# Patient Record
Sex: MALE | Race: BLACK OR AFRICAN AMERICAN
[De-identification: names, ages, dates, MRNs, and addresses within clinical notes are randomized per-mention and may not be internally consistent; named-entity substitution may affect disease eponyms.]

---

## 2017-03-30 ENCOUNTER — APPOINTMENT (OUTPATIENT)
Dept: PULMONOLOGY | Facility: CLINIC | Age: 57
End: 2017-03-30

## 2017-03-30 VITALS
DIASTOLIC BLOOD PRESSURE: 90 MMHG | WEIGHT: 315 LBS | RESPIRATION RATE: 14 BRPM | BODY MASS INDEX: 41.75 KG/M2 | OXYGEN SATURATION: 97 % | HEIGHT: 73 IN | SYSTOLIC BLOOD PRESSURE: 132 MMHG | TEMPERATURE: 98.2 F | HEART RATE: 105 BPM

## 2017-04-28 ENCOUNTER — APPOINTMENT (OUTPATIENT)
Dept: SLEEP CENTER | Facility: CLINIC | Age: 57
End: 2017-04-28

## 2017-04-28 ENCOUNTER — OUTPATIENT (OUTPATIENT)
Dept: OUTPATIENT SERVICES | Facility: HOSPITAL | Age: 57
LOS: 1 days | End: 2017-04-28
Payer: COMMERCIAL

## 2017-04-28 PROCEDURE — G0399: CPT

## 2017-05-02 DIAGNOSIS — G47.33 OBSTRUCTIVE SLEEP APNEA (ADULT) (PEDIATRIC): ICD-10-CM

## 2019-01-30 ENCOUNTER — OFFICE VISIT (OUTPATIENT)
Dept: FAMILY MEDICINE CLINIC | Facility: CLINIC | Age: 59
End: 2019-01-30
Payer: COMMERCIAL

## 2019-01-30 ENCOUNTER — TELEPHONE (OUTPATIENT)
Dept: FAMILY MEDICINE CLINIC | Facility: CLINIC | Age: 59
End: 2019-01-30

## 2019-01-30 VITALS
RESPIRATION RATE: 16 BRPM | DIASTOLIC BLOOD PRESSURE: 78 MMHG | SYSTOLIC BLOOD PRESSURE: 134 MMHG | BODY MASS INDEX: 40.02 KG/M2 | WEIGHT: 302 LBS | TEMPERATURE: 98.9 F | OXYGEN SATURATION: 99 % | HEIGHT: 73 IN | HEART RATE: 104 BPM

## 2019-01-30 DIAGNOSIS — E08.43 DIABETIC AUTONOMIC NEUROPATHY ASSOCIATED WITH DIABETES MELLITUS DUE TO UNDERLYING CONDITION (HCC): ICD-10-CM

## 2019-01-30 DIAGNOSIS — E11.42 TYPE 2 DIABETES MELLITUS WITH DIABETIC POLYNEUROPATHY, WITH LONG-TERM CURRENT USE OF INSULIN (HCC): Primary | ICD-10-CM

## 2019-01-30 DIAGNOSIS — G47.30 SLEEP APNEA, UNSPECIFIED TYPE: ICD-10-CM

## 2019-01-30 DIAGNOSIS — E11.9 TYPE 2 DIABETES MELLITUS TREATED WITH INSULIN (HCC): Primary | ICD-10-CM

## 2019-01-30 DIAGNOSIS — G62.9 POLYNEUROPATHY: ICD-10-CM

## 2019-01-30 DIAGNOSIS — Z12.5 PROSTATE CANCER SCREENING: ICD-10-CM

## 2019-01-30 DIAGNOSIS — Z13.220 NEED FOR LIPID SCREENING: ICD-10-CM

## 2019-01-30 DIAGNOSIS — Z79.4 TYPE 2 DIABETES MELLITUS WITH DIABETIC POLYNEUROPATHY, WITH LONG-TERM CURRENT USE OF INSULIN (HCC): Primary | ICD-10-CM

## 2019-01-30 DIAGNOSIS — I50.22 CHRONIC SYSTOLIC CONGESTIVE HEART FAILURE (HCC): ICD-10-CM

## 2019-01-30 DIAGNOSIS — Z79.4 TYPE 2 DIABETES MELLITUS TREATED WITH INSULIN (HCC): Primary | ICD-10-CM

## 2019-01-30 DIAGNOSIS — E11.8 TYPE 2 DIABETES MELLITUS WITH COMPLICATION, WITHOUT LONG-TERM CURRENT USE OF INSULIN (HCC): Primary | ICD-10-CM

## 2019-01-30 DIAGNOSIS — Z11.59 NEED FOR HEPATITIS C SCREENING TEST: ICD-10-CM

## 2019-01-30 PROCEDURE — 36416 COLLJ CAPILLARY BLOOD SPEC: CPT | Performed by: FAMILY MEDICINE

## 2019-01-30 PROCEDURE — 83036 HEMOGLOBIN GLYCOSYLATED A1C: CPT | Performed by: FAMILY MEDICINE

## 2019-01-30 PROCEDURE — 99204 OFFICE O/P NEW MOD 45 MIN: CPT | Performed by: FAMILY MEDICINE

## 2019-01-30 RX ORDER — LANCETS 33 GAUGE
EACH MISCELLANEOUS 3 TIMES DAILY
Qty: 100 EACH | Refills: 1 | Status: SHIPPED | OUTPATIENT
Start: 2019-01-30 | End: 2019-08-04 | Stop reason: SDUPTHER

## 2019-01-30 RX ORDER — FUROSEMIDE 40 MG/1
40 TABLET ORAL 3 TIMES DAILY
Refills: 0 | COMMUNITY
Start: 2019-01-25 | End: 2019-03-05 | Stop reason: SDUPTHER

## 2019-01-30 RX ORDER — POTASSIUM CHLORIDE 750 MG/1
10 TABLET, EXTENDED RELEASE ORAL 2 TIMES DAILY WITH MEALS
Refills: 0 | COMMUNITY
Start: 2019-01-25 | End: 2019-03-05 | Stop reason: SDUPTHER

## 2019-01-30 RX ORDER — NAPROXEN SODIUM 220 MG
220 TABLET ORAL 2 TIMES DAILY WITH MEALS
COMMUNITY

## 2019-01-30 NOTE — PROGRESS NOTES
Assessment/Plan:    No problem-specific Assessment & Plan notes found for this encounter  Diagnoses and all orders for this visit:    Type 2 diabetes mellitus with diabetic polyneuropathy, with long-term current use of insulin ([place] [address] )  I had a long discussion with patient in regards to diabetes pathophysiology, medications and treatment  We also had a long discussion in regards to lifestyle modifications including diet  After discussing risks and benefits with patient and given his very elevated blood glucose values will start insulin at this time Lantus 10 units at bedtime  He was advised to measure his blood glucose fasting in the morning daily as well as before meals  He was counseled in regards to medication adherence and compliance  -     POCT hemoglobin A1c, 14  -     Comprehensive metabolic panel; Future  -     Microalbumin / creatinine urine ratio  -     insulin detemir (LEVEMIR) 100 units/mL subcutaneous injection; Inject 10 Units under the skin daily at bedtime for 30 days  -     Insulin Pen Needle 32G X 4 MM MISC; by Does not apply route daily for 30 days  -     Glucometer  -     Glucometer test strips  -     LANCETS MICRO THIN 33G MISC; by Does not apply route 3 (three) times a day for 30 days    Chronic systolic congestive heart failure ([place] [address] )  Referred to cardiology  -     Ambulatory referral to Cardiology; Future    Need for lipid screening  -     Lipid panel; Future    Prostate cancer screening  -     PSA, Total Screen; Future    Need for hepatitis C screening test  -     Hepatitis C antibody; Future    Diabetic autonomic neuropathy associated with diabetes mellitus due to underlying condition ([place] [address] )  -     EMG 2 limb lower extremity; Future  -     VAS chapis single level; Future    Sleep apnea, unspecified type  Continue C-pap machine  Other orders  -     furosemide (LASIX) 40 mg tablet; Take 40 mg by mouth 3 (three) times a day  -     potassium chloride (K-DUR,KLOR-CON) 10 mEq tablet;  Take 10 mEq by mouth 2 (two) times a day with meals  -     naproxen sodium (ALEVE) 220 MG tablet; Take 220 mg by mouth 2 (two) times a day with meals      Follow up in 1-2 weeks    Subjective:      Patient ID: Ana Mueller is a 60 y o  male  Patient is here to establish care  He has a history of congestive heart failure per chart review of his old records  He recently moved from [facility] and used to get medical care at [facility] ([place]  Of he had an echo done in 2017, May which showed ejection fraction of 16%  He does have shortness of breath on exertion more so when he goes up a flight of stairs  He denies any shortness of breath at rest  He denies any chronic cough or any leg swelling at this time  He would like to be referred to a cardiologist at this time  He denies any chest pains at this time  He also has a history of type 2 diabetes mellitus  He has last hemoglobin A1c noted per chart review done in 2017 was 9 5  He is not currently taking any medications at this time  He is not following a low carbohydrate diet and likes his sweets with every meal per patient  He has a history of smoking for 20 years 1 pack per day  He has quit smoking 20 years ago  He has a history of numbness and tingling of both feet and lower extremities bilaterally  He also has sleep apnea and uses CPAP machine daily  The following portions of the patient's history were reviewed and updated as appropriate:   He  has a past medical history of Diverticulitis of colon and Heart disease    He   Patient Active Problem List    Diagnosis Date Noted    Type 2 diabetes mellitus with diabetic polyneuropathy, with long-term current use of insulin ([facility] ) 01/30/2019    Chronic systolic congestive heart failure ([facility] ) 01/30/2019    Need for lipid screening 01/30/2019    Prostate cancer screening 01/30/2019    Need for hepatitis C screening test 01/30/2019    Diabetic autonomic neuropathy associated with diabetes mellitus due to underlying condition ([facility] ) 01/30/2019    Sleep apnea 01/30/2019     He  has a past surgical history that includes Hernia repair  His family history includes Cancer in his maternal grandmother and sister; Leukemia in his mother; No Known Problems in his father  He  reports that he has quit smoking  He has quit using smokeless tobacco  He reports that he does not drink alcohol or use drugs  Current Outpatient Prescriptions   Medication Sig Dispense Refill    furosemide (LASIX) 40 mg tablet Take 40 mg by mouth 3 (three) times a day  0    naproxen sodium (ALEVE) 220 MG tablet Take 220 mg by mouth 2 (two) times a day with meals      potassium chloride (K-DUR,KLOR-CON) 10 mEq tablet Take 10 mEq by mouth 2 (two) times a day with meals  0    insulin detemir (LEVEMIR) 100 units/mL subcutaneous injection Inject 10 Units under the skin daily at bedtime for 30 days 300 Units 1    Insulin Pen Needle 32G X 4 MM MISC by Does not apply route daily for 30 days 30 each 1    LANCETS MICRO THIN 33G MISC by Does not apply route 3 (three) times a day for 30 days 100 each 1     No current facility-administered medications for this visit  No current outpatient prescriptions on file prior to visit  No current facility-administered medications on file prior to visit  He has No Known Allergies       Review of Systems   Constitutional: Negative for activity change, appetite change, fatigue and fever  HENT: Negative for congestion and ear discharge  Respiratory: Positive for apnea and shortness of breath  Negative for cough  Cardiovascular: Negative for chest pain and palpitations  Gastrointestinal: Negative for diarrhea and nausea  Musculoskeletal: Negative for arthralgias and back pain  Skin: Negative for color change and rash  Neurological: Positive for numbness  Negative for dizziness and headaches  Psychiatric/Behavioral: Negative for agitation and behavioral problems  Objective:      /78 (BP Location: Right arm, Patient Position: Sitting, Cuff Size: Adult)   Pulse 104   Temp 98 9 °F (37 2 °C) (Tympanic)   Resp 16   Ht 6' 1" (1 854 m)   Wt (!) 137 kg (302 lb)   SpO2 99%   BMI 39 84 kg/m²          Physical Exam   Constitutional: He is oriented to person, place, and time  He appears well-developed and well-nourished  No distress  Eyes: Pupils are equal, round, and reactive to light  No scleral icterus  Cardiovascular: Normal rate, regular rhythm and normal heart sounds  No murmur heard  Pulmonary/Chest: Effort normal and breath sounds normal  No respiratory distress  He has no wheezes  Abdominal: Soft  Bowel sounds are normal  He exhibits no distension  There is no tenderness  Neurological: He is alert and oriented to person, place, and time  Skin: Skin is warm and dry  No rash noted  He is not diaphoretic  Psychiatric: He has a normal mood and affect

## 2019-01-30 NOTE — TELEPHONE ENCOUNTER
Pharmacy called and accu check monitor is only one that is covered, placed the orders for you  Also levemir is not covered but [employer] is  Send to [facility]

## 2019-02-04 ENCOUNTER — APPOINTMENT (OUTPATIENT)
Dept: LAB | Facility: CLINIC | Age: 59
End: 2019-02-04
Payer: COMMERCIAL

## 2019-02-04 DIAGNOSIS — Z13.220 NEED FOR LIPID SCREENING: ICD-10-CM

## 2019-02-04 DIAGNOSIS — Z79.4 TYPE 2 DIABETES MELLITUS WITH DIABETIC POLYNEUROPATHY, WITH LONG-TERM CURRENT USE OF INSULIN (HCC): ICD-10-CM

## 2019-02-04 DIAGNOSIS — E11.42 TYPE 2 DIABETES MELLITUS WITH DIABETIC POLYNEUROPATHY, WITH LONG-TERM CURRENT USE OF INSULIN (HCC): ICD-10-CM

## 2019-02-04 DIAGNOSIS — Z11.59 NEED FOR HEPATITIS C SCREENING TEST: ICD-10-CM

## 2019-02-04 DIAGNOSIS — Z12.5 PROSTATE CANCER SCREENING: ICD-10-CM

## 2019-02-04 LAB
ALBUMIN SERPL BCP-MCNC: 3.8 G/DL (ref 3.5–5)
ALP SERPL-CCNC: 69 U/L (ref 46–116)
ALT SERPL W P-5'-P-CCNC: 22 U/L (ref 12–78)
ANION GAP SERPL CALCULATED.3IONS-SCNC: 7 MMOL/L (ref 4–13)
AST SERPL W P-5'-P-CCNC: 13 U/L (ref 5–45)
BILIRUB SERPL-MCNC: 0.56 MG/DL (ref 0.2–1)
BUN SERPL-MCNC: 13 MG/DL (ref 5–25)
CALCIUM SERPL-MCNC: 8.9 MG/DL (ref 8.3–10.1)
CHLORIDE SERPL-SCNC: 99 MMOL/L (ref 100–108)
CHOLEST SERPL-MCNC: 226 MG/DL (ref 50–200)
CO2 SERPL-SCNC: 29 MMOL/L (ref 21–32)
CREAT SERPL-MCNC: 0.9 MG/DL (ref 0.6–1.3)
CREAT UR-MCNC: 169 MG/DL
GFR SERPL CREATININE-BSD FRML MDRD: 93 ML/MIN/1.73SQ M
GLUCOSE SERPL-MCNC: 297 MG/DL (ref 65–140)
HDLC SERPL-MCNC: 44 MG/DL (ref 40–60)
LDLC SERPL CALC-MCNC: 164 MG/DL (ref 0–100)
MICROALBUMIN UR-MCNC: 13.3 MG/L (ref 0–20)
MICROALBUMIN/CREAT 24H UR: 8 MG/G CREATININE (ref 0–30)
NONHDLC SERPL-MCNC: 182 MG/DL
POTASSIUM SERPL-SCNC: 3.8 MMOL/L (ref 3.5–5.3)
PROT SERPL-MCNC: 7.8 G/DL (ref 6.4–8.2)
PSA SERPL-MCNC: 0.6 NG/ML (ref 0–4)
SODIUM SERPL-SCNC: 135 MMOL/L (ref 136–145)
TRIGL SERPL-MCNC: 92 MG/DL

## 2019-02-04 PROCEDURE — 86803 HEPATITIS C AB TEST: CPT

## 2019-02-04 PROCEDURE — 80061 LIPID PANEL: CPT

## 2019-02-04 PROCEDURE — 80053 COMPREHEN METABOLIC PANEL: CPT

## 2019-02-04 PROCEDURE — 82570 ASSAY OF URINE CREATININE: CPT | Performed by: FAMILY MEDICINE

## 2019-02-04 PROCEDURE — 3061F NEG MICROALBUMINURIA REV: CPT | Performed by: FAMILY MEDICINE

## 2019-02-04 PROCEDURE — 36415 COLL VENOUS BLD VENIPUNCTURE: CPT

## 2019-02-04 PROCEDURE — 82043 UR ALBUMIN QUANTITATIVE: CPT | Performed by: FAMILY MEDICINE

## 2019-02-04 PROCEDURE — G0103 PSA SCREENING: HCPCS

## 2019-02-05 LAB — HCV AB SER QL: NORMAL

## 2019-02-06 ENCOUNTER — CONSULT (OUTPATIENT)
Dept: CARDIOLOGY CLINIC | Facility: CLINIC | Age: 59
End: 2019-02-06
Payer: COMMERCIAL

## 2019-02-06 VITALS
HEART RATE: 103 BPM | RESPIRATION RATE: 18 BRPM | HEIGHT: 73 IN | BODY MASS INDEX: 41.08 KG/M2 | SYSTOLIC BLOOD PRESSURE: 134 MMHG | DIASTOLIC BLOOD PRESSURE: 76 MMHG | OXYGEN SATURATION: 94 % | WEIGHT: 310 LBS

## 2019-02-06 DIAGNOSIS — I50.22 CHRONIC SYSTOLIC CONGESTIVE HEART FAILURE (HCC): ICD-10-CM

## 2019-02-06 DIAGNOSIS — Z76.89 ENCOUNTER TO ESTABLISH CARE: Primary | ICD-10-CM

## 2019-02-06 DIAGNOSIS — G47.30 SLEEP APNEA: ICD-10-CM

## 2019-02-06 PROCEDURE — 99245 OFF/OP CONSLTJ NEW/EST HI 55: CPT | Performed by: INTERNAL MEDICINE

## 2019-02-06 PROCEDURE — 4010F ACE/ARB THERAPY RXD/TAKEN: CPT | Performed by: FAMILY MEDICINE

## 2019-02-06 PROCEDURE — 93000 ELECTROCARDIOGRAM COMPLETE: CPT | Performed by: INTERNAL MEDICINE

## 2019-02-06 RX ORDER — METOPROLOL SUCCINATE 25 MG/1
25 TABLET, EXTENDED RELEASE ORAL DAILY
Qty: 60 TABLET | Refills: 3 | Status: SHIPPED | OUTPATIENT
Start: 2019-02-06

## 2019-02-06 RX ORDER — LOSARTAN POTASSIUM 25 MG/1
25 TABLET ORAL DAILY
Qty: 60 TABLET | Refills: 3 | Status: SHIPPED | OUTPATIENT
Start: 2019-02-06

## 2019-02-06 NOTE — PROGRESS NOTES
Cardiology Outpatient Follow up Note    Ramiro Anderson 60 y o  male MRN: [id number]    02/06/19          Assessment/Plan:  1  Chest pain- catheterization at [facility] reportedly negative for obstructive CAD in 2017  Given significant risk factors will workup with a pharmacologic nuclear stress test      2  Chronic systolic heart failure-   · He is currently euvolemic  Cont lasix 40mg TID  · Na and fluid restriction was advised  · Current weight is 310lbs    3  NICM with EF: 20-25%  · Was previously non-complaint with lisinopril as he felt it gave him a headache  · Start losartan and toprol-xl   · Check echocardiogram  Previous ischemic workup was negative at [facility] in [place]  · Cardiomyopathy was likely related to significant prior alcohol use vs  Uncontrolled hypertension    4  Hypertension  · Start losartan and toprol  Cont lasix     5  CHELSIE on CPAP    6  DM2    7  HLD- refuses to take statins; states he previously experienced vision troubles  Follow up: 2 months    1  Encounter to establish care  POCT ECG   2  Chronic systolic congestive heart failure [facility])  Ambulatory referral to Cardiology       HPI: Ramiro Anderson is a 60y o  year old male with history of hypertension, morbid obesity, diabetes, sleep apnea, and nonischemic cardiomyopathy who presents to establish care  He recently moved to this area from [place]  He has a history of nonischemic cardiomyopathy with EF: 20-25% and was previously worked up with a cardiac catheterization that was reportedly negative for obstructive epicardial CAD  He was started on lisinopril by his prior cardiologist however was noncompliant due to reported headaches  Additionally he was supposed to started carvedilol however it was never initiated  He is skeptical about taking medications and prefers natural remedies  He has been on Lasix 40 mg p o  t i d  for volume management and denies any current dyspnea on exertion or lower extremity edema  He does however complain of substernal central chest discomfort that has been intermittent over the past few weeks  He states that initially felt like gas however its resolution has inconsistent  He has obstructive sleep apnea and has been compliant with CPAP  Additionally he has hyperlipidemia however refuses to take statin therapy  He states that he tried lipid lowering therapy in the past however it caused vision disturbances and discontinued it  He denies any other concerns at this time  Echocardiogram:(05/18/2017) Severely abnormal left ventricular ejection fraction estimated at 20-25%  The left ventricle is moderately dilated in size  The left ventricular wall thickness is mildly increased  There is mild mitral regurgitation  Aortic valve sclerosis without significant aortic stenosis  There is mild pulmonary hypertension  Normal pericardium without effusion  Pharmacologic Nuclear Stress Test: (05/18/2017-[facility]) Left ventricular cavity is severely enlarged at rest and is unchanged with stress  Reduced left ventricular systolic function  Post Stress LV EF: 27 %  Normal myocardial perfusion sestamibi SPECT imaging after pharmacologic vasodilatation  Overall left ventricular function was abnormal with global hypokinesis  Family History: denies family history of heart disease    Social history: prior alcohol use- drank 3 shots of whiskey daily; quit in 2017; quit smoking 20 years ago         Patient Active Problem List   Diagnosis    Type 2 diabetes mellitus with diabetic polyneuropathy, with long-term current use of insulin (HCC)    Chronic systolic congestive heart failure ([facility] [address] )    Need for lipid screening    Prostate cancer screening    Need for hepatitis C screening test    Diabetic autonomic neuropathy associated with diabetes mellitus due to underlying condition ([facility] )    Sleep apnea       No Known Allergies      Current Outpatient Prescriptions:     Blood Glucose Monitoring Suppl (ACCU-CHEK MELISA) device, TEST TWICE A DAY, Disp: 1 each, Rfl: 0    furosemide (LASIX) 40 mg tablet, Take 40 mg by mouth 3 (three) times a day, Disp: , Rfl: 0    glucose blood (ACCU-CHEK MELISA PLUS) test strip, TEST TWICE A DAY, ALSO DISPENSE LANCETS #100 TWICE A DAY WITH 3 REFILLS, Disp: 100 each, Rfl: 3    insulin detemir (LEVEMIR) 100 units/mL subcutaneous injection, Inject 10 Units under the skin daily at bedtime for 30 days, Disp: 300 Units, Rfl: 1    insulin glargine (BASAGLAR KWIKPEN) 100 units/mL injection pen, Inject 10 Units under the skin daily at bedtime, Disp: 5 pen, Rfl: 2    Insulin Pen Needle 32G X 4 MM MISC, by Does not apply route daily for 30 days, Disp: 30 each, Rfl: 1    LANCETS MICRO THIN 33G MISC, by Does not apply route 3 (three) times a day for 30 days, Disp: 100 each, Rfl: 1    naproxen sodium (ALEVE) 220 MG tablet, Take 220 mg by mouth 2 (two) times a day with meals, Disp: , Rfl:     potassium chloride (K-DUR,KLOR-CON) 10 mEq tablet, Take 10 mEq by mouth 2 (two) times a day with meals, Disp: , Rfl: 0    Past Medical History:   Diagnosis Date    Diverticulitis of colon     Heart disease        Family History   Problem Relation Age of Onset    Leukemia Mother     No Known Problems Father     Cancer Sister     Cancer Maternal Grandmother        Past Surgical History:   Procedure Laterality Date    HERNIA REPAIR         Social History     Social History    Marital status: Single     Spouse name: N/A    Number of children: N/A    Years of education: N/A     Occupational History    Not on file  Social History Main Topics    Smoking status: Former Smoker    Smokeless tobacco: Former User    Alcohol use No    Drug use: No    Sexual activity: Not on file     Other Topics Concern    Not on file     Social History Narrative    No narrative on file       Review of Systems   Constitution: Negative for diaphoresis, weight gain and weight loss     HENT: Negative for congestion  Cardiovascular: Positive for chest pain  Negative for dyspnea on exertion, irregular heartbeat, leg swelling, near-syncope, orthopnea, palpitations, paroxysmal nocturnal dyspnea and syncope  Respiratory: Negative for shortness of breath, sleep disturbances due to breathing and snoring  Hematologic/Lymphatic: Does not bruise/bleed easily  Skin: Negative for rash  Musculoskeletal: Negative for myalgias  Gastrointestinal: Negative for nausea and vomiting  Neurological: Negative for excessive daytime sleepiness and light-headedness  Psychiatric/Behavioral: The patient is not nervous/anxious  Vitals: /76   Pulse 103   Resp 18   Ht 6' 1" (1 854 m)   Wt (!) 141 kg (310 lb)   SpO2 94%   BMI 40 90 kg/m²       Physical Exam:     GEN: Alert and oriented x 3, in no acute distress  Well appearing and well nourished  HEENT: Sclera anicteric, conjunctivae pink, mucous membranes moist  Oropharynx clear  NECK: Supple, no carotid bruits, no significant JVD  Trachea midline, no thyromegaly  HEART: Regular rhythm, normal S1 and S2, 2/6 MARIA R right upper sternal border, no clicks, gallops or rubs  PMI nondisplaced, no thrills  LUNGS: Clear to auscultation bilaterally; no wheezes, rales, or rhonchi  No increased work of breathing or signs of respiratory distress  ABDOMEN: Soft, nontender, nondistended, normoactive bowel sounds  EXTREMITIES: Skin warm and well perfused, no clubbing, cyanosis, or edema  NEURO: No focal findings  Normal speech  Mood and affect normal    SKIN: Normal without suspicious lesions on exposed skin        Lab Results:       Lab Results   Component Value Date    HGBA1C  01/30/2019      Comment:      14 2     No results found for: CHOL  Lab Results   Component Value Date    HDL 44 02/04/2019     Lab Results   Component Value Date    LDLCALC 164 (H) 02/04/2019     Lab Results   Component Value Date    TRIG 92 02/04/2019     No results found for: CHOLHDL

## 2019-02-08 ENCOUNTER — TELEPHONE (OUTPATIENT)
Dept: CARDIOLOGY CLINIC | Facility: CLINIC | Age: 59
End: 2019-02-08

## 2019-02-08 DIAGNOSIS — E11.9 TYPE 2 DIABETES MELLITUS WITHOUT COMPLICATION, WITH LONG-TERM CURRENT USE OF INSULIN (HCC): Primary | ICD-10-CM

## 2019-02-08 DIAGNOSIS — Z79.4 TYPE 2 DIABETES MELLITUS WITHOUT COMPLICATION, WITH LONG-TERM CURRENT USE OF INSULIN (HCC): Primary | ICD-10-CM

## 2019-02-08 RX ORDER — BLOOD GLUCOSE CONTROL HIGH,LOW
EACH MISCELLANEOUS ONCE
Qty: 2 EACH | Refills: 0 | Status: SHIPPED | OUTPATIENT
Start: 2019-02-08 | End: 2019-07-23

## 2019-02-08 NOTE — TELEPHONE ENCOUNTER
Patient called and stated that he needs the solution for the glucose meter - never got it with the device

## 2019-02-08 NOTE — TELEPHONE ENCOUNTER
S/w Barbara from [facility], she stated there needs to be a peer to peer for pt's nuc stress @ [phone] opt 3   Pt's cath report has not been obtained from [facility]     Tracking# [id number]

## 2019-02-19 ENCOUNTER — OFFICE VISIT (OUTPATIENT)
Dept: FAMILY MEDICINE CLINIC | Facility: CLINIC | Age: 59
End: 2019-02-19
Payer: COMMERCIAL

## 2019-02-19 ENCOUNTER — TELEPHONE (OUTPATIENT)
Dept: FAMILY MEDICINE CLINIC | Facility: CLINIC | Age: 59
End: 2019-02-19

## 2019-02-19 VITALS
HEIGHT: 73 IN | OXYGEN SATURATION: 97 % | BODY MASS INDEX: 41.11 KG/M2 | DIASTOLIC BLOOD PRESSURE: 80 MMHG | WEIGHT: 310.2 LBS | TEMPERATURE: 99.4 F | HEART RATE: 96 BPM | SYSTOLIC BLOOD PRESSURE: 138 MMHG

## 2019-02-19 DIAGNOSIS — E11.9 TYPE 2 DIABETES MELLITUS TREATED WITH INSULIN (HCC): ICD-10-CM

## 2019-02-19 DIAGNOSIS — Z79.4 TYPE 2 DIABETES MELLITUS TREATED WITH INSULIN (HCC): ICD-10-CM

## 2019-02-19 DIAGNOSIS — Z79.4 TYPE 2 DIABETES MELLITUS WITH DIABETIC POLYNEUROPATHY, WITH LONG-TERM CURRENT USE OF INSULIN (HCC): Primary | ICD-10-CM

## 2019-02-19 DIAGNOSIS — E11.42 TYPE 2 DIABETES MELLITUS WITH DIABETIC POLYNEUROPATHY, WITH LONG-TERM CURRENT USE OF INSULIN (HCC): Primary | ICD-10-CM

## 2019-02-19 PROCEDURE — 99214 OFFICE O/P EST MOD 30 MIN: CPT | Performed by: FAMILY MEDICINE

## 2019-02-19 PROCEDURE — 3008F BODY MASS INDEX DOCD: CPT | Performed by: FAMILY MEDICINE

## 2019-02-19 NOTE — PROGRESS NOTES
Assessment/Plan:    No problem-specific Assessment & Plan notes found for this encounter  Diagnoses and all orders for this visit:    Type 2 diabetes mellitus with diabetic polyneuropathy, with long-term current use of insulin ([address] )  Type 2 diabetes mellitus treated with insulin ([address] )  I had a long discussion with patient in regards to lifestyle modification including diet and decreasing carbohydrates such as potatoes, rice and pasta and other sources of carbohydrates  After discussing risks and benefits of medication effects will increase basaglar to 20 units at bedtime  Advised to continue measuring blood sugars daily  Follow up in 1 month        Subjective:      Patient ID: Selina Burgess is a 60 y o  male  Diabetes Mellitus  Patient presents for follow up of diabetes  Current symptoms include: hyperglycemia  Symptoms have been well-controlled  Patient denies increased appetite, nausea, paresthesia of the feet, polydipsia, polyuria and visual disturbances  Evaluation to date has included: fasting blood sugar and hemoglobin A1C  Home sugars: BGs have been labile ranging between 200 and 300 mg/dl  Current treatment: Continued insulin which has not been very effective  The following portions of the patient's history were reviewed and updated as appropriate:   He  has a past medical history of Diverticulitis of colon and Heart disease    He   Patient Active Problem List    Diagnosis Date Noted    Type 2 diabetes mellitus with diabetic polyneuropathy, with long-term current use of insulin ([facility] [address] ) 01/30/2019    Chronic systolic congestive heart failure ([address] ) 01/30/2019    Need for lipid screening 01/30/2019    Prostate cancer screening 01/30/2019    Need for hepatitis C screening test 01/30/2019    Diabetic autonomic neuropathy associated with diabetes mellitus due to underlying condition ([address] ) 01/30/2019    Sleep apnea 01/30/2019     He  has a past surgical history that includes Hernia repair  His family history includes Cancer in his maternal grandmother and sister; Leukemia in his mother; No Known Problems in his father  He  reports that he has quit smoking  He has quit using smokeless tobacco  He reports that he does not drink alcohol or use drugs  Current Outpatient Medications   Medication Sig Dispense Refill    Blood Glucose Monitoring Suppl (ACCU-CHEK MELISA) device TEST TWICE A DAY 1 each 0    furosemide (LASIX) 40 mg tablet Take 40 mg by mouth 3 (three) times a day  0    glucose blood (ACCU-CHEK MELISA PLUS) test strip TEST TWICE A DAY, ALSO DISPENSE LANCETS #100 TWICE A DAY WITH 3 REFILLS 100 each 3    insulin glargine (BASAGLAR KWIKPEN) 100 units/mL injection pen Inject 10 Units under the skin daily at bedtime 5 pen 2    Insulin Pen Needle 32G X 4 MM MISC by Does not apply route daily for 30 days 30 each 1    LANCETS MICRO THIN 33G MISC by Does not apply route 3 (three) times a day for 30 days 100 each 1    naproxen sodium (ALEVE) 220 MG tablet Take 220 mg by mouth 2 (two) times a day with meals      potassium chloride (K-DUR,KLOR-CON) 10 mEq tablet Take 10 mEq by mouth 2 (two) times a day with meals  0    Blood Glucose Calibration (ACCU-CHEK MELISA) SOLN by In Vitro route once for 1 dose 2 each 0    insulin detemir (LEVEMIR) 100 units/mL subcutaneous injection Inject 10 Units under the skin daily at bedtime for 30 days (Patient not taking: Reported on 2/19/2019) 300 Units 1    losartan (COZAAR) 25 mg tablet Take 1 tablet (25 mg total) by mouth daily (Patient not taking: Reported on 2/19/2019) 60 tablet 3    metoprolol succinate (TOPROL-XL) 25 mg 24 hr tablet Take 1 tablet (25 mg total) by mouth daily (Patient not taking: Reported on 2/19/2019) 60 tablet 3     No current facility-administered medications for this visit        Current Outpatient Medications on File Prior to Visit   Medication Sig    Blood Glucose Monitoring Suppl (ACCU-CHEK MELISA) device TEST TWICE A DAY    furosemide (LASIX) 40 mg tablet Take 40 mg by mouth 3 (three) times a day    glucose blood (ACCU-CHEK MELISA PLUS) test strip TEST TWICE A DAY, ALSO DISPENSE LANCETS #100 TWICE A DAY WITH 3 REFILLS    insulin glargine (BASAGLAR KWIKPEN) 100 units/mL injection pen Inject 10 Units under the skin daily at bedtime    Insulin Pen Needle 32G X 4 MM MISC by Does not apply route daily for 30 days    LANCETS MICRO THIN 33G MISC by Does not apply route 3 (three) times a day for 30 days    naproxen sodium (ALEVE) 220 MG tablet Take 220 mg by mouth 2 (two) times a day with meals    potassium chloride (K-DUR,KLOR-CON) 10 mEq tablet Take 10 mEq by mouth 2 (two) times a day with meals    Blood Glucose Calibration (ACCU-CHEK MELISA) SOLN by In Vitro route once for 1 dose    insulin detemir (LEVEMIR) 100 units/mL subcutaneous injection Inject 10 Units under the skin daily at bedtime for 30 days (Patient not taking: Reported on 2/19/2019)    losartan (COZAAR) 25 mg tablet Take 1 tablet (25 mg total) by mouth daily (Patient not taking: Reported on 2/19/2019)    metoprolol succinate (TOPROL-XL) 25 mg 24 hr tablet Take 1 tablet (25 mg total) by mouth daily (Patient not taking: Reported on 2/19/2019)     No current facility-administered medications on file prior to visit  He has No Known Allergies       Review of Systems   Constitutional: Negative for activity change, appetite change, fatigue and fever  HENT: Negative for congestion and ear discharge  Respiratory: Negative for cough and shortness of breath  Cardiovascular: Negative for chest pain and palpitations  Gastrointestinal: Negative for diarrhea and nausea  Musculoskeletal: Negative for arthralgias and back pain  Skin: Negative for color change and rash  Neurological: Negative for dizziness and headaches  Psychiatric/Behavioral: Negative for agitation and behavioral problems           Objective:      /80   Pulse 96   Temp 99 4 °F (37 4 °C) (Tympanic)   Ht 6' 1" (1 854 m)   Wt (!) 141 kg (310 lb 3 2 oz)   SpO2 97%   BMI 40 93 kg/m²          Physical Exam   Constitutional: He is oriented to person, place, and time  He appears well-developed and well-nourished  No distress  Eyes: Pupils are equal, round, and reactive to light  No scleral icterus  Cardiovascular: Normal rate, regular rhythm and normal heart sounds  No murmur heard  Pulmonary/Chest: Effort normal and breath sounds normal  No respiratory distress  He has no wheezes  Abdominal: Soft  Bowel sounds are normal  He exhibits no distension  There is no tenderness  Neurological: He is alert and oriented to person, place, and time  Skin: Skin is warm and dry  No rash noted  He is not diaphoretic  Psychiatric: He has a normal mood and affect

## 2019-02-26 ENCOUNTER — HOSPITAL ENCOUNTER (OUTPATIENT)
Dept: NEUROLOGY | Facility: CLINIC | Age: 59
Discharge: HOME | End: 2019-02-26
Payer: COMMERCIAL

## 2019-02-26 DIAGNOSIS — E08.43 DIABETIC AUTONOMIC NEUROPATHY ASSOCIATED WITH DIABETES MELLITUS DUE TO UNDERLYING CONDITION (HCC): ICD-10-CM

## 2019-02-26 PROCEDURE — 95910 NRV CNDJ TEST 7-8 STUDIES: CPT | Performed by: PHYSICAL MEDICINE & REHABILITATION

## 2019-02-26 PROCEDURE — 95886 MUSC TEST DONE W/N TEST COMP: CPT | Performed by: PHYSICAL MEDICINE & REHABILITATION

## 2019-02-26 NOTE — PROCEDURES
[facility] , [address], [address]  [phone]        Name: Cesar Quinonez  Patient ID: [id number]   Age: 60 Years 3 Months  YOB: 1960   Account Number:    Gender: Male   Technologist:    Date of Exam: 2/26/2019 11:09   Referring Physician: Nico Nazario MD  Temperature: 29   Examining Physician: Janki Bautista MD  Height:                 Patient History:   60 y o male with history of DM, with numbness in bilateral feet, for approximately 2 years  Referred for peripheral neuropathy evaluation  [address]      Nerve / Sites Muscle Latency Ref  Amplitude Ref  Duration Rel Amp Distance Lat Diff Velocity Ref  ms ms mV mV ms % mm ms m/s m/s   R Peroneal - EDB      Ankle EDB 5 47 ?5 50 5 5 ?2 0 7 08 100 80         Fib head EDB 15 00  3 8  8 70 69 9 350 9 53 37 ?40      Pop fossa EDB 17 45  3 4  8 80 88 4 100 2 45 41 ?40            11 98     L Peroneal - EDB      Ankle EDB 5 68 ?5 50 7 2 ?2 0 6 56 100 80         Fib head EDB 15 63  4 4  8 44 61 8 350 9 95 35 ?40      Pop fossa EDB 17 81  4 3  8 02 97 4 100 2 19 46 ?40            12 14     R Tibial - AH      Ankle AH 9 38 ?6 00 4 1 ?4 0 5 78 100 100         Pop fossa AH 21 67  2 6  9 27 62 4 350 12 29 28 ?40   L Tibial - AH      Ankle AH 10 26 ?6 00 2 0 ?4 0 7 86 100 100         Pop fossa AH 22 08  3 3  8 44 162 350 11 82 30 ?40       SNC      Nerve / Sites Rec  Site Onset Lat Peak Lat Ref  Amp Ref  Distance Velocity Ref  ms ms ms µV µV mm m/s m/s   R Sural - Ankle (Calf)      Calf Ankle NR NR ?4 00 NR ?6 0 140 NR ?40   L Sural - Ankle (Calf)      Calf Ankle NR NR ?4 00 NR ?6 0 140 NR ?40   R Superficial peroneal - Ankle      Lat leg Ankle NR NR ?3 50 NR ?6 0 100 NR ?40   L Superficial peroneal - Ankle      Lat leg Ankle NR NR ?3 50 NR ?6 0 100 NR ?40       EMG         Needle EMG Examination     Insertional Spontaneous MUAP   Muscle Nerve Roots Activity Fib PSW Fasc Dur   Amp Poly Config Recruitment   R  Vastus medialis Femoral L2-L4 Normal None None None Normal Normal None Normal Normal   R  Tibialis anterior Peroneal L4-L5 Normal None None None Normal Normal None Normal Normal   R  Gastrocnemius (Medial head) Tibial S1-S2 Normal None None None Normal Normal None Normal Normal   R  Peroneus longus Perineal L5-S1 Normal None None None Normal Normal None Normal Normal   R  Gluteus medius Superior gluteal L4-S1 Normal None None None Normal Normal None Normal Normal   R  Biceps femoris (short head) Sciatic (peroneal division) L5-S2 Normal None None None Normal Normal None Normal Normal   R  Abductor hallucis Tibial S1-S2 Normal None None None Normal Normal None Normal Normal   R  dorsal interosseous pedis Tibial S1-S2 Normal None None None Normal Normal None Normal Normal   L  Vastus medialis Femoral L2-L4 Normal None None None Normal Normal None Normal Normal   L  Tibialis anterior Peroneal L4-L5 Normal None None None Normal Normal None Normal Normal   L  Gastrocnemius (Medial head) Tibial S1-S2 Normal None None None Normal Normal None Normal Normal   L  Peroneus longus Perineal L5-S1 Normal None None None Normal Normal None Normal Normal   L  Gluteus medius Superior gluteal L4-S1 Normal None None None Normal Normal None Normal Normal   L  Biceps femoris (short head) Sciatic (peroneal division) L5-S2 Normal None None None Normal Normal None Normal Normal   L  Abductor hallucis Tibial S1-S2 Normal None None None Normal Normal None Normal Normal   L  dorsal interosseous pedis Tibial S1-S2 Normal None None None Normal Normal None Normal Normal         Findings:   Motor:  Left peroneal compound motor action potential (CMAP) to the extensor digitorum brevis demonstrated prolonged distal latency, normal amplitude, and normal conduction velocity across the fibular head  There was decreased conduction velocity across the lower leg       Left tibial compound motor action potential (CMAP) to the abductor hallucis demonstrated prolonged distal latency, decreased amplitude, and decreased conduction velocity across the lower leg  Right peroneal compound motor action potential (CMAP) to the extensor digitorum brevis demonstrated normal distal latency, normal amplitude, and normal conduction velocity across the fibular head  There was decreased conduction velocity across the lower leg  Right tibial compound motor action potential (CMAP) to the abductor hallucis demonstrated prolonged distal latency, normal amplitude, and decreased conduction velocity across the lower leg  Sensory:  Left sural sensory nerve action potential (SNAP) unobtainable  Left superficial peroneal sensory nerve action potential (SNAP) unobtainable  Right sural sensory nerve action potential (SNAP) unobtainable  Right superficial peroneal sensory nerve action potential (SNAP) unobtainable  EMG:  A disposable monopolar needle was used to study selected muscles in the left and right lower extremity including the tibialis anterior, medial gastrocnemius, peroneus longus, vastus medialis, biceps femoris, abductor hallucis, first dorsal interosseous pedis, and gluteus medius  All muscles tested demonstrated normal insertional activity, no abnormal spontaneous activity, and normal volitional motor unit action potentials  Patient declined needle examination of the lumbar paraspinals  Impression: Abnormal study  1  There is electrodiagnostic evidence of a large fiber peripheral sensory polyneuropathy  This may be consistent with clinical history of longstanding diabetes  2  Unable to rule out left and right tibial mononeuropathy across the ankle, in setting of significantly prolonged tibial motor latencies  This may be consistent with clinical diagnosis of tarsal tunnel syndrome; recommend clinical correlation  Unable to obtain medial and lateral plantar studies in setting of peripheral polyneuropathy    3  There is no electrodiagnostic evidence of a left or right peroneal entrapment mononeuropathy across the fibular head  4  There is no electrodiagnostic evidence of a left or right lumbosacral plexopathy or lumbar radiculopathy               ___________________________  Gabrielle Mckinnon MD

## 2019-03-05 DIAGNOSIS — I50.22 CHRONIC SYSTOLIC CONGESTIVE HEART FAILURE (HCC): Primary | ICD-10-CM

## 2019-03-05 RX ORDER — FUROSEMIDE 40 MG/1
40 TABLET ORAL 3 TIMES DAILY
Qty: 270 TABLET | Refills: 1 | Status: SHIPPED | OUTPATIENT
Start: 2019-03-05 | End: 2019-05-07 | Stop reason: SDUPTHER

## 2019-03-05 RX ORDER — POTASSIUM CHLORIDE 750 MG/1
10 TABLET, EXTENDED RELEASE ORAL 2 TIMES DAILY
Qty: 180 TABLET | Refills: 1 | Status: SHIPPED | OUTPATIENT
Start: 2019-03-05 | End: 2019-08-14 | Stop reason: SDUPTHER

## 2019-03-06 ENCOUNTER — HOSPITAL ENCOUNTER (OUTPATIENT)
Dept: NON INVASIVE DIAGNOSTICS | Facility: CLINIC | Age: 59
Discharge: HOME | End: 2019-03-06
Payer: COMMERCIAL

## 2019-03-06 DIAGNOSIS — I50.22 CHRONIC SYSTOLIC CONGESTIVE HEART FAILURE (HCC): ICD-10-CM

## 2019-03-06 PROCEDURE — 93306 TTE W/DOPPLER COMPLETE: CPT | Performed by: INTERNAL MEDICINE

## 2019-03-06 PROCEDURE — 93306 TTE W/DOPPLER COMPLETE: CPT

## 2019-03-07 ENCOUNTER — HOSPITAL ENCOUNTER (OUTPATIENT)
Dept: NON INVASIVE DIAGNOSTICS | Facility: CLINIC | Age: 59
Discharge: HOME | End: 2019-03-07
Payer: COMMERCIAL

## 2019-03-07 DIAGNOSIS — I50.22 CHRONIC SYSTOLIC CONGESTIVE HEART FAILURE (HCC): ICD-10-CM

## 2019-03-07 PROCEDURE — A9502 TC99M TETROFOSMIN: HCPCS

## 2019-03-07 PROCEDURE — 78452 HT MUSCLE IMAGE SPECT MULT: CPT

## 2019-03-07 PROCEDURE — 93018 CV STRESS TEST I&R ONLY: CPT | Performed by: INTERNAL MEDICINE

## 2019-03-07 PROCEDURE — 93017 CV STRESS TEST TRACING ONLY: CPT

## 2019-03-07 PROCEDURE — 78452 HT MUSCLE IMAGE SPECT MULT: CPT | Performed by: INTERNAL MEDICINE

## 2019-03-07 PROCEDURE — 93016 CV STRESS TEST SUPVJ ONLY: CPT | Performed by: INTERNAL MEDICINE

## 2019-03-07 RX ADMIN — REGADENOSON 0.4 MG: 0.08 INJECTION, SOLUTION INTRAVENOUS at 13:48

## 2019-03-08 LAB
ARRHY DURING EX: NORMAL
CHEST PAIN STATEMENT: NORMAL
MAX DIASTOLIC BP: 72 MMHG
MAX HEART RATE: 103 BPM
MAX PREDICTED HEART RATE: 161 BPM
MAX. SYSTOLIC BP: 126 MMHG
PROTOCOL NAME: NORMAL
REASON FOR TERMINATION: NORMAL
TARGET HR FORMULA: NORMAL
TEST INDICATION: NORMAL
TIME IN EXERCISE PHASE: NORMAL

## 2019-03-13 ENCOUNTER — CONSULT (OUTPATIENT)
Dept: PULMONOLOGY | Facility: CLINIC | Age: 59
End: 2019-03-13
Payer: COMMERCIAL

## 2019-03-13 VITALS
HEART RATE: 78 BPM | HEIGHT: 73 IN | DIASTOLIC BLOOD PRESSURE: 80 MMHG | SYSTOLIC BLOOD PRESSURE: 140 MMHG | BODY MASS INDEX: 41.35 KG/M2 | WEIGHT: 312 LBS | OXYGEN SATURATION: 96 %

## 2019-03-13 DIAGNOSIS — G47.33 OBSTRUCTIVE SLEEP APNEA SYNDROME: Primary | ICD-10-CM

## 2019-03-13 DIAGNOSIS — I50.22 CHRONIC SYSTOLIC CONGESTIVE HEART FAILURE (HCC): ICD-10-CM

## 2019-03-13 PROCEDURE — 99244 OFF/OP CNSLTJ NEW/EST MOD 40: CPT | Performed by: PHYSICIAN ASSISTANT

## 2019-03-13 NOTE — PROGRESS NOTES
Assessment/Plan     1  Obstructive sleep apnea syndrome  PAP DME Resupply/Reorder   2  Chronic systolic congestive heart failure ([address] )       Patient is here today to establish for his sleep apnea, has underlying CHF as well as cardiomyopathy  He was diagnosed about 15 years ago, has been on CPAP since that time, received a new CPAP machine about 2 years ago from his physician in [place]  Now living in [place] so needs to establish here with us and with a new medical supply  He has not received a new mask, tubing or filter in about a year for his CPAP  Will try to obtain his old sleep study as well as a compliance, will send all the information to [facility] for him to receive new supplies  He will follow up with us in 3 months or sooner if necessary  Janna December is a 60 y o  male former smoker who quit over 20 years ago with past medical history of CHELSIE on CPAP, cardiomyopathy, CHF, hypertension, diabetes  He was referred to us to establish for his underlying sleep apnea  He was diagnosed about 15 years ago with sleep apnea, received a new machine about 2 years ago from a pulmonologist in [place] but is now living in [place]  He has not received any new supplies in about a year  Has been stable on the CPAP for several years, overall sleeps well at night, does have some daytime sleepiness  He complains of awakening in the middle of the night because of urination, he takes Lasix 3 times per day  He goes to sleep at 8-10 PM, awakens between 4-6 AM       He had been on inhalers in the past when he began having breathing trouble, it was initially thought to be pulmonary in origin but was found to have significant cardiac disease which was causing his respiratory symptoms  Not currently on any inhalers  Previous Report(s) Reviewed:      The following portions of the patient's history were reviewed in this encounter and updated as appropriate:     Review of Systems  Constitutional: negative  Eyes: negative  Ears, nose, mouth, throat, and face: negative  Respiratory: negative  Cardiovascular: negative  Gastrointestinal: negative  Genitourinary:negative  Integument/breast: negative  Hematologic/lymphatic: negative  Musculoskeletal:negative  Neurological: negative  Behavioral/Psych: negative  Endocrine: negative  Allergic/Immunologic: negative    Objective     /80   Pulse 78   Ht 6' 1" (1 854 m)   Wt (!) 142 kg (312 lb)   SpO2 96%   BMI 41 16 kg/m²   General appearance: alert and oriented, in no acute distress  Eyes: PERRL  Throat: Crowded oropharyngeal airway  Neck: supple, symmetrical, trachea midline and short and wide neck  Lungs: clear to auscultation bilaterally  Heart: regular rate and rhythm and S1, S2 normal  Abdomen: normal findings: soft, non-tender  Extremities: extremities normal, warm and well-perfused; no cyanosis, clubbing, or edema  Skin: Warm and dry  Neurologic: Mental status: Alert, oriented, thought content appropriate

## 2019-03-18 ENCOUNTER — TELEPHONE (OUTPATIENT)
Dept: PULMONOLOGY | Facility: CLINIC | Age: 59
End: 2019-03-18

## 2019-03-18 NOTE — TELEPHONE ENCOUNTER
----- Message from Georgie Rebolledo PA-C sent at 3/15/2019 10:38 AM EDT -----  We received the note from the physician that ordered the sleep study but not the actual study  Can you call their office to obtain the actual sleep study?  I figured we would need that to establish him with [facility]    ----- Message -----  From: Interface, Transcription Incoming  Sent: 3/14/2019   5:44 PM  To: Georgie Rebolledo PA-C

## 2019-03-18 NOTE — TELEPHONE ENCOUNTER
I called [facility] , [phone] opt 1 , spoke to [facility] , she is favidyag sleep study -- dc

## 2019-03-19 ENCOUNTER — OFFICE VISIT (OUTPATIENT)
Dept: FAMILY MEDICINE CLINIC | Facility: CLINIC | Age: 59
End: 2019-03-19
Payer: COMMERCIAL

## 2019-03-19 VITALS
OXYGEN SATURATION: 96 % | HEIGHT: 73 IN | SYSTOLIC BLOOD PRESSURE: 132 MMHG | TEMPERATURE: 99.6 F | DIASTOLIC BLOOD PRESSURE: 88 MMHG | WEIGHT: 313 LBS | BODY MASS INDEX: 41.48 KG/M2 | HEART RATE: 88 BPM

## 2019-03-19 DIAGNOSIS — G56.02 LEFT CARPAL TUNNEL SYNDROME: ICD-10-CM

## 2019-03-19 DIAGNOSIS — Z79.4 TYPE 2 DIABETES MELLITUS WITH DIABETIC POLYNEUROPATHY, WITH LONG-TERM CURRENT USE OF INSULIN (HCC): Primary | ICD-10-CM

## 2019-03-19 DIAGNOSIS — E11.42 TYPE 2 DIABETES MELLITUS WITH DIABETIC POLYNEUROPATHY, WITH LONG-TERM CURRENT USE OF INSULIN (HCC): Primary | ICD-10-CM

## 2019-03-19 DIAGNOSIS — G62.9 NEUROPATHY: ICD-10-CM

## 2019-03-19 PROCEDURE — 99214 OFFICE O/P EST MOD 30 MIN: CPT | Performed by: FAMILY MEDICINE

## 2019-03-19 RX ORDER — GABAPENTIN 100 MG/1
100 CAPSULE ORAL
Qty: 30 CAPSULE | Refills: 1 | Status: SHIPPED | OUTPATIENT
Start: 2019-03-19 | End: 2019-04-30 | Stop reason: SDUPTHER

## 2019-03-19 RX ORDER — PHENYLEPHRINE HYDROCHLORIDE 10 MG/1
TABLET, COATED ORAL DAILY
Qty: 1 EACH | Refills: 1 | Status: SHIPPED | OUTPATIENT
Start: 2019-03-19

## 2019-03-19 NOTE — PROGRESS NOTES
Assessment/Plan:    No problem-specific Assessment & Plan notes found for this encounter  Diagnoses and all orders for this visit:    Type 2 diabetes mellitus with diabetic polyneuropathy, with long-term current use of insulin ([address] )  After discussing risks and benefits of medication along with side effects he was advised to increase Levemir to 20 units at bedtime  He was advised to measure his fasting blood glucose daily  Neuropathy  After discussing risks and benefits of medication along with side effects will start gabapentin 100 mg at bedtime  Also referred to podiatrist   -     gabapentin (NEURONTIN) 100 mg capsule; Take 1 capsule (100 mg total) by mouth daily at bedtime for 30 days    Left carpal tunnel syndrome  -     Elastic Bandages & Supports (CARPAL TUNNEL WRIST STABILIZER) MISC; by Does not apply route daily      Follow up in 1 month    Subjective:      Patient ID: Traci Arango is a 60 y o  male  Patient is here to follow-up for type 2 diabetes mellitus at this time  He denies any symptoms at this time such as polyuria polyphagia or polydipsia  He said that he was taking Levemir 20 units at bedtime daily  However he developed some abdominal discomfort and he thought it was related to the Levemir and he decrease the dose to 15 units at this time  He is still taking this dose he said that his symptoms are control at this time no side effects from it  He said that his blood sugar has been running anywhere from 200-300 mg/dl in the morning fasting  He is also here to follow-up for an EMG study of lower extremities which showed polyneuropathy likely secondary from diabetes neuropathy  He does have nighttime symptoms of numbness and tingling sensation as well  Also he has been having left hand numbness and tingling sensation  more so on the left thumb and index finger         The following portions of the patient's history were reviewed and updated as appropriate:   He  has a past medical history of Diverticulitis of colon, Heart disease, and CHELSIE (obstructive sleep apnea)  He   Patient Active Problem List    Diagnosis Date Noted    Neuropathy 03/19/2019    Left carpal tunnel syndrome 03/19/2019    Type 2 diabetes mellitus with diabetic polyneuropathy, with long-term current use of insulin ([facility] ) 01/30/2019    Chronic systolic congestive heart failure ([facility] ) 01/30/2019    Need for lipid screening 01/30/2019    Prostate cancer screening 01/30/2019    Need for hepatitis C screening test 01/30/2019    Diabetic autonomic neuropathy associated with diabetes mellitus due to underlying condition ([facility] ) 01/30/2019    Sleep apnea 01/30/2019     He  has a past surgical history that includes Hernia repair  His family history includes Cancer in his maternal grandmother and sister; Leukemia in his mother; No Known Problems in his father  He  reports that he has quit smoking  He has never used smokeless tobacco  He reports that he drinks alcohol  He reports that he does not use drugs    Current Outpatient Medications   Medication Sig Dispense Refill    Blood Glucose Calibration (ACCU-CHEK MELSIA) SOLN by In Vitro route once for 1 dose 2 each 0    Blood Glucose Monitoring Suppl (ACCU-CHEK MELISA) device TEST TWICE A DAY 1 each 0    furosemide (LASIX) 40 mg tablet Take 1 tablet (40 mg total) by mouth 3 (three) times a day 270 tablet 1    glucose blood (ACCU-CHEK MELISA PLUS) test strip TEST TWICE A DAY, ALSO DISPENSE LANCETS #100 TWICE A DAY WITH 3 REFILLS 100 each 3    insulin glargine (BASAGLAR KWIKPEN) 100 units/mL injection pen Inject 20 Units under the skin daily at bedtime 5 pen 2    Insulin Pen Needle 32G X 4 MM MISC by Does not apply route daily for 30 days 30 each 1    LANCETS MICRO THIN 33G MISC by Does not apply route 3 (three) times a day for 30 days 100 each 1    losartan (COZAAR) 25 mg tablet Take 1 tablet (25 mg total) by mouth daily 60 tablet 3    metoprolol succinate (TOPROL-XL) 25 mg 24 hr tablet Take 1 tablet (25 mg total) by mouth daily 60 tablet 3    naproxen sodium (ALEVE) 220 MG tablet Take 220 mg by mouth 2 (two) times a day with meals      potassium chloride (K-DUR,KLOR-CON) 10 mEq tablet Take 1 tablet (10 mEq total) by mouth 2 (two) times a day 180 tablet 1    Elastic Bandages & Supports (CARPAL TUNNEL WRIST STABILIZER) MISC by Does not apply route daily 1 each 1    gabapentin (NEURONTIN) 100 mg capsule Take 1 capsule (100 mg total) by mouth daily at bedtime for 30 days 30 capsule 1     No current facility-administered medications for this visit  Current Outpatient Medications on File Prior to Visit   Medication Sig    Blood Glucose Calibration (ACCU-CHEK MELISA) SOLN by In Vitro route once for 1 dose    Blood Glucose Monitoring Suppl (ACCU-CHEK MELISA) device TEST TWICE A DAY    furosemide (LASIX) 40 mg tablet Take 1 tablet (40 mg total) by mouth 3 (three) times a day    glucose blood (ACCU-CHEK MELISA PLUS) test strip TEST TWICE A DAY, ALSO DISPENSE LANCETS #100 TWICE A DAY WITH 3 REFILLS    insulin glargine (BASAGLAR KWIKPEN) 100 units/mL injection pen Inject 20 Units under the skin daily at bedtime    Insulin Pen Needle 32G X 4 MM MISC by Does not apply route daily for 30 days    LANCETS MICRO THIN 33G MISC by Does not apply route 3 (three) times a day for 30 days    losartan (COZAAR) 25 mg tablet Take 1 tablet (25 mg total) by mouth daily    metoprolol succinate (TOPROL-XL) 25 mg 24 hr tablet Take 1 tablet (25 mg total) by mouth daily    naproxen sodium (ALEVE) 220 MG tablet Take 220 mg by mouth 2 (two) times a day with meals    potassium chloride (K-DUR,KLOR-CON) 10 mEq tablet Take 1 tablet (10 mEq total) by mouth 2 (two) times a day     No current facility-administered medications on file prior to visit  He has No Known Allergies       Review of Systems   Constitutional: Negative for activity change, appetite change, fatigue and fever     HENT: Negative for congestion and ear discharge  Respiratory: Negative for cough and shortness of breath  Cardiovascular: Negative for chest pain and palpitations  Gastrointestinal: Negative for diarrhea and nausea  Musculoskeletal: Positive for arthralgias  Negative for back pain  Skin: Negative for color change and rash  Neurological: Positive for numbness  Negative for dizziness and headaches  Psychiatric/Behavioral: Negative for agitation and behavioral problems  Objective:      /88   Pulse 88   Temp 99 6 °F (37 6 °C) (Tympanic)   Ht 6' 1" (1 854 m)   Wt (!) 142 kg (313 lb)   SpO2 96%   BMI 41 30 kg/m²          Physical Exam   Constitutional: He is oriented to person, place, and time  He appears well-developed and well-nourished  No distress  Eyes: Pupils are equal, round, and reactive to light  No scleral icterus  Cardiovascular: Normal rate, regular rhythm and normal heart sounds  Pulses are no weak pulses  No murmur heard  Pulses:       Dorsalis pedis pulses are 2+ on the right side, and 2+ on the left side  Posterior tibial pulses are 2+ on the right side, and 2+ on the left side  Pulmonary/Chest: Effort normal and breath sounds normal  No respiratory distress  He has no wheezes  Abdominal: Soft  Bowel sounds are normal  He exhibits no distension  There is no tenderness  Feet:   Right Foot:   Skin Integrity: Negative for ulcer, skin breakdown, erythema, warmth, callus or dry skin  Left Foot:   Skin Integrity: Negative for ulcer, skin breakdown, erythema, warmth, callus or dry skin  Neurological: He is alert and oriented to person, place, and time  Skin: Skin is warm and dry  No rash noted  He is not diaphoretic  Psychiatric: He has a normal mood and affect  Patient's shoes and socks removed  Right Foot/Ankle   Right Foot Inspection  Skin Exam: skin normal and skin intact no dry skin, no warmth, no callus, no erythema, no maceration, no abnormal color, no pre-ulcer, no ulcer and no callus                          Toe Exam: ROM and strength within normal limitsno swelling, no tenderness, erythema and  no right toe deformity  Sensory   Vibration: diminished  Proprioception: diminished   Monofilament testing: diminished  Vascular  Capillary refills: < 3 seconds  The right DP pulse is 2+  The right PT pulse is 2+  Left Foot/Ankle  Left Foot Inspection  Skin Exam: skin normal and skin intactno dry skin, no warmth, no erythema, no maceration, normal color, no pre-ulcer, no ulcer and no callus                         Toe Exam: ROM and strength within normal limitsno swelling, no tenderness, no erythema and no left toe deformity                   Sensory   Vibration: diminished  Proprioception: diminished  Monofilament: diminished  Vascular  Capillary refills: < 3 seconds  The left DP pulse is 2+  The left PT pulse is 2+  Assign Risk Category:  No deformity present; Loss of protective sensation;  No weak pulses       Risk: 1

## 2019-04-01 DIAGNOSIS — E11.9 TYPE 2 DIABETES MELLITUS TREATED WITH INSULIN (HCC): ICD-10-CM

## 2019-04-01 DIAGNOSIS — Z79.4 TYPE 2 DIABETES MELLITUS TREATED WITH INSULIN (HCC): ICD-10-CM

## 2019-04-11 ENCOUNTER — OFFICE VISIT (OUTPATIENT)
Dept: FAMILY MEDICINE CLINIC | Facility: CLINIC | Age: 59
End: 2019-04-11
Payer: COMMERCIAL

## 2019-04-11 ENCOUNTER — APPOINTMENT (OUTPATIENT)
Dept: RADIOLOGY | Facility: CLINIC | Age: 59
End: 2019-04-11
Payer: COMMERCIAL

## 2019-04-11 VITALS
WEIGHT: 312.6 LBS | HEART RATE: 84 BPM | HEIGHT: 73 IN | BODY MASS INDEX: 41.43 KG/M2 | SYSTOLIC BLOOD PRESSURE: 142 MMHG | TEMPERATURE: 99.5 F | OXYGEN SATURATION: 95 % | DIASTOLIC BLOOD PRESSURE: 84 MMHG

## 2019-04-11 DIAGNOSIS — M25.511 CHRONIC RIGHT SHOULDER PAIN: ICD-10-CM

## 2019-04-11 DIAGNOSIS — G89.29 CHRONIC RIGHT SHOULDER PAIN: ICD-10-CM

## 2019-04-11 DIAGNOSIS — M25.552 LEFT HIP PAIN: ICD-10-CM

## 2019-04-11 DIAGNOSIS — E78.2 MIXED HYPERLIPIDEMIA: ICD-10-CM

## 2019-04-11 DIAGNOSIS — M25.562 CHRONIC PAIN OF LEFT KNEE: ICD-10-CM

## 2019-04-11 DIAGNOSIS — E11.9 TYPE 2 DIABETES MELLITUS TREATED WITH INSULIN (HCC): ICD-10-CM

## 2019-04-11 DIAGNOSIS — G89.29 CHRONIC PAIN OF LEFT KNEE: ICD-10-CM

## 2019-04-11 DIAGNOSIS — Z12.11 COLON CANCER SCREENING: ICD-10-CM

## 2019-04-11 DIAGNOSIS — I10 ESSENTIAL HYPERTENSION: ICD-10-CM

## 2019-04-11 DIAGNOSIS — M25.552 LEFT HIP PAIN: Primary | ICD-10-CM

## 2019-04-11 DIAGNOSIS — Z79.4 TYPE 2 DIABETES MELLITUS TREATED WITH INSULIN (HCC): ICD-10-CM

## 2019-04-11 PROCEDURE — 73562 X-RAY EXAM OF KNEE 3: CPT

## 2019-04-11 PROCEDURE — 99214 OFFICE O/P EST MOD 30 MIN: CPT | Performed by: FAMILY MEDICINE

## 2019-04-11 PROCEDURE — 3008F BODY MASS INDEX DOCD: CPT | Performed by: FAMILY MEDICINE

## 2019-04-11 PROCEDURE — 73502 X-RAY EXAM HIP UNI 2-3 VIEWS: CPT

## 2019-04-11 PROCEDURE — 73030 X-RAY EXAM OF SHOULDER: CPT

## 2019-04-11 RX ORDER — MELOXICAM 15 MG/1
15 TABLET ORAL DAILY
Qty: 30 TABLET | Refills: 1 | Status: SHIPPED | OUTPATIENT
Start: 2019-04-11 | End: 2019-07-23

## 2019-04-15 ENCOUNTER — OFFICE VISIT (OUTPATIENT)
Dept: CARDIOLOGY CLINIC | Facility: CLINIC | Age: 59
End: 2019-04-15
Payer: COMMERCIAL

## 2019-04-15 VITALS
RESPIRATION RATE: 20 BRPM | OXYGEN SATURATION: 97 % | SYSTOLIC BLOOD PRESSURE: 136 MMHG | WEIGHT: 313 LBS | DIASTOLIC BLOOD PRESSURE: 82 MMHG | HEIGHT: 73 IN | BODY MASS INDEX: 41.48 KG/M2 | HEART RATE: 92 BPM

## 2019-04-15 DIAGNOSIS — R06.00 DYSPNEA: Primary | ICD-10-CM

## 2019-04-15 PROCEDURE — 99215 OFFICE O/P EST HI 40 MIN: CPT | Performed by: INTERNAL MEDICINE

## 2019-04-16 NOTE — ADDENDUM NOTE
[facility] on: 4/16/2019 02:07 PM     Modules accepted: Orders

## 2019-04-18 DIAGNOSIS — E11.42 TYPE 2 DIABETES MELLITUS WITH DIABETIC POLYNEUROPATHY, WITH LONG-TERM CURRENT USE OF INSULIN (HCC): ICD-10-CM

## 2019-04-18 DIAGNOSIS — Z79.4 TYPE 2 DIABETES MELLITUS WITH DIABETIC POLYNEUROPATHY, WITH LONG-TERM CURRENT USE OF INSULIN (HCC): ICD-10-CM

## 2019-04-18 RX ORDER — PEN NEEDLE, DIABETIC 32GX 5/32"
NEEDLE, DISPOSABLE MISCELLANEOUS
Qty: 30 EACH | Refills: 1 | Status: SHIPPED | OUTPATIENT
Start: 2019-04-18 | End: 2019-04-30 | Stop reason: SDUPTHER

## 2019-04-20 DIAGNOSIS — Z79.4 TYPE 2 DIABETES MELLITUS TREATED WITH INSULIN (HCC): ICD-10-CM

## 2019-04-20 DIAGNOSIS — E11.9 TYPE 2 DIABETES MELLITUS TREATED WITH INSULIN (HCC): ICD-10-CM

## 2019-04-30 ENCOUNTER — OFFICE VISIT (OUTPATIENT)
Dept: FAMILY MEDICINE CLINIC | Facility: CLINIC | Age: 59
End: 2019-04-30
Payer: COMMERCIAL

## 2019-04-30 VITALS
TEMPERATURE: 98.9 F | BODY MASS INDEX: 41.62 KG/M2 | WEIGHT: 314 LBS | SYSTOLIC BLOOD PRESSURE: 134 MMHG | DIASTOLIC BLOOD PRESSURE: 86 MMHG | HEIGHT: 73 IN | OXYGEN SATURATION: 98 % | HEART RATE: 96 BPM

## 2019-04-30 DIAGNOSIS — G62.9 NEUROPATHY: ICD-10-CM

## 2019-04-30 DIAGNOSIS — N52.9 ERECTILE DYSFUNCTION, UNSPECIFIED ERECTILE DYSFUNCTION TYPE: ICD-10-CM

## 2019-04-30 DIAGNOSIS — Z79.4 TYPE 2 DIABETES MELLITUS TREATED WITH INSULIN (HCC): Primary | ICD-10-CM

## 2019-04-30 DIAGNOSIS — E11.9 TYPE 2 DIABETES MELLITUS TREATED WITH INSULIN (HCC): Primary | ICD-10-CM

## 2019-04-30 DIAGNOSIS — Z79.4 TYPE 2 DIABETES MELLITUS WITH DIABETIC POLYNEUROPATHY, WITH LONG-TERM CURRENT USE OF INSULIN (HCC): ICD-10-CM

## 2019-04-30 DIAGNOSIS — E11.42 TYPE 2 DIABETES MELLITUS WITH DIABETIC POLYNEUROPATHY, WITH LONG-TERM CURRENT USE OF INSULIN (HCC): ICD-10-CM

## 2019-04-30 PROCEDURE — 99214 OFFICE O/P EST MOD 30 MIN: CPT | Performed by: FAMILY MEDICINE

## 2019-04-30 RX ORDER — GABAPENTIN 100 MG/1
100 CAPSULE ORAL 3 TIMES DAILY
Qty: 90 CAPSULE | Refills: 1 | Status: SHIPPED | OUTPATIENT
Start: 2019-04-30 | End: 2019-07-11 | Stop reason: SDUPTHER

## 2019-04-30 RX ORDER — TADALAFIL 10 MG/1
10 TABLET ORAL DAILY PRN
Qty: 5 TABLET | Refills: 1 | Status: SHIPPED | OUTPATIENT
Start: 2019-04-30

## 2019-05-01 ENCOUNTER — OFFICE VISIT (OUTPATIENT)
Dept: OBGYN CLINIC | Facility: CLINIC | Age: 59
End: 2019-05-01
Payer: COMMERCIAL

## 2019-05-01 VITALS
HEART RATE: 81 BPM | HEIGHT: 73 IN | WEIGHT: 310 LBS | SYSTOLIC BLOOD PRESSURE: 152 MMHG | DIASTOLIC BLOOD PRESSURE: 100 MMHG | BODY MASS INDEX: 41.08 KG/M2

## 2019-05-01 DIAGNOSIS — M19.011 PRIMARY OSTEOARTHRITIS OF RIGHT SHOULDER: Primary | ICD-10-CM

## 2019-05-01 DIAGNOSIS — M16.12 PRIMARY OSTEOARTHRITIS OF LEFT HIP: ICD-10-CM

## 2019-05-01 DIAGNOSIS — M17.12 PRIMARY LOCALIZED OSTEOARTHRITIS OF LEFT KNEE: ICD-10-CM

## 2019-05-01 PROCEDURE — 99244 OFF/OP CNSLTJ NEW/EST MOD 40: CPT | Performed by: ORTHOPAEDIC SURGERY

## 2019-05-01 PROCEDURE — 20610 DRAIN/INJ JOINT/BURSA W/O US: CPT | Performed by: ORTHOPAEDIC SURGERY

## 2019-05-01 RX ORDER — BUPIVACAINE HYDROCHLORIDE 2.5 MG/ML
4 INJECTION, SOLUTION INFILTRATION; PERINEURAL
Status: COMPLETED | OUTPATIENT
Start: 2019-05-01 | End: 2019-05-01

## 2019-05-01 RX ORDER — METHYLPREDNISOLONE ACETATE 40 MG/ML
1 INJECTION, SUSPENSION INTRA-ARTICULAR; INTRALESIONAL; INTRAMUSCULAR; SOFT TISSUE
Status: COMPLETED | OUTPATIENT
Start: 2019-05-01 | End: 2019-05-01

## 2019-05-01 RX ADMIN — BUPIVACAINE HYDROCHLORIDE 4 ML: 2.5 INJECTION, SOLUTION INFILTRATION; PERINEURAL at 10:33

## 2019-05-01 RX ADMIN — METHYLPREDNISOLONE ACETATE 1 ML: 40 INJECTION, SUSPENSION INTRA-ARTICULAR; INTRALESIONAL; INTRAMUSCULAR; SOFT TISSUE at 10:33

## 2019-05-07 DIAGNOSIS — I50.22 CHRONIC SYSTOLIC CONGESTIVE HEART FAILURE (HCC): ICD-10-CM

## 2019-05-07 RX ORDER — FUROSEMIDE 40 MG/1
40 TABLET ORAL 3 TIMES DAILY
Qty: 270 TABLET | Refills: 3 | Status: SHIPPED | OUTPATIENT
Start: 2019-05-07

## 2019-05-27 DIAGNOSIS — Z79.4 TYPE 2 DIABETES MELLITUS TREATED WITH INSULIN (HCC): ICD-10-CM

## 2019-05-27 DIAGNOSIS — E11.9 TYPE 2 DIABETES MELLITUS TREATED WITH INSULIN (HCC): ICD-10-CM

## 2019-05-28 ENCOUNTER — OFFICE VISIT (OUTPATIENT)
Dept: FAMILY MEDICINE CLINIC | Facility: CLINIC | Age: 59
End: 2019-05-28
Payer: COMMERCIAL

## 2019-05-28 VITALS
BODY MASS INDEX: 41.75 KG/M2 | OXYGEN SATURATION: 97 % | HEART RATE: 90 BPM | WEIGHT: 315 LBS | DIASTOLIC BLOOD PRESSURE: 98 MMHG | TEMPERATURE: 98.8 F | SYSTOLIC BLOOD PRESSURE: 132 MMHG | HEIGHT: 73 IN

## 2019-05-28 DIAGNOSIS — E11.9 TYPE 2 DIABETES MELLITUS TREATED WITH INSULIN (HCC): ICD-10-CM

## 2019-05-28 DIAGNOSIS — Z79.4 TYPE 2 DIABETES MELLITUS WITH DIABETIC POLYNEUROPATHY, WITH LONG-TERM CURRENT USE OF INSULIN (HCC): Primary | ICD-10-CM

## 2019-05-28 DIAGNOSIS — E78.2 MIXED HYPERLIPIDEMIA: ICD-10-CM

## 2019-05-28 DIAGNOSIS — E11.42 TYPE 2 DIABETES MELLITUS WITH DIABETIC POLYNEUROPATHY, WITH LONG-TERM CURRENT USE OF INSULIN (HCC): Primary | ICD-10-CM

## 2019-05-28 DIAGNOSIS — Z79.4 TYPE 2 DIABETES MELLITUS TREATED WITH INSULIN (HCC): ICD-10-CM

## 2019-05-28 LAB — SL AMB POCT HEMOGLOBIN AIC: 9.2 (ref ?–6.5)

## 2019-05-28 PROCEDURE — 83036 HEMOGLOBIN GLYCOSYLATED A1C: CPT | Performed by: FAMILY MEDICINE

## 2019-05-28 PROCEDURE — 3046F HEMOGLOBIN A1C LEVEL >9.0%: CPT | Performed by: FAMILY MEDICINE

## 2019-05-28 PROCEDURE — 99214 OFFICE O/P EST MOD 30 MIN: CPT | Performed by: FAMILY MEDICINE

## 2019-05-28 PROCEDURE — 3008F BODY MASS INDEX DOCD: CPT | Performed by: FAMILY MEDICINE

## 2019-05-31 DIAGNOSIS — E11.8 TYPE 2 DIABETES MELLITUS WITH COMPLICATION, WITHOUT LONG-TERM CURRENT USE OF INSULIN (HCC): ICD-10-CM

## 2019-06-06 ENCOUNTER — APPOINTMENT (OUTPATIENT)
Dept: LAB | Facility: CLINIC | Age: 59
End: 2019-06-06
Payer: COMMERCIAL

## 2019-06-06 DIAGNOSIS — E78.2 MIXED HYPERLIPIDEMIA: ICD-10-CM

## 2019-06-06 DIAGNOSIS — E11.42 TYPE 2 DIABETES MELLITUS WITH DIABETIC POLYNEUROPATHY, WITH LONG-TERM CURRENT USE OF INSULIN (HCC): ICD-10-CM

## 2019-06-06 DIAGNOSIS — Z79.4 TYPE 2 DIABETES MELLITUS WITH DIABETIC POLYNEUROPATHY, WITH LONG-TERM CURRENT USE OF INSULIN (HCC): ICD-10-CM

## 2019-06-06 LAB
ALBUMIN SERPL BCP-MCNC: 3.6 G/DL (ref 3.5–5)
ALP SERPL-CCNC: 61 U/L (ref 46–116)
ALT SERPL W P-5'-P-CCNC: 22 U/L (ref 12–78)
ANION GAP SERPL CALCULATED.3IONS-SCNC: 5 MMOL/L (ref 4–13)
AST SERPL W P-5'-P-CCNC: 12 U/L (ref 5–45)
BILIRUB SERPL-MCNC: 0.56 MG/DL (ref 0.2–1)
BUN SERPL-MCNC: 10 MG/DL (ref 5–25)
CALCIUM SERPL-MCNC: 8.8 MG/DL (ref 8.3–10.1)
CHLORIDE SERPL-SCNC: 102 MMOL/L (ref 100–108)
CHOLEST SERPL-MCNC: 229 MG/DL (ref 50–200)
CO2 SERPL-SCNC: 30 MMOL/L (ref 21–32)
CREAT SERPL-MCNC: 0.91 MG/DL (ref 0.6–1.3)
CREAT UR-MCNC: 64.9 MG/DL
GFR SERPL CREATININE-BSD FRML MDRD: 92 ML/MIN/1.73SQ M
GLUCOSE P FAST SERPL-MCNC: 102 MG/DL (ref 65–99)
HDLC SERPL-MCNC: 43 MG/DL (ref 40–60)
LDLC SERPL CALC-MCNC: 169 MG/DL (ref 0–100)
MICROALBUMIN UR-MCNC: <5 MG/L (ref 0–20)
MICROALBUMIN/CREAT 24H UR: <8 MG/G CREATININE (ref 0–30)
NONHDLC SERPL-MCNC: 186 MG/DL
POTASSIUM SERPL-SCNC: 3.9 MMOL/L (ref 3.5–5.3)
PROT SERPL-MCNC: 7.9 G/DL (ref 6.4–8.2)
SODIUM SERPL-SCNC: 137 MMOL/L (ref 136–145)
TRIGL SERPL-MCNC: 85 MG/DL

## 2019-06-06 PROCEDURE — 82570 ASSAY OF URINE CREATININE: CPT | Performed by: FAMILY MEDICINE

## 2019-06-06 PROCEDURE — 80061 LIPID PANEL: CPT

## 2019-06-06 PROCEDURE — 82043 UR ALBUMIN QUANTITATIVE: CPT | Performed by: FAMILY MEDICINE

## 2019-06-06 PROCEDURE — 36415 COLL VENOUS BLD VENIPUNCTURE: CPT

## 2019-06-06 PROCEDURE — 80053 COMPREHEN METABOLIC PANEL: CPT

## 2019-06-12 ENCOUNTER — OFFICE VISIT (OUTPATIENT)
Dept: PULMONOLOGY | Facility: CLINIC | Age: 59
End: 2019-06-12
Payer: COMMERCIAL

## 2019-06-12 VITALS
WEIGHT: 315 LBS | SYSTOLIC BLOOD PRESSURE: 120 MMHG | DIASTOLIC BLOOD PRESSURE: 70 MMHG | HEIGHT: 73 IN | OXYGEN SATURATION: 98 % | BODY MASS INDEX: 41.75 KG/M2 | HEART RATE: 78 BPM

## 2019-06-12 DIAGNOSIS — G89.29 CHRONIC PAIN OF LEFT KNEE: ICD-10-CM

## 2019-06-12 DIAGNOSIS — Z87.891 HISTORY OF TOBACCO ABUSE: ICD-10-CM

## 2019-06-12 DIAGNOSIS — I50.22 CHRONIC SYSTOLIC CONGESTIVE HEART FAILURE (HCC): ICD-10-CM

## 2019-06-12 DIAGNOSIS — E66.01 CLASS 3 SEVERE OBESITY WITH BODY MASS INDEX (BMI) OF 40.0 TO 44.9 IN ADULT, UNSPECIFIED OBESITY TYPE, UNSPECIFIED WHETHER SERIOUS COMORBIDITY PRESENT (HCC): ICD-10-CM

## 2019-06-12 DIAGNOSIS — Z99.89 OSA ON CPAP: ICD-10-CM

## 2019-06-12 DIAGNOSIS — G47.33 OSA ON CPAP: ICD-10-CM

## 2019-06-12 DIAGNOSIS — R06.02 SHORTNESS OF BREATH: Primary | ICD-10-CM

## 2019-06-12 DIAGNOSIS — M25.562 CHRONIC PAIN OF LEFT KNEE: ICD-10-CM

## 2019-06-12 PROCEDURE — 99214 OFFICE O/P EST MOD 30 MIN: CPT | Performed by: PHYSICIAN ASSISTANT

## 2019-06-24 ENCOUNTER — TELEPHONE (OUTPATIENT)
Dept: FAMILY MEDICINE CLINIC | Facility: CLINIC | Age: 59
End: 2019-06-24

## 2019-07-02 ENCOUNTER — APPOINTMENT (EMERGENCY)
Dept: CT IMAGING | Facility: HOSPITAL | Age: 59
End: 2019-07-02
Payer: COMMERCIAL

## 2019-07-02 ENCOUNTER — HOSPITAL ENCOUNTER (EMERGENCY)
Facility: HOSPITAL | Age: 59
Discharge: HOME | End: 2019-07-02
Attending: EMERGENCY MEDICINE | Admitting: EMERGENCY MEDICINE
Payer: COMMERCIAL

## 2019-07-02 VITALS
HEIGHT: 73 IN | OXYGEN SATURATION: 93 % | TEMPERATURE: 98.5 F | SYSTOLIC BLOOD PRESSURE: 129 MMHG | RESPIRATION RATE: 18 BRPM | HEART RATE: 84 BPM | DIASTOLIC BLOOD PRESSURE: 86 MMHG | BODY MASS INDEX: 41.75 KG/M2 | WEIGHT: 315 LBS

## 2019-07-02 DIAGNOSIS — K57.92 DIVERTICULITIS: Primary | ICD-10-CM

## 2019-07-02 LAB
ALBUMIN SERPL BCP-MCNC: 3.5 G/DL (ref 3.5–5)
ALP SERPL-CCNC: 62 U/L (ref 46–116)
ALT SERPL W P-5'-P-CCNC: 19 U/L (ref 12–78)
ANION GAP SERPL CALCULATED.3IONS-SCNC: 11 MMOL/L (ref 4–13)
AST SERPL W P-5'-P-CCNC: 15 U/L (ref 5–45)
BASOPHILS # BLD AUTO: 0.04 THOUSANDS/ΜL (ref 0–0.1)
BASOPHILS NFR BLD AUTO: 1 % (ref 0–1)
BILIRUB DIRECT SERPL-MCNC: 0.07 MG/DL (ref 0–0.2)
BILIRUB SERPL-MCNC: 0.3 MG/DL (ref 0.2–1)
BUN SERPL-MCNC: 19 MG/DL (ref 5–25)
CALCIUM SERPL-MCNC: 8.6 MG/DL (ref 8.3–10.1)
CHLORIDE SERPL-SCNC: 104 MMOL/L (ref 100–108)
CO2 SERPL-SCNC: 27 MMOL/L (ref 21–32)
CREAT SERPL-MCNC: 1.32 MG/DL (ref 0.6–1.3)
EOSINOPHIL # BLD AUTO: 0.21 THOUSAND/ΜL (ref 0–0.61)
EOSINOPHIL NFR BLD AUTO: 3 % (ref 0–6)
ERYTHROCYTE [DISTWIDTH] IN BLOOD BY AUTOMATED COUNT: 13.2 % (ref 11.6–15.1)
GFR SERPL CREATININE-BSD FRML MDRD: 59 ML/MIN/1.73SQ M
GLUCOSE SERPL-MCNC: 154 MG/DL (ref 65–140)
HCT VFR BLD AUTO: 40 % (ref 36.5–49.3)
HGB BLD-MCNC: 13.4 G/DL (ref 12–17)
IMM GRANULOCYTES # BLD AUTO: 0.03 THOUSAND/UL (ref 0–0.2)
IMM GRANULOCYTES NFR BLD AUTO: 0 % (ref 0–2)
LIPASE SERPL-CCNC: 95 U/L (ref 73–393)
LYMPHOCYTES # BLD AUTO: 2.52 THOUSANDS/ΜL (ref 0.6–4.47)
LYMPHOCYTES NFR BLD AUTO: 31 % (ref 14–44)
MCH RBC QN AUTO: 30.1 PG (ref 26.8–34.3)
MCHC RBC AUTO-ENTMCNC: 33.5 G/DL (ref 31.4–37.4)
MCV RBC AUTO: 90 FL (ref 82–98)
MONOCYTES # BLD AUTO: 0.77 THOUSAND/ΜL (ref 0.17–1.22)
MONOCYTES NFR BLD AUTO: 9 % (ref 4–12)
NEUTROPHILS # BLD AUTO: 4.63 THOUSANDS/ΜL (ref 1.85–7.62)
NEUTS SEG NFR BLD AUTO: 56 % (ref 43–75)
NRBC BLD AUTO-RTO: 0 /100 WBCS
PLATELET # BLD AUTO: 231 THOUSANDS/UL (ref 149–390)
PMV BLD AUTO: 9.9 FL (ref 8.9–12.7)
POTASSIUM SERPL-SCNC: 3.7 MMOL/L (ref 3.5–5.3)
PROT SERPL-MCNC: 7.5 G/DL (ref 6.4–8.2)
RBC # BLD AUTO: 4.45 MILLION/UL (ref 3.88–5.62)
SODIUM SERPL-SCNC: 142 MMOL/L (ref 136–145)
WBC # BLD AUTO: 8.2 THOUSAND/UL (ref 4.31–10.16)

## 2019-07-02 PROCEDURE — 80048 BASIC METABOLIC PNL TOTAL CA: CPT | Performed by: EMERGENCY MEDICINE

## 2019-07-02 PROCEDURE — 80076 HEPATIC FUNCTION PANEL: CPT | Performed by: EMERGENCY MEDICINE

## 2019-07-02 PROCEDURE — 83690 ASSAY OF LIPASE: CPT | Performed by: EMERGENCY MEDICINE

## 2019-07-02 PROCEDURE — 85025 COMPLETE CBC W/AUTO DIFF WBC: CPT | Performed by: EMERGENCY MEDICINE

## 2019-07-02 PROCEDURE — 99284 EMERGENCY DEPT VISIT MOD MDM: CPT | Performed by: EMERGENCY MEDICINE

## 2019-07-02 PROCEDURE — 74177 CT ABD & PELVIS W/CONTRAST: CPT

## 2019-07-02 PROCEDURE — 36415 COLL VENOUS BLD VENIPUNCTURE: CPT | Performed by: EMERGENCY MEDICINE

## 2019-07-02 PROCEDURE — 99284 EMERGENCY DEPT VISIT MOD MDM: CPT

## 2019-07-02 RX ORDER — CIPROFLOXACIN 500 MG/1
500 TABLET, FILM COATED ORAL ONCE
Status: COMPLETED | OUTPATIENT
Start: 2019-07-02 | End: 2019-07-02

## 2019-07-02 RX ORDER — METRONIDAZOLE 500 MG/1
500 TABLET ORAL ONCE
Status: COMPLETED | OUTPATIENT
Start: 2019-07-02 | End: 2019-07-02

## 2019-07-02 RX ORDER — CIPROFLOXACIN 500 MG/1
500 TABLET, FILM COATED ORAL 2 TIMES DAILY
Qty: 20 TABLET | Refills: 0 | Status: SHIPPED | OUTPATIENT
Start: 2019-07-02 | End: 2019-07-12

## 2019-07-02 RX ORDER — METRONIDAZOLE 500 MG/1
500 TABLET ORAL 3 TIMES DAILY
Qty: 30 TABLET | Refills: 0 | Status: SHIPPED | OUTPATIENT
Start: 2019-07-02 | End: 2019-07-12

## 2019-07-02 RX ADMIN — METRONIDAZOLE 500 MG: 500 TABLET, FILM COATED ORAL at 23:35

## 2019-07-02 RX ADMIN — IOHEXOL 100 ML: 350 INJECTION, SOLUTION INTRAVENOUS at 22:10

## 2019-07-02 RX ADMIN — CIPROFLOXACIN HYDROCHLORIDE 500 MG: 500 TABLET, FILM COATED ORAL at 23:35

## 2019-07-03 NOTE — DISCHARGE INSTRUCTIONS
Cipro twice daily to fight infection  Flagyl 3 times daily to fight infection  Return increasing pain fever or worsening symptoms  Follow up with  your doctor

## 2019-07-03 NOTE — ED PROVIDER NOTES
History  Chief Complaint   Patient presents with    Abdominal Pain     Patient reports abdominal pain for the last three days  Patient reports history of diverticulitis, patient feels that its the same  HPI patient is a 58-year-old male, reports over the last 3-4 days he has had left lower quadrant abdominal pain patient describes the uncomfortable soreness in his left lower abdomen  He reports some point tenderness there  He denies any vomiting or diarrhea  Denies any fever or chills  Patient reports he has had diverticulitis before was advised surgery at 1 point but refused  Patient reports usually gets antibiotics and improved  Denies any trauma to the area  Denies any worsening symptoms other than this constant pain over the last 3 days  Past medical history diabetes, diverticulitis  Family history noncontributory  Social history, nonsmoker no history of drug abuse    Prior to Admission Medications   Prescriptions Last Dose Informant Patient Reported? Taking?    Blood Glucose Calibration (ACCU-CHEK MELISA) SOLN  Self No No   Sig: by In Vitro route once for 1 dose   Blood Glucose Monitoring Suppl (ACCU-CHEK MELISA) device  Self No No   Sig: TEST TWICE A DAY   Elastic Bandages & Supports (CARPAL TUNNEL WRIST STABILIZER) MISC  Self No No   Sig: by Does not apply route daily   Insulin Pen Needle (BD PEN NEEDLE CARLOS U/F) 32G X 4 MM MISC   No No   Sig: Inject under the skin daily   LANCETS MICRO THIN 33G MISC  Self No No   Sig: by Does not apply route 3 (three) times a day for 30 days   furosemide (LASIX) 40 mg tablet   No No   Sig: Take 1 tablet (40 mg total) by mouth 3 (three) times a day   gabapentin (NEURONTIN) 100 mg capsule   No No   Sig: Take 1 capsule (100 mg total) by mouth 3 (three) times a day for 30 days   glucose blood (ACCU-CHEK MELISA PLUS) test strip   No No   Sig: TEST twice a day   insulin glargine (BASAGLAR KWIKPEN) 100 units/mL injection pen   No No   Sig: Inject 55 Units under the skin daily at bedtime for 90 days   losartan (COZAAR) 25 mg tablet  Self No No   Sig: Take 1 tablet (25 mg total) by mouth daily   meloxicam (MOBIC) 15 mg tablet  Self No No   Sig: Take 1 tablet (15 mg total) by mouth daily for 30 days   metoprolol succinate (TOPROL-XL) 25 mg 24 hr tablet  Self No No   Sig: Take 1 tablet (25 mg total) by mouth daily   naproxen sodium (ALEVE) 220 MG tablet  Self Yes No   Sig: Take 220 mg by mouth 2 (two) times a day with meals   potassium chloride (K-DUR,KLOR-CON) 10 mEq tablet  Self No No   Sig: Take 1 tablet (10 mEq total) by mouth 2 (two) times a day   tadalafil (CIALIS) 10 MG tablet   No No   Sig: Take 1 tablet (10 mg total) by mouth daily as needed for erectile dysfunction      Facility-Administered Medications: None       Past Medical History:   Diagnosis Date    Arthritis     Cardiac disease     CPAP (continuous positive airway pressure) dependence     Diabetes mellitus (HCC)     Diverticulitis of colon     Heart disease     Neuropathy     CHELSIE (obstructive sleep apnea)        Past Surgical History:   Procedure Laterality Date    HERNIA REPAIR         Family History   Problem Relation Age of Onset    Leukemia Mother     No Known Problems Father     Cancer Sister     Cancer Maternal Grandmother      I have reviewed and agree with the history as documented  Social History     Tobacco Use    Smoking status: Former Smoker    Smokeless tobacco: Never Used   Substance Use Topics    Alcohol use: Yes     Comment: social use    Drug use: No        Review of Systems   Constitutional: Negative for diaphoresis, fatigue and fever  HENT: Negative for congestion, ear pain, nosebleeds and sore throat  Eyes: Negative for photophobia, pain, discharge and visual disturbance  Respiratory: Negative for cough, choking, chest tightness, shortness of breath and wheezing  Cardiovascular: Negative for chest pain and palpitations  Gastrointestinal: Positive for abdominal pain  Negative for abdominal distention, diarrhea and vomiting  Genitourinary: Negative for dysuria, flank pain and frequency  Musculoskeletal: Negative for back pain, gait problem and joint swelling  Skin: Negative for color change and rash  Neurological: Negative for dizziness, syncope and headaches  Psychiatric/Behavioral: Negative for behavioral problems and confusion  The patient is not nervous/anxious  All other systems reviewed and are negative  Physical Exam  Physical Exam   Constitutional: He is oriented to person, place, and time  He appears well-developed and well-nourished  HENT:   Head: Normocephalic  Right Ear: External ear normal    Left Ear: External ear normal    Nose: Nose normal    Mouth/Throat: Oropharynx is clear and moist    Eyes: Pupils are equal, round, and reactive to light  EOM and lids are normal    Neck: Normal range of motion  Neck supple  Cardiovascular: Normal rate, regular rhythm and normal heart sounds  Pulmonary/Chest: Effort normal and breath sounds normal  No respiratory distress  Abdominal: Soft  Bowel sounds are normal  There is tenderness  There is left lower quadrant tenderness without rebound or guarding   Musculoskeletal: Normal range of motion  He exhibits no deformity  Neurological: He is alert and oriented to person, place, and time  Skin: Skin is warm and dry  Psychiatric: He has a normal mood and affect  Nursing note and vitals reviewed     Pulse oximetry 95% on room air adequate oxygenation, there is no hypoxia  Vital Signs  ED Triage Vitals [07/02/19 2016]   Temperature Pulse Respirations Blood Pressure SpO2   98 5 °F (36 9 °C) 91 20 156/85 95 %      Temp Source Heart Rate Source Patient Position - Orthostatic VS BP Location FiO2 (%)   Oral Monitor Sitting Left arm --      Pain Score       5           Vitals:    07/02/19 2016 07/02/19 2115 07/02/19 2200   BP: 156/85 132/83 129/86   Pulse: 91  84   Patient Position - Orthostatic VS: Sitting           Visual Acuity      ED Medications  Medications   iohexol (OMNIPAQUE) 350 MG/ML injection (MULTI-DOSE) 100 mL (100 mL Intravenous Given 7/2/19 2210)   ciprofloxacin (CIPRO) tablet 500 mg (500 mg Oral Given 7/2/19 2335)   metroNIDAZOLE (FLAGYL) tablet 500 mg (500 mg Oral Given 7/2/19 2335)       Diagnostic Studies  Results Reviewed     Procedure Component Value Units Date/Time    Basic metabolic panel [[id number]]  (Abnormal) Collected:  07/02/19 2113    Lab Status:  Final result Specimen:  Blood from Arm, Right Updated:  07/02/19 2137     Sodium 142 mmol/L      Potassium 3 7 mmol/L      Chloride 104 mmol/L      CO2 27 mmol/L      ANION GAP 11 mmol/L      BUN 19 mg/dL      Creatinine 1 32 mg/dL      Glucose 154 mg/dL      Calcium 8 6 mg/dL      eGFR 59 ml/min/1 73sq m     Narrative:       [place] guidelines for Chronic Kidney Disease (CKD):     Stage 1 with normal or high GFR (GFR > 90 mL/min/1 73 square meters)    Stage 2 Mild CKD (GFR = 60-89 mL/min/1 73 square meters)    Stage 3A Moderate CKD (GFR = 45-59 mL/min/1 73 square meters)    Stage 3B Moderate CKD (GFR = 30-44 mL/min/1 73 square meters)    Stage 4 Severe CKD (GFR = 15-29 mL/min/1 73 square meters)    Stage 5 End Stage CKD (GFR <15 mL/min/1 73 square meters)  Note: GFR calculation is accurate only with a steady state creatinine    Hepatic function panel [[id number]]  (Normal) Collected:  07/02/19 2113    Lab Status:  Final result Specimen:  Blood from Arm, Right Updated:  07/02/19 2137     Total Bilirubin 0 30 mg/dL      Bilirubin, Direct 0 07 mg/dL      Alkaline Phosphatase 62 U/L      AST 15 U/L      ALT 19 U/L      Total Protein 7 5 g/dL      Albumin 3 5 g/dL     Lipase [[id number]]  (Normal) Collected:  07/02/19 2113    Lab Status:  Final result Specimen:  Blood from Arm, Right Updated:  07/02/19 2137     Lipase 95 u/L     CBC and differential [[id number]] Collected:  07/02/19 2113    Lab Status:  Final result Specimen:  Blood from Arm, Right Updated:  07/02/19 2121     WBC 8 20 Thousand/uL      RBC 4 45 Million/uL      Hemoglobin 13 4 g/dL      Hematocrit 40 0 %      MCV 90 fL      MCH 30 1 pg      MCHC 33 5 g/dL      RDW 13 2 %      MPV 9 9 fL      Platelets 999 Thousands/uL      nRBC 0 /100 WBCs      Neutrophils Relative 56 %      Immat GRANS % 0 %      Lymphocytes Relative 31 %      Monocytes Relative 9 %      Eosinophils Relative 3 %      Basophils Relative 1 %      Neutrophils Absolute 4 63 Thousands/µL      Immature Grans Absolute 0 03 Thousand/uL      Lymphocytes Absolute 2 52 Thousands/µL      Monocytes Absolute 0 77 Thousand/µL      Eosinophils Absolute 0 21 Thousand/µL      Basophils Absolute 0 04 Thousands/µL                  CT abdomen pelvis with contrast   Final Result by Rita Bethea DO (07/02 2332)      Colonic diverticulosis with findings suspicious for acute diverticulitis in the region of the distal descending and proximal sigmoid colon; no pericolonic abscess  Bladder is partially distended  Mild circumferential bladder wall thickening noted, probably exaggerated by underdistention  Superimposed cystitis could be considered in the appropriate clinical setting  Correlation with the patient's symptoms,    laboratory values, and urinalysis recommended  Mild left renal cortical scarring, and other findings as above  Workstation performed: [id number]                    Procedures  Procedures       ED Course      diagnostic testing showed normal sodium potassium, creatinine was 1 32 consistent with renal insufficiency, patient's blood sugar was 154 consistent with his diabetes  Liver functions were normal no sign of hepatitis  Lipase was normal no sign of pancreatitis  patient's white count was normal at 8 2 no sign of inflammation, hemoglobin was normal at 13 no sign of anemia    CT scan abdomen pelvis showed findings suspicious   for diverticulitis  [facility]  Medical decision making 58-year-old male presents with left lower quadrant abdominal pain similar to past episodes of diverticulitis, no white count, diabetes is under control, normal electrolytes  CT consistent with possible acute diverticulitis, no abscess  Discussed with patient offered admission wants to be treated as an outpatient  Patient prefers out patient treatment with oral antibiotics  Discussed indications to return and follow-up  Disposition  Final diagnoses:   Diverticulitis     Time reflects when diagnosis was documented in both MDM as applicable and the Disposition within this note     Time User Action Codes Description Comment    7/2/2019 11:11 PM Hiram Castano Add [K57 [id number]] Diverticulitis       ED Disposition     ED Disposition Condition Date/Time Comment    Discharge Stable Tue Jul 2, 2019 11:12 PM Christopher Colon discharge to home/self care              Follow-up Information    None         Discharge Medication List as of 7/2/2019 11:13 PM      START taking these medications    Details   ciprofloxacin (CIPRO) 500 mg tablet Take 1 tablet (500 mg total) by mouth 2 (two) times a day for 10 days, Starting Tue 7/2/2019, Until Fri 7/12/2019, Print      metroNIDAZOLE (FLAGYL) 500 mg tablet Take 1 tablet (500 mg total) by mouth 3 (three) times a day for 10 days, Starting Tue 7/2/2019, Until Fri 7/12/2019, Print         CONTINUE these medications which have NOT CHANGED    Details   Blood Glucose Calibration (ACCU-CHEK MELISA) SOLN by In Vitro route once for 1 dose, Starting Fri 2/8/2019, Normal      Blood Glucose Monitoring Suppl (ACCU-CHEK MELISA) device TEST TWICE A DAY, Normal      Elastic Bandages & Supports (CARPAL TUNNEL WRIST STABILIZER) MISC by Does not apply route daily, Starting Tue 3/19/2019, Print      furosemide (LASIX) 40 mg tablet Take 1 tablet (40 mg total) by mouth 3 (three) times a day, Starting Tue 5/7/2019, Normal      gabapentin (NEURONTIN) 100 mg capsule Take 1 capsule (100 mg total) by mouth 3 (three) times a day for 30 days, Starting Tue 4/30/2019, Until Wed 6/12/2019, Normal      glucose blood (ACCU-CHEK MELISA PLUS) test strip TEST twice a day, Normal      insulin glargine (BASAGLAR KWIKPEN) 100 units/mL injection pen Inject 55 Units under the skin daily at bedtime for 90 days, Starting Tue 5/28/2019, Until Mon 8/26/2019, Normal      Insulin Pen Needle (BD PEN NEEDLE CARLOS U/F) 32G X 4 MM MISC Inject under the skin daily, Starting Tue 5/28/2019, Normal      LANCETS MICRO THIN 33G MISC by Does not apply route 3 (three) times a day for 30 days, Starting Wed 1/30/2019, Until Wed 6/12/2019, Normal      losartan (COZAAR) 25 mg tablet Take 1 tablet (25 mg total) by mouth daily, Starting Wed 2/6/2019, Normal      meloxicam (MOBIC) 15 mg tablet Take 1 tablet (15 mg total) by mouth daily for 30 days, Starting Thu 4/11/2019, Until Wed 6/12/2019, Normal      metoprolol succinate (TOPROL-XL) 25 mg 24 hr tablet Take 1 tablet (25 mg total) by mouth daily, Starting Wed 2/6/2019, Normal      naproxen sodium (ALEVE) 220 MG tablet Take 220 mg by mouth 2 (two) times a day with meals, Historical Med      potassium chloride (K-DUR,KLOR-CON) 10 mEq tablet Take 1 tablet (10 mEq total) by mouth 2 (two) times a day, Starting Tue 3/5/2019, Normal      tadalafil (CIALIS) 10 MG tablet Take 1 tablet (10 mg total) by mouth daily as needed for erectile dysfunction, Starting Tue 4/30/2019, Print           No discharge procedures on file      ED Provider  Electronically Signed by           Leonor Ramsey MD  07/03/19 3954

## 2019-07-09 ENCOUNTER — OFFICE VISIT (OUTPATIENT)
Dept: FAMILY MEDICINE CLINIC | Facility: CLINIC | Age: 59
End: 2019-07-09
Payer: COMMERCIAL

## 2019-07-09 VITALS
TEMPERATURE: 100 F | HEART RATE: 80 BPM | HEIGHT: 73 IN | OXYGEN SATURATION: 97 % | DIASTOLIC BLOOD PRESSURE: 86 MMHG | BODY MASS INDEX: 41.75 KG/M2 | WEIGHT: 315 LBS | SYSTOLIC BLOOD PRESSURE: 142 MMHG

## 2019-07-09 DIAGNOSIS — Z79.4 TYPE 2 DIABETES MELLITUS TREATED WITH INSULIN (HCC): ICD-10-CM

## 2019-07-09 DIAGNOSIS — M17.12 PRIMARY OSTEOARTHRITIS OF LEFT KNEE: ICD-10-CM

## 2019-07-09 DIAGNOSIS — R07.89 CHEST DISCOMFORT: ICD-10-CM

## 2019-07-09 DIAGNOSIS — M19.011 PRIMARY OSTEOARTHRITIS OF RIGHT SHOULDER: ICD-10-CM

## 2019-07-09 DIAGNOSIS — K57.92 ACUTE DIVERTICULITIS: Primary | ICD-10-CM

## 2019-07-09 DIAGNOSIS — E11.9 TYPE 2 DIABETES MELLITUS TREATED WITH INSULIN (HCC): ICD-10-CM

## 2019-07-09 PROCEDURE — 99214 OFFICE O/P EST MOD 30 MIN: CPT | Performed by: FAMILY MEDICINE

## 2019-07-09 NOTE — PROGRESS NOTES
Assessment/Plan:    No problem-specific Assessment & Plan notes found for this encounter  Diagnoses and all orders for this visit:    Acute diverticulitis    He was advised to finish antibiotic course  If symptoms worsen advised to go to the emergency room  Advised to take daily probiotics  Chest discomfort    Advised to follow up with Dr Armani Brown  Cardiologist   If his symptoms worsen advised to go to the emergency room immediately  Primary osteoarthritis of right shoulder  Primary osteoarthritis of left knee    Continue meloxicam advised to follow up with orthopedic surgeon as well  Follow-up in 1 month        Subjective:      Patient ID: Marylen Clare is a 60 y o  male  Patient is here to discuss several medical complaints  He went to [facility] [place] on July 2nd due to left lower quadrant pain tenderness workup ended up finding out diverticulitis per CT scan done of the abdomen without any evidence of perforation or abscess  He was discharged on ciprofloxacin and metronidazole Flagyl however he stopped taking the medications after a few days because he was getting some fatigue and tiredness symptoms which he thought it was related to the medications  He still having some left lower quadrant tenderness and discomfort at this time  He denies any diarrhea or blood in the stool however  He is also here for left upper chest tenderness discomfort that radiates to his back and left shoulder  He does follow up with Dr Armani Brown  Cardiologist he had a stress echo done on March 7th which showed an abnormal study with reduced ejection fraction systolic function abnormal 28% showing chronic systolic dysfunction as well as an inferior wall MI  He denies any chest pains at this time during this appointment right now  He also has a history of left knee pain as well as right shoulder pain he had x-rays done of both which showed moderate to severe arthritis of both    He is currently seen orthopedic surgeon and had injections for both  He said that his symptoms have somewhat improved although he still have some  He has tried to apply to disability given his multiple medical problems including his arthritis symptoms which limits him from doing things  The following portions of the patient's history were reviewed and updated as appropriate:   He  has a past medical history of Arthritis, Cardiac disease, CPAP (continuous positive airway pressure) dependence, Diabetes mellitus ([facility] [address] ), Diverticulitis of colon, Heart disease, Neuropathy, and CHELSIE (obstructive sleep apnea)  He   Patient Active Problem List    Diagnosis Date Noted    Acute diverticulitis 07/09/2019    Chest discomfort 07/09/2019    History of tobacco abuse 06/12/2019    Erectile dysfunction 04/30/2019    Left hip pain 04/11/2019    Osteoarthritis of left knee 04/11/2019    Osteoarthritis of right shoulder 04/11/2019    Type 2 diabetes mellitus treated with insulin ([facility] [address] ) 04/11/2019    Essential hypertension 04/11/2019    Mixed hyperlipidemia 04/11/2019    Neuropathy 03/19/2019    Left carpal tunnel syndrome 03/19/2019    Type 2 diabetes mellitus with diabetic polyneuropathy, with long-term current use of insulin ([facility] [address] ) 01/30/2019    Chronic systolic congestive heart failure ([facility] [address] ) 01/30/2019    Need for lipid screening 01/30/2019    Prostate cancer screening 01/30/2019    Need for hepatitis C screening test 01/30/2019    Diabetic autonomic neuropathy associated with diabetes mellitus due to underlying condition ([facility] [address] ) 01/30/2019    Sleep apnea 01/30/2019     He  has a past surgical history that includes Hernia repair  His family history includes Cancer in his maternal grandmother and sister; Leukemia in his mother; No Known Problems in his father  He  reports that he has quit smoking  He has never used smokeless tobacco  He reports that he drinks alcohol  He reports that he does not use drugs    Current Outpatient Medications   Medication Sig Dispense Refill    Blood Glucose Calibration (ACCU-CHEK MELIAS) SOLN by In Vitro route once for 1 dose 2 each 0    Blood Glucose Monitoring Suppl (ACCU-CHEK MELISA) device TEST TWICE A DAY 1 each 0    ciprofloxacin (CIPRO) 500 mg tablet Take 1 tablet (500 mg total) by mouth 2 (two) times a day for 10 days 20 tablet 0    Elastic Bandages & Supports (CARPAL TUNNEL WRIST STABILIZER) MISC by Does not apply route daily 1 each 1    furosemide (LASIX) 40 mg tablet Take 1 tablet (40 mg total) by mouth 3 (three) times a day 270 tablet 3    gabapentin (NEURONTIN) 100 mg capsule Take 1 capsule (100 mg total) by mouth 3 (three) times a day for 30 days 90 capsule 1    glucose blood (ACCU-CHEK MELISA PLUS) test strip TEST twice a day 100 each 3    insulin glargine (BASAGLAR KWIKPEN) 100 units/mL injection pen Inject 55 Units under the skin daily at bedtime for 90 days 17 pen 1    Insulin Pen Needle (BD PEN NEEDLE CARLOS U/F) 32G X 4 MM MISC Inject under the skin daily 100 each 1    LANCETS MICRO THIN 33G MISC by Does not apply route 3 (three) times a day for 30 days 100 each 1    meloxicam (MOBIC) 15 mg tablet Take 1 tablet (15 mg total) by mouth daily for 30 days 30 tablet 1    naproxen sodium (ALEVE) 220 MG tablet Take 220 mg by mouth 2 (two) times a day with meals      potassium chloride (K-DUR,KLOR-CON) 10 mEq tablet Take 1 tablet (10 mEq total) by mouth 2 (two) times a day 180 tablet 1    tadalafil (CIALIS) 10 MG tablet Take 1 tablet (10 mg total) by mouth daily as needed for erectile dysfunction 5 tablet 1    losartan (COZAAR) 25 mg tablet Take 1 tablet (25 mg total) by mouth daily (Patient not taking: Reported on 7/9/2019) 60 tablet 3    metoprolol succinate (TOPROL-XL) 25 mg 24 hr tablet Take 1 tablet (25 mg total) by mouth daily (Patient not taking: Reported on 7/9/2019) 60 tablet 3    metroNIDAZOLE (FLAGYL) 500 mg tablet Take 1 tablet (500 mg total) by mouth 3 (three) times a day for 10 days (Patient not taking: Reported on 7/9/2019) 30 tablet 0     No current facility-administered medications for this visit        Current Outpatient Medications on File Prior to Visit   Medication Sig    Blood Glucose Calibration (ACCU-CHEK MEILSA) SOLN by In Vitro route once for 1 dose    Blood Glucose Monitoring Suppl (ACCU-CHEK MELISA) device TEST TWICE A DAY    ciprofloxacin (CIPRO) 500 mg tablet Take 1 tablet (500 mg total) by mouth 2 (two) times a day for 10 days    Elastic Bandages & Supports (CARPAL TUNNEL WRIST STABILIZER) MISC by Does not apply route daily    furosemide (LASIX) 40 mg tablet Take 1 tablet (40 mg total) by mouth 3 (three) times a day    gabapentin (NEURONTIN) 100 mg capsule Take 1 capsule (100 mg total) by mouth 3 (three) times a day for 30 days    glucose blood (ACCU-CHEK MELISA PLUS) test strip TEST twice a day    insulin glargine (BASAGLAR KWIKPEN) 100 units/mL injection pen Inject 55 Units under the skin daily at bedtime for 90 days    Insulin Pen Needle (BD PEN NEEDLE CARLOS U/F) 32G X 4 MM MISC Inject under the skin daily    LANCETS MICRO THIN 33G MISC by Does not apply route 3 (three) times a day for 30 days    meloxicam (MOBIC) 15 mg tablet Take 1 tablet (15 mg total) by mouth daily for 30 days    naproxen sodium (ALEVE) 220 MG tablet Take 220 mg by mouth 2 (two) times a day with meals    potassium chloride (K-DUR,KLOR-CON) 10 mEq tablet Take 1 tablet (10 mEq total) by mouth 2 (two) times a day    tadalafil (CIALIS) 10 MG tablet Take 1 tablet (10 mg total) by mouth daily as needed for erectile dysfunction    losartan (COZAAR) 25 mg tablet Take 1 tablet (25 mg total) by mouth daily (Patient not taking: Reported on 7/9/2019)    metoprolol succinate (TOPROL-XL) 25 mg 24 hr tablet Take 1 tablet (25 mg total) by mouth daily (Patient not taking: Reported on 7/9/2019)    metroNIDAZOLE (FLAGYL) 500 mg tablet Take 1 tablet (500 mg total) by mouth 3 (three) times a day for 10 days (Patient not taking: Reported on 7/9/2019)     No current facility-administered medications on file prior to visit  He has No Known Allergies       Review of Systems   Constitutional: Negative for activity change, appetite change, fatigue and fever  HENT: Negative for congestion and ear discharge  Respiratory: Positive for chest tightness  Negative for apnea, cough, choking, shortness of breath, wheezing and stridor  Cardiovascular: Negative for chest pain and palpitations  Gastrointestinal: Positive for abdominal pain  Negative for abdominal distention, anal bleeding, blood in stool, constipation, diarrhea, nausea, rectal pain and vomiting  Musculoskeletal: Positive for arthralgias and myalgias  Negative for back pain  Skin: Negative for color change and rash  Neurological: Negative for dizziness and headaches  Psychiatric/Behavioral: Negative for agitation and behavioral problems  Objective:      /86   Pulse 80   Temp 100 °F (37 8 °C) (Tympanic)   Ht 6' 1" (1 854 m)   Wt (!) 146 kg (322 lb 12 8 oz)   SpO2 97%   BMI 42 59 kg/m²          Physical Exam   Constitutional: He is oriented to person, place, and time  He appears well-developed and well-nourished  No distress  Eyes: Pupils are equal, round, and reactive to light  No scleral icterus  Cardiovascular: Normal rate, regular rhythm and normal heart sounds  No murmur heard  Pulmonary/Chest: Effort normal and breath sounds normal  No respiratory distress  He has no wheezes  Abdominal: Soft  Bowel sounds are normal  He exhibits no distension  There is no tenderness  Neurological: He is alert and oriented to person, place, and time  Skin: Skin is warm and dry  No rash noted  He is not diaphoretic  Psychiatric: He has a normal mood and affect

## 2019-07-09 NOTE — LETTER
To whom it may concern:      Mr Rhonda Silva has multiple medical problems including severe left knee and right shoulder osteoarthritis as well as uncontrolled diabetes and chronic systolic heart failure  His multiple medical problems affect his every day life and also affects his ability to work  He is currently trying to apply for disability  If you have any questions feel free to contact our office        Sincerely,        Wilian Gordon MD

## 2019-07-11 DIAGNOSIS — E11.8 TYPE 2 DIABETES MELLITUS WITH COMPLICATION, WITHOUT LONG-TERM CURRENT USE OF INSULIN (HCC): ICD-10-CM

## 2019-07-11 DIAGNOSIS — G62.9 NEUROPATHY: ICD-10-CM

## 2019-07-11 RX ORDER — GABAPENTIN 100 MG/1
CAPSULE ORAL
Qty: 90 CAPSULE | Refills: 1 | Status: SHIPPED | OUTPATIENT
Start: 2019-07-11 | End: 2019-09-04 | Stop reason: SDUPTHER

## 2019-07-19 ENCOUNTER — TELEPHONE (OUTPATIENT)
Dept: FAMILY MEDICINE CLINIC | Facility: CLINIC | Age: 59
End: 2019-07-19

## 2019-07-19 ENCOUNTER — APPOINTMENT (OUTPATIENT)
Dept: RADIOLOGY | Facility: CLINIC | Age: 59
End: 2019-07-19
Payer: COMMERCIAL

## 2019-07-19 DIAGNOSIS — M25.561 RIGHT KNEE PAIN, UNSPECIFIED CHRONICITY: ICD-10-CM

## 2019-07-19 DIAGNOSIS — M25.561 RIGHT KNEE PAIN, UNSPECIFIED CHRONICITY: Primary | ICD-10-CM

## 2019-07-19 PROCEDURE — 73562 X-RAY EXAM OF KNEE 3: CPT

## 2019-07-19 NOTE — TELEPHONE ENCOUNTER
Patient called and would like an X-ray ordered for his right knee  He said it is difficult to bend and when he does, he feel slight pain behind the joint  Maris Patricia

## 2019-07-23 ENCOUNTER — APPOINTMENT (OUTPATIENT)
Dept: LAB | Facility: CLINIC | Age: 59
End: 2019-07-23
Payer: COMMERCIAL

## 2019-07-23 ENCOUNTER — OFFICE VISIT (OUTPATIENT)
Dept: CARDIOLOGY CLINIC | Facility: CLINIC | Age: 59
End: 2019-07-23
Payer: COMMERCIAL

## 2019-07-23 VITALS
HEIGHT: 73 IN | RESPIRATION RATE: 18 BRPM | SYSTOLIC BLOOD PRESSURE: 134 MMHG | DIASTOLIC BLOOD PRESSURE: 80 MMHG | OXYGEN SATURATION: 98 % | BODY MASS INDEX: 41.75 KG/M2 | HEART RATE: 78 BPM | WEIGHT: 315 LBS

## 2019-07-23 DIAGNOSIS — G47.33 OBSTRUCTIVE SLEEP APNEA SYNDROME: ICD-10-CM

## 2019-07-23 DIAGNOSIS — R53.82 CHRONIC FATIGUE: ICD-10-CM

## 2019-07-23 DIAGNOSIS — I50.22 CHRONIC SYSTOLIC CONGESTIVE HEART FAILURE (HCC): ICD-10-CM

## 2019-07-23 DIAGNOSIS — R07.9 CHEST PAIN, UNSPECIFIED TYPE: Primary | ICD-10-CM

## 2019-07-23 DIAGNOSIS — I42.8 NICM (NONISCHEMIC CARDIOMYOPATHY) (HCC): ICD-10-CM

## 2019-07-23 LAB
ALBUMIN SERPL BCP-MCNC: 3.7 G/DL (ref 3.5–5)
ALP SERPL-CCNC: 50 U/L (ref 46–116)
ALT SERPL W P-5'-P-CCNC: 21 U/L (ref 12–78)
ANION GAP SERPL CALCULATED.3IONS-SCNC: 6 MMOL/L (ref 4–13)
AST SERPL W P-5'-P-CCNC: 15 U/L (ref 5–45)
BILIRUB SERPL-MCNC: 0.53 MG/DL (ref 0.2–1)
BUN SERPL-MCNC: 12 MG/DL (ref 5–25)
CALCIUM SERPL-MCNC: 8.9 MG/DL (ref 8.3–10.1)
CHLORIDE SERPL-SCNC: 107 MMOL/L (ref 100–108)
CO2 SERPL-SCNC: 28 MMOL/L (ref 21–32)
CREAT SERPL-MCNC: 0.81 MG/DL (ref 0.6–1.3)
ERYTHROCYTE [DISTWIDTH] IN BLOOD BY AUTOMATED COUNT: 13.5 % (ref 11.6–15.1)
GFR SERPL CREATININE-BSD FRML MDRD: 97 ML/MIN/1.73SQ M
GLUCOSE SERPL-MCNC: 108 MG/DL (ref 65–140)
HCT VFR BLD AUTO: 43.1 % (ref 36.5–49.3)
HGB BLD-MCNC: 14.2 G/DL (ref 12–17)
MCH RBC QN AUTO: 30 PG (ref 26.8–34.3)
MCHC RBC AUTO-ENTMCNC: 32.9 G/DL (ref 31.4–37.4)
MCV RBC AUTO: 91 FL (ref 82–98)
PLATELET # BLD AUTO: 237 THOUSANDS/UL (ref 149–390)
PMV BLD AUTO: 10 FL (ref 8.9–12.7)
POTASSIUM SERPL-SCNC: 3.8 MMOL/L (ref 3.5–5.3)
PROT SERPL-MCNC: 7.9 G/DL (ref 6.4–8.2)
RBC # BLD AUTO: 4.73 MILLION/UL (ref 3.88–5.62)
SODIUM SERPL-SCNC: 141 MMOL/L (ref 136–145)
TSH SERPL DL<=0.05 MIU/L-ACNC: 0.83 UIU/ML (ref 0.36–3.74)
WBC # BLD AUTO: 7.11 THOUSAND/UL (ref 4.31–10.16)

## 2019-07-23 PROCEDURE — 84443 ASSAY THYROID STIM HORMONE: CPT

## 2019-07-23 PROCEDURE — 36415 COLL VENOUS BLD VENIPUNCTURE: CPT

## 2019-07-23 PROCEDURE — 80053 COMPREHEN METABOLIC PANEL: CPT

## 2019-07-23 PROCEDURE — 99215 OFFICE O/P EST HI 40 MIN: CPT | Performed by: INTERNAL MEDICINE

## 2019-07-23 PROCEDURE — 93000 ELECTROCARDIOGRAM COMPLETE: CPT | Performed by: INTERNAL MEDICINE

## 2019-07-23 PROCEDURE — 85027 COMPLETE CBC AUTOMATED: CPT

## 2019-07-23 NOTE — Clinical Note
Gil Camacho not sure what is causing his acute on chronic fatigue  I am not convinced that it is cardiac  He is not complaining of chest pain today  I told him to go to the ED, but he was not agreeable at this time  I ordered some basic blood work

## 2019-07-23 NOTE — PROGRESS NOTES
Cardiology Outpatient Follow up Note    Lily Jones 60 y o  male MRN: [id number]    07/23/19          Assessment/Plan:  1  Acute on chronic fatigue/ Dyspnea on exertion-   · Cardiac workup in March was unrevealing as noted below  · Check CBC, CMP, TSH, and TTE  · He was advised to go to the ED to be fully evaluated  He was agreeable if he had symptom progression  2  Chronic systolic heart failure-   · He is euvolemic  Cont lasix 40mg TID  He has taken an extra dose PRN for weight gain and edema  Na and fluid restriction was advised    3  NICM with EF: 20-25% with recovery to 45-50%  · Echo 3/6/19- EF: 45-50%; hypokinesis of the basal-mid inferior wall, mild MR  · Cardiomyopathy was likely related to significant prior alcohol use vs  hypertension  · Catheterization at [facility] reportedly negative for obstructive CAD in 2017  · [place] 3/7/19- inferior infarct with no evidence of ischemia  · Cont losartan and toprol-xl      4  Hypertension- cont lasix; He reports non-compliance with losartan and toprol  Compliance was strongly advised  5  CHELSIE on CPAP    6  IDDM- A1c: 9 2- he reports compliance with insulin    7  HLD- refuses to take statins; states he previously experienced vision troubles  1  Chest pain, unspecified type  POCT ECG   2  Chronic fatigue  CBC    TSH, 3rd generation with Free T4 reflex    Echo complete with contrast if indicated    Comprehensive metabolic panel    CANCELED: Basic metabolic panel   3  Chronic systolic congestive heart failure ([address] )     4  Obstructive sleep apnea syndrome     5  NICM (nonischemic cardiomyopathy) (HCC)         HPI: Lily Jones is a 60y o  year old male with history of hypertension, morbid obesity, diabetes, sleep apnea, and nonischemic cardiomyopathy who presents to for a routine follow up       Past medical history:   · He has a history of NICM with EF: 20-25% that was diagnosed at [facility] in [place]    · He was worked up with a cardiac catheterization that was reportedly negative for obstructive epicardial CAD  · [place] 3/2019 revealed inferior infarct with no evidence of ischemia  · TTE 3/2019: EF recovery to 45-50%; hypokinesis of the basal-mid inferior wall, mild LVH, g1DD, mild MR, trace TR  On presentation today he complains of acute on chronic fatigue and dyspnea on exertion  He states that he has no energy to perform his daily activities  He is unsure if he is depressed however has significant financial and life stressors  He will be moving out of his home soon  He also reports intermittent lower extremity edema for which he takes an extra dose of Lasix with resolution  He denies any active chest discomfort or any other concerns at this time  Family History: denies family history of cardiac disease    Social history: prior alcohol use- drank 3 shots of whiskey daily; quit in 2017; quit smoking 20 years ago         Patient Active Problem List   Diagnosis    Type 2 diabetes mellitus with diabetic polyneuropathy, with long-term current use of insulin ([address] )    Chronic systolic congestive heart failure ([address] )    Need for lipid screening    Prostate cancer screening    Need for hepatitis C screening test    Diabetic autonomic neuropathy associated with diabetes mellitus due to underlying condition ([address] )    Sleep apnea    Neuropathy    Left carpal tunnel syndrome    Left hip pain    Osteoarthritis of left knee    Osteoarthritis of right shoulder    Type 2 diabetes mellitus treated with insulin ([address] )    Essential hypertension    Mixed hyperlipidemia    Erectile dysfunction    History of tobacco abuse    Acute diverticulitis    Chest discomfort       No Known Allergies      Current Outpatient Medications:     Blood Glucose Calibration (ACCU-CHEK MELISA) SOLN, by In Vitro route once for 1 dose, Disp: 2 each, Rfl: 0    Blood Glucose Monitoring Suppl (ACCU-CHEK MELISA) device, TEST TWICE A DAY, Disp: 1 each, Rfl: 0    Elastic Bandages & Supports (CARPAL TUNNEL WRIST STABILIZER) MISC, by Does not apply route daily, Disp: 1 each, Rfl: 1    furosemide (LASIX) 40 mg tablet, Take 1 tablet (40 mg total) by mouth 3 (three) times a day, Disp: 270 tablet, Rfl: 3    gabapentin (NEURONTIN) 100 mg capsule, take 1 capsule by mouth three times a day, Disp: 90 capsule, Rfl: 1    glucose blood (ACCU-CHEK MELISA PLUS) test strip, TEST twice a day, Disp: 100 each, Rfl: 0    insulin glargine (BASAGLAR KWIKPEN) 100 units/mL injection pen, Inject 85 Units under the skin daily at bedtime for 90 days, Disp: 17 pen, Rfl: 1    Insulin Pen Needle (BD PEN NEEDLE CARLOS U/F) 32G X 4 MM MISC, Inject under the skin daily, Disp: 100 each, Rfl: 1    LANCETS MICRO THIN 33G MISC, by Does not apply route 3 (three) times a day for 30 days, Disp: 100 each, Rfl: 1    meloxicam (MOBIC) 15 mg tablet, Take 1 tablet (15 mg total) by mouth daily for 30 days, Disp: 30 tablet, Rfl: 1    naproxen sodium (ALEVE) 220 MG tablet, Take 220 mg by mouth 2 (two) times a day with meals , Disp: , Rfl:     potassium chloride (K-DUR,KLOR-CON) 10 mEq tablet, Take 1 tablet (10 mEq total) by mouth 2 (two) times a day, Disp: 180 tablet, Rfl: 1    tadalafil (CIALIS) 10 MG tablet, Take 1 tablet (10 mg total) by mouth daily as needed for erectile dysfunction, Disp: 5 tablet, Rfl: 1    losartan (COZAAR) 25 mg tablet, Take 1 tablet (25 mg total) by mouth daily (Patient not taking: Reported on 7/9/2019), Disp: 60 tablet, Rfl: 3    metoprolol succinate (TOPROL-XL) 25 mg 24 hr tablet, Take 1 tablet (25 mg total) by mouth daily (Patient not taking: Reported on 7/9/2019), Disp: 60 tablet, Rfl: 3    Past Medical History:   Diagnosis Date    Arthritis     Cardiac disease     CPAP (continuous positive airway pressure) dependence     Diabetes mellitus (HCC)     Diverticulitis of colon     Heart disease     Neuropathy     CHELSIE (obstructive sleep apnea)        Family History Problem Relation Age of Onset    Leukemia Mother     No Known Problems Father     Cancer Sister     Cancer Maternal Grandmother        Past Surgical History:   Procedure Laterality Date    HERNIA REPAIR         Social History     Socioeconomic History    Marital status: Single     Spouse name: Not on file    Number of children: Not on file    Years of education: Not on file    Highest education level: Not on file   Occupational History    Not on file   Social Needs    Financial resource strain: Not on file    Food insecurity:     Worry: Not on file     Inability: Not on file    Transportation needs:     Medical: Not on file     Non-medical: Not on file   Tobacco Use    Smoking status: Former Smoker    Smokeless tobacco: Never Used   Substance and Sexual Activity    Alcohol use: Yes     Comment: social use    Drug use: No    Sexual activity: Not on file   Lifestyle    Physical activity:     Days per week: Not on file     Minutes per session: Not on file    Stress: Not on file   Relationships    Social connections:     Talks on phone: Not on file     Gets together: Not on file     Attends [employer] service: Not on file     Active member of club or organization: Not on file     Attends meetings of clubs or organizations: Not on file     Relationship status: Not on file    Intimate partner violence:     Fear of current or ex partner: Not on file     Emotionally abused: Not on file     Physically abused: Not on file     Forced sexual activity: Not on file   Other Topics Concern    Not on file   Social History Narrative    Not on file       Review of Systems   Constitution: Positive for malaise/fatigue and weight gain  Negative for diaphoresis and weight loss  HENT: Negative for congestion  Cardiovascular: Positive for dyspnea on exertion and leg swelling  Negative for chest pain, irregular heartbeat, near-syncope, orthopnea, palpitations, paroxysmal nocturnal dyspnea and syncope  Respiratory: Negative for shortness of breath, sleep disturbances due to breathing and snoring  Hematologic/Lymphatic: Does not bruise/bleed easily  Skin: Negative for rash  Musculoskeletal: Negative for myalgias  Gastrointestinal: Negative for nausea and vomiting  Neurological: Positive for excessive daytime sleepiness  Negative for light-headedness  Psychiatric/Behavioral: The patient has insomnia  The patient is not nervous/anxious  Vitals: /80   Pulse 78   Resp 18   Ht 6' 1" (1 854 m)   Wt (!) 145 kg (319 lb)   SpO2 98%   BMI 42 09 kg/m²       Physical Exam:     GEN: Alert and oriented x 3, in no acute distress  Well appearing and well nourished  HEENT: Sclera anicteric, conjunctivae pink, mucous membranes moist  Oropharynx clear  NECK: Supple, no carotid bruits, no significant JVD  Trachea midline, no thyromegaly  HEART: Regular rhythm, normal S1 and S2, no murmurs, clicks, gallops or rubs  PMI nondisplaced, no thrills  LUNGS: decreased bilateral breat sounds   ABDOMEN: Soft, nontender, nondistended, normoactive bowel sounds  EXTREMITIES: Skin warm and well perfused, no clubbing, cyanosis, or edema  NEURO: No focal findings  Normal speech  Mood and affect normal    SKIN: Normal without suspicious lesions on exposed skin          Lab Results:       Lab Results   Component Value Date    HGBA1C 9 2 (A) 05/28/2019    HGBA1C  01/30/2019      Comment:      14 2     No results found for: CHOL  Lab Results   Component Value Date    HDL 43 06/06/2019    HDL 44 02/04/2019     Lab Results   Component Value Date    LDLCALC 169 (H) 06/06/2019    LDLCALC 164 (H) 02/04/2019     Lab Results   Component Value Date    TRIG 85 06/06/2019    TRIG 92 02/04/2019     No results found for: CHOLHDL

## 2019-07-24 ENCOUNTER — TELEPHONE (OUTPATIENT)
Dept: CARDIOLOGY CLINIC | Facility: CLINIC | Age: 59
End: 2019-07-24

## 2019-07-24 NOTE — TELEPHONE ENCOUNTER
----- Message from Jong Medina MD sent at 7/24/2019 10:49 AM EDT -----  please let him know that his blood work is normal

## 2019-07-25 ENCOUNTER — HOSPITAL ENCOUNTER (OUTPATIENT)
Dept: PULMONOLOGY | Facility: HOSPITAL | Age: 59
Discharge: HOME | End: 2019-07-25
Payer: COMMERCIAL

## 2019-07-25 DIAGNOSIS — R06.02 SHORTNESS OF BREATH: ICD-10-CM

## 2019-07-25 PROCEDURE — 94618 PULMONARY STRESS TESTING: CPT | Performed by: INTERNAL MEDICINE

## 2019-07-25 PROCEDURE — 94727 GAS DIL/WSHOT DETER LNG VOL: CPT | Performed by: INTERNAL MEDICINE

## 2019-07-25 PROCEDURE — 94761 N-INVAS EAR/PLS OXIMETRY MLT: CPT

## 2019-07-25 PROCEDURE — 94060 EVALUATION OF WHEEZING: CPT

## 2019-07-25 PROCEDURE — 94729 DIFFUSING CAPACITY: CPT | Performed by: INTERNAL MEDICINE

## 2019-07-25 PROCEDURE — 94727 GAS DIL/WSHOT DETER LNG VOL: CPT

## 2019-07-25 PROCEDURE — 94060 EVALUATION OF WHEEZING: CPT | Performed by: INTERNAL MEDICINE

## 2019-07-25 PROCEDURE — 94729 DIFFUSING CAPACITY: CPT

## 2019-07-25 RX ORDER — ALBUTEROL SULFATE 2.5 MG/3ML
2.5 SOLUTION RESPIRATORY (INHALATION) ONCE
Status: COMPLETED | OUTPATIENT
Start: 2019-07-25 | End: 2019-07-25

## 2019-07-25 RX ADMIN — ALBUTEROL SULFATE 2.5 MG: 2.5 SOLUTION RESPIRATORY (INHALATION) at 10:03

## 2019-08-02 ENCOUNTER — OFFICE VISIT (OUTPATIENT)
Dept: OBGYN CLINIC | Facility: CLINIC | Age: 59
End: 2019-08-02
Payer: COMMERCIAL

## 2019-08-02 VITALS
HEIGHT: 73 IN | BODY MASS INDEX: 41.75 KG/M2 | DIASTOLIC BLOOD PRESSURE: 84 MMHG | WEIGHT: 315 LBS | SYSTOLIC BLOOD PRESSURE: 128 MMHG | HEART RATE: 87 BPM

## 2019-08-02 DIAGNOSIS — M25.512 LEFT SHOULDER PAIN, UNSPECIFIED CHRONICITY: ICD-10-CM

## 2019-08-02 DIAGNOSIS — M17.11 PRIMARY OSTEOARTHRITIS OF RIGHT KNEE: Primary | ICD-10-CM

## 2019-08-02 PROCEDURE — 20610 DRAIN/INJ JOINT/BURSA W/O US: CPT | Performed by: ORTHOPAEDIC SURGERY

## 2019-08-02 PROCEDURE — 99213 OFFICE O/P EST LOW 20 MIN: CPT | Performed by: ORTHOPAEDIC SURGERY

## 2019-08-02 RX ORDER — LIDOCAINE HYDROCHLORIDE 10 MG/ML
4 INJECTION, SOLUTION INFILTRATION; PERINEURAL
Status: COMPLETED | OUTPATIENT
Start: 2019-08-02 | End: 2019-08-02

## 2019-08-02 RX ORDER — METHYLPREDNISOLONE ACETATE 40 MG/ML
1 INJECTION, SUSPENSION INTRA-ARTICULAR; INTRALESIONAL; INTRAMUSCULAR; SOFT TISSUE
Status: COMPLETED | OUTPATIENT
Start: 2019-08-02 | End: 2019-08-02

## 2019-08-02 RX ADMIN — METHYLPREDNISOLONE ACETATE 1 ML: 40 INJECTION, SUSPENSION INTRA-ARTICULAR; INTRALESIONAL; INTRAMUSCULAR; SOFT TISSUE at 11:44

## 2019-08-02 RX ADMIN — LIDOCAINE HYDROCHLORIDE 4 ML: 10 INJECTION, SOLUTION INFILTRATION; PERINEURAL at 11:45

## 2019-08-02 RX ADMIN — METHYLPREDNISOLONE ACETATE 1 ML: 40 INJECTION, SUSPENSION INTRA-ARTICULAR; INTRALESIONAL; INTRAMUSCULAR; SOFT TISSUE at 11:45

## 2019-08-02 RX ADMIN — LIDOCAINE HYDROCHLORIDE 4 ML: 10 INJECTION, SOLUTION INFILTRATION; PERINEURAL at 11:44

## 2019-08-02 NOTE — PROGRESS NOTES
Patient Name:  Juani Tenorio  MRN:  [id number]    Assessment & Plan     62-year-old male with multiple joints with osteoarthritis, now with right knee pain secondary to right knee OA and left shoulder pain with suspicion of underlying diagnosis of osteoarthritis  1  Weightbearing as tolerated in all extremities  2  Provided with right knee intra-articular corticosteroid injection and left glenohumeral joint corticosteroid injection for symptomatic pain relief  3  Continue with previously prescribed Mobic  4  Instructed patient to follow up in office on as-needed basis and call with any further issues      Subjective     62-year-old male presents today for evaluation of right knee pain and left shoulder pain  Patient was previously seen evaluated on 05/01/2019 complaining of right shoulder pain left knee pain and left hip pain  Patient received right glenohumeral corticosteroid injection as well as a left knee corticosteroid injection  States that this provided him with significant pain relief  He was set up to undergo fluoroscopically guided left hip corticosteroid injection but patient declined as he felt uncomfortable undergoing the procedure  Patient has been taking Mobic with moderate relief of symptoms  Patient is currently complaining of right knee pain that is made worse with increased activity improved somewhat with rest   Patient denies any episodes of alfonso instability but does describe intermittent popping of his right knee  Patient has peripheral neuropathy at baseline and altered sensation in bilateral lower extremities  He is also complaining of anterior left shoulder pain that he describes as deep and throbbing  Pain is intermittent but made worse with sudden shoulder movements  He states that he was lifting heavy objects at home several weeks ago this exacerbated his shoulder pain  Patient presents today interested in corticosteroid injections for right knee and left shoulder pain  Patient states that he is moving to [place] in the near future  General ROS:  Negative for fever or chills  Neurological ROS:  Negative for numbness or tingling  Objective     /84   Pulse 87   Ht 6' 1" (1 854 m)   Wt (!) 145 kg (319 lb)   BMI 42 09 kg/m²     Musculoskeletal right lower extremity  Skin intact, no erythema, no ecchymosis, no significant palpable knee effusion  No tenderness to palpation over medial or lateral joint line  Lateral knee pain with flexion extension associated with crepitus  Knee stable to varus valgus stress 30° of flexion and full extension, negative Suzie's  Altered sensation distally at baseline    Musculoskeletal left upper extremity  No significant tenderness palpation over anterior posterior lateral shoulder  Range of motion equal to contralateral extremity Passive ROM: approximately 120° of forward flexion, 45° external rotation, internal rotation to thoracolumbar junction  ROM passive at 90 degrees of abduction: 70 degrees ER, 30 degrees IR   5/5 strength with resisted external rotation and abduction in scapular plane  Negative bear hug test  Negative Celis and negative Neers      Data Review     I have personally reviewed pertinent films in PACS, and my interpretation follows  X-rays of right knee reviewed at today's visit    This reveals tricompartmental degenerative changes including subchondral sclerosis and joint space narrowing      Social History     Tobacco Use    Smoking status: Former Smoker    Smokeless tobacco: Never Used   Substance Use Topics    Alcohol use: Yes     Comment: social use    Drug use: No    [facility]      Large joint arthrocentesis: R knee  Date/Time: 8/2/2019 11:44 AM  Consent given by: patient  Timeout: Immediately prior to procedure a time out was called to verify the correct patient, procedure, equipment, support staff and site/side marked as required   Supporting Documentation  Indications: pain   Procedure Details  Location: knee - R knee  Preparation: Patient was prepped and draped in the usual sterile fashion  Needle size: 22 G  Ultrasound guidance: no  Medications administered: 4 mL lidocaine 1 %; 1 mL methylPREDNISolone acetate 40 mg/mL    Patient tolerance: patient tolerated the procedure well with no immediate complications  Dressing:  Sterile dressing applied    Large joint arthrocentesis: L glenohumeral  Date/Time: 8/2/2019 11:45 AM  Consent given by: patient  Timeout: Immediately prior to procedure a time out was called to verify the correct patient, procedure, equipment, support staff and site/side marked as required   Supporting Documentation  Indications: pain   Procedure Details  Location: shoulder - L glenohumeral  Needle size: 22 G  Ultrasound guidance: no  Approach: posterior  Medications administered: 4 mL lidocaine 1 %; 1 mL methylPREDNISolone acetate 40 mg/mL    Patient tolerance: patient tolerated the procedure well with no immediate complications  Dressing:  Sterile dressing applied

## 2019-08-04 DIAGNOSIS — Z79.4 TYPE 2 DIABETES MELLITUS WITH DIABETIC POLYNEUROPATHY, WITH LONG-TERM CURRENT USE OF INSULIN (HCC): ICD-10-CM

## 2019-08-04 DIAGNOSIS — E11.42 TYPE 2 DIABETES MELLITUS WITH DIABETIC POLYNEUROPATHY, WITH LONG-TERM CURRENT USE OF INSULIN (HCC): ICD-10-CM

## 2019-08-05 RX ORDER — LANCETS
EACH MISCELLANEOUS
Qty: 100 EACH | Refills: 3 | Status: SHIPPED | OUTPATIENT
Start: 2019-08-05

## 2019-08-09 ENCOUNTER — HOSPITAL ENCOUNTER (OUTPATIENT)
Dept: NON INVASIVE DIAGNOSTICS | Facility: CLINIC | Age: 59
Discharge: HOME | End: 2019-08-09
Payer: COMMERCIAL

## 2019-08-09 DIAGNOSIS — R53.82 CHRONIC FATIGUE: ICD-10-CM

## 2019-08-09 PROCEDURE — 93306 TTE W/DOPPLER COMPLETE: CPT

## 2019-08-09 RX ADMIN — PERFLUTREN 0.8 ML/MIN: 6.52 INJECTION, SUSPENSION INTRAVENOUS at 11:15

## 2019-08-11 PROCEDURE — 93306 TTE W/DOPPLER COMPLETE: CPT | Performed by: INTERNAL MEDICINE

## 2019-08-14 DIAGNOSIS — I50.22 CHRONIC SYSTOLIC CONGESTIVE HEART FAILURE (HCC): ICD-10-CM

## 2019-08-14 RX ORDER — POTASSIUM CHLORIDE 750 MG/1
TABLET, EXTENDED RELEASE ORAL
Qty: 180 TABLET | Refills: 1 | Status: SHIPPED | OUTPATIENT
Start: 2019-08-14

## 2019-08-15 ENCOUNTER — TELEPHONE (OUTPATIENT)
Dept: CARDIOLOGY CLINIC | Facility: CLINIC | Age: 59
End: 2019-08-15

## 2019-08-15 NOTE — TELEPHONE ENCOUNTER
Appointment canceled for Yovani Lin ([id number])   Visit Type: OFFICE VISIT SHORT [facility]   Date        Time      Length    Provider                  Department   8/22/2019    9:40 AM  20 mins  Mery Livingston MD      [facility]      Reason for Cancellation: Canceled via MyChart     SPOKE TO PT TO SEE IF HE WANTED  [address] THE APPT & HE SAID NO B/C HE IS MOVING

## 2019-09-04 DIAGNOSIS — G62.9 NEUROPATHY: ICD-10-CM

## 2019-09-04 RX ORDER — GABAPENTIN 100 MG/1
100 CAPSULE ORAL 3 TIMES DAILY
Qty: 90 CAPSULE | Refills: 1 | Status: SHIPPED | OUTPATIENT
Start: 2019-09-04

## 2020-03-06 ENCOUNTER — OFFICE VISIT (OUTPATIENT)
Dept: FAMILY MEDICINE CLINIC | Facility: CLINIC | Age: 60
End: 2020-03-06

## 2020-03-06 ENCOUNTER — TELEPHONE (OUTPATIENT)
Dept: FAMILY MEDICINE CLINIC | Facility: CLINIC | Age: 60
End: 2020-03-06

## 2020-03-06 VITALS
WEIGHT: 315 LBS | HEART RATE: 86 BPM | HEIGHT: 73 IN | DIASTOLIC BLOOD PRESSURE: 87 MMHG | BODY MASS INDEX: 41.75 KG/M2 | OXYGEN SATURATION: 94 % | SYSTOLIC BLOOD PRESSURE: 130 MMHG | TEMPERATURE: 98.7 F

## 2020-03-06 DIAGNOSIS — I50.22 CHRONIC SYSTOLIC HEART FAILURE (HCC): ICD-10-CM

## 2020-03-06 DIAGNOSIS — Z91.14 NONCOMPLIANCE WITH DIET AND MEDICATION REGIMEN: ICD-10-CM

## 2020-03-06 DIAGNOSIS — Z79.4 TYPE 2 DIABETES MELLITUS WITH DIABETIC POLYNEUROPATHY, WITH LONG-TERM CURRENT USE OF INSULIN (HCC): Primary | ICD-10-CM

## 2020-03-06 DIAGNOSIS — E11.42 TYPE 2 DIABETES MELLITUS WITH DIABETIC POLYNEUROPATHY, WITH LONG-TERM CURRENT USE OF INSULIN (HCC): Primary | ICD-10-CM

## 2020-03-06 DIAGNOSIS — Z91.119 NONCOMPLIANCE WITH DIET AND MEDICATION REGIMEN: ICD-10-CM

## 2020-03-06 DIAGNOSIS — E66.01 MORBIDLY OBESE (HCC): ICD-10-CM

## 2020-03-06 DIAGNOSIS — M15.9 PRIMARY OSTEOARTHRITIS INVOLVING MULTIPLE JOINTS: ICD-10-CM

## 2020-03-06 DIAGNOSIS — I10 ESSENTIAL HYPERTENSION: ICD-10-CM

## 2020-03-06 PROBLEM — M15.0 PRIMARY OSTEOARTHRITIS INVOLVING MULTIPLE JOINTS: Status: ACTIVE | Noted: 2020-03-06

## 2020-03-06 LAB
ALBUMIN SERPL-MCNC: 4.3 G/DL (ref 3.5–5.2)
ALBUMIN UR-MCNC: 1.3 MG/DL
ALBUMIN/GLOB SERPL: 1.1 G/DL
ALP SERPL-CCNC: 83 U/L (ref 39–117)
ALT SERPL W P-5'-P-CCNC: 11 U/L (ref 1–41)
ANION GAP SERPL CALCULATED.3IONS-SCNC: 17.2 MMOL/L (ref 5–15)
AST SERPL-CCNC: 13 U/L (ref 1–40)
BILIRUB SERPL-MCNC: 0.5 MG/DL (ref 0.2–1.2)
BUN BLD-MCNC: 14 MG/DL (ref 8–23)
BUN/CREAT SERPL: 14.9 (ref 7–25)
CALCIUM SPEC-SCNC: 9.6 MG/DL (ref 8.6–10.5)
CHLORIDE SERPL-SCNC: 94 MMOL/L (ref 98–107)
CHOLEST SERPL-MCNC: 225 MG/DL (ref 0–200)
CO2 SERPL-SCNC: 24.8 MMOL/L (ref 22–29)
CREAT BLD-MCNC: 0.94 MG/DL (ref 0.76–1.27)
CREAT UR-MCNC: 74.3 MG/DL
GFR SERPL CREATININE-BSD FRML MDRD: 99 ML/MIN/1.73
GLOBULIN UR ELPH-MCNC: 3.9 GM/DL
GLUCOSE BLD-MCNC: 282 MG/DL (ref 65–99)
HBA1C MFR BLD: 10.4 % (ref 3.5–5.6)
HDLC SERPL-MCNC: 38 MG/DL (ref 40–60)
LDLC SERPL CALC-MCNC: 162 MG/DL (ref 0–100)
LDLC/HDLC SERPL: 4.26 {RATIO}
MICROALBUMIN/CREAT UR: 17.5 MG/G
POTASSIUM BLD-SCNC: 4.1 MMOL/L (ref 3.5–5.2)
PROT SERPL-MCNC: 8.2 G/DL (ref 6–8.5)
SODIUM BLD-SCNC: 136 MMOL/L (ref 136–145)
TRIGL SERPL-MCNC: 125 MG/DL (ref 0–150)
VLDLC SERPL-MCNC: 25 MG/DL (ref 5–40)

## 2020-03-06 PROCEDURE — 82043 UR ALBUMIN QUANTITATIVE: CPT | Performed by: FAMILY MEDICINE

## 2020-03-06 PROCEDURE — 80053 COMPREHEN METABOLIC PANEL: CPT | Performed by: FAMILY MEDICINE

## 2020-03-06 PROCEDURE — 36415 COLL VENOUS BLD VENIPUNCTURE: CPT | Performed by: FAMILY MEDICINE

## 2020-03-06 PROCEDURE — 83036 HEMOGLOBIN GLYCOSYLATED A1C: CPT | Performed by: FAMILY MEDICINE

## 2020-03-06 PROCEDURE — 80061 LIPID PANEL: CPT | Performed by: FAMILY MEDICINE

## 2020-03-06 PROCEDURE — 99204 OFFICE O/P NEW MOD 45 MIN: CPT | Performed by: FAMILY MEDICINE

## 2020-03-06 PROCEDURE — 82570 ASSAY OF URINE CREATININE: CPT | Performed by: FAMILY MEDICINE

## 2020-03-06 RX ORDER — INSULIN GLARGINE 100 [IU]/ML
85 INJECTION, SOLUTION SUBCUTANEOUS NIGHTLY
COMMUNITY
End: 2020-03-06 | Stop reason: SDUPTHER

## 2020-03-06 RX ORDER — GABAPENTIN 100 MG/1
100 CAPSULE ORAL 3 TIMES DAILY
COMMUNITY
Start: 2020-02-12 | End: 2020-03-17 | Stop reason: SDUPTHER

## 2020-03-06 RX ORDER — BLOOD-GLUCOSE METER
EACH MISCELLANEOUS
COMMUNITY

## 2020-03-06 RX ORDER — POTASSIUM CHLORIDE 750 MG/1
10 TABLET, FILM COATED, EXTENDED RELEASE ORAL 2 TIMES DAILY
COMMUNITY
Start: 2020-02-08 | End: 2020-03-06 | Stop reason: SDUPTHER

## 2020-03-06 RX ORDER — FUROSEMIDE 40 MG/1
40 TABLET ORAL 3 TIMES DAILY
Qty: 90 TABLET | Refills: 5 | Status: SHIPPED | OUTPATIENT
Start: 2020-03-06 | End: 2020-10-28

## 2020-03-06 RX ORDER — LANCETS
EACH MISCELLANEOUS
COMMUNITY

## 2020-03-06 RX ORDER — METOPROLOL SUCCINATE 25 MG/1
25 TABLET, EXTENDED RELEASE ORAL DAILY
Qty: 30 TABLET | Refills: 5 | Status: SHIPPED | OUTPATIENT
Start: 2020-03-06 | End: 2020-03-23 | Stop reason: ALTCHOICE

## 2020-03-06 RX ORDER — FUROSEMIDE 40 MG/1
40 TABLET ORAL 3 TIMES DAILY
COMMUNITY
Start: 2020-02-08 | End: 2020-03-06 | Stop reason: SDUPTHER

## 2020-03-06 RX ORDER — MELOXICAM 15 MG/1
15 TABLET ORAL DAILY
COMMUNITY
End: 2020-03-06 | Stop reason: SDUPTHER

## 2020-03-06 RX ORDER — MELOXICAM 15 MG/1
15 TABLET ORAL DAILY
Qty: 30 TABLET | Refills: 5 | Status: SHIPPED | OUTPATIENT
Start: 2020-03-06 | End: 2020-08-24

## 2020-03-06 RX ORDER — NAPROXEN SODIUM 220 MG
220 TABLET ORAL 2 TIMES DAILY WITH MEALS
COMMUNITY
End: 2020-03-06

## 2020-03-06 RX ORDER — LOSARTAN POTASSIUM 25 MG/1
25 TABLET ORAL DAILY
COMMUNITY
End: 2020-03-06 | Stop reason: SDUPTHER

## 2020-03-06 RX ORDER — LOSARTAN POTASSIUM 25 MG/1
25 TABLET ORAL DAILY
Qty: 30 TABLET | Refills: 5 | Status: SHIPPED | OUTPATIENT
Start: 2020-03-06 | End: 2020-03-23 | Stop reason: ALTCHOICE

## 2020-03-06 RX ORDER — TADALAFIL 10 MG/1
10 TABLET ORAL DAILY PRN
COMMUNITY
End: 2020-03-23 | Stop reason: ALTCHOICE

## 2020-03-06 RX ORDER — ACETAMINOPHEN 500 MG
1000 TABLET ORAL EVERY 8 HOURS PRN
Qty: 180 TABLET | Refills: 5 | Status: SHIPPED | OUTPATIENT
Start: 2020-03-06

## 2020-03-06 RX ORDER — POTASSIUM CHLORIDE 1500 MG/1
20 TABLET, FILM COATED, EXTENDED RELEASE ORAL DAILY
Qty: 30 TABLET | Refills: 5 | Status: SHIPPED | OUTPATIENT
Start: 2020-03-06 | End: 2020-04-15 | Stop reason: SDUPTHER

## 2020-03-06 RX ORDER — INSULIN GLARGINE 100 [IU]/ML
100 INJECTION, SOLUTION SUBCUTANEOUS NIGHTLY
Qty: 10 PEN | Refills: 5 | Status: SHIPPED | OUTPATIENT
Start: 2020-03-06 | End: 2020-04-05

## 2020-03-06 RX ORDER — METOPROLOL SUCCINATE 25 MG/1
25 TABLET, EXTENDED RELEASE ORAL DAILY
COMMUNITY
End: 2020-03-06 | Stop reason: SDUPTHER

## 2020-03-06 NOTE — PATIENT INSTRUCTIONS
Preventive Care 40-64 Years, Male  Preventive care refers to lifestyle choices and visits with your health care provider that can promote health and wellness.  What does preventive care include?    · A yearly physical exam. This is also called an annual well check.  · Dental exams once or twice a year.  · Routine eye exams. Ask your health care provider how often you should have your eyes checked.  · Personal lifestyle choices, including:  ? Daily care of your teeth and gums.  ? Regular physical activity.  ? Eating a healthy diet.  ? Avoiding tobacco and drug use.  ? Limiting alcohol use.  ? Practicing safe sex.  ? Taking low-dose aspirin every day starting at age 50.  What happens during an annual well check?  The services and screenings done by your health care provider during your annual well check will depend on your age, overall health, lifestyle risk factors, and family history of disease.  Counseling  Your health care provider may ask you questions about your:  · Alcohol use.  · Tobacco use.  · Drug use.  · Emotional well-being.  · Home and relationship well-being.  · Sexual activity.  · Eating habits.  · Work and work environment.  Screening  You may have the following tests or measurements:  · Height, weight, and BMI.  · Blood pressure.  · Lipid and cholesterol levels. These may be checked every 5 years, or more frequently if you are over 50 years old.  · Skin check.  · Lung cancer screening. You may have this screening every year starting at age 55 if you have a 30-pack-year history of smoking and currently smoke or have quit within the past 15 years.  · Colorectal cancer screening. All adults should have this screening starting at age 50 and continuing until age 75. Your health care provider may recommend screening at age 45. You will have tests every 1-10 years, depending on your results and the type of screening test. People at increased risk should start screening at an earlier age. Screening tests may  include:  ? Guaiac-based fecal occult blood testing.  ? Fecal immunochemical test (FIT).  ? Stool DNA test.  ? Virtual colonoscopy.  ? Sigmoidoscopy. During this test, a flexible tube with a tiny camera (sigmoidoscope) is used to examine your rectum and lower colon. The sigmoidoscope is inserted through your anus into your rectum and lower colon.  ? Colonoscopy. During this test, a long, thin, flexible tube with a tiny camera (colonoscope) is used to examine your entire colon and rectum.  · Prostate cancer screening. Recommendations will vary depending on your family history and other risks.  · Hepatitis C blood test.  · Hepatitis B blood test.  · Sexually transmitted disease (STD) testing.  · Diabetes screening. This is done by checking your blood sugar (glucose) after you have not eaten for a while (fasting). You may have this done every 1-3 years.  Discuss your test results, treatment options, and if necessary, the need for more tests with your health care provider.  Vaccines  Your health care provider may recommend certain vaccines, such as:  · Influenza vaccine. This is recommended every year.  · Tetanus, diphtheria, and acellular pertussis (Tdap, Td) vaccine. You may need a Td booster every 10 years.  · Varicella vaccine. You may need this if you have not been vaccinated.  · Zoster vaccine. You may need this after age 60.  · Measles, mumps, and rubella (MMR) vaccine. You may need at least one dose of MMR if you were born in 1957 or later. You may also need a second dose.  · Pneumococcal 13-valent conjugate (PCV13) vaccine. You may need this if you have certain conditions and have not been vaccinated.  · Pneumococcal polysaccharide (PPSV23) vaccine. You may need one or two doses if you smoke cigarettes or if you have certain conditions.  · Meningococcal vaccine. You may need this if you have certain conditions.  · Hepatitis A vaccine. You may need this if you have certain conditions or if you travel or work in  "places where you may be exposed to hepatitis A.  · Hepatitis B vaccine. You may need this if you have certain conditions or if you travel or work in places where you may be exposed to hepatitis B.  · Haemophilus influenzae type b (Hib) vaccine. You may need this if you have certain risk factors.  Talk to your health care provider about which screenings and vaccines you need and how often you need them.  This information is not intended to replace advice given to you by your health care provider. Make sure you discuss any questions you have with your health care provider.  Document Released: 01/13/2017 Document Revised: 02/07/2019 Document Reviewed: 10/18/2016  [employer] Interactive Patient Education © 2019 [employer]      DASH Eating Plan  DASH stands for \"Dietary Approaches to Stop Hypertension.\" The DASH eating plan is a healthy eating plan that has been shown to reduce high blood pressure (hypertension). It may also reduce your risk for type 2 diabetes, heart disease, and stroke. The DASH eating plan may also help with weight loss.  What are tips for following this plan?    General guidelines  · Avoid eating more than 2,300 mg (milligrams) of salt (sodium) a day. If you have hypertension, you may need to reduce your sodium intake to 1,500 mg a day.  · Limit alcohol intake to no more than 1 drink a day for nonpregnant women and 2 drinks a day for men. One drink equals 12 oz of beer, 5 oz of wine, or 1½ oz of hard liquor.  · Work with your health care provider to maintain a healthy body weight or to lose weight. Ask what an ideal weight is for you.  · Get at least 30 minutes of exercise that causes your heart to beat faster (aerobic exercise) most days of the week. Activities may include walking, swimming, or biking.  · Work with your health care provider or diet and nutrition specialist (dietitian) to adjust your eating plan to your individual calorie needs.  Reading food labels    · Check food labels for the " "amount of sodium per serving. Choose foods with less than 5 percent of the Daily Value of sodium. Generally, foods with less than 300 mg of sodium per serving fit into this eating plan.  · To find whole grains, look for the word \"whole\" as the first word in the ingredient list.  Shopping  · Buy products labeled as \"low-sodium\" or \"no salt added.\"  · Buy fresh foods. Avoid canned foods and premade or frozen meals.  Cooking  · Avoid adding salt when cooking. Use salt-free seasonings or herbs instead of table salt or sea salt. Check with your health care provider or pharmacist before using salt substitutes.  · Do not preciado foods. Cook foods using healthy methods such as baking, boiling, grilling, and broiling instead.  · Cook with heart-healthy oils, such as olive, canola, soybean, or sunflower oil.  Meal planning  · Eat a balanced diet that includes:  ? 5 or more servings of fruits and vegetables each day. At each meal, try to fill half of your plate with fruits and vegetables.  ? Up to 6-8 servings of whole grains each day.  ? Less than 6 oz of lean meat, poultry, or fish each day. A 3-oz serving of meat is about the same size as a deck of cards. One egg equals 1 oz.  ? 2 servings of low-fat dairy each day.  ? A serving of nuts, seeds, or beans 5 times each week.  ? Heart-healthy fats. Healthy fats called Omega-3 fatty acids are found in foods such as flaxseeds and coldwater fish, like sardines, salmon, and mackerel.  · Limit how much you eat of the following:  ? Canned or prepackaged foods.  ? Food that is high in trans fat, such as fried foods.  ? Food that is high in saturated fat, such as fatty meat.  ? Sweets, desserts, sugary drinks, and other foods with added sugar.  ? Full-fat dairy products.  · Do not salt foods before eating.  · Try to eat at least 2 vegetarian meals each week.  · Eat more home-cooked food and less restaurant, buffet, and fast food.  · When eating at a restaurant, ask that your food be " prepared with less salt or no salt, if possible.  What foods are recommended?  The items listed may not be a complete list. Talk with your dietitian about what dietary choices are best for you.  Grains  Whole-grain or whole-wheat bread. Whole-grain or whole-wheat pasta. Brown rice. Oatmeal. Quinoa. Bulgur. Whole-grain and low-sodium cereals. Amy bread. Low-fat, low-sodium crackers. Whole-wheat flour tortillas.  Vegetables  Fresh or frozen vegetables (raw, steamed, roasted, or grilled). Low-sodium or reduced-sodium tomato and vegetable juice. Low-sodium or reduced-sodium tomato sauce and tomato paste. Low-sodium or reduced-sodium canned vegetables.  Fruits  All fresh, dried, or frozen fruit. Canned fruit in natural juice (without added sugar).  Meat and other protein foods  Skinless chicken or turkey. Ground chicken or turkey. Pork with fat trimmed off. Fish and seafood. Egg whites. Dried beans, peas, or lentils. Unsalted nuts, nut butters, and seeds. Unsalted canned beans. Lean cuts of beef with fat trimmed off. Low-sodium, lean deli meat.  Dairy  Low-fat (1%) or fat-free (skim) milk. Fat-free, low-fat, or reduced-fat cheeses. Nonfat, low-sodium ricotta or cottage cheese. Low-fat or nonfat yogurt. Low-fat, low-sodium cheese.  Fats and oils  Soft margarine without trans fats. Vegetable oil. Low-fat, reduced-fat, or light mayonnaise and salad dressings (reduced-sodium). Canola, safflower, olive, soybean, and sunflower oils. Avocado.  Seasoning and other foods  Herbs. Spices. Seasoning mixes without salt. Unsalted popcorn and pretzels. Fat-free sweets.  What foods are not recommended?  The items listed may not be a complete list. Talk with your dietitian about what dietary choices are best for you.  Grains  Baked goods made with fat, such as croissants, muffins, or some breads. Dry pasta or rice meal packs.  Vegetables  Creamed or fried vegetables. Vegetables in a cheese sauce. Regular canned vegetables (not  low-sodium or reduced-sodium). Regular canned tomato sauce and paste (not low-sodium or reduced-sodium). Regular tomato and vegetable juice (not low-sodium or reduced-sodium). Pickles. Olives.  Fruits  Canned fruit in a light or heavy syrup. Fried fruit. Fruit in cream or butter sauce.  Meat and other protein foods  Fatty cuts of meat. Ribs. Fried meat. Jernigan. Sausage. Bologna and other processed lunch meats. Salami. Fatback. Hotdogs. Bratwurst. Salted nuts and seeds. Canned beans with added salt. Canned or smoked fish. Whole eggs or egg yolks. Chicken or turkey with skin.  Dairy  Whole or 2% milk, cream, and half-and-half. Whole or full-fat cream cheese. Whole-fat or sweetened yogurt. Full-fat cheese. Nondairy creamers. Whipped toppings. Processed cheese and cheese spreads.  Fats and oils  Butter. Stick margarine. Lard. Shortening. Ghee. Jernigan fat. Tropical oils, such as coconut, palm kernel, or palm oil.  Seasoning and other foods  Salted popcorn and pretzels. Onion salt, garlic salt, seasoned salt, table salt, and sea salt. Worcestershire sauce. Tartar sauce. Barbecue sauce. Teriyaki sauce. Soy sauce, including reduced-sodium. Steak sauce. Canned and packaged gravies. Fish sauce. Oyster sauce. Cocktail sauce. Horseradish that you find on the shelf. Ketchup. Mustard. Meat flavorings and tenderizers. Bouillon cubes. Hot sauce and Tabasco sauce. Premade or packaged marinades. Premade or packaged taco seasonings. Relishes. Regular salad dressings.  Where to find more information:  · National [facility] [place] [facility]: www.nhlbi.nih.gov  · American Heart Association: www.heart.org  Summary  · The DASH eating plan is a healthy eating plan that has been shown to reduce high blood pressure (hypertension). It may also reduce your risk for type 2 diabetes, heart disease, and stroke.  · With the DASH eating plan, you should limit salt (sodium) intake to 2,300 mg a day. If you have hypertension, you may need to reduce  your sodium intake to 1,500 mg a day.  · When on the DASH eating plan, aim to eat more fresh fruits and vegetables, whole grains, lean proteins, low-fat dairy, and heart-healthy fats.  · Work with your health care provider or diet and nutrition specialist (dietitian) to adjust your eating plan to your individual calorie needs.  This information is not intended to replace advice given to you by your health care provider. Make sure you discuss any questions you have with your health care provider.  Document Released: 12/06/2012 Document Revised: 12/11/2017 Document Reviewed: 12/11/2017  [employer] Interactive Patient Education © 2020 [employer]      Exercising to Stay Healthy  To become healthy and stay healthy, it is recommended that you do moderate-intensity and vigorous-intensity exercise. You can tell that you are exercising at a moderate intensity if your heart starts beating faster and you start breathing faster but can still hold a conversation. You can tell that you are exercising at a vigorous intensity if you are breathing much harder and faster and cannot hold a conversation while exercising.  Exercising regularly is important. It has many health benefits, such as:  · Improving overall fitness, flexibility, and endurance.  · Increasing bone density.  · Helping with weight control.  · Decreasing body fat.  · Increasing muscle strength.  · Reducing stress and tension.  · Improving overall health.  How often should I exercise?  Choose an activity that you enjoy, and set realistic goals. Your health care provider can help you make an activity plan that works for you.  Exercise regularly as told by your health care provider. This may include:  · Doing strength training two times a week, such as:  ? Lifting weights.  ? Using resistance bands.  ? Push-ups.  ? Sit-ups.  ? Yoga.  · Doing a certain intensity of exercise for a given amount of time. Choose from these options:  ? A total of 150 minutes of  moderate-intensity exercise every week.  ? A total of 75 minutes of vigorous-intensity exercise every week.  ? A mix of moderate-intensity and vigorous-intensity exercise every week.  Children, pregnant women, people who have not exercised regularly, people who are overweight, and older adults may need to talk with a health care provider about what activities are safe to do. If you have a medical condition, be sure to talk with your health care provider before you start a new exercise program.  What are some exercise ideas?  Moderate-intensity exercise ideas include:  · Walking 1 mile (1.6 km) in about 15 minutes.  · Biking.  · Hiking.  · Golfing.  · Dancing.  · Water aerobics.  Vigorous-intensity exercise ideas include:  · Walking 4.5 miles (7.2 km) or more in about 1 hour.  · Jogging or running 5 miles (8 km) in about 1 hour.  · Biking 10 miles (16.1 km) or more in about 1 hour.  · Lap swimming.  · Roller-skating or in-line skating.  · Cross-country skiing.  · Vigorous competitive sports, such as football, basketball, and soccer.  · Jumping rope.  · Aerobic dancing.  What are some everyday activities that can help me to get exercise?  · Yard work, such as:  ? Pushing a .  ? Raking and bagging leaves.  · Washing your car.  · Pushing a stroller.  · Shoveling snow.  · Gardening.  · Washing windows or floors.  How can I be more active in my day-to-day activities?  · Use stairs instead of an elevator.  · Take a walk during your lunch break.  · If you drive, park your car farther away from your work or school.  · If you take public transportation, get off one stop early and walk the rest of the way.  · Stand up or walk around during all of your indoor phone calls.  · Get up, stretch, and walk around every 30 minutes throughout the day.  · Enjoy exercise with a friend. Support to continue exercising will help you keep a regular routine of activity.  What guidelines can I follow while exercising?  · Before you  start a new exercise program, talk with your health care provider.  · Do not exercise so much that you hurt yourself, feel dizzy, or get very short of breath.  · Wear comfortable clothes and wear shoes with good support.  · Drink plenty of water while you exercise to prevent dehydration or heat stroke.  · Work out until your breathing and your heartbeat get faster.  Where to find more information  · [place] Department of Health and Human Services: www.hhs.gov  · Centers for Disease Control and Prevention (CDC): www.cdc.gov  Summary  · Exercising regularly is important. It will improve your overall fitness, flexibility, and endurance.  · Regular exercise also will improve your overall health. It can help you control your weight, reduce stress, and improve your bone density.  · Do not exercise so much that you hurt yourself, feel dizzy, or get very short of breath.  · Before you start a new exercise program, talk with your health care provider.  This information is not intended to replace advice given to you by your health care provider. Make sure you discuss any questions you have with your health care provider.  Document Released: 01/20/2012 Document Revised: 11/08/2018 Document Reviewed: 11/08/2018  [employer] Interactive Patient Education © 2020 [employer]

## 2020-03-06 NOTE — PROGRESS NOTES
Subjective:     Sorin Allen is a 59 y.o. male who presents for  Chief Complaint   Patient presents with   • Shortness of Breath     New pt to the office. Complains of shortness of breath    • Establish Care       This is a new patient to me. Presents to establish care. Moved from [place].     HPI  Patient has a concurrent medical history of type 2 diabetes mellitus that is moderately controlled with basal insulin 100 units nightly.  Diabetes is exacerbated by morbid obesity, BMI 42.  Patient does not follow a low carbohydrate/diabetic diet.  Does not participate in regular exercise due to complaints of diffuse arthritic pain.  Diabetic eye exam and foot exam are due.    Associated cardiac comorbidity includes a concurrent medical history of HFrEF.  No echocardiogram is available at this time.  Records have been requested.  Patient is normotensive in office.  Cardiac disease is poorly controlled.  Patient refuses to take beta-blocker or an ACE inhibitor.  Patient is only willing to take Lasix 40 mg 3 times daily.  Complains of dyspnea without diuretic.  Complains of occasional headache and pulse in years.  Ambulatory BP is not monitored.  Discussed pathophysiology of congestive heart failure with patient and encouraged beta-blocker and ACE inhibitor use in an effort to reduce worsening heart disease.    Preventative:  Health Maintenance   Topic Date Due   • ANNUAL PHYSICAL  11/11/1962   • TDAP/TD VACCINES (1 - Tdap) 11/11/1970   • ZOSTER VACCINE (1 of 2) 11/11/2009   • INFLUENZA VACCINE  08/01/2019   • HEPATITIS C SCREENING  02/07/2020   • COLONOSCOPY  02/07/2020   • LIPID PANEL  03/06/2020       Past Medical Hx:  Past Medical History:   Diagnosis Date   • Arthritis    • Cardiac disease    • Carpal tunnel syndrome    • CPAP (continuous positive airway pressure) dependence    • Diabetes mellitus (CMS/HCC)    • Diverticulitis of colon    • Erectile dysfunction    • Heart disease    • Hyperlipidemia    •  Hypertension    • Neuromuscular disorder (CMS/HCC)    • ESAU (obstructive sleep apnea)    • Osteoarthritis        Past Surgical Hx:  Past Surgical History:   Procedure Laterality Date   • HERNIA REPAIR         Family Hx:  Family History   Problem Relation Age of Onset   • Leukemia Mother    • Cancer Sister    • Cancer Maternal Grandmother         Social History:  Social History     Socioeconomic History   • Marital status:      Spouse name: Not on file   • Number of children: Not on file   • Years of education: Not on file   • Highest education level: Not on file   Tobacco Use   • Smoking status: Former Smoker   • Smokeless tobacco: Never Used   Substance and Sexual Activity   • Alcohol use: Yes   • Drug use: Never       Allergies:  Patient has no known allergies.    Current Meds:    Current Outpatient Medications:   •  furosemide (LASIX) 40 MG tablet, Take 40 mg by mouth 3 (Three) Times a Day., Disp: , Rfl:   •  gabapentin (NEURONTIN) 100 MG capsule, Take 100 mg by mouth 3 (Three) Times a Day., Disp: , Rfl:   •  potassium chloride (K-DUR) 10 MEQ CR tablet, Take 10 mEq by mouth 2 (Two) Times a Day., Disp: , Rfl:     The following portions of the patient's history were reviewed and updated as appropriate: allergies, current medications, past family history, past medical history, past social history, past surgical history and problem list.    Review of Systems  Review of Systems   Constitutional: Negative for chills, diaphoresis, fatigue and fever.   HENT: Negative for congestion, rhinorrhea, sinus pressure, sneezing and sore throat.    Eyes: Negative for blurred vision, double vision and redness.   Respiratory: Positive for shortness of breath. Negative for cough and wheezing.    Cardiovascular: Negative for chest pain and leg swelling.   Gastrointestinal: Negative for abdominal pain, constipation, diarrhea, nausea and vomiting.   Endocrine: Negative for polydipsia, polyphagia and polyuria.   Genitourinary:  "Negative for difficulty urinating, dysuria and hematuria.   Musculoskeletal: Positive for arthralgias and gait problem. Negative for myalgias.   Skin: Negative for rash and skin lesions.   Neurological: Positive for headache. Negative for tremors, seizures and syncope.   Psychiatric/Behavioral: Negative for sleep disturbance and depressed mood. The patient is not nervous/anxious.        Objective:     /87 (BP Location: Right arm, Patient Position: Sitting, Cuff Size: Large Adult)   Pulse 86   Temp 98.7 °F (37.1 °C) (Oral)   Ht 185.4 cm (73\")   Wt (!) 145 kg (320 lb 3.2 oz)   SpO2 94%   BMI 42.25 kg/m²     Physical Exam   Constitutional: He is oriented to person, place, and time. He appears well-developed and well-nourished. No distress. He is morbidly obese.  HENT:   Head: Normocephalic and atraumatic.   Right Ear: Tympanic membrane and ear canal normal.   Left Ear: Tympanic membrane and ear canal normal.   Nose: Nose normal.   Mouth/Throat: Oropharynx is clear and moist and mucous membranes are normal.   Eyes: Pupils are equal, round, and reactive to light. Conjunctivae and EOM are normal. No scleral icterus.   Neck: Neck supple. No tracheal deviation present. No thyromegaly present.   Cardiovascular: Normal rate, regular rhythm, normal heart sounds and intact distal pulses.   Pulmonary/Chest: Effort normal and breath sounds normal. He has no wheezes.   Abdominal: Soft. Bowel sounds are normal. There is no tenderness.   Lymphadenopathy:     He has no cervical adenopathy.   Neurological: He is alert and oriented to person, place, and time.   Skin: Skin is warm and dry. Capillary refill takes less than 2 seconds. No rash noted. He is not diaphoretic.   Psychiatric: His affect is angry. He is agitated and aggressive.   Vitals reviewed.    No results found for: WBC, HGB, HCT, MCV, PLT  No results found for: GLUCOSE, BUN, CREATININE, EGFRIFNONA, EGFRIFAFRI, BCR, K, CO2, CALCIUM, PROTENTOTREF, ALBUMIN, " LABIL2, BILIRUBIN, AST, ALT     Assessment/Plan:     Sorin was seen today for shortness of breath and establish care.    Diagnoses and all orders for this visit:    Type 2 diabetes mellitus with diabetic polyneuropathy, with long-term current use of insulin (CMS/[facility])  Comments:  HbA1c goal <7%. Plan to recheck A1c today.  Continue insulin regimen.  May benefit from SGLT2.  Diabetic eye exam and foot exam are due.  Orders:  -     Comprehensive Metabolic Panel  -     Microalbumin / Creatinine Urine Ratio - Urine, Clean Catch  -     Hemoglobin A1c  -     Insulin Glargine (BASAGLAR KWIKPEN) 100 UNIT/ML injection pen; Inject 100 Units under the skin into the appropriate area as directed Every Night for 30 days.  -     losartan (COZAAR) 25 MG tablet; Take 1 tablet by mouth Daily.    Chronic systolic heart failure (CMS/[facility])  Comments:  Records requested.  Declined beta-blocker and ACE inhibitor.  Encouraged low-salt diet.  Only willing to take Lasix.  Orders:  -     Comprehensive Metabolic Panel  -     Lipid Panel  -     losartan (COZAAR) 25 MG tablet; Take 1 tablet by mouth Daily.  -     metoprolol succinate XL (TOPROL-XL) 25 MG 24 hr tablet; Take 1 tablet by mouth Daily.  -     furosemide (LASIX) 40 MG tablet; Take 1 tablet by mouth 3 (Three) Times a Day.  -     potassium chloride (K-TAB) 20 MEQ tablet controlled-release ER tablet; Take 1 tablet by mouth Daily.    Essential hypertension  Comments:  BP goal <140/90.  Encouraged low-Na+ diet & 150 min exercise weekly.   Noncompliant with antihypertensive medication.  Orders:  -     Comprehensive Metabolic Panel  -     Lipid Panel  -     losartan (COZAAR) 25 MG tablet; Take 1 tablet by mouth Daily.  -     metoprolol succinate XL (TOPROL-XL) 25 MG 24 hr tablet; Take 1 tablet by mouth Daily.    Morbidly obese (CMS/[facility])  Comments:  Discussed health risks associated with obesity.  Encouraged 150 minutes of exercise weekly.  Orders:  -     Comprehensive Metabolic Panel  -      Hemoglobin A1c  -     Lipid Panel    Primary osteoarthritis involving multiple joints  Comments:  Exacerbated by morbid obesity, BMI 42.  Advised no more than 3 g in medicine in 24 hours.  Requesting referral to orthopedics for intraarticular injections.  Orders:  -     Comprehensive Metabolic Panel  -     meloxicam (MOBIC) 15 MG tablet; Take 1 tablet by mouth Daily.  -     acetaminophen (TYLENOL) 500 MG tablet; Take 2 tablets by mouth Every 8 (Eight) Hours As Needed for Moderate Pain  (arthritic pain).  -     Ambulatory Referral to Orthopedic Surgery    Noncompliance with diet and medication regimen  Comments:  Discussed risks and benefits of antihypertensive therapy and oral hypoglycemics.  Discussed health risks associated with uncontrolled diabetes and hypertension.      Follow-up:     Return in about 3 months (around 6/6/2020) for Recheck BP & HTN.      Signature    Natalie Steward MD  Family Medicine  [facility]        This document has been electronically signed by Natalie Steward MD on March 6, 2020 9:11 AM

## 2020-03-10 ENCOUNTER — TELEPHONE (OUTPATIENT)
Dept: FAMILY MEDICINE CLINIC | Facility: CLINIC | Age: 60
End: 2020-03-10

## 2020-03-10 DIAGNOSIS — E11.42 TYPE 2 DIABETES MELLITUS WITH DIABETIC POLYNEUROPATHY, WITH LONG-TERM CURRENT USE OF INSULIN (HCC): Primary | ICD-10-CM

## 2020-03-10 DIAGNOSIS — Z79.4 TYPE 2 DIABETES MELLITUS WITH DIABETIC POLYNEUROPATHY, WITH LONG-TERM CURRENT USE OF INSULIN (HCC): Primary | ICD-10-CM

## 2020-03-13 ENCOUNTER — TELEPHONE (OUTPATIENT)
Dept: FAMILY MEDICINE CLINIC | Facility: CLINIC | Age: 60
End: 2020-03-13

## 2020-03-13 NOTE — TELEPHONE ENCOUNTER
Pt was given lab results. Does not want any cholesterol medication. No additional diabetes medication. Would like you to recheck his labs. He was off insulin for 2 weeks prior to labs and thinks that is why levels was high. He is not on Losartan. Has tried it before but gave him headaches and has not been on it.

## 2020-03-16 NOTE — TELEPHONE ENCOUNTER
Please call patient and let him know that his prescription were sent to his pharmacy on March 6 following his appointment.    I will send referral to orthopedics for patient's arthritic knees.

## 2020-03-16 NOTE — TELEPHONE ENCOUNTER
Recommended increasing insulin to 110 units. Blood sugar was 135 this morning. Needs Furosemide refilled but says you was waiting on his lab results first and asking if you are going to put in referral for his knee problem?

## 2020-03-16 NOTE — TELEPHONE ENCOUNTER
Patient is unwilling to take antihypertensive medication despite DM II & HFrEF. Discussed health risks associated with uncontrolled BP and uncontrolled heart disease. Risks and benefits of antihypertensive therapy discussed with patient at his last office visit, March 6, 2020. Patient expressed understanding and will only take Lasix.

## 2020-03-16 NOTE — TELEPHONE ENCOUNTER
Please call patient and let him know that I will recheck his hemoglobin A1c in 3 months.  Hemoglobin A1c is a measure of average blood sugar over the course of 3 months.  Not having his insulin may have caused drastic changes in his sugar levels but he has been averaging sugar levels greater than 240 mg/dL.  His goal blood sugar level should be less than 180.  He is unwilling to add additional diabetic medication to his regimen I would recommend increasing his insulin from 100 units nightly to 110 units nightly.

## 2020-03-18 RX ORDER — GABAPENTIN 100 MG/1
100 CAPSULE ORAL 3 TIMES DAILY
Qty: 90 CAPSULE | Refills: 2 | Status: SHIPPED | OUTPATIENT
Start: 2020-03-18 | End: 2020-06-09

## 2020-03-18 NOTE — TELEPHONE ENCOUNTER
Patient with concurrent medical history of uncontrolled type 2 diabetes mellitus with associated peripheral neuropathy requesting refill of gabapentin 100 mg 3 times daily for neuropathic pain.  Prescription last filled on February 12, 2020 per inspect report.

## 2020-03-23 ENCOUNTER — OFFICE VISIT (OUTPATIENT)
Dept: ORTHOPEDIC SURGERY | Facility: CLINIC | Age: 60
End: 2020-03-23

## 2020-03-23 VITALS
HEART RATE: 83 BPM | SYSTOLIC BLOOD PRESSURE: 150 MMHG | TEMPERATURE: 97 F | DIASTOLIC BLOOD PRESSURE: 95 MMHG | WEIGHT: 315 LBS | BODY MASS INDEX: 41.75 KG/M2 | HEIGHT: 73 IN

## 2020-03-23 DIAGNOSIS — M17.0 PRIMARY OSTEOARTHRITIS OF BOTH KNEES: Primary | ICD-10-CM

## 2020-03-23 DIAGNOSIS — E66.01 MORBID OBESITY (HCC): ICD-10-CM

## 2020-03-23 PROBLEM — M17.12 PRIMARY OSTEOARTHRITIS OF LEFT KNEE: Status: ACTIVE | Noted: 2020-03-23

## 2020-03-23 PROCEDURE — 99203 OFFICE O/P NEW LOW 30 MIN: CPT | Performed by: PHYSICIAN ASSISTANT

## 2020-03-23 PROCEDURE — 20610 DRAIN/INJ JOINT/BURSA W/O US: CPT | Performed by: PHYSICIAN ASSISTANT

## 2020-03-23 RX ORDER — LIDOCAINE HYDROCHLORIDE 10 MG/ML
4 INJECTION, SOLUTION EPIDURAL; INFILTRATION; INTRACAUDAL; PERINEURAL
Status: COMPLETED | OUTPATIENT
Start: 2020-03-23 | End: 2020-03-23

## 2020-03-23 RX ORDER — TRIAMCINOLONE ACETONIDE 40 MG/ML
40 INJECTION, SUSPENSION INTRA-ARTICULAR; INTRAMUSCULAR
Status: COMPLETED | OUTPATIENT
Start: 2020-03-23 | End: 2020-03-23

## 2020-03-23 RX ADMIN — TRIAMCINOLONE ACETONIDE 40 MG: 40 INJECTION, SUSPENSION INTRA-ARTICULAR; INTRAMUSCULAR at 09:52

## 2020-03-23 RX ADMIN — LIDOCAINE HYDROCHLORIDE 4 ML: 10 INJECTION, SOLUTION EPIDURAL; INFILTRATION; INTRACAUDAL; PERINEURAL at 09:52

## 2020-03-23 NOTE — PATIENT INSTRUCTIONS
"Osteoarthritis    Osteoarthritis is a type of arthritis that affects tissue that covers the ends of bones in joints (cartilage). Cartilage acts as a cushion between the bones and helps them move smoothly. Osteoarthritis results when cartilage in the joints gets worn down. Osteoarthritis is sometimes called \"wear and tear\" arthritis.  Osteoarthritis is the most common form of arthritis. It often occurs in older people. It is a condition that gets worse over time (a progressive condition). Joints that are most often affected by this condition are in:  · Fingers.  · Toes.  · Hips.  · Knees.  · Spine, including neck and lower back.  What are the causes?  This condition is caused by age-related wearing down of cartilage that covers the ends of bones.  What increases the risk?  The following factors may make you more likely to develop this condition:  · Older age.  · Being overweight or obese.  · Overuse of joints, such as in athletes.  · Past injury of a joint.  · Past surgery on a joint.  · Family history of osteoarthritis.  What are the signs or symptoms?  The main symptoms of this condition are pain, swelling, and stiffness in the joint. The joint may lose its shape over time. Small pieces of bone or cartilage may break off and float inside of the joint, which may cause more pain and damage to the joint. Small deposits of bone (osteophytes) may grow on the edges of the joint. Other symptoms may include:  · A grating or scraping feeling inside the joint when you move it.  · Popping or creaking sounds when you move.  Symptoms may affect one or more joints. Osteoarthritis in a major joint, such as your knee or hip, can make it painful to walk or exercise. If you have osteoarthritis in your hands, you might not be able to  items, twist your hand, or control small movements of your hands and fingers (fine motor skills).  How is this diagnosed?  This condition may be diagnosed based on:  · Your medical history.  · A " physical exam.  · Your symptoms.  · X-rays of the affected joint(s).  · Blood tests to rule out other types of arthritis.  How is this treated?  There is no cure for this condition, but treatment can help to control pain and improve joint function. Treatment plans may include:  · A prescribed exercise program that allows for rest and joint relief. You may work with a physical therapist.  · A weight control plan.  · Pain relief techniques, such as:  ? Applying heat and cold to the joint.  ? Electric pulses delivered to nerve endings under the skin (transcutaneous electrical nerve stimulation, or TENS).  ? Massage.  ? Certain nutritional supplements.  · NSAIDs or prescription medicines to help relieve pain.  · Medicine to help relieve pain and inflammation (corticosteroids). This can be given by mouth (orally) or as an injection.  · Assistive devices, such as a brace, wrap, splint, specialized glove, or cane.  · Surgery, such as:  ? An osteotomy. This is done to reposition the bones and relieve pain or to remove loose pieces of bone and cartilage.  ? Joint replacement surgery. You may need this surgery if you have very bad (advanced) osteoarthritis.  Follow these instructions at home:  Activity  · Rest your affected joints as directed by your health care provider.  · Do not drive or use heavy machinery while taking prescription pain medicine.  · Exercise as directed. Your health care provider or physical therapist may recommend specific types of exercise, such as:  ? Strengthening exercises. These are done to strengthen the muscles that support joints that are affected by arthritis. They can be performed with weights or with exercise bands to add resistance.  ? Aerobic activities. These are exercises, such as brisk walking or water aerobics, that get your heart pumping.  ? Range-of-motion activities. These keep your joints easy to move.  ? Balance and agility exercises.  Managing pain, stiffness, and swelling              · If directed, apply heat to the affected area as often as told by your health care provider. Use the heat source that your health care provider recommends, such as a moist heat pack or a heating pad.  ? If you have a removable assistive device, remove it as told by your health care provider.  ? Place a towel between your skin and the heat source. If your health care provider tells you to keep the assistive device on while you apply heat, place a towel between the assistive device and the heat source.  ? Leave the heat on for 20-30 minutes.  ? Remove the heat if your skin turns bright red. This is especially important if you are unable to feel pain, heat, or cold. You may have a greater risk of getting burned.  · If directed, put ice on the affected joint:  ? If you have a removable assistive device, remove it as told by your health care provider.  ? Put ice in a plastic bag.  ? Place a towel between your skin and the bag. If your health care provider tells you to keep the assistive device on during icing, place a towel between the assistive device and the bag.  ? Leave the ice on for 20 minutes, 2-3 times a day.  General instructions  · Take over-the-counter and prescription medicines only as told by your health care provider.  · Maintain a healthy weight. Follow instructions from your health care provider for weight control. These may include dietary restrictions.  · Do not use any products that contain nicotine or tobacco, such as cigarettes and e-cigarettes. These can delay bone healing. If you need help quitting, ask your health care provider.  · Use assistive devices as directed by your health care provider.  · Keep all follow-up visits as told by your health care provider. This is important.  Where to find more information  · National [place] of Arthritis and Musculoskeletal and Skin Diseases: www.niams.nih.gov  · National [place] on Aging: www.melodie.nih.gov  · American College of Rheumatology:  www.rheumatology.org  Contact a health care provider if:  · Your skin turns red.  · You develop a rash.  · You have pain that gets worse.  · You have a fever along with joint or muscle aches.  Get help right away if:  · You lose a lot of weight.  · You suddenly lose your appetite.  · You have night sweats.  Summary  · Osteoarthritis is a type of arthritis that affects tissue covering the ends of bones in joints (cartilage).  · This condition is caused by age-related wearing down of cartilage that covers the ends of bones.  · The main symptom of this condition is pain, swelling, and stiffness in the joint.  · There is no cure for this condition, but treatment can help to control pain and improve joint function.  This information is not intended to replace advice given to you by your health care provider. Make sure you discuss any questions you have with your health care provider.  Document Released: 12/18/2006 Document Revised: 09/24/2018 Document Reviewed: 08/21/2017  [employer] Interactive Patient Education © 2020 [employer]      Knee Injection  A knee injection is a procedure to get medicine into your knee joint to relieve the pain, swelling, and stiffness of arthritis. Your health care provider uses a needle to inject medicine, which may also help to lubricate and cushion your knee joint. You may need more than one injection.  Tell a health care provider about:  · Any allergies you have.  · All medicines you are taking, including vitamins, herbs, eye drops, creams, and over-the-counter medicines.  · Any problems you or family members have had with anesthetic medicines.  · Any blood disorders you have.  · Any surgeries you have had.  · Any medical conditions you have.  · Whether you are pregnant or may be pregnant.  What are the risks?  Generally, this is a safe procedure. However, problems may occur, including:  · Infection.  · Bleeding.  · Symptoms that get worse.  · Damage to the area around your  knee.  · Allergic reaction to any of the medicines.  · Skin reactions from repeated injections.  What happens before the procedure?  · Ask your health care provider about changing or stopping your regular medicines. This is especially important if you are taking diabetes medicines or blood thinners.  · Plan to have someone take you home from the hospital or clinic.  What happens during the procedure?    · You will sit or lie down in a position for your knee to be treated.  · The skin over your kneecap will be cleaned with a germ-killing soap.  · You will be given a medicine that numbs the area (local anesthetic). You may feel some stinging.  · The medicine will be injected into your knee. The needle is carefully placed between your kneecap and your knee. The medicine is injected into the joint space.  · The needle will be removed at the end of the procedure.  · A bandage (dressing) may be placed over the injection site.  The procedure may vary among health care providers and hospitals.  What can I expect after the procedure?  · Your blood pressure, heart rate, breathing rate, and blood oxygen level will be monitored until you leave the hospital or clinic.  · You may have to move your knee through its full range of motion. This helps to get all the medicine into your joint space.  · You will be watched to make sure that you do not have a reaction to the injected medicine.  · You may feel more pain, swelling, and warmth than you did before the injection. This reaction may last about 1-2 days.  Follow these instructions at home:  Medicines  · Take over-the-counter and prescription medicines only as told by your doctor.  · Do not drive or use heavy machinery while taking prescription pain medicine.  · Do not take medicines such as aspirin and ibuprofen unless your health care provider tells you to take them.  Injection site care  · Follow instructions from your health care provider about:  ? How to take care of your  puncture site.  ? When and how you should change your dressing.  ? When you should remove your dressing.  · Check your injection area every day for signs of infection. Check for:  ? More redness, swelling, or pain after 2 days.  ? Fluid or blood.  ? Pus or a bad smell.  ? Warmth.  Managing pain, stiffness, and swelling    · If directed, put ice on the injection area:  ? Put ice in a plastic bag.  ? Place a towel between your skin and the bag.  ? Leave the ice on for 20 minutes, 2-3 times per day.  · Do not apply heat to your knee.  · Raise (elevate) the injection area above the level of your heart while you are sitting or lying down.  General instructions  · If you were given a dressing, keep it dry until your health care provider says it can be removed. Ask your health care provider when you can start showering or taking a bath.  · Avoid strenuous activities for as long as directed by your health care provider. Ask your health care provider when you can return to your normal activities.  · Keep all follow-up visits as told by your health care provider. This is important. You may need more injections.  Contact a health care provider if you have:  · A fever.  · Warmth in your injection area.  · Fluid, blood, or pus coming from your injection site.  · Symptoms at your injection site that last longer than 2 days after your procedure.  Get help right away if:  · Your knee:  ? Turns very red.  ? Becomes very swollen.  ? Is in severe pain.  Summary  · A knee injection is a procedure to get medicine into your knee joint to relieve the pain, swelling, and stiffness of arthritis.  · A needle is carefully placed between your kneecap and your knee to inject medicine into the joint space.  · Before the procedure, ask your health care provider about changing or stopping your regular medicines, especially if you are taking diabetes medicines or blood thinners.  · Contact your health care provider if you have any problems or  questions after your procedure.  This information is not intended to replace advice given to you by your health care provider. Make sure you discuss any questions you have with your health care provider.  Document Released: 03/10/2008 Document Revised: 01/07/2019 Document Reviewed: 01/07/2019  [employer] Interactive Patient Education © 2020 [employer]      Preventing Health Risks of Being Overweight  Maintaining a healthy body weight is an important part of your overall health. Your healthy body weight depends on your age, gender, and height. Being overweight puts you at risk for many health problems, including:  · Heart disease.  · Diabetes.  · Problems sleeping.  · Joint problems.  You can make changes to your diet and lifestyle to prevent these risks. Consider working with a health care provider or a dietitian to make these changes.  What nutrition changes can be made?    · Eat only as much as your body needs. In most cases, this is about 2,000 calories a day, but the amount varies depending on your height, gender, and activity level. Ask your health care provider how many calories you should have each day. Eating more than your body needs on a regular basis can cause you to become overweight or obese.  · Eat slowly, and stop eating when you feel full.  · Choose healthy foods, including:  ? Fruits and vegetables.  ? Lean meats.  ? Low-fat dairy products.  ? High-fiber foods, such as whole grains and beans.  ? Healthy snacks like vegetable sticks, a piece of fruit, or a small amount of yogurt or cheese.  · Avoid foods and drinks that are high in sugar, salt (sodium), saturated fat, or trans fat. This includes:  ? Many desserts such as candy, cookies, and ice cream.  ? Soda.  ? Fried foods.  ? Processed meats such as hot dogs or lunch meats.  ? Prepackaged snack foods.  What lifestyle changes can be made?    · Exercise for at least 150 minutes a week to prevent weight gain, or as often as recommended by your  health care provider. Do moderate-intensity exercise, such as brisk walking.  ? Spread it out by exercising for 30 minutes 5 days a week, or in short 10-minute bursts several times a day.  · Find other ways to stay active and burn calories, such as yard work or a hobby that involves physical activity.  · Get at least 8 hours of sleep each night. When you are well-rested, you are more likely to be active and make healthy choices during the day. To sleep better:  ? Try to go to bed and wake up at about the same time every day.  ? Keep your bedroom dark, quiet, and cool.  ? Make sure that your bed is comfortable.  ? Avoid stimulating activities, such as watching television or exercising, for at least one hour before bedtime.  Why are these changes important?  Eating healthy and being active helps you lose weight and prevent health problems caused by being overweight. Making these changes can also help you manage stress, feel better mentally, and connect with friends and family.  What can happen if changes are not made?  Being overweight can affect you for your entire life. You may develop joint or bone problems that make it painful or difficult for you to play sports or do activities you enjoy. Being overweight puts stress on your heart and lungs and can lead to medical problems like diabetes, heart disease, and sleeping problems.  Where to find support  You can get support for preventing health risks of being overweight from:  · Your health care provider or a dietitian. They can provide guidance about healthy eating and healthy lifestyle choices.  · Weight loss support groups, online or in-person.  Where to find more information  · MyPlate: www.choosemyplate.gov  ? This an online tool that provides personalized recommendations about foods to eat each day.  · The Centers for Disease Control and Prevention: www.cdc.gov/healthyweight  ? This resource gives tips for managing weight and having an active  lifestyle.  Summary  · To prevent unhealthy weight gain, it is important to maintain a healthy diet high in vegetables and whole grains, exercise regularly, and get at least 8 hours of sleep each night.  · Making these changes helps prevent many long-term (chronic) health conditions that can shorten your life, such as diabetes, heart disease, and stroke.  This information is not intended to replace advice given to you by your health care provider. Make sure you discuss any questions you have with your health care provider.  Document Released: 11/14/2018 Document Revised: 11/14/2018 Document Reviewed: 11/14/2018  [employer] Interactive Patient Education © 2020 [employer]      Knee Injection  A knee injection is a procedure to get medicine into your knee joint to relieve the pain, swelling, and stiffness of arthritis. Your health care provider uses a needle to inject medicine, which may also help to lubricate and cushion your knee joint. You may need more than one injection.  Tell a health care provider about:  · Any allergies you have.  · All medicines you are taking, including vitamins, herbs, eye drops, creams, and over-the-counter medicines.  · Any problems you or family members have had with anesthetic medicines.  · Any blood disorders you have.  · Any surgeries you have had.  · Any medical conditions you have.  · Whether you are pregnant or may be pregnant.  What are the risks?  Generally, this is a safe procedure. However, problems may occur, including:  · Infection.  · Bleeding.  · Symptoms that get worse.  · Damage to the area around your knee.  · Allergic reaction to any of the medicines.  · Skin reactions from repeated injections.  What happens before the procedure?  · Ask your health care provider about changing or stopping your regular medicines. This is especially important if you are taking diabetes medicines or blood thinners.  · Plan to have someone take you home from the hospital or clinic.  What  happens during the procedure?    · You will sit or lie down in a position for your knee to be treated.  · The skin over your kneecap will be cleaned with a germ-killing soap.  · You will be given a medicine that numbs the area (local anesthetic). You may feel some stinging.  · The medicine will be injected into your knee. The needle is carefully placed between your kneecap and your knee. The medicine is injected into the joint space.  · The needle will be removed at the end of the procedure.  · A bandage (dressing) may be placed over the injection site.  The procedure may vary among health care providers and hospitals.  What can I expect after the procedure?  · Your blood pressure, heart rate, breathing rate, and blood oxygen level will be monitored until you leave the hospital or clinic.  · You may have to move your knee through its full range of motion. This helps to get all the medicine into your joint space.  · You will be watched to make sure that you do not have a reaction to the injected medicine.  · You may feel more pain, swelling, and warmth than you did before the injection. This reaction may last about 1-2 days.  Follow these instructions at home:  Medicines  · Take over-the-counter and prescription medicines only as told by your doctor.  · Do not drive or use heavy machinery while taking prescription pain medicine.  · Do not take medicines such as aspirin and ibuprofen unless your health care provider tells you to take them.  Injection site care  · Follow instructions from your health care provider about:  ? How to take care of your puncture site.  ? When and how you should change your dressing.  ? When you should remove your dressing.  · Check your injection area every day for signs of infection. Check for:  ? More redness, swelling, or pain after 2 days.  ? Fluid or blood.  ? Pus or a bad smell.  ? Warmth.  Managing pain, stiffness, and swelling    · If directed, put ice on the injection area:  ? Put  ice in a plastic bag.  ? Place a towel between your skin and the bag.  ? Leave the ice on for 20 minutes, 2-3 times per day.  · Do not apply heat to your knee.  · Raise (elevate) the injection area above the level of your heart while you are sitting or lying down.  General instructions  · If you were given a dressing, keep it dry until your health care provider says it can be removed. Ask your health care provider when you can start showering or taking a bath.  · Avoid strenuous activities for as long as directed by your health care provider. Ask your health care provider when you can return to your normal activities.  · Keep all follow-up visits as told by your health care provider. This is important. You may need more injections.  Contact a health care provider if you have:  · A fever.  · Warmth in your injection area.  · Fluid, blood, or pus coming from your injection site.  · Symptoms at your injection site that last longer than 2 days after your procedure.  Get help right away if:  · Your knee:  ? Turns very red.  ? Becomes very swollen.  ? Is in severe pain.  Summary  · A knee injection is a procedure to get medicine into your knee joint to relieve the pain, swelling, and stiffness of arthritis.  · A needle is carefully placed between your kneecap and your knee to inject medicine into the joint space.  · Before the procedure, ask your health care provider about changing or stopping your regular medicines, especially if you are taking diabetes medicines or blood thinners.  · Contact your health care provider if you have any problems or questions after your procedure.  This information is not intended to replace advice given to you by your health care provider. Make sure you discuss any questions you have with your health care provider.  Document Released: 03/10/2008 Document Revised: 01/07/2019 Document Reviewed: 01/07/2019  [employer] Interactive Patient Education © 2020 [employer]

## 2020-03-23 NOTE — PROGRESS NOTES
ORTHOPEDIC VISIT    Referring Provider: Nichelle  Primary Care Provider: Natalie Steward MD         Subjective:  Chief Complaint:  Chief Complaint   Patient presents with   • Left Knee - Initial Evaluation, Pain   • Right Knee - Initial Evaluation, Pain       HPI:  Sorin Allen is a 59 y.o. male who presents for his initial visit for bilateral knee pain ongoing for many years.  He denies any specific injury, but reports that he used to be a  and had to kneel a lot.  He reports both dull, achy pain, as well as intermittent sharp, shooting pain, mainly in the anterior general knee area.  He denies any radiation, numbness, or tingling.  He does report mechanical symptoms such as popping, that is associated with the pain.  He also reports some instability in the left knee.  He reports the pain in his left knee is greater than the right.  He is currently taking meloxicam, which does help some with the discomfort.  He denies any history of previous surgery.  He has had previous steroid injections, which helped significantly with his discomfort.  His last injections were over a year ago.  Referred for consultation by Dr. Steward.    Past Medical History:   Diagnosis Date   • Arthritis    • Cardiac disease    • Carpal tunnel syndrome    • CPAP (continuous positive airway pressure) dependence    • Diabetes mellitus (CMS/HCC)    • Diverticulitis of colon    • Erectile dysfunction    • Heart disease    • Hyperlipidemia    • Hypertension    • Neuromuscular disorder (CMS/HCC)    • ESAU (obstructive sleep apnea)    • Osteoarthritis        Past Surgical History:   Procedure Laterality Date   • HERNIA REPAIR         Family History   Problem Relation Age of Onset   • Leukemia Mother    • Cancer Sister    • Cancer Maternal Grandmother        Social History     Occupational History   • Not on file   Tobacco Use   • Smoking status: Former Smoker     Packs/day: 2.00     Years: 20.00     Pack years: 40.00     Last attempt  to quit: 3/6/2000     Years since quittin.0   • Smokeless tobacco: Never Used   Substance and Sexual Activity   • Alcohol use: Yes   • Drug use: Never   • Sexual activity: Defer        Medications:    Current Outpatient Medications:   •  ACCU-CHEK SOFTCLIX LANCETS lancets, by Other route. Use as instructed, Disp: , Rfl:   •  acetaminophen (TYLENOL) 500 MG tablet, Take 2 tablets by mouth Every 8 (Eight) Hours As Needed for Moderate Pain  (arthritic pain)., Disp: 180 tablet, Rfl: 5  •  Blood Glucose Calibration liquid, by In Vitro route., Disp: , Rfl:   •  Blood Glucose Monitoring Suppl (ACCU-CHEK ALEKS PLUS) w/Device kit, Use to test blood sugar twice a day, Disp: , Rfl:   •  furosemide (LASIX) 40 MG tablet, Take 1 tablet by mouth 3 (Three) Times a Day., Disp: 90 tablet, Rfl: 5  •  gabapentin (NEURONTIN) 100 MG capsule, Take 1 capsule by mouth 3 (Three) Times a Day., Disp: 90 capsule, Rfl: 2  •  glucose blood (ACCU-CHEK ALEKS PLUS) test strip, 1 each by Other route 4 (Four) Times a Day Before Meals & at Bedtime., Disp: 120 each, Rfl: 5  •  Insulin Glargine (BASAGLAR KWIKPEN) 100 UNIT/ML injection pen, Inject 100 Units under the skin into the appropriate area as directed Every Night for 30 days., Disp: 10 pen, Rfl: 5  •  Insulin Pen Needle 32G X 4 MM misc, 1 Units Daily., Disp: 30 each, Rfl: 5  •  meloxicam (MOBIC) 15 MG tablet, Take 1 tablet by mouth Daily., Disp: 30 tablet, Rfl: 5  •  potassium chloride (K-TAB) 20 MEQ tablet controlled-release ER tablet, Take 1 tablet by mouth Daily., Disp: 30 tablet, Rfl: 5    Allergies:  No Known Allergies      Review of Systems:  Gen -no fever, chills , sweats, headache   Eyes - no irritation or discharge   ENT -  no ear pain , runny nose , sore throat , difficulty swallowing   Resp - no cough , congestion , excessive expectoration   CVS - no chest pain , palpitations.   Abd - no pain , nausea , vomiting , diarrhea   Skin - no rash , lesions.   Neuro - no dizziness    Please  "see HPI for any other pertinent positives.  All other systems were reviewed and are negative.       Objective   Objective:    /95 (BP Location: Right arm, Patient Position: Sitting, Cuff Size: Large Adult)   Pulse 83   Temp 97 °F (36.1 °C) (Oral)   Ht 185.4 cm (73\")   Wt (!) 148 kg (325 lb 6.4 oz)   BMI 42.93 kg/m²     Physical Examination:  Alert, oriented, morbidly obese individual in no acute distress, ambulating unassisted  Right lower extremity shows no erythema, rashes, or open skin lesions. There is a minimal amount of swelling. It is grossly well aligned, and the patient is neurovascularly intact distally. The knee is stable to varus and valgus stress, there is no patellar maltracking or crepitus noted, and plantar and dorsiflexion is 5/5. There is no tenderness to palpation or with range of motion, which is about 0-100.  Left lower extremity shows no erythema, rashes, or open skin lesions. There is a minimal amount of swelling. It is grossly well aligned, and the patient is neurovascularly intact distally. The knee is stable to varus and valgus stress, there is no patellar maltracking or crepitus noted, and plantar and dorsiflexion is 5/5. There is no tenderness to palpation or with range of motion, which is about 0-100.           Imaging:  xrays obtained today   left Knee X-Ray  Indication: Left knee pain  AP, Lateral, [place] views  Findings:Shows moderate to severe tricompartmental DJD, worse in the patellofemoral compartment, There is subchondral sclerosis, subchondral cysts, and osteophytosis present., No fractures or dislocations are appreciated and AP view of the right knee shows similar findings.  decreased joint spaces  Hardware appropriately positioned not applicable    no prior studies available for comparison.    This patient's x-ray report was graded according to the Kellgren and Jaspreet classification.  This took into account the joint space narrowing, osteophyte formation, " sclerosis of the distal femur/proximal tibia along with deformity of those bones.  The findings were indicative of K L grade 4.    X-RAY was ordered and reviewed by NICK Carranza            Assessment:  1. Primary osteoarthritis of both knees    2. Morbid obesity (CMS/HCC)                 Plan:  Both surgical and nonsurgical options were discussed with the patient today.  He would like to continue conservative treatment options at this time.  Weight loss is highly recommended, and he voiced understanding.  He reports that he was just started on a new insulin that has caused him to gain some weight.  I have encouraged him to discuss with his prescriber if any of the new medications would be an option for him that also help with weight loss.  We will try an intra-articular bilateral steroid injections today.  Risks and benefits were discussed and postinjection instructions were given.  He will also be fitted for a knee brace for his left knee.  He has deferred a brace for the right knee.  We have also discussed future use with Visco supplementation.  He may follow-up as needed.  Natural history and expected course discussed. Questions answered.  Educational materials distributed.  Rest, ice, compression, and elevation (RICE) therapy.  Quad strengthening exercises.  NSAIDs per medication orders.  OTC analgesics as needed.  Arthrocentesis. See procedure note.  cortisone injections  viscosupplementation  bracing  weight loss  activtiy modification  assistive devices  Large Joint Arthrocentesis  Date/Time: 3/23/2020 9:52 AM  Consent given by: patient  Timeout: Immediately prior to procedure a time out was called to verify the correct patient, procedure, equipment, support staff and site/side marked as required   Supporting Documentation  Indications: pain   Procedure Details  Location: knee - Knee joint: Bilateral.  Preparation: Patient was prepped and draped in the usual sterile fashion  Needle size: 25  G  Approach: anterolateral  Medications administered: 4 mL lidocaine PF 1% 1 %; 40 mg triamcinolone acetonide 40 MG/ML  Patient tolerance: patient tolerated the procedure well with no immediate complications      After consent was obtained and a time out was properly performed, the right knee was prepped with alcohol and chlorhexidine. Sterile technique was used, along with a 25 gauge needle, to inject 4 cc of 1% lidocaine and 1 cc of 40 mg/mL kenalog into the knee. The patient tolerated the procedure well.  After consent was obtained and a time out was properly performed, the left knee was prepped with alcohol and chlorhexidine. Sterile technique was used, along with a 25 gauge needle, to inject 4 cc of 1% lidocaine and 1 cc of 40 mg/mL kenalog into the knee. The patient tolerated the procedure well.               NICK Carranza  03/23/20  09:44

## 2020-03-24 NOTE — TELEPHONE ENCOUNTER
[facility] requests pen needles for insulin and test strips for meter.    No

## 2020-03-30 ENCOUNTER — PATIENT MESSAGE (OUTPATIENT)
Dept: FAMILY MEDICINE CLINIC | Facility: CLINIC | Age: 60
End: 2020-03-30

## 2020-03-31 NOTE — TELEPHONE ENCOUNTER
From: Sorin Allen  To: Natalie Steward MD  Sent: 3/30/2020 9:27 PM EDT  Subject: Referral Request    Hi  My CPAP machine needs a new Mass how do I go about getting it

## 2020-04-01 ENCOUNTER — TELEMEDICINE (OUTPATIENT)
Dept: FAMILY MEDICINE CLINIC | Facility: CLINIC | Age: 60
End: 2020-04-01

## 2020-04-01 VITALS — HEART RATE: 84 BPM | DIASTOLIC BLOOD PRESSURE: 98 MMHG | SYSTOLIC BLOOD PRESSURE: 154 MMHG

## 2020-04-01 DIAGNOSIS — E11.42 TYPE 2 DIABETES MELLITUS WITH DIABETIC POLYNEUROPATHY, WITH LONG-TERM CURRENT USE OF INSULIN (HCC): Chronic | ICD-10-CM

## 2020-04-01 DIAGNOSIS — G47.33 OSA ON CPAP: Primary | ICD-10-CM

## 2020-04-01 DIAGNOSIS — Z79.4 TYPE 2 DIABETES MELLITUS WITH DIABETIC POLYNEUROPATHY, WITH LONG-TERM CURRENT USE OF INSULIN (HCC): Chronic | ICD-10-CM

## 2020-04-01 DIAGNOSIS — I11.0 HYPERTENSIVE HEART DISEASE WITH CHRONIC SYSTOLIC CONGESTIVE HEART FAILURE (HCC): ICD-10-CM

## 2020-04-01 DIAGNOSIS — Z99.89 OSA ON CPAP: Primary | ICD-10-CM

## 2020-04-01 DIAGNOSIS — E66.01 MORBID (SEVERE) OBESITY DUE TO EXCESS CALORIES (HCC): ICD-10-CM

## 2020-04-01 DIAGNOSIS — I50.22 CHRONIC SYSTOLIC HEART FAILURE (HCC): Chronic | ICD-10-CM

## 2020-04-01 DIAGNOSIS — I50.22 HYPERTENSIVE HEART DISEASE WITH CHRONIC SYSTOLIC CONGESTIVE HEART FAILURE (HCC): ICD-10-CM

## 2020-04-01 PROCEDURE — 99215 OFFICE O/P EST HI 40 MIN: CPT | Performed by: FAMILY MEDICINE

## 2020-04-01 NOTE — PROGRESS NOTES
Subjective:     Sorin Allen is a 59 y.o. morbidly obese male who presents for  Chief Complaint   Patient presents with   • Sleep Apnea     CPAP       This is a known patient to me.  Visit completed via [employer] video visit. One hour of the visit was spent on the phone troubleshooting patient's [employer] video access.     Patient has a concurrent medical history of obstructive sleep apnea managed with CPAP. Patient was tested greater than 15 years ago after reports from friends of witnessed apneic episodes. Reports a history of falling asleep at traffic lights.  Currently using a CPAP with nasal mask and humidifier with a pressure setting of 9 mmHg.  Denies any problems falling asleep. Sleeping 6 hours of sleep at night that is generally restorative. Reports night time awakenings to urinate due to diuretic therapy. Patient requesting replacement mask as his it defective.    Sleep Apnea   This is a chronic problem. The current episode started more than 1 year ago (>15 years ago). The problem occurs constantly. The problem has been gradually improving. Associated symptoms include arthralgias (hands, feet, and knees) and urinary symptoms (nocturia x2). Pertinent negatives include no abdominal pain, chest pain, chills, congestion, coughing, diaphoresis, fatigue, fever, myalgias, nausea, rash, sore throat or vomiting. Exacerbated by: weight. Treatments tried: CPAP. The treatment provided significant relief.     Patient has a concurrent medical history of type 2 diabetes mellitus with peripheral neuropathy of bilateral lower extremities that is moderately controlled with basal insulin 95 units nightly.  Last HbA1c was 10.4% in March 2020 that he attributes to lack of insulin for approximately 2 months.  Glucose was checked during this visit and measured at 147 mg/dL.  Glucose has been as high as 277 mg/dL when he forgot to take insulin.  Patient does not follow a low carbohydrate/diabetic diet; meals  typically consist of rice, beans, and a meat product with candy daily. Reports symptomatic episodes of hypoglycemia presenting with tremors and a glucose of 83-85 mg/dL.  Complains of bilateral foot pain that is worse at night and managed with gabapentin.  Patient requesting referral to podiatry.    Patient also has concurrent medical history of essential hypertension and congestive heart failure with ejection fractions as low as 16% in 2017 and 25% in 2019 per review of medical records from former PCP.  Patient reports that he was able to improve his ejection fraction from 16% to 25% by drinking coffee.  Patient is unwilling to take a beta-blocker or ACE inhibitor, reporting that he would prefer a homeopathic approach.  Discussed mortality benefit of starting patient on spironolactone given his ejection fraction.  Heart failure is currently treated with diuretic therapy 3 times a day and potassium supplementation.  Ambulatory BP fluctuates from systolic BP of 129 to 154 mmHg and diastolic pressure of 89 to 98 mmHg.  Associated symptoms include bilateral temporal headaches that typically start in the morning and last the entire day.  Patient believes that headaches may be related to a side effect of insulin or hypoglycemia.  Reports some improvement of headache following meals.    Preventative:  Health Maintenance   Topic Date Due   • ANNUAL PHYSICAL  11/11/1962   • TDAP/TD VACCINES (1 - Tdap) 11/11/1970   • PNEUMOCOCCAL VACCINE (19-64 MEDIUM RISK) (1 of 1 - PPSV23) 11/11/1978   • ZOSTER VACCINE (1 of 2) 11/11/2009   • HEPATITIS C SCREENING  02/07/2020   • DIABETIC FOOT EXAM  02/07/2020   • DIABETIC EYE EXAM  02/07/2020   • COLONOSCOPY  02/07/2020   • INFLUENZA VACCINE  08/01/2020   • HEMOGLOBIN A1C  09/06/2020   • LIPID PANEL  03/06/2021   • URINE MICROALBUMIN  03/06/2021       Past Medical Hx:  Past Medical History:   Diagnosis Date   • Arthritis    • Cardiac disease    • Carpal tunnel syndrome    • CPAP  (continuous positive airway pressure) dependence    • Diabetes mellitus (CMS/HCC)    • Diverticulitis of colon    • Erectile dysfunction    • Heart disease    • Hyperlipidemia    • Hypertension    • Neuromuscular disorder (CMS/HCC)    • ESAU (obstructive sleep apnea)    • Osteoarthritis        Past Surgical Hx:  Past Surgical History:   Procedure Laterality Date   • HERNIA REPAIR         Family Hx:  Family History   Problem Relation Age of Onset   • Leukemia Mother    • Cancer Sister    • Cancer Maternal Grandmother         Social History:  Social History     Socioeconomic History   • Marital status:      Spouse name: Not on file   • Number of children: Not on file   • Years of education: Not on file   • Highest education level: Not on file   Tobacco Use   • Smoking status: Former Smoker     Packs/day: 2.00     Years: 20.00     Pack years: 40.00     Last attempt to quit: 3/6/2000     Years since quittin.0   • Smokeless tobacco: Never Used   Substance and Sexual Activity   • Alcohol use: Yes   • Drug use: Never   • Sexual activity: Defer       Allergies:  Patient has no known allergies.    Current Meds:    Current Outpatient Medications:   •  ACCU-CHEK SOFTCLIX LANCETS lancets, by Other route. Use as instructed, Disp: , Rfl:   •  acetaminophen (TYLENOL) 500 MG tablet, Take 2 tablets by mouth Every 8 (Eight) Hours As Needed for Moderate Pain  (arthritic pain)., Disp: 180 tablet, Rfl: 5  •  Blood Glucose Calibration liquid, by In Vitro route., Disp: , Rfl:   •  Blood Glucose Monitoring Suppl (ACCU-CHEK ALEKS PLUS) w/Device kit, Use to test blood sugar twice a day, Disp: , Rfl:   •  furosemide (LASIX) 40 MG tablet, Take 1 tablet by mouth 3 (Three) Times a Day., Disp: 90 tablet, Rfl: 5  •  gabapentin (NEURONTIN) 100 MG capsule, Take 1 capsule by mouth 3 (Three) Times a Day., Disp: 90 capsule, Rfl: 2  •  glucose blood (ACCU-CHEK ALEKS PLUS) test strip, 1 each by Other route 4 (Four) Times a Day Before Meals &  at Bedtime., Disp: 120 each, Rfl: 5  •  Insulin Glargine (BASAGLAR KWIKPEN) 100 UNIT/ML injection pen, Inject 100 Units under the skin into the appropriate area as directed Every Night for 30 days., Disp: 10 pen, Rfl: 5  •  Insulin Pen Needle 32G X 4 MM misc, 1 Units Daily., Disp: 30 each, Rfl: 5  •  meloxicam (MOBIC) 15 MG tablet, Take 1 tablet by mouth Daily., Disp: 30 tablet, Rfl: 5  •  potassium chloride (K-TAB) 20 MEQ tablet controlled-release ER tablet, Take 1 tablet by mouth Daily., Disp: 30 tablet, Rfl: 5    The following portions of the patient's history were reviewed and updated as appropriate: allergies, current medications, past family history, past medical history, past social history, past surgical history and problem list.    Review of Systems  Review of Systems   Constitutional: Negative for chills, diaphoresis, fatigue and fever.   HENT: Negative for congestion, rhinorrhea, sinus pressure, sneezing and sore throat.    Eyes: Positive for visual disturbance. Negative for blurred vision, double vision and redness.   Respiratory: Positive for shortness of breath. Negative for cough and wheezing.    Cardiovascular: Negative for chest pain and leg swelling.   Gastrointestinal: Negative for abdominal pain, constipation, diarrhea, nausea and vomiting.   Endocrine: Negative for polydipsia, polyphagia and polyuria.   Genitourinary: Positive for nocturia. Negative for difficulty urinating, dysuria and hematuria.   Musculoskeletal: Positive for arthralgias (hands, feet, and knees) and gait problem (due to bilateral knee pain). Negative for myalgias.   Skin: Negative for rash and skin lesions.   Neurological: Positive for headache. Negative for tremors, seizures and syncope.   Psychiatric/Behavioral: Negative for sleep disturbance and depressed mood. The patient is not nervous/anxious.        Objective:     Patient measured blood pressure on automated cuff during visit.  /98 (BP Location: Left arm, Patient  Position: Sitting) Comment (BP Location): wrist    Physical Exam     No results found for: WBC, HGB, HCT, MCV, PLT     Lab Results   Component Value Date    GLUCOSE 282 (H) 03/06/2020    BUN 14 03/06/2020    CREATININE 0.94 03/06/2020    EGFRIFAFRI 99 03/06/2020    BCR 14.9 03/06/2020    K 4.1 03/06/2020    CO2 24.8 03/06/2020    CALCIUM 9.6 03/06/2020    ALBUMIN 4.30 03/06/2020    AST 13 03/06/2020    ALT 11 03/06/2020      Lab Results   Component Value Date    HGBA1C 10.4 (H) 03/06/2020     Lab Results   Component Value Date    CHOL 225 (H) 03/06/2020    TRIG 125 03/06/2020    HDL 38 (L) 03/06/2020     (H) 03/06/2020     The 10-year ASCVD risk score (Ford OBWEN Jr., et al., 2013) is: 35.6%    Values used to calculate the score:      Age: 59 years      Sex: Male      Is Non- : Yes      Diabetic: Yes      Tobacco smoker: No      Systolic Blood Pressure: 154 mmHg      Is BP treated: Yes      HDL Cholesterol: 38 mg/dL      Total Cholesterol: 225 mg/dL    Assessment/Plan:     Sorin was seen today for sleep apnea.    Diagnoses and all orders for this visit:    ESAU on CPAP  Comments:  Reports nightly compliance with CPAP & benefit from use.  Exacerbated by BMI.  Contributing to BP control as pt is unwilling to take additional antiHTN meds.  Orders:  -     Ambulatory Referral to Sleep Medicine  -     CPAP Therapy    Type 2 diabetes mellitus with diabetic polyneuropathy, with long-term current use of insulin (CMS/[facility])  Comments:  HbA1c goal <7%.  Noncompliant with diet.  Continue basal insulin.  Patient to reduce to 90 units nightly.  Diabetic eye exam & foot exam are due.  Orders:  -     Ambulatory Referral to Podiatry    Chronic systolic heart failure (CMS/[facility])  Comments:  BP goal < 140/90.  Continue Lasix.  Patient unwilling to take BB, ACE-I, or statin.  Discussed mortality benefit of spironolactone.  Patient to consider.  Orders:  -     CPAP Therapy  -     Adult Transthoracic Echo  Complete W/ Cont if Necessary Per Protocol; Future    Hypertensive heart disease with chronic systolic congestive heart failure (CMS/HCC)  Comments:  BP goal <140/90.  Encouraged low-Na+ diet. Cannot exercise.   Continue Lasix 40 mg TID.  Pt to consider Aldactone.  Orders:  -     CPAP Therapy  -     Adult Transthoracic Echo Complete W/ Cont if Necessary Per Protocol; Future    Morbid (severe) obesity due to excess calories (CMS/HCC)  Comments:  Cannot exercise due to debilitating OA and dyspnea with exertion likely associated with HFrEF.  Inform patient that some movement is better than no movement.  Orders:  -     CPAP Therapy    Greater than 50% of visit, 40 minutes, was spent counseling patient on his multiple comorbidities, recommended therapies for each condition, and health risks associated with uncontrolled diabetes and heart disease.  This time was also spent coordinating care.  Discussed a diabetes appropriate diet; encouraged limiting salt from diet.  Patient is agreeable to monitoring ambulatory BP.  Educational material and ambulatory BP monitoring instructions provided in AVS.      Follow-up:     Return for Keep appointment in June 2020..      Signature    Natalie Steward MD  Family Medicine  [facility]        This document has been electronically signed by Natalie Steward MD on April 1, 2020 13:42

## 2020-04-01 NOTE — PATIENT INSTRUCTIONS
Diabetes Mellitus and Nutrition, Adult  When you have diabetes (diabetes mellitus), it is very important to have healthy eating habits because your blood sugar (glucose) levels are greatly affected by what you eat and drink. Eating healthy foods in the appropriate amounts, at about the same times every day, can help you:  · Control your blood glucose.  · Lower your risk of heart disease.  · Improve your blood pressure.  · Reach or maintain a healthy weight.  Every person with diabetes is different, and each person has different needs for a meal plan. Your health care provider may recommend that you work with a diet and nutrition specialist (dietitian) to make a meal plan that is best for you. Your meal plan may vary depending on factors such as:  · The calories you need.  · The medicines you take.  · Your weight.  · Your blood glucose, blood pressure, and cholesterol levels.  · Your activity level.  · Other health conditions you have, such as heart or kidney disease.  How do carbohydrates affect me?  Carbohydrates, also called carbs, affect your blood glucose level more than any other type of food. Eating carbs naturally raises the amount of glucose in your blood. Carb counting is a method for keeping track of how many carbs you eat. Counting carbs is important to keep your blood glucose at a healthy level, especially if you use insulin or take certain oral diabetes medicines.  It is important to know how many carbs you can safely have in each meal. This is different for every person. Your dietitian can help you calculate how many carbs you should have at each meal and for each snack.  Foods that contain carbs include:  · Bread, cereal, rice, pasta, and crackers.  · Potatoes and corn.  · Peas, beans, and lentils.  · Milk and yogurt.  · Fruit and juice.  · Desserts, such as cakes, cookies, ice cream, and candy.  How does alcohol affect me?  Alcohol can cause a sudden decrease in blood glucose (hypoglycemia),  "especially if you use insulin or take certain oral diabetes medicines. Hypoglycemia can be a life-threatening condition. Symptoms of hypoglycemia (sleepiness, dizziness, and confusion) are similar to symptoms of having too much alcohol.  If your health care provider says that alcohol is safe for you, follow these guidelines:  · Limit alcohol intake to no more than 1 drink per day for nonpregnant women and 2 drinks per day for men. One drink equals 12 oz of beer, 5 oz of wine, or 1½ oz of hard liquor.  · Do not drink on an empty stomach.  · Keep yourself hydrated with water, diet soda, or unsweetened iced tea.  · Keep in mind that regular soda, juice, and other mixers may contain a lot of sugar and must be counted as carbs.  What are tips for following this plan?    Reading food labels  · Start by checking the serving size on the \"Nutrition Facts\" label of packaged foods and drinks. The amount of calories, carbs, fats, and other nutrients listed on the label is based on one serving of the item. Many items contain more than one serving per package.  · Check the total grams (g) of carbs in one serving. You can calculate the number of servings of carbs in one serving by dividing the total carbs by 15. For example, if a food has 30 g of total carbs, it would be equal to 2 servings of carbs.  · Check the number of grams (g) of saturated and trans fats in one serving. Choose foods that have low or no amount of these fats.  · Check the number of milligrams (mg) of salt (sodium) in one serving. Most people should limit total sodium intake to less than 2,300 mg per day.  · Always check the nutrition information of foods labeled as \"low-fat\" or \"nonfat\". These foods may be higher in added sugar or refined carbs and should be avoided.  · Talk to your dietitian to identify your daily goals for nutrients listed on the label.  Shopping  · Avoid buying canned, premade, or processed foods. These foods tend to be high in fat, sodium, " and added sugar.  · Shop around the outside edge of the grocery store. This includes fresh fruits and vegetables, bulk grains, fresh meats, and fresh dairy.  Cooking  · Use low-heat cooking methods, such as baking, instead of high-heat cooking methods like deep frying.  · Cook using healthy oils, such as olive, canola, or sunflower oil.  · Avoid cooking with butter, cream, or high-fat meats.  Meal planning  · Eat meals and snacks regularly, preferably at the same times every day. Avoid going long periods of time without eating.  · Eat foods high in fiber, such as fresh fruits, vegetables, beans, and whole grains. Talk to your dietitian about how many servings of carbs you can eat at each meal.  · Eat 4-6 ounces (oz) of lean protein each day, such as lean meat, chicken, fish, eggs, or tofu. One oz of lean protein is equal to:  ? 1 oz of meat, chicken, or fish.  ? 1 egg.  ? ¼ cup of tofu.  · Eat some foods each day that contain healthy fats, such as avocado, nuts, seeds, and fish.  Lifestyle  · Check your blood glucose regularly.  · Exercise regularly as told by your health care provider. This may include:  ? 150 minutes of moderate-intensity or vigorous-intensity exercise each week. This could be brisk walking, biking, or water aerobics.  ? Stretching and doing strength exercises, such as yoga or weightlifting, at least 2 times a week.  · Take medicines as told by your health care provider.  · Do not use any products that contain nicotine or tobacco, such as cigarettes and e-cigarettes. If you need help quitting, ask your health care provider.  · Work with a counselor or diabetes educator to identify strategies to manage stress and any emotional and social challenges.  Questions to ask a health care provider  · Do I need to meet with a diabetes educator?  · Do I need to meet with a dietitian?  · What number can I call if I have questions?  · When are the best times to check my blood glucose?  Where to find more  information:  · American Diabetes Association: diabetes.org  · Academy of Nutrition and Dietetics: www.eatright.org  · National Independence of Diabetes and Digestive and Kidney Diseases (NIH): www.niddk.nih.gov  Summary  · A healthy meal plan will help you control your blood glucose and maintain a healthy lifestyle.  · Working with a diet and nutrition specialist (dietitian) can help you make a meal plan that is best for you.  · Keep in mind that carbohydrates (carbs) and alcohol have immediate effects on your blood glucose levels. It is important to count carbs and to use alcohol carefully.  This information is not intended to replace advice given to you by your health care provider. Make sure you discuss any questions you have with your health care provider.  Document Released: 09/14/2006 Document Revised: 01/14/2020 Document Reviewed: 01/22/2018  [employer] Interactive Patient Education © 2020 [employer]      Diabetes Mellitus and Exercise  Exercising regularly is important for your overall health, especially when you have diabetes (diabetes mellitus). Exercising is not only about losing weight. It has many other health benefits, such as increasing muscle strength and bone density and reducing body fat and stress. This leads to improved fitness, flexibility, and endurance, all of which result in better overall health.  Exercise has additional benefits for people with diabetes, including:  · Reducing appetite.  · Helping to lower and control blood glucose.  · Lowering blood pressure.  · Helping to control amounts of fatty substances (lipids) in the blood, such as cholesterol and triglycerides.  · Helping the body to respond better to insulin (improving insulin sensitivity).  · Reducing how much insulin the body needs.  · Decreasing the risk for heart disease by:  ? Lowering cholesterol and triglyceride levels.  ? Increasing the levels of good cholesterol.  ? Lowering blood glucose levels.  What is my activity  plan?  Your health care provider or certified diabetes educator can help you make a plan for the type and frequency of exercise (activity plan) that works for you. Make sure that you:  · Do at least 150 minutes of moderate-intensity or vigorous-intensity exercise each week. This could be brisk walking, biking, or water aerobics.  ? Do stretching and strength exercises, such as yoga or weightlifting, at least 2 times a week.  ? Spread out your activity over at least 3 days of the week.  · Get some form of physical activity every day.  ? Do not go more than 2 days in a row without some kind of physical activity.  ? Avoid being inactive for more than 30 minutes at a time. Take frequent breaks to walk or stretch.  · Choose a type of exercise or activity that you enjoy, and set realistic goals.  · Start slowly, and gradually increase the intensity of your exercise over time.  What do I need to know about managing my diabetes?    · Check your blood glucose before and after exercising.  ? If your blood glucose is 240 mg/dL (13.3 mmol/L) or higher before you exercise, check your urine for ketones. If you have ketones in your urine, do not exercise until your blood glucose returns to normal.  ? If your blood glucose is 100 mg/dL (5.6 mmol/L) or lower, eat a snack containing 15-20 grams of carbohydrate. Check your blood glucose 15 minutes after the snack to make sure that your level is above 100 mg/dL (5.6 mmol/L) before you start your exercise.  · Know the symptoms of low blood glucose (hypoglycemia) and how to treat it. Your risk for hypoglycemia increases during and after exercise. Common symptoms of hypoglycemia can include:  ? Hunger.  ? Anxiety.  ? Sweating and feeling clammy.  ? Confusion.  ? Dizziness or feeling light-headed.  ? Increased heart rate or palpitations.  ? Blurry vision.  ? Tingling or numbness around the mouth, lips, or tongue.  ? Tremors or shakes.  ? Irritability.  · Keep a rapid-acting carbohydrate  snack available before, during, and after exercise to help prevent or treat hypoglycemia.  · Avoid injecting insulin into areas of the body that are going to be exercised. For example, avoid injecting insulin into:  ? The arms, when playing tennis.  ? The legs, when jogging.  · Keep records of your exercise habits. Doing this can help you and your health care provider adjust your diabetes management plan as needed. Write down:  ? Food that you eat before and after you exercise.  ? Blood glucose levels before and after you exercise.  ? The type and amount of exercise you have done.  ? When your insulin is expected to peak, if you use insulin. Avoid exercising at times when your insulin is peaking.  · When you start a new exercise or activity, work with your health care provider to make sure the activity is safe for you, and to adjust your insulin, medicines, or food intake as needed.  · Drink plenty of water while you exercise to prevent dehydration or heat stroke. Drink enough fluid to keep your urine clear or pale yellow.  Summary  · Exercising regularly is important for your overall health, especially when you have diabetes (diabetes mellitus).  · Exercising has many health benefits, such as increasing muscle strength and bone density and reducing body fat and stress.  · Your health care provider or certified diabetes educator can help you make a plan for the type and frequency of exercise (activity plan) that works for you.  · When you start a new exercise or activity, work with your health care provider to make sure the activity is safe for you, and to adjust your insulin, medicines, or food intake as needed.  This information is not intended to replace advice given to you by your health care provider. Make sure you discuss any questions you have with your health care provider.  Document Released: 03/09/2005 Document Revised: 01/13/2020 Document Reviewed: 05/29/2017  [employer] Interactive Patient Education © 2020  [employer]      How to Take Your Blood Pressure  You can take your blood pressure at home with a machine. You may need to check your blood pressure at home:  · To check if you have high blood pressure (hypertension).  · To check your blood pressure over time.  · To make sure your blood pressure medicine is working.  Supplies needed:  You will need a blood pressure machine, or monitor. You can buy one at a [employer] or online. When choosing one:  · Choose one with an arm cuff.  · Choose one that wraps around your upper arm. Only one finger should fit between your arm and the cuff.  · Do not choose one that measures your blood pressure from your wrist or finger.  Your doctor can suggest a monitor.  How to prepare  Avoid these things for 30 minutes before checking your blood pressure:  · Drinking caffeine.  · Drinking alcohol.  · Eating.  · Smoking.  · Exercising.  Five minutes before checking your blood pressure:  · Pee.  · Sit in a dining chair. Avoid sitting in a soft couch or armchair.  · Be quiet. Do not talk.  How to take your blood pressure  Follow the instructions that came with your machine. If you have a digital blood pressure monitor, these may be the instructions:  1. Sit up straight.  2. Place your feet on the floor. Do not cross your ankles or legs.  3. Rest your left arm at the level of your heart. You may rest it on a table, desk, or chair.  4. Pull up your shirt sleeve.  5. Wrap the blood pressure cuff around the upper part of your left arm. The cuff should be 1 inch (2.5 cm) above your elbow. It is best to wrap the cuff around bare skin.  6. Fit the cuff snugly around your arm. You should be able to place only one finger between the cuff and your arm.  7. Put the cord inside the groove of your elbow.  8. Press the power button.  9. Sit quietly while the cuff fills with air and loses air.  10. Write down the numbers on the screen.  11. Wait 2-3 minutes and then repeat steps 1-10.  What do the  "numbers mean?  Two numbers make up your blood pressure. The first number is called systolic pressure. The second is called diastolic pressure. An example of a blood pressure reading is \"120 over 80\" (or 120/80).  If you are an adult and do not have a medical condition, use this guide to find out if your blood pressure is normal:  Normal  · First number: below 120.  · Second number: below 80.  Elevated  · First number: 120-129.  · Second number: below 80.  Hypertension stage 1  · First number: 130-139.  · Second number: 80-89.  Hypertension stage 2  · First number: 140 or above.  · Second number: 90 or above.  Your blood pressure is above normal even if only the top or bottom number is above normal.  Follow these instructions at home:  · Check your blood pressure as often as your doctor tells you to.  · Take your monitor to your next doctor's appointment. Your doctor will:  ? Make sure you are using it correctly.  ? Make sure it is working right.  · Make sure you understand what your blood pressure numbers should be.  · Tell your doctor if your medicines are causing side effects.  Contact a doctor if:  · Your blood pressure keeps being high.  Get help right away if:  · Your first blood pressure number is higher than 180.  · Your second blood pressure number is higher than 120.  This information is not intended to replace advice given to you by your health care provider. Make sure you discuss any questions you have with your health care provider.  Document Released: 11/30/2009 Document Revised: 11/15/2017 Document Reviewed: 05/26/2017  [employer] Interactive Patient Education © 2020 [employer]      "

## 2020-04-08 ENCOUNTER — TELEPHONE (OUTPATIENT)
Dept: FAMILY MEDICINE CLINIC | Facility: CLINIC | Age: 60
End: 2020-04-08

## 2020-04-08 RX ORDER — INSULIN GLARGINE 100 [IU]/ML
90 INJECTION, SOLUTION SUBCUTANEOUS NIGHTLY
COMMUNITY
End: 2020-04-13 | Stop reason: SDUPTHER

## 2020-04-08 NOTE — TELEPHONE ENCOUNTER
Received prior authorization request for Lantus Solostar 100unit/ml. I don't see this in his medication list. Is pt on this medication? I see Basaglar in chart.

## 2020-04-08 NOTE — TELEPHONE ENCOUNTER
Spoke with pharmacy. Informed them he is not to be on Lantus. He is taking Basaglar. Pharmacy said looks like insurance is covering Basaglar. I will disregard PA for Lantus. Thank you.

## 2020-04-08 NOTE — TELEPHONE ENCOUNTER
Patient is currently prescribed Basaglar.  He is currently using 90 units nightly.  I do not want him on the Lantus.

## 2020-04-09 ENCOUNTER — OFFICE VISIT (OUTPATIENT)
Dept: PODIATRY | Facility: CLINIC | Age: 60
End: 2020-04-09

## 2020-04-09 VITALS
BODY MASS INDEX: 41.75 KG/M2 | HEART RATE: 80 BPM | TEMPERATURE: 98.2 F | HEIGHT: 73 IN | SYSTOLIC BLOOD PRESSURE: 154 MMHG | WEIGHT: 315 LBS | DIASTOLIC BLOOD PRESSURE: 95 MMHG

## 2020-04-09 DIAGNOSIS — E11.42 DM TYPE 2 WITH DIABETIC PERIPHERAL NEUROPATHY (HCC): ICD-10-CM

## 2020-04-09 DIAGNOSIS — M77.42 METATARSALGIA OF BOTH FEET: Primary | ICD-10-CM

## 2020-04-09 DIAGNOSIS — L85.3 XEROSIS OF SKIN: ICD-10-CM

## 2020-04-09 DIAGNOSIS — M72.2 PLANTAR FASCIITIS, LEFT: ICD-10-CM

## 2020-04-09 DIAGNOSIS — M77.41 METATARSALGIA OF BOTH FEET: Primary | ICD-10-CM

## 2020-04-09 PROCEDURE — 99203 OFFICE O/P NEW LOW 30 MIN: CPT | Performed by: PODIATRIST

## 2020-04-09 NOTE — PROGRESS NOTES
04/09/2020  Foot and Ankle Surgery - New Patient   Provider: Dr. Justin Gaitan DPM  Location: [facility]    Subjective:  Sorin Allen is a 59 y.o. male.     No chief complaint on file.      HPI: Patient is a 59-year-old diabetic male that was referred to me for bilateral foot pain.  Patient states that he has had longstanding burning, numbness, and tingling sensations to the feet but has been recently dealing with left heel and arch discomfort.  He states that the symptoms have been present for the last few months.  Symptoms are worse with increased weightbearing activity and typically improved with rest.  He does notice fairly significant burning and tingling at the end of the day.  He rates his symptoms an 8 out of 10.  He is unaware of any recent injury.  Denies any previous open wounds or infections to his feet.  He states that his most recent A1c was greater than 10%.  He has not seen a podiatrist in the past.  He does have longstanding issues involving both knees and his low back.    No Known Allergies    Past Medical History:   Diagnosis Date   • Arthritis    • Cardiac disease    • Carpal tunnel syndrome    • CPAP (continuous positive airway pressure) dependence    • Diabetes mellitus (CMS/HCC)    • Diverticulitis of colon    • Erectile dysfunction    • Heart disease    • Hyperlipidemia    • Hypertension    • Neuromuscular disorder (CMS/HCC)    • ESAU (obstructive sleep apnea)    • Osteoarthritis        Past Surgical History:   Procedure Laterality Date   • HERNIA REPAIR         Family History   Problem Relation Age of Onset   • Leukemia Mother    • Cancer Sister    • Cancer Maternal Grandmother        Social History     Socioeconomic History   • Marital status:      Spouse name: Not on file   • Number of children: Not on file   • Years of education: Not on file   • Highest education level: Not on file   Tobacco Use   • Smoking status: Former Smoker     Packs/day: 2.00     Years: 20.00      Pack years: 40.00     Last attempt to quit: 3/6/2000     Years since quittin.1   • Smokeless tobacco: Never Used   Substance and Sexual Activity   • Alcohol use: Yes   • Drug use: Never   • Sexual activity: Defer        Current Outpatient Medications on File Prior to Visit   Medication Sig Dispense Refill   • ACCU-CHEK SOFTCLIX LANCETS lancets by Other route. Use as instructed     • acetaminophen (TYLENOL) 500 MG tablet Take 2 tablets by mouth Every 8 (Eight) Hours As Needed for Moderate Pain  (arthritic pain). 180 tablet 5   • Blood Glucose Calibration liquid by In Vitro route.     • Blood Glucose Monitoring Suppl (ACCU-CHEK ALEKS PLUS) w/Device kit Use to test blood sugar twice a day     • furosemide (LASIX) 40 MG tablet Take 1 tablet by mouth 3 (Three) Times a Day. 90 tablet 5   • gabapentin (NEURONTIN) 100 MG capsule Take 1 capsule by mouth 3 (Three) Times a Day. 90 capsule 2   • glucose blood (ACCU-CHEK ALEKS PLUS) test strip 1 each by Other route 4 (Four) Times a Day Before Meals & at Bedtime. 120 each 5   • Insulin Glargine (BASAGLAR KWIKPEN) 100 UNIT/ML injection pen Inject 90 Units under the skin into the appropriate area as directed Every Night.     • Insulin Pen Needle 32G X 4 MM misc 1 Units Daily. 30 each 5   • meloxicam (MOBIC) 15 MG tablet Take 1 tablet by mouth Daily. 30 tablet 5   • potassium chloride (K-TAB) 20 MEQ tablet controlled-release ER tablet Take 1 tablet by mouth Daily. 30 tablet 5     No current facility-administered medications on file prior to visit.        Review of Systems:  General: Denies fever, chills, fatigue, and weakness.  Eyes: Denies vision loss, blurry vision, and excessive redness.  ENT: Denies hearing issues and difficulty swallowing.  Cardiovascular: Denies palpitations, chest pain, or syncopal episodes.  Respiratory: Denies shortness of breath, wheezing, and coughing.  GI: Denies abdominal pain, nausea, and vomiting.   : Denies frequency, hematuria, and  "urgency.  Musculoskeletal: + Bilateral foot pain  Derm: Denies rash, open wounds, or suspicious lesions.  Neuro: Denies headaches, numbness, loss of coordination, and tremors.  Psych: Denies anxiety and depression.  Endocrine: Denies temperature intolerance and changes in appetite.  Heme: Denies bleeding disorders or abnormal bruising.     Objective   /95   Pulse 80   Temp 98.2 °F (36.8 °C) (Oral)   Ht 185.4 cm (73\")   Wt (!) 148 kg (326 lb)   BMI 43.01 kg/m²     Foot/Ankle Exam:       General:   Appearance: appears stated age and healthy and obesity    Orientation: AAOx3    Affect: appropriate    Gait: antalgic      VASCULAR      Right Foot Vascularity   Normal vascular exam    Dorsalis pedis:  2+  Posterior tibial:  2+  Skin Temperature: warm    Edema Grading:  None  CFT:  < 3 seconds  Pedal Hair Growth:  Present     Left Foot Vascularity   Normal vascular exam    Dorsalis pedis:  2+  Posterior tibial:  2+  Skin Temperature: warm    Edema Grading:  None  CFT:  < 3 seconds  Pedal Hair Growth:  Present      NEUROLOGIC     Right Foot Neurologic   Light touch sensation:  Diminished  Hot/Cold sensation: diminished    Protective Sensation using Dorset-Lanny Monofilament:  Diminished  Achilles reflex:  2+     Left Foot Neurologic   Light touch sensation:  Diminished  Hot/cold sensation: diminished    Protective Sensation using Dorset-Lanny Monofilament:  Diminished  Achilles reflex:  2+     MUSCULOSKELETAL      Right Foot Musculoskeletal   Ecchymosis:  None  Tenderness: none    Arch:  Normal  Hammertoe:  Second toe, third toe, fourth toe and fifth toe (Reducible)  Hallux valgus: Yes       Left Foot Musculoskeletal   Ecchymosis:  None  Tenderness: plantar heel and arch    Arch:  Normal  Hammertoe:  Second toe, third toe, fourth toe and fifth toe (Reducible)  Hallux valgus: Yes       MUSCLE STRENGTH     Right Foot Muscle Strength   Normal strength    Foot dorsiflexion:  5  Foot plantar flexion:  5  Foot " inversion:  5  Foot eversion:  5     Left Foot Muscle Strength   Normal strength    Foot dorsiflexion:  5  Foot plantar flexion:  5  Foot inversion:  5  Foot eversion:  5     DERMATOLOGIC     Right Foot Dermatologic   Skin: skin intact       Left Foot Dermatologic   Skin: skin intact       TESTS     Left Foot Tests   Calcaneal squeeze: positive        Right Foot Additional Comments Moderate xerosis to the plantar aspect both feet.  No open wounds or signs of infection.  Moderate soft tissue rigidity and equinus contracture, bilateral.  No gross deformity or instability      Assessment/Plan   Diagnoses and all orders for this visit:    Metatarsalgia of both feet    Plantar fasciitis, left    DM type 2 with diabetic peripheral neuropathy (CMS/[facility])    Xerosis of skin      Patient presents with longstanding issues involving both feet and recent onset of left heel and arch pain.  After evaluation, patient does have fairly significant soft tissue rigidity and equinus contracture to both lower extremities.  I explained his symptoms are most likely due to plantar fasciitis on his left foot.  His forefoot discomfort of numbness, tingling, and burning is most likely due to increased forefoot pressure, soft tissue rigidity, and diabetic neuropathy.  We did review the diagnoses and treatment options at length.  I have discussed proper supportive shoes and inserts.  We also reviewed stretching and manual therapy exercises to perform on a daily basis.  I discussed the importance of weight loss and low impact exercises.  I do feel that he is at moderate risk for pedal complications given his elevated A1c and neuropathy.Explained importance of diabetic foot care, daily foot checks, and glycemic control. Patient should check both feet on a daily basis, monitor and control blood sugars, make sure that both feet and in between toes are towel dried after baths or showers. Avoid barefoot walking at all times. Check shoes before putting  them on.  I would like him to apply a diabetic foot cream on a daily basis to treat the xerosis.  I would like to see him in 3 months for routine foot check.  If he continues to have issues at that time, may consider formal physical therapy. Patient was given information on proper foot care. Call the office at the first signs of a wound or with signs of infection.      No orders of the defined types were placed in this encounter.       Note is dictated utilizing voice recognition software. Unfortunately this leads to occasional typographical errors. I apologize in advance if the situation occurs. If questions occur please do not hesitate to call our office.

## 2020-04-09 NOTE — PATIENT INSTRUCTIONS
Diabetes Mellitus and Foot Care  Foot care is an important part of your health, especially when you have diabetes. Diabetes may cause you to have problems because of poor blood flow (circulation) to your feet and legs, which can cause your skin to:  · Become thinner and drier.  · Break more easily.  · Heal more slowly.  · Peel and crack.  You may also have nerve damage (neuropathy) in your legs and feet, causing decreased feeling in them. This means that you may not notice minor injuries to your feet that could lead to more serious problems. Noticing and addressing any potential problems early is the best way to prevent future foot problems.  How to care for your feet  Foot hygiene  · Wash your feet daily with warm water and mild soap. Do not use hot water. Then, pat your feet and the areas between your toes until they are completely dry. Do not soak your feet as this can dry your skin.  · Trim your toenails straight across. Do not dig under them or around the cuticle. File the edges of your nails with an emery board or nail file.  · Apply a moisturizing lotion or petroleum jelly to the skin on your feet and to dry, brittle toenails. Use lotion that does not contain alcohol and is unscented. Do not apply lotion between your toes.  Shoes and socks  · Wear clean socks or stockings every day. Make sure they are not too tight. Do not wear knee-high stockings since they may decrease blood flow to your legs.  · Wear shoes that fit properly and have enough cushioning. Always look in your shoes before you put them on to be sure there are no objects inside.  · To break in new shoes, wear them for just a few hours a day. This prevents injuries on your feet.  Wounds, scrapes, corns, and calluses  · Check your feet daily for blisters, cuts, bruises, sores, and redness. If you cannot see the bottom of your feet, use a mirror or ask someone for help.  · Do not cut corns or calluses or try to remove them with medicine.  · If you  find a minor scrape, cut, or break in the skin on your feet, keep it and the skin around it clean and dry. You may clean these areas with mild soap and water. Do not clean the area with peroxide, alcohol, or iodine.  · If you have a wound, scrape, corn, or callus on your foot, look at it several times a day to make sure it is healing and not infected. Check for:  ? Redness, swelling, or pain.  ? Fluid or blood.  ? Warmth.  ? Pus or a bad smell.  General instructions  · Do not cross your legs. This may decrease blood flow to your feet.  · Do not use heating pads or hot water bottles on your feet. They may burn your skin. If you have lost feeling in your feet or legs, you may not know this is happening until it is too late.  · Protect your feet from hot and cold by wearing shoes, such as at the beach or on hot pavement.  · Schedule a complete foot exam at least once a year (annually) or more often if you have foot problems. If you have foot problems, report any cuts, sores, or bruises to your health care provider immediately.  Contact a health care provider if:  · You have a medical condition that increases your risk of infection and you have any cuts, sores, or bruises on your feet.  · You have an injury that is not healing.  · You have redness on your legs or feet.  · You feel burning or tingling in your legs or feet.  · You have pain or cramps in your legs and feet.  · Your legs or feet are numb.  · Your feet always feel cold.  · You have pain around a toenail.  Get help right away if:  · You have a wound, scrape, corn, or callus on your foot and:  ? You have pain, swelling, or redness that gets worse.  ? You have fluid or blood coming from the wound, scrape, corn, or callus.  ? Your wound, scrape, corn, or callus feels warm to the touch.  ? You have pus or a bad smell coming from the wound, scrape, corn, or callus.  ? You have a fever.  ? You have a red line going up your leg.  Summary  · Check your feet every day  for cuts, sores, red spots, swelling, and blisters.  · Moisturize feet and legs daily.  · Wear shoes that fit properly and have enough cushioning.  · If you have foot problems, report any cuts, sores, or bruises to your health care provider immediately.  · Schedule a complete foot exam at least once a year (annually) or more often if you have foot problems.  This information is not intended to replace advice given to you by your health care provider. Make sure you discuss any questions you have with your health care provider.  Document Released: 12/15/2001 Document Revised: 01/30/2019 Document Reviewed: 01/19/2018  [employer] Interactive Patient Education © 2020 [employer]

## 2020-04-12 ENCOUNTER — PATIENT MESSAGE (OUTPATIENT)
Dept: FAMILY MEDICINE CLINIC | Facility: CLINIC | Age: 60
End: 2020-04-12

## 2020-04-13 DIAGNOSIS — Z79.4 TYPE 2 DIABETES MELLITUS WITH DIABETIC POLYNEUROPATHY, WITH LONG-TERM CURRENT USE OF INSULIN (HCC): Primary | ICD-10-CM

## 2020-04-13 DIAGNOSIS — E11.42 TYPE 2 DIABETES MELLITUS WITH DIABETIC POLYNEUROPATHY, WITH LONG-TERM CURRENT USE OF INSULIN (HCC): Primary | ICD-10-CM

## 2020-04-13 RX ORDER — INSULIN GLARGINE 100 [IU]/ML
90 INJECTION, SOLUTION SUBCUTANEOUS NIGHTLY
Qty: 9 PEN | Refills: 5 | Status: SHIPPED | OUTPATIENT
Start: 2020-04-13 | End: 2020-05-11

## 2020-04-14 NOTE — TELEPHONE ENCOUNTER
From: Sorin Allen  To: Natalie Steward MD  Sent: 4/12/2020 10:58 AM EDT  Subject: Referral Request    This is my blood pressure from 3:31    5:20 pm 4/1 138/84 -88  7:01 am 4/2 126/79 -78  8:51 PM 4/2 139/93 -97  6:43 am 4/3 119/73 -74  9:36 PM 4/3 129/85 -89  5:50 am 4/4 127/86 -81  7:21 PM 4/4 114/83 -103  7:08 am 4/5 127/83 -80  7:09 am 4/6 128/66 _75  7:13 PM 4/6 141/87 -89  7:40 am 4/7 126/78 77  8:29 PM 4/7 135/87 87  8:17 am 4/8 127/86 -81  7:37 am 4/9 127/77 -78  10:53 am 4/9 142/91 -84  2:24 PM 4/9 120/79 -86  7:17 am 4/10 126/85 -74  9:06 PM 4/10 131/82 -87  7:32 am 4.11. 119/84 -83

## 2020-04-14 NOTE — PATIENT INSTRUCTIONS
"DASH Eating Plan  DASH stands for \"Dietary Approaches to Stop Hypertension.\" The DASH eating plan is a healthy eating plan that has been shown to reduce high blood pressure (hypertension). It may also reduce your risk for type 2 diabetes, heart disease, and stroke. The DASH eating plan may also help with weight loss.  What are tips for following this plan?    General guidelines  · Avoid eating more than 2,300 mg (milligrams) of salt (sodium) a day. If you have hypertension, you may need to reduce your sodium intake to 1,500 mg a day.  · Limit alcohol intake to no more than 1 drink a day for nonpregnant women and 2 drinks a day for men. One drink equals 12 oz of beer, 5 oz of wine, or 1½ oz of hard liquor.  · Work with your health care provider to maintain a healthy body weight or to lose weight. Ask what an ideal weight is for you.  · Get at least 30 minutes of exercise that causes your heart to beat faster (aerobic exercise) most days of the week. Activities may include walking, swimming, or biking.  · Work with your health care provider or diet and nutrition specialist (dietitian) to adjust your eating plan to your individual calorie needs.  Reading food labels    · Check food labels for the amount of sodium per serving. Choose foods with less than 5 percent of the Daily Value of sodium. Generally, foods with less than 300 mg of sodium per serving fit into this eating plan.  · To find whole grains, look for the word \"whole\" as the first word in the ingredient list.  Shopping  · Buy products labeled as \"low-sodium\" or \"no salt added.\"  · Buy fresh foods. Avoid canned foods and premade or frozen meals.  Cooking  · Avoid adding salt when cooking. Use salt-free seasonings or herbs instead of table salt or sea salt. Check with your health care provider or pharmacist before using salt substitutes.  · Do not preciado foods. Cook foods using healthy methods such as baking, boiling, grilling, and broiling instead.  · Cook with " heart-healthy oils, such as olive, canola, soybean, or sunflower oil.  Meal planning  · Eat a balanced diet that includes:  ? 5 or more servings of fruits and vegetables each day. At each meal, try to fill half of your plate with fruits and vegetables.  ? Up to 6-8 servings of whole grains each day.  ? Less than 6 oz of lean meat, poultry, or fish each day. A 3-oz serving of meat is about the same size as a deck of cards. One egg equals 1 oz.  ? 2 servings of low-fat dairy each day.  ? A serving of nuts, seeds, or beans 5 times each week.  ? Heart-healthy fats. Healthy fats called Omega-3 fatty acids are found in foods such as flaxseeds and coldwater fish, like sardines, salmon, and mackerel.  · Limit how much you eat of the following:  ? Canned or prepackaged foods.  ? Food that is high in trans fat, such as fried foods.  ? Food that is high in saturated fat, such as fatty meat.  ? Sweets, desserts, sugary drinks, and other foods with added sugar.  ? Full-fat dairy products.  · Do not salt foods before eating.  · Try to eat at least 2 vegetarian meals each week.  · Eat more home-cooked food and less restaurant, buffet, and fast food.  · When eating at a restaurant, ask that your food be prepared with less salt or no salt, if possible.  What foods are recommended?  The items listed may not be a complete list. Talk with your dietitian about what dietary choices are best for you.  Grains  Whole-grain or whole-wheat bread. Whole-grain or whole-wheat pasta. Brown rice. Oatmeal. Quinoa. Bulgur. Whole-grain and low-sodium cereals. Amy bread. Low-fat, low-sodium crackers. Whole-wheat flour tortillas.  Vegetables  Fresh or frozen vegetables (raw, steamed, roasted, or grilled). Low-sodium or reduced-sodium tomato and vegetable juice. Low-sodium or reduced-sodium tomato sauce and tomato paste. Low-sodium or reduced-sodium canned vegetables.  Fruits  All fresh, dried, or frozen fruit. Canned fruit in natural juice (without  added sugar).  Meat and other protein foods  Skinless chicken or turkey. Ground chicken or turkey. Pork with fat trimmed off. Fish and seafood. Egg whites. Dried beans, peas, or lentils. Unsalted nuts, nut butters, and seeds. Unsalted canned beans. Lean cuts of beef with fat trimmed off. Low-sodium, lean deli meat.  Dairy  Low-fat (1%) or fat-free (skim) milk. Fat-free, low-fat, or reduced-fat cheeses. Nonfat, low-sodium ricotta or cottage cheese. Low-fat or nonfat yogurt. Low-fat, low-sodium cheese.  Fats and oils  Soft margarine without trans fats. Vegetable oil. Low-fat, reduced-fat, or light mayonnaise and salad dressings (reduced-sodium). Canola, safflower, olive, soybean, and sunflower oils. Avocado.  Seasoning and other foods  Herbs. Spices. Seasoning mixes without salt. Unsalted popcorn and pretzels. Fat-free sweets.  What foods are not recommended?  The items listed may not be a complete list. Talk with your dietitian about what dietary choices are best for you.  Grains  Baked goods made with fat, such as croissants, muffins, or some breads. Dry pasta or rice meal packs.  Vegetables  Creamed or fried vegetables. Vegetables in a cheese sauce. Regular canned vegetables (not low-sodium or reduced-sodium). Regular canned tomato sauce and paste (not low-sodium or reduced-sodium). Regular tomato and vegetable juice (not low-sodium or reduced-sodium). Pickles. Olives.  Fruits  Canned fruit in a light or heavy syrup. Fried fruit. Fruit in cream or butter sauce.  Meat and other protein foods  Fatty cuts of meat. Ribs. Fried meat. Jernigan. Sausage. Bologna and other processed lunch meats. Salami. Fatback. Hotdogs. Bratwurst. Salted nuts and seeds. Canned beans with added salt. Canned or smoked fish. Whole eggs or egg yolks. Chicken or turkey with skin.  Dairy  Whole or 2% milk, cream, and half-and-half. Whole or full-fat cream cheese. Whole-fat or sweetened yogurt. Full-fat cheese. Nondairy creamers. Whipped toppings.  Processed cheese and cheese spreads.  Fats and oils  Butter. Stick margarine. Lard. Shortening. Ghee. Jernigan fat. Tropical oils, such as coconut, palm kernel, or palm oil.  Seasoning and other foods  Salted popcorn and pretzels. Onion salt, garlic salt, seasoned salt, table salt, and sea salt. Worcestershire sauce. Tartar sauce. Barbecue sauce. Teriyaki sauce. Soy sauce, including reduced-sodium. Steak sauce. Canned and packaged gravies. Fish sauce. Oyster sauce. Cocktail sauce. Horseradish that you find on the shelf. Ketchup. Mustard. Meat flavorings and tenderizers. Bouillon cubes. Hot sauce and Tabasco sauce. Premade or packaged marinades. Premade or packaged taco seasonings. Relishes. Regular salad dressings.  Where to find more information:  · National Heart, Lung, and Blood [place]: www.nhlbi.nih.gov  · American Heart Association: www.heart.org  Summary  · The DASH eating plan is a healthy eating plan that has been shown to reduce high blood pressure (hypertension). It may also reduce your risk for type 2 diabetes, heart disease, and stroke.  · With the DASH eating plan, you should limit salt (sodium) intake to 2,300 mg a day. If you have hypertension, you may need to reduce your sodium intake to 1,500 mg a day.  · When on the DASH eating plan, aim to eat more fresh fruits and vegetables, whole grains, lean proteins, low-fat dairy, and heart-healthy fats.  · Work with your health care provider or diet and nutrition specialist (dietitian) to adjust your eating plan to your individual calorie needs.  This information is not intended to replace advice given to you by your health care provider. Make sure you discuss any questions you have with your health care provider.  Document Released: 12/06/2012 Document Revised: 12/11/2017 Document Reviewed: 12/11/2017  [employer] Interactive Patient Education © 2020 [employer]

## 2020-04-15 DIAGNOSIS — I50.22 CHRONIC SYSTOLIC HEART FAILURE (HCC): ICD-10-CM

## 2020-04-16 RX ORDER — POTASSIUM CHLORIDE 1500 MG/1
20 TABLET, FILM COATED, EXTENDED RELEASE ORAL DAILY
Qty: 30 TABLET | Refills: 5 | Status: SHIPPED | OUTPATIENT
Start: 2020-04-16 | End: 2020-05-11 | Stop reason: SDUPTHER

## 2020-05-01 ENCOUNTER — HOSPITAL ENCOUNTER (OUTPATIENT)
Dept: CARDIOLOGY | Facility: HOSPITAL | Age: 60
Discharge: HOME | End: 2020-05-01
Admitting: FAMILY MEDICINE

## 2020-05-01 VITALS
HEIGHT: 71 IN | BODY MASS INDEX: 44.1 KG/M2 | SYSTOLIC BLOOD PRESSURE: 145 MMHG | DIASTOLIC BLOOD PRESSURE: 95 MMHG | WEIGHT: 315 LBS

## 2020-05-01 DIAGNOSIS — I11.0 HYPERTENSIVE HEART DISEASE WITH CHRONIC SYSTOLIC CONGESTIVE HEART FAILURE (HCC): ICD-10-CM

## 2020-05-01 DIAGNOSIS — I50.22 HYPERTENSIVE HEART DISEASE WITH CHRONIC SYSTOLIC CONGESTIVE HEART FAILURE (HCC): ICD-10-CM

## 2020-05-01 DIAGNOSIS — I50.22 CHRONIC SYSTOLIC HEART FAILURE (HCC): ICD-10-CM

## 2020-05-01 PROCEDURE — 93306 TTE W/DOPPLER COMPLETE: CPT

## 2020-05-02 LAB
BH CV ECHO MEAS - ACS: 1.6 CM
BH CV ECHO MEAS - AI DEC SLOPE: 57.2 CM/SEC^2
BH CV ECHO MEAS - AI DEC TIME: 5.4 SEC
BH CV ECHO MEAS - AI MAX PG: 38.1 MMHG
BH CV ECHO MEAS - AI MAX VEL: 308.5 CM/SEC
BH CV ECHO MEAS - AI P1/2T: 1578 MSEC
BH CV ECHO MEAS - AO MAX PG (FULL): 12.9 MMHG
BH CV ECHO MEAS - AO MAX PG: 14.4 MMHG
BH CV ECHO MEAS - AO MEAN PG (FULL): 9.2 MMHG
BH CV ECHO MEAS - AO MEAN PG: 10.1 MMHG
BH CV ECHO MEAS - AO ROOT AREA: 9.2 CM^2
BH CV ECHO MEAS - AO ROOT DIAM: 3.4 CM
BH CV ECHO MEAS - AO V2 MAX: 189.6 CM/SEC
BH CV ECHO MEAS - AO V2 MEAN: 152.7 CM/SEC
BH CV ECHO MEAS - AO V2 VTI: 37 CM
BH CV ECHO MEAS - ASC AORTA: 3.5 CM
BH CV ECHO MEAS - AVA(I,A): 1.2 CM^2
BH CV ECHO MEAS - AVA(I,D): 1.2 CM^2
BH CV ECHO MEAS - AVA(V,A): 1.2 CM^2
BH CV ECHO MEAS - AVA(V,D): 1.2 CM^2
BH CV ECHO MEAS - EDV(CUBED): 225.8 ML
BH CV ECHO MEAS - EDV(MOD-SP4): 136.7 ML
BH CV ECHO MEAS - EDV(TEICH): 186.2 ML
BH CV ECHO MEAS - EF(CUBED): 17.4 %
BH CV ECHO MEAS - EF(MOD-SP4): 37.6 %
BH CV ECHO MEAS - EF(TEICH): 13.5 %
BH CV ECHO MEAS - ESV(CUBED): 186.6 ML
BH CV ECHO MEAS - ESV(MOD-SP4): 85.3 ML
BH CV ECHO MEAS - ESV(TEICH): 161 ML
BH CV ECHO MEAS - FS: 6.2 %
BH CV ECHO MEAS - IVS/LVPW: 1.2
BH CV ECHO MEAS - IVSD: 1.4 CM
BH CV ECHO MEAS - LA DIMENSION: 5.5 CM
BH CV ECHO MEAS - LA/AO: 1.6
BH CV ECHO MEAS - LV MASS(C)D: 352.7 GRAMS
BH CV ECHO MEAS - LV MAX PG: 1.6 MMHG
BH CV ECHO MEAS - LV MEAN PG: 0.84 MMHG
BH CV ECHO MEAS - LV V1 MAX: 62.3 CM/SEC
BH CV ECHO MEAS - LV V1 MEAN: 43.8 CM/SEC
BH CV ECHO MEAS - LV V1 VTI: 12.7 CM
BH CV ECHO MEAS - LVIDD: 6.1 CM
BH CV ECHO MEAS - LVIDS: 5.7 CM
BH CV ECHO MEAS - LVOT AREA: 3.5 CM^2
BH CV ECHO MEAS - LVOT DIAM: 2.1 CM
BH CV ECHO MEAS - LVPWD: 1.2 CM
BH CV ECHO MEAS - MV A MAX VEL: 71.9 CM/SEC
BH CV ECHO MEAS - MV DEC SLOPE: 519.5 CM/SEC^2
BH CV ECHO MEAS - MV DEC TIME: 0.12 SEC
BH CV ECHO MEAS - MV E MAX VEL: 64.1 CM/SEC
BH CV ECHO MEAS - MV E/A: 0.89
BH CV ECHO MEAS - MV MAX PG: 3.1 MMHG
BH CV ECHO MEAS - MV MEAN PG: 1.8 MMHG
BH CV ECHO MEAS - MV V2 MAX: 87.6 CM/SEC
BH CV ECHO MEAS - MV V2 MEAN: 63.3 CM/SEC
BH CV ECHO MEAS - MV V2 VTI: 14.9 CM
BH CV ECHO MEAS - MVA(VTI): 3 CM^2
BH CV ECHO MEAS - PA ACC TIME: 0.12 SEC
BH CV ECHO MEAS - PA MAX PG (FULL): 0 MMHG
BH CV ECHO MEAS - PA MAX PG: 2 MMHG
BH CV ECHO MEAS - PA PR(ACCEL): 22.9 MMHG
BH CV ECHO MEAS - PA V2 MAX: 70.6 CM/SEC
BH CV ECHO MEAS - RV MAX PG: 2 MMHG
BH CV ECHO MEAS - RV MEAN PG: 0.93 MMHG
BH CV ECHO MEAS - RV V1 MAX: 70.5 CM/SEC
BH CV ECHO MEAS - RV V1 MEAN: 45.6 CM/SEC
BH CV ECHO MEAS - RV V1 VTI: 12 CM
BH CV ECHO MEAS - RVDD: 3.2 CM
BH CV ECHO MEAS - SV(AO): 338.7 ML
BH CV ECHO MEAS - SV(CUBED): 39.2 ML
BH CV ECHO MEAS - SV(LVOT): 44.6 ML
BH CV ECHO MEAS - SV(MOD-SP4): 51.4 ML
BH CV ECHO MEAS - SV(TEICH): 25.2 ML
LV EF 2D ECHO EST: 40 %
MAXIMAL PREDICTED HEART RATE: 160 BPM
STRESS TARGET HR: 136 BPM

## 2020-05-02 PROCEDURE — 93306 TTE W/DOPPLER COMPLETE: CPT | Performed by: INTERNAL MEDICINE

## 2020-05-03 PROBLEM — I11.0 HYPERTENSIVE HEART DISEASE WITH CHRONIC COMBINED SYSTOLIC AND DIASTOLIC CONGESTIVE HEART FAILURE (HCC): Status: ACTIVE | Noted: 2020-03-06

## 2020-05-03 PROBLEM — I42.0 DILATED CARDIOMYOPATHY: Status: ACTIVE | Noted: 2020-03-06

## 2020-05-03 PROBLEM — I50.42 HYPERTENSIVE HEART DISEASE WITH CHRONIC COMBINED SYSTOLIC AND DIASTOLIC CONGESTIVE HEART FAILURE: Status: ACTIVE | Noted: 2020-03-06

## 2020-05-11 DIAGNOSIS — Z79.4 TYPE 2 DIABETES MELLITUS WITH DIABETIC POLYNEUROPATHY, WITH LONG-TERM CURRENT USE OF INSULIN (HCC): ICD-10-CM

## 2020-05-11 DIAGNOSIS — I50.22 CHRONIC SYSTOLIC HEART FAILURE (HCC): ICD-10-CM

## 2020-05-11 DIAGNOSIS — E11.42 TYPE 2 DIABETES MELLITUS WITH DIABETIC POLYNEUROPATHY, WITH LONG-TERM CURRENT USE OF INSULIN (HCC): ICD-10-CM

## 2020-05-11 RX ORDER — POTASSIUM CHLORIDE 1500 MG/1
20 TABLET, FILM COATED, EXTENDED RELEASE ORAL DAILY
Qty: 30 TABLET | Refills: 5 | Status: SHIPPED | OUTPATIENT
Start: 2020-05-11 | End: 2020-07-16 | Stop reason: SDUPTHER

## 2020-05-11 RX ORDER — INSULIN GLARGINE 100 [IU]/ML
85 INJECTION, SOLUTION SUBCUTANEOUS DAILY
Qty: 9 PEN | Refills: 5 | Status: SHIPPED | OUTPATIENT
Start: 2020-05-11 | End: 2020-11-30 | Stop reason: SDUPTHER

## 2020-06-06 DIAGNOSIS — Z79.4 TYPE 2 DIABETES MELLITUS WITH DIABETIC POLYNEUROPATHY, WITH LONG-TERM CURRENT USE OF INSULIN (HCC): Primary | ICD-10-CM

## 2020-06-06 DIAGNOSIS — E11.42 TYPE 2 DIABETES MELLITUS WITH DIABETIC POLYNEUROPATHY, WITH LONG-TERM CURRENT USE OF INSULIN (HCC): Primary | ICD-10-CM

## 2020-06-09 RX ORDER — GABAPENTIN 100 MG/1
100 CAPSULE ORAL 3 TIMES DAILY
Qty: 90 CAPSULE | Refills: 2 | Status: SHIPPED | OUTPATIENT
Start: 2020-06-09 | End: 2020-09-20

## 2020-06-09 NOTE — TELEPHONE ENCOUNTER
Patient with concurrent medical history of type 2 diabetes mellitus with polyneuropathy requesting refill gabapentin 100 g p.o. 3 times daily.  Prescription was filled on May 11, 2020 per inspect report.

## 2020-06-15 ENCOUNTER — LAB (OUTPATIENT)
Dept: FAMILY MEDICINE CLINIC | Facility: CLINIC | Age: 60
End: 2020-06-15

## 2020-06-15 ENCOUNTER — OFFICE VISIT (OUTPATIENT)
Dept: FAMILY MEDICINE CLINIC | Facility: CLINIC | Age: 60
End: 2020-06-15

## 2020-06-15 VITALS
BODY MASS INDEX: 42.66 KG/M2 | HEART RATE: 78 BPM | DIASTOLIC BLOOD PRESSURE: 88 MMHG | WEIGHT: 315 LBS | HEIGHT: 72 IN | TEMPERATURE: 97.3 F | SYSTOLIC BLOOD PRESSURE: 133 MMHG | OXYGEN SATURATION: 99 %

## 2020-06-15 DIAGNOSIS — E66.01 MORBID (SEVERE) OBESITY DUE TO EXCESS CALORIES (HCC): ICD-10-CM

## 2020-06-15 DIAGNOSIS — E11.42 TYPE 2 DIABETES MELLITUS WITH DIABETIC POLYNEUROPATHY, WITH LONG-TERM CURRENT USE OF INSULIN (HCC): ICD-10-CM

## 2020-06-15 DIAGNOSIS — I50.42 HYPERTENSIVE HEART DISEASE WITH CHRONIC COMBINED SYSTOLIC AND DIASTOLIC CONGESTIVE HEART FAILURE (HCC): Primary | ICD-10-CM

## 2020-06-15 DIAGNOSIS — Z53.20 SCREENING FOR MALIGNANT NEOPLASM OF COLON DECLINED: ICD-10-CM

## 2020-06-15 DIAGNOSIS — Z79.4 TYPE 2 DIABETES MELLITUS WITH DIABETIC POLYNEUROPATHY, WITH LONG-TERM CURRENT USE OF INSULIN (HCC): ICD-10-CM

## 2020-06-15 DIAGNOSIS — Z00.00 ANNUAL PHYSICAL EXAM: ICD-10-CM

## 2020-06-15 DIAGNOSIS — I11.0 HYPERTENSIVE HEART DISEASE WITH CHRONIC COMBINED SYSTOLIC AND DIASTOLIC CONGESTIVE HEART FAILURE (HCC): Primary | ICD-10-CM

## 2020-06-15 LAB
ALBUMIN SERPL-MCNC: 4.5 G/DL (ref 3.5–5.2)
ALBUMIN/GLOB SERPL: 1.1 G/DL
ALP SERPL-CCNC: 66 U/L (ref 39–117)
ALT SERPL W P-5'-P-CCNC: 18 U/L (ref 1–41)
ANION GAP SERPL CALCULATED.3IONS-SCNC: 9.9 MMOL/L (ref 5–15)
AST SERPL-CCNC: 16 U/L (ref 1–40)
BASOPHILS # BLD AUTO: 0.05 10*3/MM3 (ref 0–0.2)
BASOPHILS NFR BLD AUTO: 0.7 % (ref 0–1.5)
BILIRUB SERPL-MCNC: 0.4 MG/DL (ref 0.2–1.2)
BUN BLD-MCNC: 11 MG/DL (ref 8–23)
BUN/CREAT SERPL: 12.4 (ref 7–25)
CALCIUM SPEC-SCNC: 9.7 MG/DL (ref 8.6–10.5)
CHLORIDE SERPL-SCNC: 98 MMOL/L (ref 98–107)
CO2 SERPL-SCNC: 32.1 MMOL/L (ref 22–29)
CREAT BLD-MCNC: 0.89 MG/DL (ref 0.76–1.27)
DEPRECATED RDW RBC AUTO: 43.7 FL (ref 37–54)
EOSINOPHIL # BLD AUTO: 0.11 10*3/MM3 (ref 0–0.4)
EOSINOPHIL NFR BLD AUTO: 1.5 % (ref 0.3–6.2)
ERYTHROCYTE [DISTWIDTH] IN BLOOD BY AUTOMATED COUNT: 13.6 % (ref 12.3–15.4)
GFR SERPL CREATININE-BSD FRML MDRD: 106 ML/MIN/1.73
GLOBULIN UR ELPH-MCNC: 4 GM/DL
GLUCOSE BLD-MCNC: 122 MG/DL (ref 65–99)
HBA1C MFR BLD: 6.9 % (ref 3.5–5.6)
HCT VFR BLD AUTO: 45 % (ref 37.5–51)
HGB BLD-MCNC: 15.1 G/DL (ref 13–17.7)
IMM GRANULOCYTES # BLD AUTO: 0.02 10*3/MM3 (ref 0–0.05)
IMM GRANULOCYTES NFR BLD AUTO: 0.3 % (ref 0–0.5)
LYMPHOCYTES # BLD AUTO: 2.01 10*3/MM3 (ref 0.7–3.1)
LYMPHOCYTES NFR BLD AUTO: 26.9 % (ref 19.6–45.3)
MCH RBC QN AUTO: 29.2 PG (ref 26.6–33)
MCHC RBC AUTO-ENTMCNC: 33.6 G/DL (ref 31.5–35.7)
MCV RBC AUTO: 86.9 FL (ref 79–97)
MONOCYTES # BLD AUTO: 0.54 10*3/MM3 (ref 0.1–0.9)
MONOCYTES NFR BLD AUTO: 7.2 % (ref 5–12)
NEUTROPHILS # BLD AUTO: 4.74 10*3/MM3 (ref 1.7–7)
NEUTROPHILS NFR BLD AUTO: 63.4 % (ref 42.7–76)
NRBC BLD AUTO-RTO: 0 /100 WBC (ref 0–0.2)
PLATELET # BLD AUTO: 239 10*3/MM3 (ref 140–450)
PMV BLD AUTO: 10.3 FL (ref 6–12)
POTASSIUM BLD-SCNC: 4.4 MMOL/L (ref 3.5–5.2)
PROT SERPL-MCNC: 8.5 G/DL (ref 6–8.5)
RBC # BLD AUTO: 5.18 10*6/MM3 (ref 4.14–5.8)
SODIUM BLD-SCNC: 140 MMOL/L (ref 136–145)
VIT B12 BLD-MCNC: 515 PG/ML (ref 211–946)
WBC NRBC COR # BLD: 7.47 10*3/MM3 (ref 3.4–10.8)

## 2020-06-15 PROCEDURE — 82607 VITAMIN B-12: CPT | Performed by: FAMILY MEDICINE

## 2020-06-15 PROCEDURE — 99213 OFFICE O/P EST LOW 20 MIN: CPT | Performed by: FAMILY MEDICINE

## 2020-06-15 PROCEDURE — 83036 HEMOGLOBIN GLYCOSYLATED A1C: CPT | Performed by: FAMILY MEDICINE

## 2020-06-15 PROCEDURE — 99396 PREV VISIT EST AGE 40-64: CPT | Performed by: FAMILY MEDICINE

## 2020-06-15 PROCEDURE — 85025 COMPLETE CBC W/AUTO DIFF WBC: CPT | Performed by: FAMILY MEDICINE

## 2020-06-15 PROCEDURE — 36415 COLL VENOUS BLD VENIPUNCTURE: CPT | Performed by: FAMILY MEDICINE

## 2020-06-15 PROCEDURE — 80053 COMPREHEN METABOLIC PANEL: CPT | Performed by: FAMILY MEDICINE

## 2020-06-15 NOTE — PROGRESS NOTES
Subjective:     Sorin Allen is a 59 y.o. male who presents for  Chief Complaint   Patient presents with   • Annual Exam     He is in for his annual physical exam        This is a known patient to me.     HPI   Patient presents for an annual physical exam.    Diet: generally unhealthy; enjoys donuts and sweet tea  Exercise: walking; 2K steps per day  Dentist: never    Seatbelt Use: regular    Colon Cancer Screening: Due. Patient requested postponing procedure due to concerns of out of pocket cost.       Patient has a concurrent medical history of type II diabetes mellitus that is moderately controlled with Basaglar 50 units nightly. Last hemoglobin A1c was not at therapeutic goal in March 2020. Reports fasting glucose ~120 mg/dL. Associated conditions include peripheral neuropathy and ischemic heart disease. Dilated cardiomyopathy is managed with Lasix 40 mg TID and potassium 20 mEq. Normotensive in office. Reports a generally unhealthy diet and does not participate in regular exercise. Diabetic eye exam is due. Diabetic foot exam is due.     Past Medical Hx:  Past Medical History:   Diagnosis Date   • Arthritis    • Cardiac disease    • Carpal tunnel syndrome    • CPAP (continuous positive airway pressure) dependence    • Diabetes mellitus (CMS/HCC)    • Diverticulitis of colon    • Erectile dysfunction    • Heart disease    • Hyperlipidemia    • Hypertension    • Neuromuscular disorder (CMS/HCC)    • ESAU (obstructive sleep apnea)    • Osteoarthritis        Past Surgical Hx:  Past Surgical History:   Procedure Laterality Date   • HERNIA REPAIR         Family Hx:  Family History   Problem Relation Age of Onset   • Leukemia Mother    • Cancer Sister    • Cancer Maternal Grandmother         Social History:  Social History     Socioeconomic History   • Marital status:      Spouse name: Not on file   • Number of children: Not on file   • Years of education: Not on file   • Highest education level: Not on  file   Tobacco Use   • Smoking status: Former Smoker     Packs/day: 2.00     Years: 20.00     Pack years: 40.00     Last attempt to quit: 3/6/2000     Years since quittin.2   • Smokeless tobacco: Never Used   Substance and Sexual Activity   • Alcohol use: Yes   • Drug use: Never   • Sexual activity: Defer       Allergies:  Patient has no known allergies.    Current Meds:    Current Outpatient Medications:   •  ACCU-CHEK SOFTCLIX LANCETS lancets, by Other route. Use as instructed, Disp: , Rfl:   •  acetaminophen (TYLENOL) 500 MG tablet, Take 2 tablets by mouth Every 8 (Eight) Hours As Needed for Moderate Pain  (arthritic pain)., Disp: 180 tablet, Rfl: 5  •  Blood Glucose Calibration liquid, by In Vitro route., Disp: , Rfl:   •  Blood Glucose Monitoring Suppl (ACCU-CHEK ALEKS PLUS) w/Device kit, Use to test blood sugar twice a day, Disp: , Rfl:   •  furosemide (LASIX) 40 MG tablet, Take 1 tablet by mouth 3 (Three) Times a Day., Disp: 90 tablet, Rfl: 5  •  gabapentin (NEURONTIN) 100 MG capsule, Take 1 capsule by mouth 3 (Three) Times a Day., Disp: 90 capsule, Rfl: 2  •  glucose blood (ACCU-CHEK ALEKS PLUS) test strip, 1 each by Other route 4 (Four) Times a Day Before Meals & at Bedtime., Disp: 120 each, Rfl: 5  •  Insulin Glargine (BASAGLAR KWIKPEN) 100 UNIT/ML injection pen, Inject 85 Units under the skin into the appropriate area as directed Daily., Disp: 9 pen, Rfl: 5  •  Insulin Pen Needle 32G X 4 MM misc, 1 Units Daily., Disp: 30 each, Rfl: 5  •  meloxicam (MOBIC) 15 MG tablet, Take 1 tablet by mouth Daily., Disp: 30 tablet, Rfl: 5  •  potassium chloride ER (K-TAB) 20 MEQ tablet controlled-release ER tablet, Take 1 tablet by mouth Daily., Disp: 30 tablet, Rfl: 5    The following portions of the patient's history were reviewed and updated as appropriate: allergies, current medications, past family history, past medical history, past social history, past surgical history and problem list.    Review of  "Systems  Review of Systems   Constitutional: Negative for chills, diaphoresis, fatigue and fever.   HENT: Negative for congestion, rhinorrhea, sinus pressure, sneezing and sore throat.    Eyes: Negative for blurred vision, double vision and redness.   Respiratory: Negative for cough, shortness of breath and wheezing.    Cardiovascular: Negative for chest pain and leg swelling.   Gastrointestinal: Negative for abdominal pain, constipation, diarrhea, nausea and vomiting.   Genitourinary: Positive for frequency. Negative for difficulty urinating, dysuria and hematuria.   Musculoskeletal: Positive for arthralgias (knee & ankles) and myalgias (muscle cramps in calves). Negative for gait problem.   Skin: Negative for rash and skin lesions.   Neurological: Negative for tremors, seizures, syncope and headache.   Psychiatric/Behavioral: Negative for sleep disturbance and depressed mood. The patient is not nervous/anxious.        Objective:     /88 (BP Location: Left arm, Patient Position: Sitting, Cuff Size: Large Adult)   Pulse 78   Temp 97.3 °F (36.3 °C) (Infrared)   Ht 182.9 cm (72\")   Wt (!) 150 kg (331 lb)   SpO2 99%   BMI 44.89 kg/m²     Physical Exam   Constitutional: He is oriented to person, place, and time. He appears well-developed and well-nourished. No distress. He is morbidly obese.  HENT:   Head: Normocephalic and atraumatic.   Right Ear: Tympanic membrane and ear canal normal.   Left Ear: Tympanic membrane and ear canal normal.   Nose: Nose normal.   Mouth/Throat: Oropharynx is clear and moist and mucous membranes are normal.   Eyes: Pupils are equal, round, and reactive to light. Conjunctivae and EOM are normal. No scleral icterus.   Neck: Neck supple. No tracheal deviation present. No thyromegaly present.   Cardiovascular: Normal rate, regular rhythm, normal heart sounds and intact distal pulses.   Pulmonary/Chest: Effort normal and breath sounds normal. He has no wheezes.   Abdominal: Soft. " Bowel sounds are normal.    Sorin had a diabetic foot exam performed today.   During the foot exam he had a monofilament test performed (diminished sensation).    Neurological Sensory Findings -  Right ankle/foot altered proprioception (no sensation in heel) and left ankle/foot altered proprioception (no sensation in heel).  Vascular Status -  His right foot exhibits no edema. His left foot exhibits no edema.  Skin Integrity  -  He has right heel is dry and cracked.He has left heel dry and cracked..  Lymphadenopathy:     He has no cervical adenopathy.   Neurological: He is alert and oriented to person, place, and time.   Skin: Skin is warm and dry. Capillary refill takes less than 2 seconds. No rash noted. He is not diaphoretic.   Psychiatric: He has a normal mood and affect. His behavior is normal.   Vitals reviewed.      No results found for: WBC, HGB, HCT, MCV, PLT  Lab Results   Component Value Date    GLUCOSE 282 (H) 03/06/2020    BUN 14 03/06/2020    CREATININE 0.94 03/06/2020    EGFRIFAFRI 99 03/06/2020    BCR 14.9 03/06/2020    K 4.1 03/06/2020    CO2 24.8 03/06/2020    CALCIUM 9.6 03/06/2020    ALBUMIN 4.30 03/06/2020    AST 13 03/06/2020    ALT 11 03/06/2020     Lab Results   Component Value Date    HGBA1C 10.4 (H) 03/06/2020     Lab Results   Component Value Date    MICROALBUR 1.3 03/06/2020      Lab Results   Component Value Date    CHOL 225 (H) 03/06/2020    TRIG 125 03/06/2020    HDL 38 (L) 03/06/2020     (H) 03/06/2020     The 10-year ASCVD risk score (Ford ANDRÉS Jr., et al., 2013) is: 28.3%    Values used to calculate the score:      Age: 59 years      Sex: Male      Is Non- : Yes      Diabetic: Yes      Tobacco smoker: No      Systolic Blood Pressure: 133 mmHg      Is BP treated: Yes      HDL Cholesterol: 38 mg/dL      Total Cholesterol: 225 mg/dL     Results for orders placed during the hospital encounter of 05/01/20   Adult Transthoracic Echo Complete W/ Cont if  Necessary Per Protocol    Narrative · The left ventricular cavity is mildly dilated.  · Estimated EF = 40%. Somewhat global LV hypokinesis noted  · Left ventricular diastolic dysfunction (grade I a) consistent with   impaired relaxation.  · Right ventricular cavity is mild-to-moderately dilated.  · Left atrial cavity size is moderately dilated.  · There is calcification of the aortic valve. Most probably fusion of the   raphe noted  · Mild aortic valve stenosis is present.  · Mild mitral valve regurgitation is present        Assessment/Plan:     Sorin was seen today for annual exam.    Diagnoses and all orders for this visit:    Hypertensive heart disease with chronic combined systolic and diastolic congestive heart failure (CMS/HCC)  Comments:  BP goal <140/90.  LDL goal < 100.  Discussed risks & benefits of BB, ACE-I/ARB, and statin. Declined therapy.  Continue Lasix for symptomatic management.   Orders:  -     CBC Auto Differential  -     Comprehensive Metabolic Panel    Type 2 diabetes mellitus with diabetic polyneuropathy, with long-term current use of insulin (CMS/[facility])  Comments:  HbA1c goal <7%.  Continue Basaglar 50 units nightly.  Continue gabapentin for neuropathy.  Diabetic eye exam due.  Orders:  -     CBC Auto Differential  -     Comprehensive Metabolic Panel  -     Hemoglobin A1c  -     Vitamin B12    Morbid (severe) obesity due to excess calories (CMS/[facility])  Comments:  Discussed health risks associated with obesity.  Encouraged 150 minutes of exercise weekly.  Orders:  -     CBC Auto Differential  -     Comprehensive Metabolic Panel    Screening for malignant neoplasm of colon declined  Comments:  Would like to hold on screening due to financial restrictions.    Annual physical exam  -     CBC Auto Differential  -     Comprehensive Metabolic Panel  -     Hemoglobin A1c  -     Vitamin B12    Encouraged 150 minutes of moderate intensity activity weekly.   Encouraged regular dental visits.   Encouraged  regular seat belt use.   Encouraged safe sex practices.   Patient provided with educational material regarding preventive health care in [facility].    Follow-up:     Return in about 6 months (around 12/15/2020) for Recheck BP & DM II.      Signature    Natalie Steward MD  Family Medicine  [facility]        This document has been electronically signed by Natalie Steward MD on Laura 15, 2020 09:32

## 2020-06-15 NOTE — PATIENT INSTRUCTIONS
Preventive Care 40-64 Years Old, Male  Preventive care refers to lifestyle choices and visits with your health care provider that can promote health and wellness. This includes:  · A yearly physical exam. This is also called an annual well check.  · Regular dental and eye exams.  · Immunizations.  · Screening for certain conditions.  · Healthy lifestyle choices, such as eating a healthy diet, getting regular exercise, not using drugs or products that contain nicotine and tobacco, and limiting alcohol use.  What can I expect for my preventive care visit?  Physical exam  Your health care provider will check:  · Height and weight. These may be used to calculate body mass index (BMI), which is a measurement that tells if you are at a healthy weight.  · Heart rate and blood pressure.  · Your skin for abnormal spots.  Counseling  Your health care provider may ask you questions about:  · Alcohol, tobacco, and drug use.  · Emotional well-being.  · Home and relationship well-being.  · Sexual activity.  · Eating habits.  · Work and work environment.  What immunizations do I need?    Influenza (flu) vaccine  · This is recommended every year.  Tetanus, diphtheria, and pertussis (Tdap) vaccine  · You may need a Td booster every 10 years.  Varicella (chickenpox) vaccine  · You may need this vaccine if you have not already been vaccinated.  Zoster (shingles) vaccine  · You may need this after age 60.  Measles, mumps, and rubella (MMR) vaccine  · You may need at least one dose of MMR if you were born in 1957 or later. You may also need a second dose.  Pneumococcal conjugate (PCV13) vaccine  · You may need this if you have certain conditions and were not previously vaccinated.  Pneumococcal polysaccharide (PPSV23) vaccine  · You may need one or two doses if you smoke cigarettes or if you have certain conditions.  Meningococcal conjugate (MenACWY) vaccine  · You may need this if you have certain conditions.  Hepatitis A  vaccine  · You may need this if you have certain conditions or if you travel or work in places where you may be exposed to hepatitis A.  Hepatitis B vaccine  · You may need this if you have certain conditions or if you travel or work in places where you may be exposed to hepatitis B.  Haemophilus influenzae type b (Hib) vaccine  · You may need this if you have certain risk factors.  Human papillomavirus (HPV) vaccine  · If recommended by your health care provider, you may need three doses over 6 months.  You may receive vaccines as individual doses or as more than one vaccine together in one shot (combination vaccines). Talk with your health care provider about the risks and benefits of combination vaccines.  What tests do I need?  Blood tests  · Lipid and cholesterol levels. These may be checked every 5 years, or more frequently if you are over 50 years old.  · Hepatitis C test.  · Hepatitis B test.  Screening  · Lung cancer screening. You may have this screening every year starting at age 55 if you have a 30-pack-year history of smoking and currently smoke or have quit within the past 15 years.  · Prostate cancer screening. Recommendations will vary depending on your family history and other risks.  · Colorectal cancer screening. All adults should have this screening starting at age 50 and continuing until age 75. Your health care provider may recommend screening at age 45 if you are at increased risk. You will have tests every 1-10 years, depending on your results and the type of screening test.  · Diabetes screening. This is done by checking your blood sugar (glucose) after you have not eaten for a while (fasting). You may have this done every 1-3 years.  · Sexually transmitted disease (STD) testing.  Follow these instructions at home:  Eating and drinking  · Eat a diet that includes fresh fruits and vegetables, whole grains, lean protein, and low-fat dairy products.  · Take vitamin and mineral supplements as  recommended by your health care provider.  · Do not drink alcohol if your health care provider tells you not to drink.  · If you drink alcohol:  ? Limit how much you have to 0-2 drinks a day.  ? Be aware of how much alcohol is in your drink. In the [place] one drink equals one 12 oz bottle of beer (355 mL), one 5 oz glass of wine (148 mL), or one 1½ oz glass of hard liquor (44 mL).  Lifestyle  · Take daily care of your teeth and gums.  · Stay active. Exercise for at least 30 minutes on 5 or more days each week.  · Do not use any products that contain nicotine or tobacco, such as cigarettes, e-cigarettes, and chewing tobacco. If you need help quitting, ask your health care provider.  · If you are sexually active, practice safe sex. Use a condom or other form of protection to prevent STIs (sexually transmitted infections).  · Talk with your health care provider about taking a low-dose aspirin every day starting at age 50.  What's next?  · Go to your health care provider once a year for a well check visit.  · Ask your health care provider how often you should have your eyes and teeth checked.  · Stay up to date on all vaccines.  This information is not intended to replace advice given to you by your health care provider. Make sure you discuss any questions you have with your health care provider.  Document Released: 01/13/2017 Document Revised: 12/12/2019 Document Reviewed: 12/12/2019  [employer] Patient Education © 2020 [employer]      Diabetes Mellitus and Nutrition, Adult  When you have diabetes (diabetes mellitus), it is very important to have healthy eating habits because your blood sugar (glucose) levels are greatly affected by what you eat and drink. Eating healthy foods in the appropriate amounts, at about the same times every day, can help you:  · Control your blood glucose.  · Lower your risk of heart disease.  · Improve your blood pressure.  · Reach or maintain a healthy weight.  Every person with diabetes is  different, and each person has different needs for a meal plan. Your health care provider may recommend that you work with a diet and nutrition specialist (dietitian) to make a meal plan that is best for you. Your meal plan may vary depending on factors such as:  · The calories you need.  · The medicines you take.  · Your weight.  · Your blood glucose, blood pressure, and cholesterol levels.  · Your activity level.  · Other health conditions you have, such as heart or kidney disease.  How do carbohydrates affect me?  Carbohydrates, also called carbs, affect your blood glucose level more than any other type of food. Eating carbs naturally raises the amount of glucose in your blood. Carb counting is a method for keeping track of how many carbs you eat. Counting carbs is important to keep your blood glucose at a healthy level, especially if you use insulin or take certain oral diabetes medicines.  It is important to know how many carbs you can safely have in each meal. This is different for every person. Your dietitian can help you calculate how many carbs you should have at each meal and for each snack.  Foods that contain carbs include:  · Bread, cereal, rice, pasta, and crackers.  · Potatoes and corn.  · Peas, beans, and lentils.  · Milk and yogurt.  · Fruit and juice.  · Desserts, such as cakes, cookies, ice cream, and candy.  How does alcohol affect me?  Alcohol can cause a sudden decrease in blood glucose (hypoglycemia), especially if you use insulin or take certain oral diabetes medicines. Hypoglycemia can be a life-threatening condition. Symptoms of hypoglycemia (sleepiness, dizziness, and confusion) are similar to symptoms of having too much alcohol.  If your health care provider says that alcohol is safe for you, follow these guidelines:  · Limit alcohol intake to no more than 1 drink per day for nonpregnant women and 2 drinks per day for men. One drink equals 12 oz of beer, 5 oz of wine, or 1½ oz of hard  "liquor.  · Do not drink on an empty stomach.  · Keep yourself hydrated with water, diet soda, or unsweetened iced tea.  · Keep in mind that regular soda, juice, and other mixers may contain a lot of sugar and must be counted as carbs.  What are tips for following this plan?    Reading food labels  · Start by checking the serving size on the \"Nutrition Facts\" label of packaged foods and drinks. The amount of calories, carbs, fats, and other nutrients listed on the label is based on one serving of the item. Many items contain more than one serving per package.  · Check the total grams (g) of carbs in one serving. You can calculate the number of servings of carbs in one serving by dividing the total carbs by 15. For example, if a food has 30 g of total carbs, it would be equal to 2 servings of carbs.  · Check the number of grams (g) of saturated and trans fats in one serving. Choose foods that have low or no amount of these fats.  · Check the number of milligrams (mg) of salt (sodium) in one serving. Most people should limit total sodium intake to less than 2,300 mg per day.  · Always check the nutrition information of foods labeled as \"low-fat\" or \"nonfat\". These foods may be higher in added sugar or refined carbs and should be avoided.  · Talk to your dietitian to identify your daily goals for nutrients listed on the label.  Shopping  · Avoid buying canned, premade, or processed foods. These foods tend to be high in fat, sodium, and added sugar.  · Shop around the outside edge of the grocery store. This includes fresh fruits and vegetables, bulk grains, fresh meats, and fresh dairy.  Cooking  · Use low-heat cooking methods, such as baking, instead of high-heat cooking methods like deep frying.  · Cook using healthy oils, such as olive, canola, or sunflower oil.  · Avoid cooking with butter, cream, or high-fat meats.  Meal planning  · Eat meals and snacks regularly, preferably at the same times every day. Avoid going " long periods of time without eating.  · Eat foods high in fiber, such as fresh fruits, vegetables, beans, and whole grains. Talk to your dietitian about how many servings of carbs you can eat at each meal.  · Eat 4-6 ounces (oz) of lean protein each day, such as lean meat, chicken, fish, eggs, or tofu. One oz of lean protein is equal to:  ? 1 oz of meat, chicken, or fish.  ? 1 egg.  ? ¼ cup of tofu.  · Eat some foods each day that contain healthy fats, such as avocado, nuts, seeds, and fish.  Lifestyle  · Check your blood glucose regularly.  · Exercise regularly as told by your health care provider. This may include:  ? 150 minutes of moderate-intensity or vigorous-intensity exercise each week. This could be brisk walking, biking, or water aerobics.  ? Stretching and doing strength exercises, such as yoga or weightlifting, at least 2 times a week.  · Take medicines as told by your health care provider.  · Do not use any products that contain nicotine or tobacco, such as cigarettes and e-cigarettes. If you need help quitting, ask your health care provider.  · Work with a counselor or diabetes educator to identify strategies to manage stress and any emotional and social challenges.  Questions to ask a health care provider  · Do I need to meet with a diabetes educator?  · Do I need to meet with a dietitian?  · What number can I call if I have questions?  · When are the best times to check my blood glucose?  Where to find more information:  · American Diabetes Association: diabetes.org  · Academy of Nutrition and Dietetics: www.eatright.org  · National [place] of Diabetes and Digestive and Kidney Diseases (NIH): www.niddk.nih.gov  Summary  · A healthy meal plan will help you control your blood glucose and maintain a healthy lifestyle.  · Working with a diet and nutrition specialist (dietitian) can help you make a meal plan that is best for you.  · Keep in mind that carbohydrates (carbs) and alcohol have immediate  effects on your blood glucose levels. It is important to count carbs and to use alcohol carefully.  This information is not intended to replace advice given to you by your health care provider. Make sure you discuss any questions you have with your health care provider.  Document Released: 09/14/2006 Document Revised: 11/30/2018 Document Reviewed: 01/22/2018  [employer] Patient Education © 2020 [employer]      Diabetes Mellitus and Exercise  Exercising regularly is important for your overall health, especially when you have diabetes (diabetes mellitus). Exercising is not only about losing weight. It has many other health benefits, such as increasing muscle strength and bone density and reducing body fat and stress. This leads to improved fitness, flexibility, and endurance, all of which result in better overall health.  Exercise has additional benefits for people with diabetes, including:  · Reducing appetite.  · Helping to lower and control blood glucose.  · Lowering blood pressure.  · Helping to control amounts of fatty substances (lipids) in the blood, such as cholesterol and triglycerides.  · Helping the body to respond better to insulin (improving insulin sensitivity).  · Reducing how much insulin the body needs.  · Decreasing the risk for heart disease by:  ? Lowering cholesterol and triglyceride levels.  ? Increasing the levels of good cholesterol.  ? Lowering blood glucose levels.  What is my activity plan?  Your health care provider or certified diabetes educator can help you make a plan for the type and frequency of exercise (activity plan) that works for you. Make sure that you:  · Do at least 150 minutes of moderate-intensity or vigorous-intensity exercise each week. This could be brisk walking, biking, or water aerobics.  ? Do stretching and strength exercises, such as yoga or weightlifting, at least 2 times a week.  ? Spread out your activity over at least 3 days of the week.  · Get some form of  physical activity every day.  ? Do not go more than 2 days in a row without some kind of physical activity.  ? Avoid being inactive for more than 30 minutes at a time. Take frequent breaks to walk or stretch.  · Choose a type of exercise or activity that you enjoy, and set realistic goals.  · Start slowly, and gradually increase the intensity of your exercise over time.  What do I need to know about managing my diabetes?    · Check your blood glucose before and after exercising.  ? If your blood glucose is 240 mg/dL (13.3 mmol/L) or higher before you exercise, check your urine for ketones. If you have ketones in your urine, do not exercise until your blood glucose returns to normal.  ? If your blood glucose is 100 mg/dL (5.6 mmol/L) or lower, eat a snack containing 15-20 grams of carbohydrate. Check your blood glucose 15 minutes after the snack to make sure that your level is above 100 mg/dL (5.6 mmol/L) before you start your exercise.  · Know the symptoms of low blood glucose (hypoglycemia) and how to treat it. Your risk for hypoglycemia increases during and after exercise. Common symptoms of hypoglycemia can include:  ? Hunger.  ? Anxiety.  ? Sweating and feeling clammy.  ? Confusion.  ? Dizziness or feeling light-headed.  ? Increased heart rate or palpitations.  ? Blurry vision.  ? Tingling or numbness around the mouth, lips, or tongue.  ? Tremors or shakes.  ? Irritability.  · Keep a rapid-acting carbohydrate snack available before, during, and after exercise to help prevent or treat hypoglycemia.  · Avoid injecting insulin into areas of the body that are going to be exercised. For example, avoid injecting insulin into:  ? The arms, when playing tennis.  ? The legs, when jogging.  · Keep records of your exercise habits. Doing this can help you and your health care provider adjust your diabetes management plan as needed. Write down:  ? Food that you eat before and after you exercise.  ? Blood glucose levels  before and after you exercise.  ? The type and amount of exercise you have done.  ? When your insulin is expected to peak, if you use insulin. Avoid exercising at times when your insulin is peaking.  · When you start a new exercise or activity, work with your health care provider to make sure the activity is safe for you, and to adjust your insulin, medicines, or food intake as needed.  · Drink plenty of water while you exercise to prevent dehydration or heat stroke. Drink enough fluid to keep your urine clear or pale yellow.  Summary  · Exercising regularly is important for your overall health, especially when you have diabetes (diabetes mellitus).  · Exercising has many health benefits, such as increasing muscle strength and bone density and reducing body fat and stress.  · Your health care provider or certified diabetes educator can help you make a plan for the type and frequency of exercise (activity plan) that works for you.  · When you start a new exercise or activity, work with your health care provider to make sure the activity is safe for you, and to adjust your insulin, medicines, or food intake as needed.  This information is not intended to replace advice given to you by your health care provider. Make sure you discuss any questions you have with your health care provider.  Document Released: 03/09/2005 Document Revised: 07/12/2018 Document Reviewed: 05/29/2017  [employer] Patient Education © 2020 [employer]

## 2020-07-16 DIAGNOSIS — I50.22 CHRONIC SYSTOLIC HEART FAILURE (HCC): ICD-10-CM

## 2020-07-19 RX ORDER — POTASSIUM CHLORIDE 1500 MG/1
20 TABLET, FILM COATED, EXTENDED RELEASE ORAL DAILY
Qty: 30 TABLET | Refills: 5 | Status: SHIPPED | OUTPATIENT
Start: 2020-07-19 | End: 2021-02-16 | Stop reason: SDUPTHER

## 2020-08-24 DIAGNOSIS — I10 ESSENTIAL HYPERTENSION: ICD-10-CM

## 2020-08-24 DIAGNOSIS — M15.9 PRIMARY OSTEOARTHRITIS INVOLVING MULTIPLE JOINTS: ICD-10-CM

## 2020-08-24 DIAGNOSIS — I50.22 CHRONIC SYSTOLIC HEART FAILURE (HCC): ICD-10-CM

## 2020-08-24 RX ORDER — MELOXICAM 15 MG/1
15 TABLET ORAL DAILY PRN
Qty: 30 TABLET | Refills: 5 | Status: SHIPPED | OUTPATIENT
Start: 2020-08-24 | End: 2021-02-07

## 2020-08-24 RX ORDER — METOPROLOL SUCCINATE 25 MG/1
TABLET, EXTENDED RELEASE ORAL
Qty: 30 TABLET | Refills: 5 | OUTPATIENT
Start: 2020-08-24

## 2020-08-24 NOTE — TELEPHONE ENCOUNTER
Refill requests for Toprol 25 mg and meloxicam 15 mg reviewed.  Toprol refill denied.  Patient unwilling to take any antihypertensive therapy aside from diuretics for HFrEF.  Refill of meloxicam sent to patient's pharmacy.

## 2020-08-31 ENCOUNTER — TELEPHONE (OUTPATIENT)
Dept: FAMILY MEDICINE CLINIC | Facility: CLINIC | Age: 60
End: 2020-08-31

## 2020-08-31 NOTE — TELEPHONE ENCOUNTER
LM for patient to return my call  Patient has not been seen in office since 7/9/2019  Needs appointment and verify we are still his PCP

## 2020-08-31 NOTE — TELEPHONE ENCOUNTER
Patient returned phone call and stated that he moved to [place] and will not longer be coming to our practice

## 2020-09-20 DIAGNOSIS — Z79.4 TYPE 2 DIABETES MELLITUS WITH DIABETIC POLYNEUROPATHY, WITH LONG-TERM CURRENT USE OF INSULIN (HCC): ICD-10-CM

## 2020-09-20 DIAGNOSIS — E11.42 TYPE 2 DIABETES MELLITUS WITH DIABETIC POLYNEUROPATHY, WITH LONG-TERM CURRENT USE OF INSULIN (HCC): ICD-10-CM

## 2020-09-20 RX ORDER — GABAPENTIN 100 MG/1
CAPSULE ORAL
Qty: 90 CAPSULE | Refills: 2 | Status: SHIPPED | OUTPATIENT
Start: 2020-09-20 | End: 2021-01-05

## 2020-10-27 DIAGNOSIS — I50.22 CHRONIC SYSTOLIC HEART FAILURE (HCC): ICD-10-CM

## 2020-10-28 RX ORDER — FUROSEMIDE 40 MG/1
40 TABLET ORAL 3 TIMES DAILY
Qty: 90 TABLET | Refills: 5 | Status: SHIPPED | OUTPATIENT
Start: 2020-10-28 | End: 2021-02-16 | Stop reason: SDUPTHER

## 2020-10-28 NOTE — TELEPHONE ENCOUNTER
Lab Results   Component Value Date    CREATININE 0.89 06/15/2020     Lab Results   Component Value Date    K 4.4 06/15/2020

## 2020-11-30 DIAGNOSIS — Z79.4 TYPE 2 DIABETES MELLITUS WITH DIABETIC POLYNEUROPATHY, WITH LONG-TERM CURRENT USE OF INSULIN (HCC): ICD-10-CM

## 2020-11-30 DIAGNOSIS — E11.42 TYPE 2 DIABETES MELLITUS WITH DIABETIC POLYNEUROPATHY, WITH LONG-TERM CURRENT USE OF INSULIN (HCC): ICD-10-CM

## 2020-12-01 RX ORDER — INSULIN GLARGINE 100 [IU]/ML
85 INJECTION, SOLUTION SUBCUTANEOUS DAILY
Qty: 10 PEN | Refills: 5 | Status: SHIPPED | OUTPATIENT
Start: 2020-12-01 | End: 2021-02-22 | Stop reason: SDUPTHER

## 2020-12-01 NOTE — TELEPHONE ENCOUNTER
Lab Results   Component Value Date    HGBA1C 6.9 (H) 06/15/2020     Continue Basaglar 85 units nightly.

## 2020-12-16 ENCOUNTER — HOSPITAL ENCOUNTER (OUTPATIENT)
Dept: GENERAL RADIOLOGY | Facility: HOSPITAL | Age: 60
Discharge: HOME | End: 2020-12-16
Admitting: FAMILY MEDICINE

## 2020-12-16 ENCOUNTER — LAB (OUTPATIENT)
Dept: FAMILY MEDICINE CLINIC | Facility: CLINIC | Age: 60
End: 2020-12-16

## 2020-12-16 ENCOUNTER — OFFICE VISIT (OUTPATIENT)
Dept: FAMILY MEDICINE CLINIC | Facility: CLINIC | Age: 60
End: 2020-12-16

## 2020-12-16 VITALS
BODY MASS INDEX: 42.66 KG/M2 | HEIGHT: 72 IN | DIASTOLIC BLOOD PRESSURE: 84 MMHG | OXYGEN SATURATION: 96 % | TEMPERATURE: 97.5 F | WEIGHT: 315 LBS | HEART RATE: 94 BPM | SYSTOLIC BLOOD PRESSURE: 133 MMHG

## 2020-12-16 DIAGNOSIS — J18.9 COMMUNITY ACQUIRED PNEUMONIA OF LEFT LOWER LOBE OF LUNG: Primary | ICD-10-CM

## 2020-12-16 DIAGNOSIS — Z11.59 NEED FOR HEPATITIS C SCREENING TEST: ICD-10-CM

## 2020-12-16 DIAGNOSIS — Z79.4 TYPE 2 DIABETES MELLITUS WITH DIABETIC POLYNEUROPATHY, WITH LONG-TERM CURRENT USE OF INSULIN (HCC): Primary | ICD-10-CM

## 2020-12-16 DIAGNOSIS — E66.01 MORBID (SEVERE) OBESITY DUE TO EXCESS CALORIES (HCC): ICD-10-CM

## 2020-12-16 DIAGNOSIS — E11.42 TYPE 2 DIABETES MELLITUS WITH DIABETIC POLYNEUROPATHY, WITH LONG-TERM CURRENT USE OF INSULIN (HCC): Primary | ICD-10-CM

## 2020-12-16 DIAGNOSIS — I50.42 CHRONIC COMBINED SYSTOLIC AND DIASTOLIC HEART FAILURE (HCC): ICD-10-CM

## 2020-12-16 DIAGNOSIS — R06.02 SHORTNESS OF BREATH: ICD-10-CM

## 2020-12-16 LAB
ANION GAP SERPL CALCULATED.3IONS-SCNC: 10.9 MMOL/L (ref 5–15)
BUN SERPL-MCNC: 12 MG/DL (ref 8–23)
BUN/CREAT SERPL: 13.8 (ref 7–25)
CALCIUM SPEC-SCNC: 9.3 MG/DL (ref 8.6–10.5)
CHLORIDE SERPL-SCNC: 98 MMOL/L (ref 98–107)
CO2 SERPL-SCNC: 30.1 MMOL/L (ref 22–29)
CREAT SERPL-MCNC: 0.87 MG/DL (ref 0.76–1.27)
GFR SERPL CREATININE-BSD FRML MDRD: 108 ML/MIN/1.73
GLUCOSE SERPL-MCNC: 176 MG/DL (ref 65–99)
HBA1C MFR BLD: 7 % (ref 3.5–5.6)
HCV AB SER DONR QL: NORMAL
NT-PROBNP SERPL-MCNC: 584.4 PG/ML (ref 0–900)
POTASSIUM SERPL-SCNC: 3.9 MMOL/L (ref 3.5–5.2)
SODIUM SERPL-SCNC: 139 MMOL/L (ref 136–145)

## 2020-12-16 PROCEDURE — 36415 COLL VENOUS BLD VENIPUNCTURE: CPT | Performed by: FAMILY MEDICINE

## 2020-12-16 PROCEDURE — 99214 OFFICE O/P EST MOD 30 MIN: CPT | Performed by: FAMILY MEDICINE

## 2020-12-16 PROCEDURE — 83036 HEMOGLOBIN GLYCOSYLATED A1C: CPT | Performed by: FAMILY MEDICINE

## 2020-12-16 PROCEDURE — 83880 ASSAY OF NATRIURETIC PEPTIDE: CPT | Performed by: FAMILY MEDICINE

## 2020-12-16 PROCEDURE — 71046 X-RAY EXAM CHEST 2 VIEWS: CPT

## 2020-12-16 PROCEDURE — 86803 HEPATITIS C AB TEST: CPT | Performed by: FAMILY MEDICINE

## 2020-12-16 PROCEDURE — 80048 BASIC METABOLIC PNL TOTAL CA: CPT | Performed by: FAMILY MEDICINE

## 2020-12-16 RX ORDER — CEFDINIR 300 MG/1
300 CAPSULE ORAL 2 TIMES DAILY
Qty: 14 CAPSULE | Refills: 0 | Status: SHIPPED | OUTPATIENT
Start: 2020-12-16 | End: 2020-12-23

## 2021-01-05 DIAGNOSIS — E11.42 TYPE 2 DIABETES MELLITUS WITH DIABETIC POLYNEUROPATHY, WITH LONG-TERM CURRENT USE OF INSULIN (HCC): ICD-10-CM

## 2021-01-05 DIAGNOSIS — Z79.4 TYPE 2 DIABETES MELLITUS WITH DIABETIC POLYNEUROPATHY, WITH LONG-TERM CURRENT USE OF INSULIN (HCC): ICD-10-CM

## 2021-01-05 RX ORDER — BLOOD SUGAR DIAGNOSTIC
STRIP MISCELLANEOUS
Qty: 400 EACH | Refills: 3 | Status: SHIPPED | OUTPATIENT
Start: 2021-01-05 | End: 2021-02-11 | Stop reason: SDUPTHER

## 2021-01-05 RX ORDER — GABAPENTIN 100 MG/1
CAPSULE ORAL
Qty: 90 CAPSULE | Refills: 2 | Status: SHIPPED | OUTPATIENT
Start: 2021-01-05 | End: 2021-06-27

## 2021-01-06 PROBLEM — Z53.20 MEDICATION THERAPY MANAGEMENT RECOMMENDATION REFUSED BY PATIENT: Status: ACTIVE | Noted: 2021-01-06

## 2021-01-06 PROBLEM — Z53.20 STATIN DECLINED: Status: ACTIVE | Noted: 2021-01-06

## 2021-02-06 DIAGNOSIS — M15.9 PRIMARY OSTEOARTHRITIS INVOLVING MULTIPLE JOINTS: ICD-10-CM

## 2021-02-07 RX ORDER — MELOXICAM 15 MG/1
15 TABLET ORAL DAILY PRN
Qty: 30 TABLET | Refills: 5 | Status: SHIPPED | OUTPATIENT
Start: 2021-02-07 | End: 2021-08-17

## 2021-02-08 ENCOUNTER — TELEPHONE (OUTPATIENT)
Dept: FAMILY MEDICINE CLINIC | Facility: CLINIC | Age: 61
End: 2021-02-08

## 2021-02-08 DIAGNOSIS — E11.42 TYPE 2 DIABETES MELLITUS WITH DIABETIC POLYNEUROPATHY, WITH LONG-TERM CURRENT USE OF INSULIN (HCC): ICD-10-CM

## 2021-02-08 DIAGNOSIS — Z79.4 TYPE 2 DIABETES MELLITUS WITH DIABETIC POLYNEUROPATHY, WITH LONG-TERM CURRENT USE OF INSULIN (HCC): ICD-10-CM

## 2021-02-08 NOTE — TELEPHONE ENCOUNTER
Fax from pharmacy requesting new prescription for test strips and bd uf wilton pen needle. I spoke with pharmacy. Pt has recently started on  medicare and they need new prescription for wilton pen needle and for accu-chek guide test strips. Must have diagnosis code and medicare will only allow him to test up to 3 times a day and prescription cannot say as needed.

## 2021-02-11 RX ORDER — BLOOD SUGAR DIAGNOSTIC
STRIP MISCELLANEOUS
Qty: 300 EACH | Refills: 3 | Status: SHIPPED | OUTPATIENT
Start: 2021-02-11 | End: 2021-02-22 | Stop reason: CLARIF

## 2021-02-12 ENCOUNTER — PATIENT MESSAGE (OUTPATIENT)
Dept: FAMILY MEDICINE CLINIC | Facility: CLINIC | Age: 61
End: 2021-02-12

## 2021-02-12 DIAGNOSIS — I50.22 CHRONIC SYSTOLIC HEART FAILURE (HCC): ICD-10-CM

## 2021-02-15 NOTE — TELEPHONE ENCOUNTER
From: Sorin Allen  To: Natalie Steward MD  Sent: 2/12/2021 4:56 PM EST  Subject: Prescription Question      I am taking twice the amount of water pills and the potassium could you increase on the prescription please I am having a problem at the pharmacy

## 2021-02-16 RX ORDER — POTASSIUM CHLORIDE 1500 MG/1
40 TABLET, FILM COATED, EXTENDED RELEASE ORAL DAILY
Qty: 60 TABLET | Refills: 5 | Status: SHIPPED | OUTPATIENT
Start: 2021-02-16 | End: 2021-06-04 | Stop reason: SDUPTHER

## 2021-02-16 RX ORDER — FUROSEMIDE 80 MG
80 TABLET ORAL 3 TIMES DAILY
Qty: 90 TABLET | Refills: 5 | Status: SHIPPED | OUTPATIENT
Start: 2021-02-16 | End: 2021-06-29

## 2021-02-22 DIAGNOSIS — E11.42 TYPE 2 DIABETES MELLITUS WITH DIABETIC POLYNEUROPATHY, WITH LONG-TERM CURRENT USE OF INSULIN (HCC): ICD-10-CM

## 2021-02-22 DIAGNOSIS — Z79.4 TYPE 2 DIABETES MELLITUS WITH DIABETIC POLYNEUROPATHY, WITH LONG-TERM CURRENT USE OF INSULIN (HCC): ICD-10-CM

## 2021-02-22 DIAGNOSIS — I50.22 CHRONIC SYSTOLIC HEART FAILURE (HCC): ICD-10-CM

## 2021-02-22 RX ORDER — BLOOD-GLUCOSE METER
EACH MISCELLANEOUS
COMMUNITY
End: 2021-02-22 | Stop reason: SDUPTHER

## 2021-02-22 NOTE — TELEPHONE ENCOUNTER
Pharmacy needs new prescription for accu chek guide test strips not naz plus per insurance and pharmacy states they are going to stop making naz monitor. Needs accu chek guide please.  Also, needs refill on Basaglar.

## 2021-02-24 RX ORDER — FUROSEMIDE 40 MG/1
TABLET ORAL
Qty: 90 TABLET | Refills: 5 | OUTPATIENT
Start: 2021-02-24

## 2021-02-24 NOTE — TELEPHONE ENCOUNTER
Please call patient let him know that he should not be alarmed by [facility]  This was a denial for his Lasix.  I sent a new prescription with updated doses on the 16th.  I would like him to come in and have blood work done to make sure that his kidney function and electrolytes are well controlled on his new dose of Lasix.

## 2021-02-25 ENCOUNTER — LAB (OUTPATIENT)
Dept: FAMILY MEDICINE CLINIC | Facility: CLINIC | Age: 61
End: 2021-02-25

## 2021-02-25 LAB
ANION GAP SERPL CALCULATED.3IONS-SCNC: 11 MMOL/L (ref 5–15)
BUN SERPL-MCNC: 17 MG/DL (ref 8–23)
BUN/CREAT SERPL: 18.7 (ref 7–25)
CALCIUM SPEC-SCNC: 9 MG/DL (ref 8.6–10.5)
CHLORIDE SERPL-SCNC: 100 MMOL/L (ref 98–107)
CO2 SERPL-SCNC: 31 MMOL/L (ref 22–29)
CREAT SERPL-MCNC: 0.91 MG/DL (ref 0.76–1.27)
GFR SERPL CREATININE-BSD FRML MDRD: 103 ML/MIN/1.73
GLUCOSE SERPL-MCNC: 115 MG/DL (ref 65–99)
POTASSIUM SERPL-SCNC: 4 MMOL/L (ref 3.5–5.2)
SODIUM SERPL-SCNC: 142 MMOL/L (ref 136–145)

## 2021-02-25 PROCEDURE — 80048 BASIC METABOLIC PNL TOTAL CA: CPT | Performed by: FAMILY MEDICINE

## 2021-02-25 PROCEDURE — 36415 COLL VENOUS BLD VENIPUNCTURE: CPT | Performed by: FAMILY MEDICINE

## 2021-02-26 RX ORDER — INSULIN GLARGINE 100 [IU]/ML
85 INJECTION, SOLUTION SUBCUTANEOUS DAILY
Qty: 10 PEN | Refills: 5 | Status: SHIPPED | OUTPATIENT
Start: 2021-02-26 | End: 2021-05-10

## 2021-02-26 RX ORDER — BLOOD-GLUCOSE METER
1 EACH MISCELLANEOUS 3 TIMES DAILY
Qty: 1 KIT | Refills: 0 | Status: SHIPPED | OUTPATIENT
Start: 2021-02-26

## 2021-05-08 DIAGNOSIS — E11.42 TYPE 2 DIABETES MELLITUS WITH DIABETIC POLYNEUROPATHY, WITH LONG-TERM CURRENT USE OF INSULIN (HCC): ICD-10-CM

## 2021-05-08 DIAGNOSIS — Z79.4 TYPE 2 DIABETES MELLITUS WITH DIABETIC POLYNEUROPATHY, WITH LONG-TERM CURRENT USE OF INSULIN (HCC): ICD-10-CM

## 2021-05-10 RX ORDER — INSULIN GLARGINE 100 [IU]/ML
85 INJECTION, SOLUTION SUBCUTANEOUS DAILY
Qty: 15 PEN | Refills: 5 | Status: SHIPPED | OUTPATIENT
Start: 2021-05-10 | End: 2021-07-13 | Stop reason: CLARIF

## 2021-06-04 DIAGNOSIS — I50.22 CHRONIC SYSTOLIC HEART FAILURE (HCC): ICD-10-CM

## 2021-06-04 RX ORDER — POTASSIUM CHLORIDE 1500 MG/1
40 TABLET, FILM COATED, EXTENDED RELEASE ORAL DAILY
Qty: 60 TABLET | Refills: 5 | Status: SHIPPED | OUTPATIENT
Start: 2021-06-04 | End: 2021-06-08 | Stop reason: SDUPTHER

## 2021-06-08 DIAGNOSIS — I50.22 CHRONIC SYSTOLIC HEART FAILURE (HCC): ICD-10-CM

## 2021-06-08 RX ORDER — POTASSIUM CHLORIDE 1500 MG/1
40 TABLET, FILM COATED, EXTENDED RELEASE ORAL DAILY
Qty: 180 TABLET | Refills: 1 | Status: SHIPPED | OUTPATIENT
Start: 2021-06-08 | End: 2021-12-03

## 2021-06-25 ENCOUNTER — HOSPITAL ENCOUNTER (OUTPATIENT)
Dept: GENERAL RADIOLOGY | Facility: HOSPITAL | Age: 61
Discharge: HOME | End: 2021-06-25
Admitting: FAMILY MEDICINE

## 2021-06-25 ENCOUNTER — LAB (OUTPATIENT)
Dept: FAMILY MEDICINE CLINIC | Facility: CLINIC | Age: 61
End: 2021-06-25

## 2021-06-25 ENCOUNTER — OFFICE VISIT (OUTPATIENT)
Dept: FAMILY MEDICINE CLINIC | Facility: CLINIC | Age: 61
End: 2021-06-25

## 2021-06-25 VITALS
WEIGHT: 315 LBS | SYSTOLIC BLOOD PRESSURE: 125 MMHG | TEMPERATURE: 97.3 F | OXYGEN SATURATION: 97 % | DIASTOLIC BLOOD PRESSURE: 84 MMHG | HEART RATE: 79 BPM | BODY MASS INDEX: 42.66 KG/M2 | HEIGHT: 72 IN

## 2021-06-25 DIAGNOSIS — I11.0 HYPERTENSIVE HEART DISEASE WITH CHRONIC COMBINED SYSTOLIC AND DIASTOLIC CONGESTIVE HEART FAILURE (HCC): Primary | ICD-10-CM

## 2021-06-25 DIAGNOSIS — I11.0 HYPERTENSIVE HEART DISEASE WITH CHRONIC COMBINED SYSTOLIC AND DIASTOLIC CONGESTIVE HEART FAILURE (HCC): ICD-10-CM

## 2021-06-25 DIAGNOSIS — E78.2 MIXED DIABETIC HYPERLIPIDEMIA ASSOCIATED WITH TYPE 2 DIABETES MELLITUS (HCC): ICD-10-CM

## 2021-06-25 DIAGNOSIS — I50.42 HYPERTENSIVE HEART DISEASE WITH CHRONIC COMBINED SYSTOLIC AND DIASTOLIC CONGESTIVE HEART FAILURE (HCC): ICD-10-CM

## 2021-06-25 DIAGNOSIS — E11.42 TYPE 2 DIABETES MELLITUS WITH DIABETIC POLYNEUROPATHY, WITH LONG-TERM CURRENT USE OF INSULIN (HCC): ICD-10-CM

## 2021-06-25 DIAGNOSIS — E11.69 MIXED DIABETIC HYPERLIPIDEMIA ASSOCIATED WITH TYPE 2 DIABETES MELLITUS (HCC): ICD-10-CM

## 2021-06-25 DIAGNOSIS — Z00.00 ENCOUNTER FOR MEDICARE ANNUAL WELLNESS EXAM: ICD-10-CM

## 2021-06-25 DIAGNOSIS — Z79.4 TYPE 2 DIABETES MELLITUS WITH DIABETIC POLYNEUROPATHY, WITH LONG-TERM CURRENT USE OF INSULIN (HCC): ICD-10-CM

## 2021-06-25 DIAGNOSIS — Z53.20 MEDICATION THERAPY MANAGEMENT RECOMMENDATION REFUSED BY PATIENT: ICD-10-CM

## 2021-06-25 DIAGNOSIS — I50.42 HYPERTENSIVE HEART DISEASE WITH CHRONIC COMBINED SYSTOLIC AND DIASTOLIC CONGESTIVE HEART FAILURE (HCC): Primary | ICD-10-CM

## 2021-06-25 DIAGNOSIS — Z12.5 ENCOUNTER FOR SCREENING FOR MALIGNANT NEOPLASM OF PROSTATE: ICD-10-CM

## 2021-06-25 DIAGNOSIS — E66.01 MORBID (SEVERE) OBESITY DUE TO EXCESS CALORIES (HCC): ICD-10-CM

## 2021-06-25 LAB
ALBUMIN SERPL-MCNC: 4.1 G/DL (ref 3.5–5.2)
ALBUMIN UR-MCNC: 2.3 MG/DL
ALBUMIN/GLOB SERPL: 1.2 G/DL
ALP SERPL-CCNC: 62 U/L (ref 39–117)
ALT SERPL W P-5'-P-CCNC: 11 U/L (ref 1–41)
ANION GAP SERPL CALCULATED.3IONS-SCNC: 10.7 MMOL/L (ref 5–15)
AST SERPL-CCNC: 14 U/L (ref 1–40)
BASOPHILS # BLD AUTO: 0.05 10*3/MM3 (ref 0–0.2)
BASOPHILS NFR BLD AUTO: 0.6 % (ref 0–1.5)
BILIRUB SERPL-MCNC: 0.4 MG/DL (ref 0–1.2)
BUN SERPL-MCNC: 13 MG/DL (ref 8–23)
BUN/CREAT SERPL: 14.4 (ref 7–25)
CALCIUM SPEC-SCNC: 9.3 MG/DL (ref 8.6–10.5)
CHLORIDE SERPL-SCNC: 98 MMOL/L (ref 98–107)
CHOLEST SERPL-MCNC: 204 MG/DL (ref 0–200)
CO2 SERPL-SCNC: 28.3 MMOL/L (ref 22–29)
CREAT SERPL-MCNC: 0.9 MG/DL (ref 0.76–1.27)
CREAT UR-MCNC: 90.6 MG/DL
DEPRECATED RDW RBC AUTO: 44.7 FL (ref 37–54)
EOSINOPHIL # BLD AUTO: 0.31 10*3/MM3 (ref 0–0.4)
EOSINOPHIL NFR BLD AUTO: 4 % (ref 0.3–6.2)
ERYTHROCYTE [DISTWIDTH] IN BLOOD BY AUTOMATED COUNT: 14 % (ref 12.3–15.4)
GFR SERPL CREATININE-BSD FRML MDRD: 104 ML/MIN/1.73
GLOBULIN UR ELPH-MCNC: 3.3 GM/DL
GLUCOSE SERPL-MCNC: 84 MG/DL (ref 65–99)
HBA1C MFR BLD: 6.4 % (ref 3.5–5.6)
HCT VFR BLD AUTO: 44.3 % (ref 37.5–51)
HDLC SERPL-MCNC: 38 MG/DL (ref 40–60)
HGB BLD-MCNC: 14.6 G/DL (ref 13–17.7)
IMM GRANULOCYTES # BLD AUTO: 0.01 10*3/MM3 (ref 0–0.05)
IMM GRANULOCYTES NFR BLD AUTO: 0.1 % (ref 0–0.5)
LDLC SERPL CALC-MCNC: 145 MG/DL (ref 0–100)
LDLC/HDLC SERPL: 3.77 {RATIO}
LYMPHOCYTES # BLD AUTO: 2.76 10*3/MM3 (ref 0.7–3.1)
LYMPHOCYTES NFR BLD AUTO: 35.6 % (ref 19.6–45.3)
MCH RBC QN AUTO: 28.9 PG (ref 26.6–33)
MCHC RBC AUTO-ENTMCNC: 33 G/DL (ref 31.5–35.7)
MCV RBC AUTO: 87.7 FL (ref 79–97)
MICROALBUMIN/CREAT UR: 25.4 MG/G
MONOCYTES # BLD AUTO: 0.68 10*3/MM3 (ref 0.1–0.9)
MONOCYTES NFR BLD AUTO: 8.8 % (ref 5–12)
NEUTROPHILS NFR BLD AUTO: 3.95 10*3/MM3 (ref 1.7–7)
NEUTROPHILS NFR BLD AUTO: 50.9 % (ref 42.7–76)
NRBC BLD AUTO-RTO: 0 /100 WBC (ref 0–0.2)
NT-PROBNP SERPL-MCNC: 850 PG/ML (ref 0–900)
PLATELET # BLD AUTO: 230 10*3/MM3 (ref 140–450)
PMV BLD AUTO: 10.8 FL (ref 6–12)
POTASSIUM SERPL-SCNC: 3.9 MMOL/L (ref 3.5–5.2)
PROT SERPL-MCNC: 7.4 G/DL (ref 6–8.5)
PSA SERPL-MCNC: 0.85 NG/ML (ref 0–4)
RBC # BLD AUTO: 5.05 10*6/MM3 (ref 4.14–5.8)
SODIUM SERPL-SCNC: 137 MMOL/L (ref 136–145)
TRIGL SERPL-MCNC: 113 MG/DL (ref 0–150)
VLDLC SERPL-MCNC: 21 MG/DL (ref 5–40)
WBC # BLD AUTO: 7.76 10*3/MM3 (ref 3.4–10.8)

## 2021-06-25 PROCEDURE — 83036 HEMOGLOBIN GLYCOSYLATED A1C: CPT | Performed by: FAMILY MEDICINE

## 2021-06-25 PROCEDURE — 82043 UR ALBUMIN QUANTITATIVE: CPT | Performed by: FAMILY MEDICINE

## 2021-06-25 PROCEDURE — 80053 COMPREHEN METABOLIC PANEL: CPT | Performed by: FAMILY MEDICINE

## 2021-06-25 PROCEDURE — 80061 LIPID PANEL: CPT | Performed by: FAMILY MEDICINE

## 2021-06-25 PROCEDURE — 99396 PREV VISIT EST AGE 40-64: CPT | Performed by: FAMILY MEDICINE

## 2021-06-25 PROCEDURE — 85025 COMPLETE CBC W/AUTO DIFF WBC: CPT | Performed by: FAMILY MEDICINE

## 2021-06-25 PROCEDURE — 93000 ELECTROCARDIOGRAM COMPLETE: CPT | Performed by: FAMILY MEDICINE

## 2021-06-25 PROCEDURE — 82570 ASSAY OF URINE CREATININE: CPT | Performed by: FAMILY MEDICINE

## 2021-06-25 PROCEDURE — 83880 ASSAY OF NATRIURETIC PEPTIDE: CPT | Performed by: FAMILY MEDICINE

## 2021-06-25 PROCEDURE — G0103 PSA SCREENING: HCPCS | Performed by: FAMILY MEDICINE

## 2021-06-25 PROCEDURE — 71046 X-RAY EXAM CHEST 2 VIEWS: CPT

## 2021-06-25 PROCEDURE — 36415 COLL VENOUS BLD VENIPUNCTURE: CPT | Performed by: FAMILY MEDICINE

## 2021-06-25 RX ORDER — BLOOD SUGAR DIAGNOSTIC
STRIP MISCELLANEOUS
COMMUNITY
Start: 2021-05-13 | End: 2021-11-18 | Stop reason: ALTCHOICE

## 2021-06-25 NOTE — PROGRESS NOTES
The ABCs of the Annual Wellness Visit  [place] to Medicare Visit    Chief Complaint   Patient presents with   • Welcome To Medicare       Subjective   History of Present Illness:  Sorin Allen is a 60 y.o. male who presents for a  Welcome to Medicare Visit.    HEALTH RISK ASSESSMENT    Recent Hospitalizations:  No hospitalization(s) within the last year.    Current Medical Providers:  Patient Care Team:  Natalie Steward MD as PCP - General (Family Medicine)    Smoking Status:  Social History     Tobacco Use   Smoking Status Former Smoker   • Packs/day: 2.00   • Years: 20.00   • Pack years: 40.00   • Quit date: 3/6/2000   • Years since quittin.3   Smokeless Tobacco Never Used       Alcohol Consumption:  Social History     Substance and Sexual Activity   Alcohol Use Yes    Comment: rare       Depression Screen:   PHQ-2/PHQ-9 Depression Screening 2021   Little interest or pleasure in doing things 0   Feeling down, depressed, or hopeless 1   Total Score 1       Fall Risk Screen:  NATALYA Fall Risk Assessment has not been completed.    Health Habits and Functional and Cognitive Screening:  Functional & Cognitive Status 2021   Do you have difficulty preparing food and eating? Yes   Do you have difficulty bathing yourself, getting dressed or grooming yourself? Yes   Do you have difficulty using the toilet? Yes   Do you have difficulty moving around from place to place? Yes   Do you have trouble with steps or getting out of a bed or a chair? Yes   Current Diet Unhealthy Diet   Dental Exam Not up to date   Eye Exam Up to date   Exercise (times per week) 0 times per week   Current Exercises Include No Regular Exercise   Do you need help using the phone?  No   Are you deaf or do you have serious difficulty hearing?  No   Do you need help with transportation? No   Do you need help shopping? Yes   Do you need help preparing meals?  Yes   Do you need help with housework?  Yes   Do you need help with  laundry? Yes   Do you need help taking your medications? No   Do you need help managing money? No   Do you ever drive or ride in a car without wearing a seat belt? No   Have you felt unusual stress, anger or loneliness in the last month? Yes   Who do you live with? Spouse   If you need help, do you have trouble finding someone available to you? Yes   Have you been bothered in the last four weeks by sexual problems? Yes   Do you have difficulty concentrating, remembering or making decisions? Yes         Does the patient have evidence of cognitive impairment? No    Asprin use counseling:Start ASA 81 mg daily     Visual Acuity:     Visual Acuity Screening    Right eye Left eye Both eyes   Without correction: 20/40 20/50 20/40   With correction:          Age-appropriate Screening Schedule:  Refer to the list below for future screening recommendations based on patient's age, sex and/or medical conditions. Orders for these recommended tests are listed in the plan section. The patient has been provided with a written plan.    Health Maintenance   Topic Date Due   • TDAP/TD VACCINES (1 - Tdap) Never done   • ZOSTER VACCINE (1 of 2) Never done   • DIABETIC EYE EXAM  Never done   • LIPID PANEL  03/06/2021   • URINE MICROALBUMIN  03/06/2021   • HEMOGLOBIN A1C  06/16/2021   • INFLUENZA VACCINE  08/01/2021   • DIABETIC FOOT EXAM  12/16/2021          The following portions of the patient's history were reviewed and updated as appropriate: allergies, current medications, past family history, past medical history, past social history, past surgical history and problem list.    Outpatient Medications Prior to Visit   Medication Sig Dispense Refill   • Accu-Chek Sigrid Plus test strip USE GLUCOSE TESTING STRIPS TO CHECK BLOOD SUGARS 3 TIMES A DAY. DX. E11.42     • ACCU-CHEK SOFTCLIX LANCETS lancets by Other route. Use as instructed     • acetaminophen (TYLENOL) 500 MG tablet Take 2 tablets by mouth Every 8 (Eight) Hours As Needed for  Moderate Pain  (arthritic pain). 180 tablet 5   • Blood Glucose Calibration liquid by In Vitro route.     • Blood Glucose Monitoring Suppl (ACCU-CHEK ALEKS PLUS) w/Device kit Use to test blood sugar twice a day     • Blood Glucose Monitoring Suppl (Accu-Chek Guide) w/Device kit 1 kit 3 (Three) Times a Day. Use glucometer to check blood sugars 3 times a day. Dx. E11.42 1 kit 0   • furosemide (LASIX) 80 MG tablet Take 1 tablet by mouth 3 (Three) Times a Day. 90 tablet 5   • gabapentin (NEURONTIN) 100 MG capsule TAKE 1 CAPSULE BY MOUTH THREE TIMES A DAY 90 capsule 2   • Insulin Glargine (BASAGLAR KWIKPEN) 100 UNIT/ML injection pen INJECT 85 UNITS UNDER THE SKIN INTO THE APPROPRIATE AREA AS DIRECTED DAILY. 15 pen 5   • Insulin Pen Needle 32G X 4 MM misc Use pen needle to deliver insulin subcutaneously daily.  Dx. E11.42 30 each 5   • meloxicam (MOBIC) 15 MG tablet Take 1 tablet by mouth Daily As Needed for Moderate Pain . Take with food! 30 tablet 5   • potassium chloride ER (K-TAB) 20 MEQ tablet controlled-release ER tablet Take 2 tablets by mouth Daily. 180 tablet 1     No facility-administered medications prior to visit.       Patient Active Problem List   Diagnosis   • Morbid (severe) obesity due to excess calories (CMS/HCC)   • Hypertensive heart disease with chronic combined systolic and diastolic congestive heart failure (CMS/HCC)   • Dilated cardiomyopathy (CMS/HCC)   • Type 2 diabetes mellitus with diabetic polyneuropathy, with long-term current use of insulin (CMS/HCC)   • Primary osteoarthritis involving multiple joints   • Primary osteoarthritis of left knee   • Statin declined   • Medication therapy management recommendation refused by patient       Advanced Care Planning:  ACP discussion was held with the patient during this visit. Patient does not have an advance directive, information provided.    Review of Systems   Cardiovascular: Positive for leg swelling.   Musculoskeletal: Positive for arthralgias  "(knees).   Neurological:        Paresthesias of feet.       Compared to one year ago, the patient feels his physical health is worse.  Compared to one year ago, the patient feels his mental health is the same.    Reviewed chart for potential of high risk medication in the elderly: yes  Reviewed chart for potential of harmful drug interactions in the elderly:yes    Objective         Vitals:    06/25/21 0859   BP: 125/84   BP Location: Left arm   Patient Position: Sitting   Cuff Size: Large Adult   Pulse: 79   Temp: 97.3 °F (36.3 °C)   TempSrc: Infrared   SpO2: 97%   Weight: (!) 151 kg (333 lb)   Height: 182.9 cm (72\")       Body mass index is 45.16 kg/m².  Discussed the patient's BMI with him. The BMI is above average; BMI management plan is completed.    Physical Exam  Vitals reviewed.   Constitutional:       General: He is not in acute distress.     Appearance: He is well-developed. He is morbidly obese. He is not diaphoretic.   HENT:      Head: Normocephalic and atraumatic.   Cardiovascular:      Rate and Rhythm: Normal rate and regular rhythm.      Heart sounds: Normal heart sounds.   Pulmonary:      Effort: Pulmonary effort is normal.      Breath sounds: Examination of the right-lower field reveals rales. Examination of the left-lower field reveals rales. Rales present. No wheezing.   Musculoskeletal:      Right lower leg: No edema.      Left lower leg: Edema (pedal) present.   Skin:     General: Skin is warm and dry.      Comments: Calloused feet   Neurological:      Mental Status: He is alert and oriented to person, place, and time.   Psychiatric:         Mood and Affect: Mood normal.         Behavior: Behavior normal.             Lab Results   Component Value Date    WBC 7.47 06/15/2020    HGB 15.1 06/15/2020    HCT 45.0 06/15/2020    MCV 86.9 06/15/2020     06/15/2020     Lab Results   Component Value Date    GLUCOSE 115 (H) 02/25/2021    BUN 17 02/25/2021    CREATININE 0.91 02/25/2021    EGFRIFAFRI " 103 02/25/2021    BCR 18.7 02/25/2021    K 4.0 02/25/2021    CO2 31.0 (H) 02/25/2021    CALCIUM 9.0 02/25/2021    ALBUMIN 4.50 06/15/2020    AST 16 06/15/2020    ALT 18 06/15/2020     Lab Results   Component Value Date    HGBA1C 7.0 (H) 12/16/2020     Lab Results   Component Value Date    MICROALBUR 1.3 03/06/2020     Lab Results   Component Value Date    CHOL 225 (H) 03/06/2020    TRIG 125 03/06/2020    HDL 38 (L) 03/06/2020     (H) 03/06/2020     The 10-year ASCVD risk score (Ford BOWEN Jr., et al., 2013) is: 26.5%    Values used to calculate the score:      Age: 60 years      Sex: Male      Is Non- : Yes      Diabetic: Yes      Tobacco smoker: No      Systolic Blood Pressure: 125 mmHg      Is BP treated: Yes      HDL Cholesterol: 38 mg/dL      Total Cholesterol: 225 mg/dL      ECG 12 Lead    Date/Time: 6/25/2021 9:28 AM  Performed by: Natalie Steward MD  Authorized by: Natalie Steward MD   Comparison: not compared with previous ECG   Previous ECG: no previous ECG available  Rhythm: sinus rhythm  Rate: normal  BPM: 77    Clinical impression: abnormal EKG            Assessment/Plan   Medicare Risks and Personalized Health Plan  CMS Preventative Services Quick Reference  Advance Directive Discussion  Cardiovascular risk  Colon Cancer Screening  Dementia/Memory   Depression/Dysphoria  Diabetic Lab Screening   Fall Risk  Immunizations Discussed/Encouraged (specific immunizations; Tdap, Pneumococcal 23 and Shingrix )  Inactivity/Sedentary  Obesity/Overweight   Prostate Cancer Screening     The above risks/problems have been discussed with the patient.  Pertinent information has been shared with the patient in the After Visit Summary.  Follow up plans and orders are seen below in the Assessment/Plan Section.    Problem List Items Addressed This Visit        Advance Directives and General Issues    Medication therapy management recommendation refused by patient    Overview      Declined statin, BB, & ACE-I/ARB.            Cardiac and Vasculature    Hypertensive heart disease with chronic combined systolic and diastolic congestive heart failure (CMS/HCC) - Primary    Overview     Echocardiogram in May 2020 revealed LVEF 40% & grade 1a diastolic dysfunction, dilated ventricles, and aortic & mitral valve calcifications.  BP at goal, <140/90.  Continue Lasix 80 mg 3 times daily & KCl 30 mEq.  Declined BB & ACE-I/ARB.  Reviewed LDL goal.  Declines statin.         Relevant Orders    CBC Auto Differential    Comprehensive Metabolic Panel    Microalbumin / Creatinine Urine Ratio - Urine, Clean Catch    proBNP    ECG 12 Lead    Mixed diabetic hyperlipidemia associated with type 2 diabetes mellitus (CMS/HCC)    Overview     Reviewed lipid panel, LDL goal, & 10-year ASCVD risk score.  Encouraged a diet rich in vegetables & 150 minutes of exercise weekly.  Declined statin.         Relevant Orders    CBC Auto Differential    Comprehensive Metabolic Panel    Lipid Panel    ECG 12 Lead       Endocrine and Metabolic    Morbid (severe) obesity due to excess calories (CMS/[facility])    Overview     Discussed health risks associated with obesity.  Encouraged 150 minutes of exercise weekly.         Relevant Orders    CBC Auto Differential    Comprehensive Metabolic Panel    ECG 12 Lead    Type 2 diabetes mellitus with diabetic polyneuropathy, with long-term current use of insulin (CMS/[facility])    Overview     HbA1c at goal, <7%.  Monitoring regularly.  Monitoring renal function.  Declined ACE inhibitor/ARB.  Continue Basaglar 60 units daily.  Continue gabapentin 300 mg nightly for neuropathy.  Eye exam due.         Relevant Orders    CBC Auto Differential    Comprehensive Metabolic Panel    Hemoglobin A1c    Lipid Panel    Microalbumin / Creatinine Urine Ratio - Urine, Clean Catch    ECG 12 Lead      Other Visit Diagnoses     Encounter for screening for malignant neoplasm of prostate        Relevant Orders    PSA  Screen    Encounter for Medicare annual wellness exam        Relevant Orders    CBC Auto Differential    Comprehensive Metabolic Panel    Hemoglobin A1c    Lipid Panel    Microalbumin / Creatinine Urine Ratio - Urine, Clean Catch    ECG 12 Lead          Follow Up:  Return in about 3 months (around 9/25/2021) for Recheck heart disease & DM II.     An After Visit Summary and PPPS were given to the patient.     Signature    Natalie Steward MD  Family Medicine  [facility]

## 2021-06-25 NOTE — PATIENT INSTRUCTIONS
Diabetes Mellitus and Nutrition, Adult  When you have diabetes, or diabetes mellitus, it is very important to have healthy eating habits because your blood sugar (glucose) levels are greatly affected by what you eat and drink. Eating healthy foods in the right amounts, at about the same times every day, can help you:  · Control your blood glucose.  · Lower your risk of heart disease.  · Improve your blood pressure.  · Reach or maintain a healthy weight.  What can affect my meal plan?  Every person with diabetes is different, and each person has different needs for a meal plan. Your health care provider may recommend that you work with a dietitian to make a meal plan that is best for you. Your meal plan may vary depending on factors such as:  · The calories you need.  · The medicines you take.  · Your weight.  · Your blood glucose, blood pressure, and cholesterol levels.  · Your activity level.  · Other health conditions you have, such as heart or kidney disease.  How do carbohydrates affect me?  Carbohydrates, also called carbs, affect your blood glucose level more than any other type of food. Eating carbs naturally raises the amount of glucose in your blood. Carb counting is a method for keeping track of how many carbs you eat. Counting carbs is important to keep your blood glucose at a healthy level, especially if you use insulin or take certain oral diabetes medicines.  It is important to know how many carbs you can safely have in each meal. This is different for every person. Your dietitian can help you calculate how many carbs you should have at each meal and for each snack.  How does alcohol affect me?  Alcohol can cause a sudden decrease in blood glucose (hypoglycemia), especially if you use insulin or take certain oral diabetes medicines. Hypoglycemia can be a life-threatening condition. Symptoms of hypoglycemia, such as sleepiness, dizziness, and confusion, are similar to symptoms of having too much  "alcohol.  · Do not drink alcohol if:  ? Your health care provider tells you not to drink.  ? You are pregnant, may be pregnant, or are planning to become pregnant.  · If you drink alcohol:  ? Do not drink on an empty stomach.  ? Limit how much you use to:  § 0-1 drink a day for women.  § 0-2 drinks a day for men.  ? Be aware of how much alcohol is in your drink. In the [place] one drink equals one 12 oz bottle of beer (355 mL), one 5 oz glass of wine (148 mL), or one 1½ oz glass of hard liquor (44 mL).  ? Keep yourself hydrated with water, diet soda, or unsweetened iced tea.  § Keep in mind that regular soda, juice, and other mixers may contain a lot of sugar and must be counted as carbs.  What are tips for following this plan?    Reading food labels  · Start by checking the serving size on the \"Nutrition Facts\" label of packaged foods and drinks. The amount of calories, carbs, fats, and other nutrients listed on the label is based on one serving of the item. Many items contain more than one serving per package.  · Check the total grams (g) of carbs in one serving. You can calculate the number of servings of carbs in one serving by dividing the total carbs by 15. For example, if a food has 30 g of total carbs per serving, it would be equal to 2 servings of carbs.  · Check the number of grams (g) of saturated fats and trans fats in one serving. Choose foods that have a low amount or none of these fats.  · Check the number of milligrams (mg) of salt (sodium) in one serving. Most people should limit total sodium intake to less than 2,300 mg per day.  · Always check the nutrition information of foods labeled as \"low-fat\" or \"nonfat.\" These foods may be higher in added sugar or refined carbs and should be avoided.  · Talk to your dietitian to identify your daily goals for nutrients listed on the label.  Shopping  · Avoid buying canned, pre-made, or processed foods. These foods tend to be high in fat, sodium, and added " sugar.  · Shop around the outside edge of the grocery store. This is where you will most often find fresh fruits and vegetables, bulk grains, fresh meats, and fresh dairy.  Cooking  · Use low-heat cooking methods, such as baking, instead of high-heat cooking methods like deep frying.  · Cook using healthy oils, such as olive, canola, or sunflower oil.  · Avoid cooking with butter, cream, or high-fat meats.  Meal planning  · Eat meals and snacks regularly, preferably at the same times every day. Avoid going long periods of time without eating.  · Eat foods that are high in fiber, such as fresh fruits, vegetables, beans, and whole grains. Talk with your dietitian about how many servings of carbs you can eat at each meal.  · Eat 4-6 oz (112-168 g) of lean protein each day, such as lean meat, chicken, fish, eggs, or tofu. One ounce (oz) of lean protein is equal to:  ? 1 oz (28 g) of meat, chicken, or fish.  ? 1 egg.  ? ¼ cup (62 g) of tofu.  · Eat some foods each day that contain healthy fats, such as avocado, nuts, seeds, and fish.  What foods should I eat?  Fruits  Berries. Apples. Oranges. Peaches. Apricots. Plums. Grapes. Clay. Papaya. Pomegranate. Kiwi. Cherries.  Vegetables  Lettuce. Spinach. Leafy greens, including kale, chard, boy greens, and mustard greens. Beets. Cauliflower. Cabbage. Broccoli. Carrots. Green beans. Tomatoes. Peppers. Onions. Cucumbers. Bunola sprouts.  Grains  Whole grains, such as whole-wheat or whole-grain bread, crackers, tortillas, cereal, and pasta. Unsweetened oatmeal. Quinoa. Brown or wild rice.  Meats and other proteins  Seafood. Poultry without skin. Lean cuts of poultry and beef. Tofu. Nuts. Seeds.  Dairy  Low-fat or fat-free dairy products such as milk, yogurt, and cheese.  The items listed above may not be a complete list of foods and beverages you can eat. Contact a dietitian for more information.  What foods should I avoid?  Fruits  Fruits canned with  syrup.  Vegetables  Canned vegetables. Frozen vegetables with butter or cream sauce.  Grains  Refined white flour and flour products such as bread, pasta, snack foods, and cereals. Avoid all processed foods.  Meats and other proteins  Fatty cuts of meat. Poultry with skin. Breaded or fried meats. Processed meat. Avoid saturated fats.  Dairy  Full-fat yogurt, cheese, or milk.  Beverages  Sweetened drinks, such as soda or iced tea.  The items listed above may not be a complete list of foods and beverages you should avoid. Contact a dietitian for more information.  Questions to ask a health care provider  · Do I need to meet with a diabetes educator?  · Do I need to meet with a dietitian?  · What number can I call if I have questions?  · When are the best times to check my blood glucose?  Where to find more information:  · American Diabetes Association: diabetes.org  · Academy of Nutrition and Dietetics: www.eatright.org  · National [place] of Diabetes and Digestive and Kidney Diseases: www.niddk.nih.gov  · Association of Diabetes Care and Education Specialists: www.diabeteseducator.org  Summary  · It is important to have healthy eating habits because your blood sugar (glucose) levels are greatly affected by what you eat and drink.  · A healthy meal plan will help you control your blood glucose and maintain a healthy lifestyle.  · Your health care provider may recommend that you work with a dietitian to make a meal plan that is best for you.  · Keep in mind that carbohydrates (carbs) and alcohol have immediate effects on your blood glucose levels. It is important to count carbs and to use alcohol carefully.  This information is not intended to replace advice given to you by your health care provider. Make sure you discuss any questions you have with your health care provider.  Document Revised: 11/24/2020 Document Reviewed: 11/24/2020  [employer] Patient Education © 2021 [employer]      Diabetes Mellitus and  "Exercise  Exercising regularly is important for overall health, especially for people who have diabetes mellitus. Exercising is not only about losing weight. It has many other health benefits, such as increasing muscle strength and bone density and reducing body fat and stress. This leads to improved fitness, flexibility, and endurance, all of which result in better overall health.  What are the benefits of exercise if I have diabetes?  Exercise has many benefits for people with diabetes. They include:  · Helping to lower and control blood sugar (glucose).  · Helping the body to respond better to the hormone insulin by improving insulin sensitivity.  · Reducing how much insulin the body needs.  · Lowering the risk for heart disease by:  ? Lowering \"bad\" cholesterol and triglyceride levels.  ? Increasing \"good\" cholesterol levels.  ? Lowering blood pressure.  ? Lowering blood glucose levels.  What is my activity plan?  Your health care provider or certified diabetes educator can help you make a plan for the type and frequency of exercise that works for you. This is called your activity plan. Be sure to:  · Get at least 150 minutes of medium-intensity or high-intensity exercise each week. Exercises may include brisk walking, biking, or water aerobics.  · Do stretching and strengthening exercises, such as yoga or weight lifting, at least 2 times a week.  · Spread out your activity over at least 3 days of the week.  · Get some form of physical activity each day.  ? Do not go more than 2 days in a row without some kind of physical activity.  ? Avoid being inactive for more than 90 minutes at a time. Take frequent breaks to walk or stretch.  · Choose exercises or activities that you enjoy. Set realistic goals.  · Start slowly and gradually increase your exercise intensity over time.  How do I manage my diabetes during exercise?    Monitor your blood glucose  · Check your blood glucose before and after exercising. If your " blood glucose is:  ? 240 mg/dL (13.3 mmol/L) or higher before you exercise, check your urine for ketones. These are chemicals created by the liver. If you have ketones in your urine, do not exercise until your blood glucose returns to normal.  ? 100 mg/dL (5.6 mmol/L) or lower, eat a snack containing 15-20 grams of carbohydrate. Check your blood glucose 15 minutes after the snack to make sure that your glucose level is above 100 mg/dL (5.6 mmol/L) before you start your exercise.  · Know the symptoms of low blood glucose (hypoglycemia) and how to treat it. Your risk for hypoglycemia increases during and after exercise.  Follow these tips and your health care provider's instructions  · Keep a carbohydrate snack that is fast-acting for use before, during, and after exercise to help prevent or treat hypoglycemia.  · Avoid injecting insulin into areas of the body that are going to be exercised. For example, avoid injecting insulin into:  ? Your arms, when you are about to play tennis.  ? Your legs, when you are about to go jogging.  · Keep records of your exercise habits. Doing this can help you and your health care provider adjust your diabetes management plan as needed. Write down:  ? Food that you eat before and after you exercise.  ? Blood glucose levels before and after you exercise.  ? The type and amount of exercise you have done.  · Work with your health care provider when you start a new exercise or activity. He or she may need to:  ? Make sure that the activity is safe for you.  ? Adjust your insulin, other medicines, and food that you eat.  · Drink plenty of water while you exercise. This prevents loss of water (dehydration) and problems caused by a lot of heat in the body (heat stroke).  Where to find more information  · American Diabetes Association: www.diabetes.org  Summary  · Exercising regularly is important for overall health, especially for people who have diabetes mellitus.  · Exercising has many  health benefits. It increases muscle strength and bone density and reduces body fat and stress. It also lowers and controls blood glucose.  · Your health care provider or certified diabetes educator can help you make an activity plan for the type and frequency of exercise that works for you.  · Work with your health care provider to make sure any new activity is safe for you. Also work with your health care provider to adjust your insulin, other medicines, and the food you eat.  This information is not intended to replace advice given to you by your health care provider. Make sure you discuss any questions you have with your health care provider.  Document Revised: 09/14/2020 Document Reviewed: 09/14/2020  Elsevier Patient Education © 2021 [employer]       ADVANCE CARE PLANNING  Conversations that Matter  PLANNING GUIDE    LOOKING BACK ...  Who we are, what we believe, and what we value are all shaped by experiences we have had. Mormonism, family traditions, jobs and friends affect us deeply.    Has anything happened in your past that shaped your feelings about medical treatment?    Think about an experience you may have had with a family member or friend who was faced with a decision about medical care near the end of life. What was positive about that experience? What do you wish would have been done differently?    HERE AND NOW ...  Do you have any significant health problems now? What kinds of things bring you such antione that, should a health problem prevent you from doing them any more, life would have little meaning? What short- or long-term goals do you have? How might medical treatment help you or hinder you in accomplishing those goals?    WHAT ABOUT TOMORROW?  What significant health problems do you fear may affect you in the future? How do you feel about the possibility of having to go to a nursing home? How would decisions be made if you could not make them?    WHO SHOULD MAKE DECISIONS?  An important  "part of planning is to consider whether or not you could appoint someone to make your healthcare decisions if you could not make them yourself. Many people select a close family member, but you are free to pick anyone you think could best represent you. Whoever you appoint should have all of the following qualifications:    • Can be trusted.  • Is willing to accept this responsibility.  • Is willing to follow the values and instructions you have discussed.  • Is able to make complex, difficult decisions.    It is helpful, but not required, to appoint one or more alternate persons in case your first choice becomes unable or unwilling to represent you. It is best if only one person has authority at a time, but you can instruct your representatives to discuss decisions together if time permits.    WHAT FUTURE DECISIONS NEED TO BE CONSIDERED?  Providing instructions for future healthcare decisions may seem like an impossible task. How can anyone plan for all the possibilities? You cannot … but you do not have to.    You need to plan for situations where you:  1. Become unexpectedly incapable of making your own decisions  2. It is clear you will have little or no recovery, and  3. The injury or loss of function is significant.    Such a situation might arise because of an injury to the brain from an accident, a stroke, or a slowly progressive disease such as Alzheimer's.    To plan for this type of situation, many people state, \"If I'm going to be a vegetable, let me go,\" or \"No heroics,\" or \"Don't keep me alive on machines.\" While these remarks are a beginning, they simply are too vague to guide decision-making.    You need to completely describe under what circumstances your goals for medical care should be changed from attempting to prolong life to being allowed to die. In some situations, certain treatments may not make sense because they will not help, but other treatments will be of important benefit.    Consider " these three questions:  1. When would it make sense to continue certain treatments in an effort to prolong life and seek recovery?  2. When would it make sense to stop or withhold certain treatments and accept death when it comes?  3. Under any circumstance, what kind of comfort care would you want, including medication, spiritual and environmental options?    Making these choices requires understanding the information, weighing the benefits and burdens from your perspective, and then discussing your choices with those closest to you.    WHAT'S NEXT?  How do you make sure that your choices are respected? First talk, about them with your family, friends, clergy and physician, then put your choices in writing. Information about putting your plans into writing -- in an advance directive -- is available from your healthcare organization or .    Do you have any significant health problems? What health problems do you fear in the future?     Consider what frightens you most about medical treatment. What role does Tenriism, christopher or spirituality play in how you live your life? How does cost influence your decisions about medical care?   In terms of future medical care, under what circumstances would you want the goals of medical treatment to switch from attempting to prolong life to focusing on comfort?     Describe these circumstances in as much detail as possible.   Ask yourself, what will most help me live well at this point in my life?     Share your views with the person or people who would make your medical decisions if you could not make them.     THERE'S NO EASY WAY TO PLAN FOR FUTURE HEALTHCARE CHOICES.  It's a process that involves thinking and talking about complex and sensitive issues.    The questions that follow will help in the advance care planning process. This is a guide for your own benefit; it's not a test, and there are no right or wrong answers. It does not need to be completed all at once.  You may use it to share your feelings with healthcare providers, your family and your friends. The answers to these questions will help those you love make choices for you when you cannot make them yourself.    These are things I need to tell my loved ones:  What is your idea of comfort care? Describe how you would want medications to be used to provide comfort. What type of spiritual care would you want?     I need to learn about: ____________________________  I need to ask my healthcare provider: ________________

## 2021-06-26 DIAGNOSIS — Z79.4 TYPE 2 DIABETES MELLITUS WITH DIABETIC POLYNEUROPATHY, WITH LONG-TERM CURRENT USE OF INSULIN (HCC): ICD-10-CM

## 2021-06-26 DIAGNOSIS — E11.42 TYPE 2 DIABETES MELLITUS WITH DIABETIC POLYNEUROPATHY, WITH LONG-TERM CURRENT USE OF INSULIN (HCC): ICD-10-CM

## 2021-06-27 RX ORDER — GABAPENTIN 100 MG/1
CAPSULE ORAL
Qty: 90 CAPSULE | Refills: 2 | Status: SHIPPED | OUTPATIENT
Start: 2021-06-27

## 2021-06-28 DIAGNOSIS — I50.22 CHRONIC SYSTOLIC HEART FAILURE (HCC): ICD-10-CM

## 2021-06-29 RX ORDER — FUROSEMIDE 80 MG
80 TABLET ORAL 3 TIMES DAILY
Qty: 270 TABLET | Refills: 1 | Status: SHIPPED | OUTPATIENT
Start: 2021-06-29 | End: 2021-12-27

## 2021-07-01 ENCOUNTER — TELEPHONE (OUTPATIENT)
Dept: FAMILY MEDICINE CLINIC | Facility: CLINIC | Age: 61
End: 2021-07-01

## 2021-07-01 DIAGNOSIS — E11.42 TYPE 2 DIABETES MELLITUS WITH DIABETIC POLYNEUROPATHY, WITH LONG-TERM CURRENT USE OF INSULIN (HCC): Primary | ICD-10-CM

## 2021-07-01 DIAGNOSIS — Z79.4 TYPE 2 DIABETES MELLITUS WITH DIABETIC POLYNEUROPATHY, WITH LONG-TERM CURRENT USE OF INSULIN (HCC): Primary | ICD-10-CM

## 2021-07-01 NOTE — TELEPHONE ENCOUNTER
PATIENT'S  INSURANCE WILL NOT COVER THE INSULIN HE IS PRESCRIBED. IS THERE ANOTHER ONE HE CAN TRY.    PLEASE ADVISE  [phone]       [facility]/pharmacy #[id number] - [place], [place] - [address] - [phone] [facility] [phone] FX

## 2021-07-12 NOTE — TELEPHONE ENCOUNTER
PATIENT CALLED IN ABOUT THIS AND IS ASKING THAT IT BE CHANGED TO LANTIS. PLEASE REACH OUT AND LET HIM KNOW IF THIS IS A CHANGE THAT CAN BE MADE.

## 2021-07-13 NOTE — TELEPHONE ENCOUNTER
Please call patient and let him know that I switched his Basaglar to Lantus and sent a new prescription to [facility] pharmacy.

## 2021-08-17 DIAGNOSIS — M15.9 PRIMARY OSTEOARTHRITIS INVOLVING MULTIPLE JOINTS: ICD-10-CM

## 2021-08-17 RX ORDER — MELOXICAM 15 MG/1
15 TABLET ORAL DAILY PRN
Qty: 90 TABLET | Refills: 1 | Status: SHIPPED | OUTPATIENT
Start: 2021-08-17 | End: 2022-02-10

## 2021-09-29 ENCOUNTER — OFFICE VISIT (OUTPATIENT)
Dept: FAMILY MEDICINE CLINIC | Facility: CLINIC | Age: 61
End: 2021-09-29

## 2021-09-29 VITALS
OXYGEN SATURATION: 93 % | BODY MASS INDEX: 42.66 KG/M2 | HEART RATE: 90 BPM | WEIGHT: 315 LBS | SYSTOLIC BLOOD PRESSURE: 117 MMHG | DIASTOLIC BLOOD PRESSURE: 80 MMHG | TEMPERATURE: 98 F | HEIGHT: 72 IN

## 2021-09-29 DIAGNOSIS — I11.0 HYPERTENSIVE HEART DISEASE WITH CHRONIC COMBINED SYSTOLIC AND DIASTOLIC CONGESTIVE HEART FAILURE (HCC): Primary | ICD-10-CM

## 2021-09-29 DIAGNOSIS — I50.42 HYPERTENSIVE HEART DISEASE WITH CHRONIC COMBINED SYSTOLIC AND DIASTOLIC CONGESTIVE HEART FAILURE (HCC): Primary | ICD-10-CM

## 2021-09-29 DIAGNOSIS — E11.69 MIXED DIABETIC HYPERLIPIDEMIA ASSOCIATED WITH TYPE 2 DIABETES MELLITUS (HCC): ICD-10-CM

## 2021-09-29 DIAGNOSIS — E66.01 MORBID (SEVERE) OBESITY DUE TO EXCESS CALORIES (HCC): ICD-10-CM

## 2021-09-29 DIAGNOSIS — Z53.20 STATIN DECLINED: ICD-10-CM

## 2021-09-29 DIAGNOSIS — E11.42 TYPE 2 DIABETES MELLITUS WITH DIABETIC POLYNEUROPATHY, WITH LONG-TERM CURRENT USE OF INSULIN (HCC): ICD-10-CM

## 2021-09-29 DIAGNOSIS — Z12.11 ENCOUNTER FOR SCREENING FOR MALIGNANT NEOPLASM OF COLON: ICD-10-CM

## 2021-09-29 DIAGNOSIS — E78.2 MIXED DIABETIC HYPERLIPIDEMIA ASSOCIATED WITH TYPE 2 DIABETES MELLITUS (HCC): ICD-10-CM

## 2021-09-29 DIAGNOSIS — Z79.4 TYPE 2 DIABETES MELLITUS WITH DIABETIC POLYNEUROPATHY, WITH LONG-TERM CURRENT USE OF INSULIN (HCC): ICD-10-CM

## 2021-09-29 PROCEDURE — 99213 OFFICE O/P EST LOW 20 MIN: CPT | Performed by: FAMILY MEDICINE

## 2021-09-29 RX ORDER — FLASH GLUCOSE SENSOR
1 KIT MISCELLANEOUS
Qty: 2 EACH | Refills: 5 | Status: SHIPPED | OUTPATIENT
Start: 2021-09-29 | End: 2022-02-05 | Stop reason: SDUPTHER

## 2021-09-29 RX ORDER — FLASH GLUCOSE SCANNING READER
1 EACH MISCELLANEOUS
Qty: 1 EACH | Refills: 0 | Status: SHIPPED | OUTPATIENT
Start: 2021-09-29

## 2021-09-29 NOTE — PROGRESS NOTES
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  BP at goal, <140/90.  Continue Lasix 80 mg 3 times daily & KCl 40 mEq.  Declined BB & ACE-I/ARB.  Reviewed LDL goal.  Declines statin.         Statin declined    Mixed diabetic hyperlipidemia associated with type 2 diabetes mellitus (CMS/[facility])    Overview     Reviewed lipid panel, LDL goal, & 10-year ASCVD risk score.  Encouraged a diet rich in vegetables & 150 minutes of exercise weekly.  Declined statin.            Endocrine and Metabolic    Morbid (severe) obesity due to excess calories (CMS/[facility])    Overview     Discussed health risks associated with obesity.  Encouraged 150 minutes of exercise weekly.         Type 2 diabetes mellitus with diabetic polyneuropathy, with long-term current use of insulin (CMS/[facility])    Overview     HbA1c at goal, <7%.  Monitoring regularly.  Monitoring renal function.  Declined ACE inhibitor/ARB.  Continue Basaglar 60 units daily. Patient to consider GLP1 agonist.  Continue gabapentin PRN for pain.  Eye exam due. Report requested.         Relevant Medications    Continuous Blood Gluc  (FreeStyle Torie 14 Day [place]) device    Continuous Blood Gluc Sensor (FreeStyle Torie 14 Day Sensor) misc      Other Visit Diagnoses     Encounter for screening for malignant neoplasm of colon        Relevant Orders    Cologuard - Stool, Per Rectum        Return in about 3 months (around 12/29/2021) for Recheck BP & DM II.        Patient was given instructions and counseling regarding his condition or for health maintenance advice. Please see specific information pulled into the AVS if appropriate.        Sorin is a 60 y.o.  "being seen today for  Heart Problem (follow up on heart diesase and diabetes ) and Diabetes   Subjective   History of the Present Illness     Morbidly obese black male with a concurrent medical history of HFrEF and type 2 diabetes mellitus presents for follow-up.    Normotensive in office with diuretic therapy.  Declined beta-blocker and ARB. Declined statin.    Diabetes well controlled with insulin. Complaining of weight gain with insulin. Associated neuropathy was previously treated with gabapentin. Complains of bilateral pedal (toes) numbness. Discontinued to gabapentin due to non efficacy. Eye exam records requested. Interested in CGM. Fasting glucose was 126 mg/dL.    Social History  He  reports that he quit smoking about 21 years ago. He has a 40.00 pack-year smoking history. He has never used smokeless tobacco. He reports current alcohol use. He reports that he does not use drugs.  Objective   Vital Signs          Vitals:    09/29/21 0919   BP: 117/80   BP Location: Left arm   Patient Position: Sitting   Cuff Size: Large Adult   Pulse: 90   Temp: 98 °F (36.7 °C)   TempSrc: Infrared   SpO2: 93%   Weight: (!) 153 kg (338 lb)   Height: 182.9 cm (72\")     BP Readings from Last 1 Encounters:   09/29/21 117/80     Wt Readings from Last 3 Encounters:   09/29/21 (!) 153 kg (338 lb)   06/25/21 (!) 151 kg (333 lb)   12/16/20 (!) 152 kg (335 lb)   Body mass index is 45.84 kg/m².     Physical Exam  Vitals reviewed.   Constitutional:       General: He is not in acute distress.     Appearance: He is well-developed. He is morbidly obese. He is not diaphoretic.   HENT:      Head: Normocephalic and atraumatic.   Cardiovascular:      Rate and Rhythm: Normal rate and regular rhythm.      Heart sounds: Normal heart sounds.   Pulmonary:      Effort: Pulmonary effort is normal.      Breath sounds: Normal breath sounds. No wheezing.   Musculoskeletal:      Right lower leg: No edema.      Left lower leg: No edema.   Skin:     " General: Skin is warm and dry.   Neurological:      Mental Status: He is alert and oriented to person, place, and time.   Psychiatric:         Mood and Affect: Mood normal.         Behavior: Behavior normal.               PSA Screen (06/25/2021 10:15)  proBNP (06/25/2021 10:15)  Microalbumin / Creatinine Urine Ratio - Urine, Clean Catch (06/25/2021 10:15)  Lipid Panel (06/25/2021 10:15)  Hemoglobin A1c (06/25/2021 10:15)  Comprehensive Metabolic Panel (06/25/2021 10:15)  CBC Auto Differential (06/25/2021 10:15)    The 10-year ASCVD risk score (Ford BOWEN Jr., et al., 2013) is: 14.6%    Values used to calculate the score:      Age: 60 years      Sex: Male      Is Non- : Yes      Diabetic: Yes      Tobacco smoker: No      Systolic Blood Pressure: 117 mmHg      Is BP treated: No      HDL Cholesterol: 38 mg/dL      Total Cholesterol: 204 mg/dL

## 2021-09-29 NOTE — PATIENT INSTRUCTIONS
I would recommend the pneumococcal vaccine.     I can offer you a prescription for Trulicity or Ozempic in addition to your insulin for your diabetes. It can help you lose weight.     Diabetes Mellitus and Nutrition, Adult  When you have diabetes, or diabetes mellitus, it is very important to have healthy eating habits because your blood sugar (glucose) levels are greatly affected by what you eat and drink. Eating healthy foods in the right amounts, at about the same times every day, can help you:  · Control your blood glucose.  · Lower your risk of heart disease.  · Improve your blood pressure.  · Reach or maintain a healthy weight.  What can affect my meal plan?  Every person with diabetes is different, and each person has different needs for a meal plan. Your health care provider may recommend that you work with a dietitian to make a meal plan that is best for you. Your meal plan may vary depending on factors such as:  · The calories you need.  · The medicines you take.  · Your weight.  · Your blood glucose, blood pressure, and cholesterol levels.  · Your activity level.  · Other health conditions you have, such as heart or kidney disease.  How do carbohydrates affect me?  Carbohydrates, also called carbs, affect your blood glucose level more than any other type of food. Eating carbs naturally raises the amount of glucose in your blood. Carb counting is a method for keeping track of how many carbs you eat. Counting carbs is important to keep your blood glucose at a healthy level, especially if you use insulin or take certain oral diabetes medicines.  It is important to know how many carbs you can safely have in each meal. This is different for every person. Your dietitian can help you calculate how many carbs you should have at each meal and for each snack.  How does alcohol affect me?  Alcohol can cause a sudden decrease in blood glucose (hypoglycemia), especially if you use insulin or take certain oral diabetes  "medicines. Hypoglycemia can be a life-threatening condition. Symptoms of hypoglycemia, such as sleepiness, dizziness, and confusion, are similar to symptoms of having too much alcohol.  · Do not drink alcohol if:  ? Your health care provider tells you not to drink.  ? You are pregnant, may be pregnant, or are planning to become pregnant.  · If you drink alcohol:  ? Do not drink on an empty stomach.  ? Limit how much you use to:  § 0-1 drink a day for women.  § 0-2 drinks a day for men.  ? Be aware of how much alcohol is in your drink. In the [place] one drink equals one 12 oz bottle of beer (355 mL), one 5 oz glass of wine (148 mL), or one 1½ oz glass of hard liquor (44 mL).  ? Keep yourself hydrated with water, diet soda, or unsweetened iced tea.  § Keep in mind that regular soda, juice, and other mixers may contain a lot of sugar and must be counted as carbs.  What are tips for following this plan?    Reading food labels  · Start by checking the serving size on the \"Nutrition Facts\" label of packaged foods and drinks. The amount of calories, carbs, fats, and other nutrients listed on the label is based on one serving of the item. Many items contain more than one serving per package.  · Check the total grams (g) of carbs in one serving. You can calculate the number of servings of carbs in one serving by dividing the total carbs by 15. For example, if a food has 30 g of total carbs per serving, it would be equal to 2 servings of carbs.  · Check the number of grams (g) of saturated fats and trans fats in one serving. Choose foods that have a low amount or none of these fats.  · Check the number of milligrams (mg) of salt (sodium) in one serving. Most people should limit total sodium intake to less than 2,300 mg per day.  · Always check the nutrition information of foods labeled as \"low-fat\" or \"nonfat.\" These foods may be higher in added sugar or refined carbs and should be avoided.  · Talk to your dietitian to " identify your daily goals for nutrients listed on the label.  Shopping  · Avoid buying canned, pre-made, or processed foods. These foods tend to be high in fat, sodium, and added sugar.  · Shop around the outside edge of the grocery store. This is where you will most often find fresh fruits and vegetables, bulk grains, fresh meats, and fresh dairy.  Cooking  · Use low-heat cooking methods, such as baking, instead of high-heat cooking methods like deep frying.  · Cook using healthy oils, such as olive, canola, or sunflower oil.  · Avoid cooking with butter, cream, or high-fat meats.  Meal planning  · Eat meals and snacks regularly, preferably at the same times every day. Avoid going long periods of time without eating.  · Eat foods that are high in fiber, such as fresh fruits, vegetables, beans, and whole grains. Talk with your dietitian about how many servings of carbs you can eat at each meal.  · Eat 4-6 oz (112-168 g) of lean protein each day, such as lean meat, chicken, fish, eggs, or tofu. One ounce (oz) of lean protein is equal to:  ? 1 oz (28 g) of meat, chicken, or fish.  ? 1 egg.  ? ¼ cup (62 g) of tofu.  · Eat some foods each day that contain healthy fats, such as avocado, nuts, seeds, and fish.  What foods should I eat?  Fruits  Berries. Apples. Oranges. Peaches. Apricots. Plums. Grapes. Clay. Papaya. Pomegranate. Kiwi. Cherries.  Vegetables  Lettuce. Spinach. Leafy greens, including kale, chard, boy greens, and mustard greens. Beets. Cauliflower. Cabbage. Broccoli. Carrots. Green beans. Tomatoes. Peppers. Onions. Cucumbers. [place] sprouts.  Grains  Whole grains, such as whole-wheat or whole-grain bread, crackers, tortillas, cereal, and pasta. Unsweetened oatmeal. Quinoa. Brown or wild rice.  Meats and other proteins  Seafood. Poultry without skin. Lean cuts of poultry and beef. Tofu. Nuts. Seeds.  Dairy  Low-fat or fat-free dairy products such as milk, yogurt, and cheese.  The items listed above  may not be a complete list of foods and beverages you can eat. Contact a dietitian for more information.  What foods should I avoid?  Fruits  Fruits canned with syrup.  Vegetables  Canned vegetables. Frozen vegetables with butter or cream sauce.  Grains  Refined white flour and flour products such as bread, pasta, snack foods, and cereals. Avoid all processed foods.  Meats and other proteins  Fatty cuts of meat. Poultry with skin. Breaded or fried meats. Processed meat. Avoid saturated fats.  Dairy  Full-fat yogurt, cheese, or milk.  Beverages  Sweetened drinks, such as soda or iced tea.  The items listed above may not be a complete list of foods and beverages you should avoid. Contact a dietitian for more information.  Questions to ask a health care provider  · Do I need to meet with a diabetes educator?  · Do I need to meet with a dietitian?  · What number can I call if I have questions?  · When are the best times to check my blood glucose?  Where to find more information:  · American Diabetes Association: diabetes.org  · Academy of Nutrition and Dietetics: www.eatright.org  · National [place] of Diabetes and Digestive and Kidney Diseases: www.niddk.nih.gov  · Association of Diabetes Care and Education Specialists: www.diabeteseducator.org  Summary  · It is important to have healthy eating habits because your blood sugar (glucose) levels are greatly affected by what you eat and drink.  · A healthy meal plan will help you control your blood glucose and maintain a healthy lifestyle.  · Your health care provider may recommend that you work with a dietitian to make a meal plan that is best for you.  · Keep in mind that carbohydrates (carbs) and alcohol have immediate effects on your blood glucose levels. It is important to count carbs and to use alcohol carefully.  This information is not intended to replace advice given to you by your health care provider. Make sure you discuss any questions you have with your  "health care provider.  Document Revised: 11/24/2020 Document Reviewed: 11/24/2020  Elsevier Patient Education © 2021 [employer]      Diabetes Mellitus and Exercise  Exercising regularly is important for overall health, especially for people who have diabetes mellitus. Exercising is not only about losing weight. It has many other health benefits, such as increasing muscle strength and bone density and reducing body fat and stress. This leads to improved fitness, flexibility, and endurance, all of which result in better overall health.  What are the benefits of exercise if I have diabetes?  Exercise has many benefits for people with diabetes. They include:  · Helping to lower and control blood sugar (glucose).  · Helping the body to respond better to the hormone insulin by improving insulin sensitivity.  · Reducing how much insulin the body needs.  · Lowering the risk for heart disease by:  ? Lowering \"bad\" cholesterol and triglyceride levels.  ? Increasing \"good\" cholesterol levels.  ? Lowering blood pressure.  ? Lowering blood glucose levels.  What is my activity plan?  Your health care provider or certified diabetes educator can help you make a plan for the type and frequency of exercise that works for you. This is called your activity plan. Be sure to:  · Get at least 150 minutes of medium-intensity or high-intensity exercise each week. Exercises may include brisk walking, biking, or water aerobics.  · Do stretching and strengthening exercises, such as yoga or weight lifting, at least 2 times a week.  · Spread out your activity over at least 3 days of the week.  · Get some form of physical activity each day.  ? Do not go more than 2 days in a row without some kind of physical activity.  ? Avoid being inactive for more than 90 minutes at a time. Take frequent breaks to walk or stretch.  · Choose exercises or activities that you enjoy. Set realistic goals.  · Start slowly and gradually increase your exercise " intensity over time.  How do I manage my diabetes during exercise?    Monitor your blood glucose  · Check your blood glucose before and after exercising. If your blood glucose is:  ? 240 mg/dL (13.3 mmol/L) or higher before you exercise, check your urine for ketones. These are chemicals created by the liver. If you have ketones in your urine, do not exercise until your blood glucose returns to normal.  ? 100 mg/dL (5.6 mmol/L) or lower, eat a snack containing 15-20 grams of carbohydrate. Check your blood glucose 15 minutes after the snack to make sure that your glucose level is above 100 mg/dL (5.6 mmol/L) before you start your exercise.  · Know the symptoms of low blood glucose (hypoglycemia) and how to treat it. Your risk for hypoglycemia increases during and after exercise.  Follow these tips and your health care provider's instructions  · Keep a carbohydrate snack that is fast-acting for use before, during, and after exercise to help prevent or treat hypoglycemia.  · Avoid injecting insulin into areas of the body that are going to be exercised. For example, avoid injecting insulin into:  ? Your arms, when you are about to play tennis.  ? Your legs, when you are about to go jogging.  · Keep records of your exercise habits. Doing this can help you and your health care provider adjust your diabetes management plan as needed. Write down:  ? Food that you eat before and after you exercise.  ? Blood glucose levels before and after you exercise.  ? The type and amount of exercise you have done.  · Work with your health care provider when you start a new exercise or activity. He or she may need to:  ? Make sure that the activity is safe for you.  ? Adjust your insulin, other medicines, and food that you eat.  · Drink plenty of water while you exercise. This prevents loss of water (dehydration) and problems caused by a lot of heat in the body (heat stroke).  Where to find more information  · American Diabetes  Association: www.diabetes.org  Summary  · Exercising regularly is important for overall health, especially for people who have diabetes mellitus.  · Exercising has many health benefits. It increases muscle strength and bone density and reduces body fat and stress. It also lowers and controls blood glucose.  · Your health care provider or certified diabetes educator can help you make an activity plan for the type and frequency of exercise that works for you.  · Work with your health care provider to make sure any new activity is safe for you. Also work with your health care provider to adjust your insulin, other medicines, and the food you eat.  This information is not intended to replace advice given to you by your health care provider. Make sure you discuss any questions you have with your health care provider.  Document Revised: 09/14/2020 Document Reviewed: 09/14/2020  Elsevier Patient Education © 2021 [employer]

## 2021-11-14 ENCOUNTER — APPOINTMENT (OUTPATIENT)
Dept: GENERAL RADIOLOGY | Facility: HOSPITAL | Age: 61
End: 2021-11-14

## 2021-11-14 ENCOUNTER — HOSPITAL ENCOUNTER (EMERGENCY)
Facility: HOSPITAL | Age: 61
Discharge: LEFT BEFORE DISCHARGE | End: 2021-11-15
Attending: EMERGENCY MEDICINE | Admitting: EMERGENCY MEDICINE

## 2021-11-14 VITALS
OXYGEN SATURATION: 90 % | SYSTOLIC BLOOD PRESSURE: 121 MMHG | HEART RATE: 89 BPM | BODY MASS INDEX: 42.66 KG/M2 | WEIGHT: 315 LBS | HEIGHT: 72 IN | TEMPERATURE: 97.8 F | RESPIRATION RATE: 22 BRPM | DIASTOLIC BLOOD PRESSURE: 83 MMHG

## 2021-11-14 DIAGNOSIS — Z20.822 LAB TEST NEGATIVE FOR COVID-19 VIRUS: ICD-10-CM

## 2021-11-14 DIAGNOSIS — R06.00 DYSPNEA, UNSPECIFIED TYPE: ICD-10-CM

## 2021-11-14 DIAGNOSIS — R07.9 CHEST PAIN, UNSPECIFIED TYPE: Primary | ICD-10-CM

## 2021-11-14 LAB
APTT PPP: 28.9 SECONDS (ref 24–31)
BASOPHILS # BLD AUTO: 0.1 10*3/MM3 (ref 0–0.2)
BASOPHILS NFR BLD AUTO: 0.9 % (ref 0–1.5)
D DIMER PPP FEU-MCNC: 0.59 MG/L (FEU) (ref 0–0.59)
DEPRECATED RDW RBC AUTO: 46.4 FL (ref 37–54)
EOSINOPHIL # BLD AUTO: 0.4 10*3/MM3 (ref 0–0.4)
EOSINOPHIL NFR BLD AUTO: 3.2 % (ref 0.3–6.2)
ERYTHROCYTE [DISTWIDTH] IN BLOOD BY AUTOMATED COUNT: 14.8 % (ref 12.3–15.4)
HCT VFR BLD AUTO: 39.8 % (ref 37.5–51)
HGB BLD-MCNC: 13.4 G/DL (ref 13–17.7)
INR PPP: 1.03 (ref 0.93–1.1)
LYMPHOCYTES # BLD AUTO: 2.3 10*3/MM3 (ref 0.7–3.1)
LYMPHOCYTES NFR BLD AUTO: 20.7 % (ref 19.6–45.3)
MCH RBC QN AUTO: 30.4 PG (ref 26.6–33)
MCHC RBC AUTO-ENTMCNC: 33.7 G/DL (ref 31.5–35.7)
MCV RBC AUTO: 90.4 FL (ref 79–97)
MONOCYTES # BLD AUTO: 1 10*3/MM3 (ref 0.1–0.9)
MONOCYTES NFR BLD AUTO: 9.3 % (ref 5–12)
NEUTROPHILS NFR BLD AUTO: 65.9 % (ref 42.7–76)
NEUTROPHILS NFR BLD AUTO: 7.3 10*3/MM3 (ref 1.7–7)
NRBC BLD AUTO-RTO: 0 /100 WBC (ref 0–0.2)
NT-PROBNP SERPL-MCNC: 1342 PG/ML (ref 0–900)
PLATELET # BLD AUTO: 217 10*3/MM3 (ref 140–450)
PMV BLD AUTO: 8.3 FL (ref 6–12)
PROTHROMBIN TIME: 11.4 SECONDS (ref 9.6–11.7)
QT INTERVAL: 382 MS
RBC # BLD AUTO: 4.4 10*6/MM3 (ref 4.14–5.8)
SARS-COV-2 RNA PNL SPEC NAA+PROBE: NOT DETECTED
WBC # BLD AUTO: 11.1 10*3/MM3 (ref 3.4–10.8)

## 2021-11-14 PROCEDURE — 85610 PROTHROMBIN TIME: CPT | Performed by: EMERGENCY MEDICINE

## 2021-11-14 PROCEDURE — 93005 ELECTROCARDIOGRAM TRACING: CPT | Performed by: EMERGENCY MEDICINE

## 2021-11-14 PROCEDURE — 83880 ASSAY OF NATRIURETIC PEPTIDE: CPT | Performed by: EMERGENCY MEDICINE

## 2021-11-14 PROCEDURE — 99283 EMERGENCY DEPT VISIT LOW MDM: CPT

## 2021-11-14 PROCEDURE — 85025 COMPLETE CBC W/AUTO DIFF WBC: CPT | Performed by: EMERGENCY MEDICINE

## 2021-11-14 PROCEDURE — 85379 FIBRIN DEGRADATION QUANT: CPT | Performed by: EMERGENCY MEDICINE

## 2021-11-14 PROCEDURE — 93005 ELECTROCARDIOGRAM TRACING: CPT

## 2021-11-14 PROCEDURE — 80053 COMPREHEN METABOLIC PANEL: CPT | Performed by: EMERGENCY MEDICINE

## 2021-11-14 PROCEDURE — 84484 ASSAY OF TROPONIN QUANT: CPT | Performed by: EMERGENCY MEDICINE

## 2021-11-14 PROCEDURE — 87635 SARS-COV-2 COVID-19 AMP PRB: CPT | Performed by: EMERGENCY MEDICINE

## 2021-11-14 PROCEDURE — 85730 THROMBOPLASTIN TIME PARTIAL: CPT | Performed by: EMERGENCY MEDICINE

## 2021-11-14 PROCEDURE — 71045 X-RAY EXAM CHEST 1 VIEW: CPT

## 2021-11-14 RX ORDER — SODIUM CHLORIDE 0.9 % (FLUSH) 0.9 %
10 SYRINGE (ML) INJECTION AS NEEDED
Status: DISCONTINUED | OUTPATIENT
Start: 2021-11-14 | End: 2021-11-15 | Stop reason: HOSPADM

## 2021-11-15 PROBLEM — R07.9 CHEST PAIN: Status: ACTIVE | Noted: 2021-11-15

## 2021-11-15 LAB
ALBUMIN SERPL-MCNC: 4 G/DL (ref 3.5–5.2)
ALBUMIN/GLOB SERPL: 1.1 G/DL
ALP SERPL-CCNC: 88 U/L (ref 39–117)
ALT SERPL W P-5'-P-CCNC: 22 U/L (ref 1–41)
ANION GAP SERPL CALCULATED.3IONS-SCNC: 11 MMOL/L (ref 5–15)
AST SERPL-CCNC: 25 U/L (ref 1–40)
BILIRUB SERPL-MCNC: 0.4 MG/DL (ref 0–1.2)
BUN SERPL-MCNC: 16 MG/DL (ref 8–23)
BUN/CREAT SERPL: 18.2 (ref 7–25)
CALCIUM SPEC-SCNC: 9 MG/DL (ref 8.6–10.5)
CHLORIDE SERPL-SCNC: 98 MMOL/L (ref 98–107)
CO2 SERPL-SCNC: 31 MMOL/L (ref 22–29)
CREAT SERPL-MCNC: 0.88 MG/DL (ref 0.76–1.27)
GFR SERPL CREATININE-BSD FRML MDRD: 106 ML/MIN/1.73
GLOBULIN UR ELPH-MCNC: 3.8 GM/DL
GLUCOSE SERPL-MCNC: 158 MG/DL (ref 65–99)
HOLD SPECIMEN: NORMAL
POTASSIUM SERPL-SCNC: 4.6 MMOL/L (ref 3.5–5.2)
PROT SERPL-MCNC: 7.8 G/DL (ref 6–8.5)
SODIUM SERPL-SCNC: 140 MMOL/L (ref 136–145)
TROPONIN T SERPL-MCNC: 0.01 NG/ML (ref 0–0.03)

## 2021-11-15 PROCEDURE — 25010000002 FUROSEMIDE PER 20 MG: Performed by: PHYSICIAN ASSISTANT

## 2021-11-15 PROCEDURE — 96374 THER/PROPH/DIAG INJ IV PUSH: CPT

## 2021-11-15 PROCEDURE — G0378 HOSPITAL OBSERVATION PER HR: HCPCS

## 2021-11-15 RX ORDER — ACETAMINOPHEN 325 MG/1
650 TABLET ORAL EVERY 4 HOURS PRN
Status: DISCONTINUED | OUTPATIENT
Start: 2021-11-15 | End: 2021-11-15

## 2021-11-15 RX ORDER — DEXTROSE MONOHYDRATE 25 G/50ML
25 INJECTION, SOLUTION INTRAVENOUS
Status: DISCONTINUED | OUTPATIENT
Start: 2021-11-15 | End: 2021-11-15

## 2021-11-15 RX ORDER — INSULIN LISPRO 100 [IU]/ML
0-14 INJECTION, SOLUTION INTRAVENOUS; SUBCUTANEOUS
Status: DISCONTINUED | OUTPATIENT
Start: 2021-11-15 | End: 2021-11-15

## 2021-11-15 RX ORDER — FUROSEMIDE 10 MG/ML
40 INJECTION INTRAMUSCULAR; INTRAVENOUS ONCE
Status: COMPLETED | OUTPATIENT
Start: 2021-11-15 | End: 2021-11-15

## 2021-11-15 RX ORDER — NITROGLYCERIN 0.4 MG/1
0.4 TABLET SUBLINGUAL
Status: DISCONTINUED | OUTPATIENT
Start: 2021-11-15 | End: 2021-11-15

## 2021-11-15 RX ORDER — OLANZAPINE 10 MG/2ML
1 INJECTION, POWDER, LYOPHILIZED, FOR SOLUTION INTRAMUSCULAR AS NEEDED
Status: DISCONTINUED | OUTPATIENT
Start: 2021-11-15 | End: 2021-11-15

## 2021-11-15 RX ORDER — ACETAMINOPHEN 160 MG/5ML
650 SOLUTION ORAL EVERY 4 HOURS PRN
Status: DISCONTINUED | OUTPATIENT
Start: 2021-11-15 | End: 2021-11-15

## 2021-11-15 RX ORDER — ACETAMINOPHEN 650 MG/1
650 SUPPOSITORY RECTAL EVERY 4 HOURS PRN
Status: DISCONTINUED | OUTPATIENT
Start: 2021-11-15 | End: 2021-11-15

## 2021-11-15 RX ORDER — ONDANSETRON 4 MG/1
4 TABLET, FILM COATED ORAL EVERY 6 HOURS PRN
Status: DISCONTINUED | OUTPATIENT
Start: 2021-11-15 | End: 2021-11-15

## 2021-11-15 RX ORDER — CHOLECALCIFEROL (VITAMIN D3) 125 MCG
5 CAPSULE ORAL NIGHTLY PRN
Status: DISCONTINUED | OUTPATIENT
Start: 2021-11-15 | End: 2021-11-15

## 2021-11-15 RX ORDER — BROMPHENIRAMINE MALEATE, PSEUDOEPHEDRINE HYDROCHLORIDE, AND DEXTROMETHORPHAN HYDROBROMIDE 2; 30; 10 MG/5ML; MG/5ML; MG/5ML
5 SYRUP ORAL 4 TIMES DAILY PRN
Qty: 473 ML | Refills: 0 | Status: SHIPPED | OUTPATIENT
Start: 2021-11-15 | End: 2022-01-04

## 2021-11-15 RX ORDER — NICOTINE POLACRILEX 4 MG
15 LOZENGE BUCCAL
Status: DISCONTINUED | OUTPATIENT
Start: 2021-11-15 | End: 2021-11-15

## 2021-11-15 RX ORDER — ONDANSETRON 2 MG/ML
4 INJECTION INTRAMUSCULAR; INTRAVENOUS EVERY 6 HOURS PRN
Status: DISCONTINUED | OUTPATIENT
Start: 2021-11-15 | End: 2021-11-15

## 2021-11-15 RX ORDER — INSULIN LISPRO 100 [IU]/ML
0-14 INJECTION, SOLUTION INTRAVENOUS; SUBCUTANEOUS AS NEEDED
Status: DISCONTINUED | OUTPATIENT
Start: 2021-11-15 | End: 2021-11-15

## 2021-11-15 RX ADMIN — FUROSEMIDE 40 MG: 10 INJECTION INTRAMUSCULAR; INTRAVENOUS at 01:03

## 2021-11-15 NOTE — ED NOTES
PT reports SOA x3 days. Pt also reports left sided chest pain.          Macrina Pacheco RN  11/14/21 2009

## 2021-11-15 NOTE — ED PROVIDER NOTES
Subjective   Chief complaint: Patient is a pleasant 61-year-old.  He came in because of shortness of breath.  He has significant exertional shortness of breath.  Any activity gets increasing shortness of breath.  He had a history of cardiomyopathy and is on Lasix chronically.  He has been taking his medication.  His dyspnea has been progressive over the last 3 days.  He has had some intermittent left-sided chest pain as well.  Very mild he states.    Context: Exertion    Duration: 3 days    Timing: As above    Severity: Very mild pain.  Dyspnea is moderately severe    Associated Symptoms: Negative except as noted above.  Appropriate PPE was used.        PCP:            Review of Systems   Constitutional: Negative for fever.   Respiratory: Positive for cough, chest tightness and shortness of breath.    Cardiovascular: Positive for chest pain and leg swelling.   All other systems reviewed and are negative.      Past Medical History:   Diagnosis Date   • Arthritis    • Cardiac disease    • Carpal tunnel syndrome    • CPAP (continuous positive airway pressure) dependence    • Diabetes mellitus (CMS/HCC)    • Diverticulitis of colon    • Erectile dysfunction    • Heart disease    • Hyperlipidemia    • Hypertension    • Neuromuscular disorder (CMS/HCC)    • ESAU (obstructive sleep apnea)    • Osteoarthritis        No Known Allergies    Past Surgical History:   Procedure Laterality Date   • HERNIA REPAIR         Family History   Problem Relation Age of Onset   • Leukemia Mother    • Cancer Sister    • Cancer Maternal Grandmother        Social History     Socioeconomic History   • Marital status:    Tobacco Use   • Smoking status: Former Smoker     Packs/day: 2.00     Years: 20.00     Pack years: 40.00     Quit date: 3/6/2000     Years since quittin.7   • Smokeless tobacco: Never Used   Substance and Sexual Activity   • Alcohol use: Yes     Comment: rare   • Drug use: Never   • Sexual activity: Defer  "          Objective   Physical Exam  Vitals and nursing note reviewed.   Constitutional:       Appearance: He is well-developed. He is obese.   HENT:      Head: Normocephalic and atraumatic.   Eyes:      Extraocular Movements: Extraocular movements intact.   Cardiovascular:      Rate and Rhythm: Normal rate and regular rhythm.   Pulmonary:      Breath sounds: Decreased breath sounds and rales present.   Abdominal:      Palpations: Abdomen is soft.      Tenderness: There is no abdominal tenderness.   Musculoskeletal:      Cervical back: Neck supple.      Right lower leg: Edema present.      Left lower leg: Edema present.   Skin:     General: Skin is warm and dry.   Neurological:      General: No focal deficit present.      Mental Status: He is alert and oriented to person, place, and time.   Psychiatric:         Mood and Affect: Mood normal.         Behavior: Behavior normal.         Procedures           ED Course  ED Course as of 11/15/21 0114   Mon Nov 15, 2021   0047 Patient reevaluated, reports that he would like something for his cough.  He denies chest pain, reports that his chest feels tight when he coughs only.  Offered admission, patient refused, states that he does not want to stay in the hospital, states that he \"just wants something for his cough and to go home\". [MM]      ED Course User Index  [MM] Geno Anthony PA    /83   Pulse 89   Temp 97.8 °F (36.6 °C) (Oral)   Resp 22   Ht 182.9 cm (72\")   Wt (!) 152 kg (334 lb)   SpO2 90%   BMI 45.30 kg/m²   Labs Reviewed   COMPREHENSIVE METABOLIC PANEL - Abnormal; Notable for the following components:       Result Value    Glucose 158 (*)     CO2 31.0 (*)     All other components within normal limits    Narrative:     GFR Normal >60  Chronic Kidney Disease <60  Kidney Failure <15     BNP (IN-HOUSE) - Abnormal; Notable for the following components:    proBNP 1,342.0 (*)     All other components within normal limits    Narrative:     Among patients " with dyspnea, NT-proBNP is highly sensitive for the detection of acute congestive heart failure. In addition NT-proBNP of <300 pg/ml effectively rules out acute congestive heart failure with 99% negative predictive value.    Results may be falsely decreased if patient taking Biotin.     CBC WITH AUTO DIFFERENTIAL - Abnormal; Notable for the following components:    WBC 11.10 (*)     Neutrophils, Absolute 7.30 (*)     Monocytes, Absolute 1.00 (*)     All other components within normal limits   COVID-19,CEPHEID/FELIPE/BDMAX,COR/BENNY/PAD/JOSEPH IN-HOUSE,NP SWAB IN TRANSPORT MEDIA 3-4 HR TAT, RT-PCR - Normal    Narrative:     Fact sheet for providers: https://www.fda.gov/media/403421/download     Fact sheet for patients: https://www.fda.gov/media/398732/download  Fact sheet for providers: https://www.fda.gov/media/320292/download    Fact sheet for patients: https://www.[employer].gov/media/769817/download    Test performed by PCR.   APTT - Normal   PROTIME-INR - Normal   TROPONIN (IN-HOUSE) - Normal    Narrative:     Troponin T Reference Range:  <= 0.03 ng/mL-   Negative for AMI  >0.03 ng/mL-     Abnormal for myocardial necrosis.  Clinicians would have to utilize clinical acumen, EKG, Troponin and serial changes to determine if it is an Acute Myocardial Infarction or myocardial injury due to an underlying chronic condition.       Results may be falsely decreased if patient taking Biotin.     D-DIMER, QUANTITATIVE - Normal    Narrative:     Reference Range  --------------------------------------------------------------------     < 0.50   Negative Predictive Value  0.50-0.59   Indeterminate    >= 0.60   Probable VTE             A very low percentage of patients with DVT may yield D-Dimer results   below the cut-off of 0.50 mg/L FEU.  This is known to be more   prevalent in patients with distal DVT.             Results of this test should always be interpreted in conjunction with   the patient's medical history, clinical presentation  and other   findings.  Clinical diagnosis should not be based on the result of   INNOVANCE D-Dimer alone.   HEMOGLOBIN A1C   BASIC METABOLIC PANEL   TROPONIN (IN-HOUSE)   MANUAL DIFFERENTIAL   CBC WITH AUTO DIFFERENTIAL   POCT GLUCOSE FINGERSTICK   POCT GLUCOSE FINGERSTICK   POCT GLUCOSE FINGERSTICK   POCT GLUCOSE FINGERSTICK   CBC AND DIFFERENTIAL    Narrative:     The following orders were created for panel order CBC & Differential.  Procedure                               Abnormality         Status                     ---------                               -----------         ------                     CBC Auto Differential[[id number]]        Abnormal            Final result                 Please view results for these tests on the individual orders.   EXTRA TUBES    Narrative:     The following orders were created for panel order Extra Tubes.  Procedure                               Abnormality         Status                     ---------                               -----------         ------                     Gold Top - SST[[id number]]                                   Final result                 Please view results for these tests on the individual orders.   GOLD TOP - SST   CBC AND DIFFERENTIAL    Narrative:     The following orders were created for panel order CBC & Differential.  Procedure                               Abnormality         Status                     ---------                               -----------         ------                     Manual Differential[[id number]]                                                         CBC Auto Differential[[id number]]                                                         Please view results for these tests on the individual orders.     Medications   sodium chloride 0.9 % flush 10 mL (has no administration in time range)   ondansetron (ZOFRAN) tablet 4 mg (has no administration in time range)     Or   ondansetron (ZOFRAN) injection 4 mg (has no  administration in time range)   dextrose (GLUTOSE) oral gel 15 g (has no administration in time range)   dextrose (D50W) (25 g/50 mL) IV injection 25 g (has no administration in time range)   glucagon (human recombinant) (GLUCAGEN DIAGNOSTIC) 1 mg in sterile water (preservative free) 1 mL injection (has no administration in time range)   enoxaparin (LOVENOX) syringe 40 mg (has no administration in time range)   nitroglycerin (NITROSTAT) SL tablet 0.4 mg (has no administration in time range)   acetaminophen (TYLENOL) tablet 650 mg (has no administration in time range)     Or   acetaminophen (TYLENOL) 160 MG/5ML solution 650 mg (has no administration in time range)     Or   acetaminophen (TYLENOL) suppository 650 mg (has no administration in time range)   insulin lispro (ADMELOG) injection 0-14 Units (has no administration in time range)     And   insulin lispro (ADMELOG) injection 0-14 Units (has no administration in time range)   melatonin tablet 5 mg (has no administration in time range)   furosemide (LASIX) injection 40 mg (40 mg Intravenous Given 11/15/21 0103)     No radiology results for the last day                    HEART Score: 2                      MDM  Number of Diagnoses or Management Options  Chest pain, unspecified type  Dyspnea, unspecified type  Diagnosis management comments: MEDICAL DECISION  Epic Chart Review: No recent admissions  Echocardiogram in May 2020 revealed LVEF 40% & grade 1a diastolic dysfunction, dilated ventricles, and aortic & mitral valve calcifications.  Comorbidities: Diabetes, CHF, hypertension, CAD  Differentials: CHF exacerbation, pneumonia, Covid; this list is not all inclusive and does not constitute the entirety of considered causes  Radiology interpretation:  Images reviewed by me and interpreted by radiologist,   Reviewed by myself interpreted by Dr. Zamora, mild pulmonary vascular congestion.  Lab interpretation:  Labs viewed by me significant for, as above  EKG  "interpretation: Reviewed myself interpreted by Dr. Harris, sinus rhythm rate of 95 with probable left atrial enlargement, no acute ST changes.    Patient initial physical exam, HPI, orders were placed by Dr. Harris, I, NICK Higuera assumed care pending lab work and imaging results.  Patient afebrile, nontoxic appearance and in no acute distress while in the ER.  CMP glucose 158, CO2 31.0, troponin essentially normal 0.011, BNP elevated 1300.  CBC unremarkable.  Lab work otherwise unremarkable.  Covid not detected.  Chest x-ray shows mild to moderate pulmonary vascular congestion.  Patient is given Lasix 40 mg.  Patient offered admission, patient initially agreeable with him refused.  Patient states he does not want to be admitted, states \"I just want something for my cough given his history, multiple comorbidities including congestive heart failure, diabetes, hypertension is critical for patient to be observed worked up for acute CHF exacerbation and chest pain rule out.  Patient again states he does not want to stay.  Patient does admit to chest pain while in the ER, per epic chart review patient has refused to take statins or ACEs or ARBs per his PCP.    Patient has decided to leave our facility AGAINST MEDICAL ADVICE. I have assessed the patient´s ability to make an informed decision and it is my opinion at this time that the patient has the medical decision-making capacity to comprehend information regarding current medical condition and appreciates the impact of the disease or condition and the consequences of various options for treatment, including foregoing treatment. The patient possesses the ability to evaluate all treatment options, compare the risks and benefits of each option, communicate choice in a consistent manner over time, and is able to make rational choices. I have explained to the patient the further testing, treatment, and evaluation I would like to perform during the current " emergency department visit as well as any possible alternatives that could be accomplished in a timely manner. I have outlined the possible risks of forgoing any all of these interventions and the patient understands and acknowledges that the decision to leave may result in undesirable consequences such as death, permanent disability, and/or loss of current lifestyle. Even though leaving AMA is not ideal, I have instructed the patient to follow any discharge instructions given, take any medications prescribed, and resume care as soon as possible with any other provider. Additionally, we clearly stated that the patient is welcome to return it anytime to continue care at our facility.         Amount and/or Complexity of Data Reviewed  Clinical lab tests: reviewed and ordered  Tests in the radiology section of CPT®: ordered and reviewed  Tests in the medicine section of CPT®: reviewed    Patient Progress  Patient progress: stable      Final diagnoses:   Chest pain, unspecified type   Dyspnea, unspecified type   Lab test negative for COVID-19 virus       ED Disposition  ED Disposition     ED Disposition Condition Comment    AMA  Level of Care: Telemetry [5]  Admitting Physician: WEN YAP [[id number]]  Attending Physician: WEN YAP [[id number]]            Natalie Steward MD  [id number]  [address]  [place] [address] [phone]    Schedule an appointment as soon as possible for a visit in 2 days  As needed, If symptoms worsen         Medication List      New Prescriptions    brompheniramine-pseudoephedrine-DM 30-2-10 MG/5ML syrup  Take 5 mL by mouth 4 (Four) Times a Day As Needed for Congestion or Cough.           Where to Get Your Medications      These medications were sent to [facility]/pharmacy #[id number] - [place], [place] - [address] [facility] [phone] [place] [phone]   [address] [facility] [place] [address]    Hours: 24-hours Phone: [phone]   · brompheniramine-pseudoephedrine-DM 30-2-10 MG/5ML  cherry Anthony, NICK Rivera  11/15/21 0114

## 2021-11-15 NOTE — DISCHARGE INSTRUCTIONS
Take Bromfed as needed for cough and congestion.    Take Tylenol as needed for headache or fever    Continue take your Lasix and other medications at home per PCP    Follow with your PCP this week for recheck    Return to the ER for new or worsening symptoms

## 2021-11-18 DIAGNOSIS — E11.42 TYPE 2 DIABETES MELLITUS WITH DIABETIC POLYNEUROPATHY, WITH LONG-TERM CURRENT USE OF INSULIN (HCC): Primary | ICD-10-CM

## 2021-11-18 DIAGNOSIS — Z79.4 TYPE 2 DIABETES MELLITUS WITH DIABETIC POLYNEUROPATHY, WITH LONG-TERM CURRENT USE OF INSULIN (HCC): Primary | ICD-10-CM

## 2021-12-03 DIAGNOSIS — I50.22 CHRONIC SYSTOLIC HEART FAILURE (HCC): ICD-10-CM

## 2021-12-03 RX ORDER — POTASSIUM CHLORIDE 1500 MG/1
40 TABLET, FILM COATED, EXTENDED RELEASE ORAL DAILY
Qty: 180 TABLET | Refills: 1 | Status: SHIPPED | OUTPATIENT
Start: 2021-12-03 | End: 2022-06-04

## 2021-12-23 DIAGNOSIS — I50.22 CHRONIC SYSTOLIC HEART FAILURE (HCC): ICD-10-CM

## 2021-12-27 RX ORDER — FUROSEMIDE 80 MG
80 TABLET ORAL 3 TIMES DAILY
Qty: 270 TABLET | Refills: 1 | Status: SHIPPED | OUTPATIENT
Start: 2021-12-27 | End: 2022-03-16 | Stop reason: SDUPTHER

## 2022-01-04 ENCOUNTER — LAB (OUTPATIENT)
Dept: FAMILY MEDICINE CLINIC | Facility: CLINIC | Age: 62
End: 2022-01-04

## 2022-01-04 ENCOUNTER — OFFICE VISIT (OUTPATIENT)
Dept: FAMILY MEDICINE CLINIC | Facility: CLINIC | Age: 62
End: 2022-01-04

## 2022-01-04 VITALS
OXYGEN SATURATION: 97 % | SYSTOLIC BLOOD PRESSURE: 151 MMHG | BODY MASS INDEX: 42.66 KG/M2 | HEART RATE: 98 BPM | HEIGHT: 72 IN | WEIGHT: 315 LBS | DIASTOLIC BLOOD PRESSURE: 78 MMHG

## 2022-01-04 DIAGNOSIS — E66.01 MORBID (SEVERE) OBESITY DUE TO EXCESS CALORIES: ICD-10-CM

## 2022-01-04 DIAGNOSIS — Z79.4 TYPE 2 DIABETES MELLITUS WITH DIABETIC POLYNEUROPATHY, WITH LONG-TERM CURRENT USE OF INSULIN: Primary | ICD-10-CM

## 2022-01-04 DIAGNOSIS — Z53.20 MEDICATION THERAPY MANAGEMENT RECOMMENDATION REFUSED BY PATIENT: ICD-10-CM

## 2022-01-04 DIAGNOSIS — I42.0 DILATED CARDIOMYOPATHY: ICD-10-CM

## 2022-01-04 DIAGNOSIS — Z23 ENCOUNTER FOR IMMUNIZATION: ICD-10-CM

## 2022-01-04 DIAGNOSIS — I11.0 HYPERTENSIVE HEART DISEASE WITH CHRONIC COMBINED SYSTOLIC AND DIASTOLIC CONGESTIVE HEART FAILURE: ICD-10-CM

## 2022-01-04 DIAGNOSIS — I50.42 HYPERTENSIVE HEART DISEASE WITH CHRONIC COMBINED SYSTOLIC AND DIASTOLIC CONGESTIVE HEART FAILURE: ICD-10-CM

## 2022-01-04 DIAGNOSIS — E11.42 TYPE 2 DIABETES MELLITUS WITH DIABETIC POLYNEUROPATHY, WITH LONG-TERM CURRENT USE OF INSULIN: Primary | ICD-10-CM

## 2022-01-04 PROBLEM — G47.33 OSA ON CPAP: Status: ACTIVE | Noted: 2022-01-04

## 2022-01-04 PROBLEM — Z99.89 OSA ON CPAP: Status: ACTIVE | Noted: 2022-01-04

## 2022-01-04 LAB
ALBUMIN SERPL-MCNC: 5.1 G/DL (ref 3.5–5.2)
ALBUMIN/GLOB SERPL: 1.8 G/DL
ALP SERPL-CCNC: 71 U/L (ref 39–117)
ALT SERPL W P-5'-P-CCNC: 17 U/L (ref 1–41)
ANION GAP SERPL CALCULATED.3IONS-SCNC: 8.6 MMOL/L (ref 5–15)
AST SERPL-CCNC: 15 U/L (ref 1–40)
BILIRUB SERPL-MCNC: 0.5 MG/DL (ref 0–1.2)
BUN SERPL-MCNC: 13 MG/DL (ref 8–23)
BUN/CREAT SERPL: 13.5 (ref 7–25)
CALCIUM SPEC-SCNC: 9.5 MG/DL (ref 8.6–10.5)
CHLORIDE SERPL-SCNC: 100 MMOL/L (ref 98–107)
CHOLEST SERPL-MCNC: 187 MG/DL (ref 0–200)
CO2 SERPL-SCNC: 33.4 MMOL/L (ref 22–29)
CREAT SERPL-MCNC: 0.96 MG/DL (ref 0.76–1.27)
GFR SERPL CREATININE-BSD FRML MDRD: 96 ML/MIN/1.73
GLOBULIN UR ELPH-MCNC: 2.8 GM/DL
GLUCOSE SERPL-MCNC: 110 MG/DL (ref 65–99)
HBA1C MFR BLD: 6.7 % (ref 3.5–5.6)
HDLC SERPL-MCNC: 35 MG/DL (ref 40–60)
LDLC SERPL CALC-MCNC: 135 MG/DL (ref 0–100)
LDLC/HDLC SERPL: 3.82 {RATIO}
NT-PROBNP SERPL-MCNC: 1548 PG/ML (ref 0–900)
POTASSIUM SERPL-SCNC: 4 MMOL/L (ref 3.5–5.2)
PROT SERPL-MCNC: 7.9 G/DL (ref 6–8.5)
SODIUM SERPL-SCNC: 142 MMOL/L (ref 136–145)
TRIGL SERPL-MCNC: 92 MG/DL (ref 0–150)
VLDLC SERPL-MCNC: 17 MG/DL (ref 5–40)

## 2022-01-04 PROCEDURE — 83036 HEMOGLOBIN GLYCOSYLATED A1C: CPT | Performed by: FAMILY MEDICINE

## 2022-01-04 PROCEDURE — 90732 PPSV23 VACC 2 YRS+ SUBQ/IM: CPT | Performed by: FAMILY MEDICINE

## 2022-01-04 PROCEDURE — G0009 ADMIN PNEUMOCOCCAL VACCINE: HCPCS | Performed by: FAMILY MEDICINE

## 2022-01-04 PROCEDURE — 80053 COMPREHEN METABOLIC PANEL: CPT | Performed by: FAMILY MEDICINE

## 2022-01-04 PROCEDURE — 36415 COLL VENOUS BLD VENIPUNCTURE: CPT | Performed by: FAMILY MEDICINE

## 2022-01-04 PROCEDURE — 80061 LIPID PANEL: CPT | Performed by: FAMILY MEDICINE

## 2022-01-04 PROCEDURE — 83880 ASSAY OF NATRIURETIC PEPTIDE: CPT | Performed by: FAMILY MEDICINE

## 2022-01-04 PROCEDURE — 99214 OFFICE O/P EST MOD 30 MIN: CPT | Performed by: FAMILY MEDICINE

## 2022-01-04 NOTE — PROGRESS NOTES
Assessment and Plan     Problem List Items Addressed This Visit        Advance Directives and General Issues    Medication therapy management recommendation refused by patient    Overview     Declined statin, BB, & ACE-I/ARB.            Cardiac and Vasculature    Hypertensive heart disease with chronic combined systolic and diastolic congestive heart failure (HCC)    Overview     Echocardiogram in May 2020 revealed LVEF 40% & grade 1a diastolic dysfunction, dilated ventricles, and aortic & mitral valve calcifications.  BP not at goal, <140/90.  Continue Lasix 80 mg QID & KCl 40 mEq.  Declined BB & ACE-I/ARB.  Reviewed LDL goal.  Declines statin.  Will obtain echocardiogram.  Consider referral to cardiology for AICD.         Relevant Orders    Comprehensive metabolic panel    Lipid panel    proBNP    Adult Transthoracic Echo Complete W/ Cont if Necessary Per Protocol    Dilated cardiomyopathy (HCC)    Relevant Orders    proBNP    Adult Transthoracic Echo Complete W/ Cont if Necessary Per Protocol       Endocrine and Metabolic    Morbid (severe) obesity due to excess calories (HCC)    Overview     Discussed health risks associated with obesity.  Encouraged 150 minutes of exercise weekly.         Type 2 diabetes mellitus with diabetic polyneuropathy, with long-term current use of insulin (HCC) - Primary    Overview     HbA1c at goal, <7%.  Monitoring regularly.  Monitoring renal function.  Declined ACE inhibitor/ARB.  Continue Basaglar 60 units daily. Patient to consider GLP1 agonist.  Continue gabapentin PRN for pain.  Eye exam due. Report requested.         Relevant Orders    Comprehensive metabolic panel    Hemoglobin A1c      Other Visit Diagnoses     Encounter for immunization        Relevant Orders    Pneumococcal Polysaccharide Vaccine 23-Valent Greater Than or Equal To 3yo Subcutaneous / IM        Return in about 3 months (around 4/4/2022) for Medicare Wellness.        Patient was given instructions and  "counseling regarding his condition or for health maintenance advice. Please see specific information pulled into the AVS if appropriate.        Sorin is a 61 y.o. being seen today for  Diabetes (3 month f/u)   Subjective   History of the Present Illness     Morbidly obese black male with CMHx of HFrEF and DM II presents with complaints of midsternal chest pressure and orthopnea for the last 3 weeks. Increased Lasix to QID dosing 2 weeks ago. Dyspnea improves in tripod position. Reduced water intake to 2 L daily.     Report glucose  mg/dL. FreeStyle Torie glucose monitor has a tendency of falling off.  Reports elevated glucose with Metamucil. Unwilling to try alternative laxative. Reports the need for 3 bowel movements per day.     Social History  He  reports that he quit smoking about 21 years ago. He has a 40.00 pack-year smoking history. He has never used smokeless tobacco. He reports current alcohol use. He reports that he does not use drugs.  Objective   Vital Signs          Vitals:    01/04/22 0753   BP: 151/78   BP Location: Left arm   Patient Position: Sitting   Cuff Size: Large Adult   Pulse: 98   SpO2: 97%   Weight: (!) 153 kg (337 lb)   Height: 182.9 cm (72\")     BP Readings from Last 1 Encounters:   01/04/22 151/78     Wt Readings from Last 3 Encounters:   01/04/22 (!) 153 kg (337 lb)   11/14/21 (!) 152 kg (334 lb)   09/29/21 (!) 153 kg (338 lb)   Body mass index is 45.71 kg/m².     Physical Exam  Vitals reviewed.   Constitutional:       General: He is not in acute distress.     Appearance: He is well-developed. He is morbidly obese. He is not diaphoretic.   HENT:      Head: Normocephalic and atraumatic.   Cardiovascular:      Rate and Rhythm: Normal rate and regular rhythm.      Heart sounds: Normal heart sounds.   Pulmonary:      Effort: Pulmonary effort is normal.      Breath sounds: Normal breath sounds. No wheezing.   Musculoskeletal:      Right lower leg: Edema (+1) present.      Left lower " leg: Edema (+1) present.   Skin:     General: Skin is warm and dry.   Neurological:      Mental Status: He is alert and oriented to person, place, and time.   Psychiatric:         Mood and Affect: Mood normal.         Behavior: Behavior normal.               BNP (11/14/2021 22:28)  Comprehensive Metabolic Panel (11/14/2021 23:57)  CBC & Differential (11/14/2021 21:33)  Hemoglobin A1c (06/25/2021 10:15)

## 2022-01-04 NOTE — PATIENT INSTRUCTIONS
Diabetes Mellitus and Nutrition, Adult  When you have diabetes, or diabetes mellitus, it is very important to have healthy eating habits because your blood sugar (glucose) levels are greatly affected by what you eat and drink. Eating healthy foods in the right amounts, at about the same times every day, can help you:  · Control your blood glucose.  · Lower your risk of heart disease.  · Improve your blood pressure.  · Reach or maintain a healthy weight.  What can affect my meal plan?  Every person with diabetes is different, and each person has different needs for a meal plan. Your health care provider may recommend that you work with a dietitian to make a meal plan that is best for you. Your meal plan may vary depending on factors such as:  · The calories you need.  · The medicines you take.  · Your weight.  · Your blood glucose, blood pressure, and cholesterol levels.  · Your activity level.  · Other health conditions you have, such as heart or kidney disease.  How do carbohydrates affect me?  Carbohydrates, also called carbs, affect your blood glucose level more than any other type of food. Eating carbs naturally raises the amount of glucose in your blood. Carb counting is a method for keeping track of how many carbs you eat. Counting carbs is important to keep your blood glucose at a healthy level, especially if you use insulin or take certain oral diabetes medicines.  It is important to know how many carbs you can safely have in each meal. This is different for every person. Your dietitian can help you calculate how many carbs you should have at each meal and for each snack.  How does alcohol affect me?  Alcohol can cause a sudden decrease in blood glucose (hypoglycemia), especially if you use insulin or take certain oral diabetes medicines. Hypoglycemia can be a life-threatening condition. Symptoms of hypoglycemia, such as sleepiness, dizziness, and confusion, are similar to symptoms of having too much  "alcohol.  · Do not drink alcohol if:  ? Your health care provider tells you not to drink.  ? You are pregnant, may be pregnant, or are planning to become pregnant.  · If you drink alcohol:  ? Do not drink on an empty stomach.  ? Limit how much you use to:  § 0-1 drink a day for women.  § 0-2 drinks a day for men.  ? Be aware of how much alcohol is in your drink. In the [place] one drink equals one 12 oz bottle of beer (355 mL), one 5 oz glass of wine (148 mL), or one 1½ oz glass of hard liquor (44 mL).  ? Keep yourself hydrated with water, diet soda, or unsweetened iced tea.  § Keep in mind that regular soda, juice, and other mixers may contain a lot of sugar and must be counted as carbs.  What are tips for following this plan?    Reading food labels  · Start by checking the serving size on the \"Nutrition Facts\" label of packaged foods and drinks. The amount of calories, carbs, fats, and other nutrients listed on the label is based on one serving of the item. Many items contain more than one serving per package.  · Check the total grams (g) of carbs in one serving. You can calculate the number of servings of carbs in one serving by dividing the total carbs by 15. For example, if a food has 30 g of total carbs per serving, it would be equal to 2 servings of carbs.  · Check the number of grams (g) of saturated fats and trans fats in one serving. Choose foods that have a low amount or none of these fats.  · Check the number of milligrams (mg) of salt (sodium) in one serving. Most people should limit total sodium intake to less than 2,300 mg per day.  · Always check the nutrition information of foods labeled as \"low-fat\" or \"nonfat.\" These foods may be higher in added sugar or refined carbs and should be avoided.  · Talk to your dietitian to identify your daily goals for nutrients listed on the label.  Shopping  · Avoid buying canned, pre-made, or processed foods. These foods tend to be high in fat, sodium, and added " sugar.  · Shop around the outside edge of the grocery store. This is where you will most often find fresh fruits and vegetables, bulk grains, fresh meats, and fresh dairy.  Cooking  · Use low-heat cooking methods, such as baking, instead of high-heat cooking methods like deep frying.  · Cook using healthy oils, such as olive, canola, or sunflower oil.  · Avoid cooking with butter, cream, or high-fat meats.  Meal planning  · Eat meals and snacks regularly, preferably at the same times every day. Avoid going long periods of time without eating.  · Eat foods that are high in fiber, such as fresh fruits, vegetables, beans, and whole grains. Talk with your dietitian about how many servings of carbs you can eat at each meal.  · Eat 4-6 oz (112-168 g) of lean protein each day, such as lean meat, chicken, fish, eggs, or tofu. One ounce (oz) of lean protein is equal to:  ? 1 oz (28 g) of meat, chicken, or fish.  ? 1 egg.  ? ¼ cup (62 g) of tofu.  · Eat some foods each day that contain healthy fats, such as avocado, nuts, seeds, and fish.  What foods should I eat?  Fruits  Berries. Apples. Oranges. Peaches. Apricots. Plums. Grapes. Clay. Papaya. Pomegranate. Kiwi. Cherries.  Vegetables  Lettuce. Spinach. Leafy greens, including kale, chard, boy greens, and mustard greens. Beets. Cauliflower. Cabbage. Broccoli. Carrots. Green beans. Tomatoes. Peppers. Onions. Cucumbers. [place] sprouts.  Grains  Whole grains, such as whole-wheat or whole-grain bread, crackers, tortillas, cereal, and pasta. Unsweetened oatmeal. Quinoa. Brown or wild rice.  Meats and other proteins  Seafood. Poultry without skin. Lean cuts of poultry and beef. Tofu. Nuts. Seeds.  Dairy  Low-fat or fat-free dairy products such as milk, yogurt, and cheese.  The items listed above may not be a complete list of foods and beverages you can eat. Contact a dietitian for more information.  What foods should I avoid?  Fruits  Fruits canned with  syrup.  Vegetables  Canned vegetables. Frozen vegetables with butter or cream sauce.  Grains  Refined white flour and flour products such as bread, pasta, snack foods, and cereals. Avoid all processed foods.  Meats and other proteins  Fatty cuts of meat. Poultry with skin. Breaded or fried meats. Processed meat. Avoid saturated fats.  Dairy  Full-fat yogurt, cheese, or milk.  Beverages  Sweetened drinks, such as soda or iced tea.  The items listed above may not be a complete list of foods and beverages you should avoid. Contact a dietitian for more information.  Questions to ask a health care provider  · Do I need to meet with a diabetes educator?  · Do I need to meet with a dietitian?  · What number can I call if I have questions?  · When are the best times to check my blood glucose?  Where to find more information:  · American Diabetes Association: diabetes.org  · Academy of Nutrition and Dietetics: www.eatright.org  · National [place] of Diabetes and Digestive and Kidney Diseases: www.niddk.nih.gov  · Association of Diabetes Care and Education Specialists: www.diabeteseducator.org  Summary  · It is important to have healthy eating habits because your blood sugar (glucose) levels are greatly affected by what you eat and drink.  · A healthy meal plan will help you control your blood glucose and maintain a healthy lifestyle.  · Your health care provider may recommend that you work with a dietitian to make a meal plan that is best for you.  · Keep in mind that carbohydrates (carbs) and alcohol have immediate effects on your blood glucose levels. It is important to count carbs and to use alcohol carefully.  This information is not intended to replace advice given to you by your health care provider. Make sure you discuss any questions you have with your health care provider.  Document Revised: 11/24/2020 Document Reviewed: 11/24/2020  [employer] [place] Patient Education © 2021 [employer]      Diabetes Mellitus and  "Exercise  Exercising regularly is important for overall health, especially for people who have diabetes mellitus. Exercising is not only about losing weight. It has many other health benefits, such as increasing muscle strength and bone density and reducing body fat and stress. This leads to improved fitness, flexibility, and endurance, all of which result in better overall health.  What are the benefits of exercise if I have diabetes?  Exercise has many benefits for people with diabetes. They include:  · Helping to lower and control blood sugar (glucose).  · Helping the body to respond better to the hormone insulin by improving insulin sensitivity.  · Reducing how much insulin the body needs.  · Lowering the risk for heart disease by:  ? Lowering \"bad\" cholesterol and triglyceride levels.  ? Increasing \"good\" cholesterol levels.  ? Lowering blood pressure.  ? Lowering blood glucose levels.  What is my activity plan?  Your health care provider or certified diabetes educator can help you make a plan for the type and frequency of exercise that works for you. This is called your activity plan. Be sure to:  · Get at least 150 minutes of medium-intensity or high-intensity exercise each week. Exercises may include brisk walking, biking, or water aerobics.  · Do stretching and strengthening exercises, such as yoga or weight lifting, at least 2 times a week.  · Spread out your activity over at least 3 days of the week.  · Get some form of physical activity each day.  ? Do not go more than 2 days in a row without some kind of physical activity.  ? Avoid being inactive for more than 90 minutes at a time. Take frequent breaks to walk or stretch.  · Choose exercises or activities that you enjoy. Set realistic goals.  · Start slowly and gradually increase your exercise intensity over time.  How do I manage my diabetes during exercise?    Monitor your blood glucose  · Check your blood glucose before and after exercising. If your " blood glucose is:  ? 240 mg/dL (13.3 mmol/L) or higher before you exercise, check your urine for ketones. These are chemicals created by the liver. If you have ketones in your urine, do not exercise until your blood glucose returns to normal.  ? 100 mg/dL (5.6 mmol/L) or lower, eat a snack containing 15-20 grams of carbohydrate. Check your blood glucose 15 minutes after the snack to make sure that your glucose level is above 100 mg/dL (5.6 mmol/L) before you start your exercise.  · Know the symptoms of low blood glucose (hypoglycemia) and how to treat it. Your risk for hypoglycemia increases during and after exercise.  Follow these tips and your health care provider's instructions  · Keep a carbohydrate snack that is fast-acting for use before, during, and after exercise to help prevent or treat hypoglycemia.  · Avoid injecting insulin into areas of the body that are going to be exercised. For example, avoid injecting insulin into:  ? Your arms, when you are about to play tennis.  ? Your legs, when you are about to go jogging.  · Keep records of your exercise habits. Doing this can help you and your health care provider adjust your diabetes management plan as needed. Write down:  ? Food that you eat before and after you exercise.  ? Blood glucose levels before and after you exercise.  ? The type and amount of exercise you have done.  · Work with your health care provider when you start a new exercise or activity. He or she may need to:  ? Make sure that the activity is safe for you.  ? Adjust your insulin, other medicines, and food that you eat.  · Drink plenty of water while you exercise. This prevents loss of water (dehydration) and problems caused by a lot of heat in the body (heat stroke).  Where to find more information  · American Diabetes Association: www.diabetes.org  Summary  · Exercising regularly is important for overall health, especially for people who have diabetes mellitus.  · Exercising has many  health benefits. It increases muscle strength and bone density and reduces body fat and stress. It also lowers and controls blood glucose.  · Your health care provider or certified diabetes educator can help you make an activity plan for the type and frequency of exercise that works for you.  · Work with your health care provider to make sure any new activity is safe for you. Also work with your health care provider to adjust your insulin, other medicines, and the food you eat.  This information is not intended to replace advice given to you by your health care provider. Make sure you discuss any questions you have with your health care provider.  Document Revised: 09/14/2020 Document Reviewed: 09/14/2020  [employer] Patient Education © 2021 [employer]      Heart Failure, Self Care  Heart failure is a serious condition. This sheet explains things you need to do to take care of yourself at home. To help you stay as healthy as possible, you may be asked to change your diet, take certain medicines, and make other changes in your life. Your doctor may also give you more specific instructions. If you have problems or questions, call your doctor.  What are the risks?  Having heart failure makes it more likely for you to have some problems. These problems can get worse if you do not take good care of yourself. Problems may include:  · Blood clotting problems. This may cause a stroke.  · Damage to the kidneys, liver, or lungs.  · Abnormal heart rhythms.  Supplies needed:  · Scale for weighing yourself.  · Blood pressure monitor.  · Notebook.  · Medicines.  How to care for yourself when you have heart failure  Medicines  Take over-the-counter and prescription medicines only as told by your doctor. Take your medicines every day.  · Do not stop taking your medicine unless your doctor tells you to do so.  · Do not skip any medicines.  · Get your prescriptions refilled before you run out of medicine. This is important.  Eating  and drinking    · Eat heart-healthy foods. Talk with a diet specialist (dietitian) to create an eating plan.  · Choose foods that:  ? Have no trans fat.  ? Are low in saturated fat and cholesterol.  · Choose healthy foods, such as:  ? Fresh or frozen fruits and vegetables.  ? Fish.  ? Low-fat (lean) meats.  ? Legumes, such as beans, peas, and lentils.  ? Fat-free or low-fat dairy products.  ? Whole-grain foods.  ? High-fiber foods.  · Limit salt (sodium) if told by your doctor. Ask your diet specialist to tell you which seasonings are healthy for your heart.  · Cook in healthy ways instead of frying. Healthy ways of cooking include roasting, grilling, broiling, baking, poaching, steaming, and stir-frying.  · Limit how much fluid you drink, if told by your doctor.    Alcohol use  · Do not drink alcohol if:  ? Your doctor tells you not to drink.  ? Your heart was damaged by alcohol, or you have very bad heart failure.  ? You are pregnant, may be pregnant, or are planning to become pregnant.  · If you drink alcohol:  ? Limit how much you use to:  § 0-1 drink a day for women.  § 0-2 drinks a day for men.  ? Be aware of how much alcohol is in your drink. In the [place] one drink equals one 12 oz bottle of beer (355 mL), one 5 oz glass of wine (148 mL), or one 1½ oz glass of hard liquor (44 mL).  Lifestyle    · Do not use any products that contain nicotine or tobacco, such as cigarettes, e-cigarettes, and chewing tobacco. If you need help quitting, ask your doctor.  ? Do not use nicotine gum or patches before talking to your doctor.  · Do not use illegal drugs.  · Lose weight if told by your doctor.  · Do physical activity if told by your doctor. Talk to your doctor before you begin an exercise if:  ? You are an older adult.  ? You have very bad heart failure.  · Learn to manage stress. If you need help, ask your doctor.  · Get rehab (rehabilitation) to help you stay independent and to help with your quality of  life.  · Plan time to rest when you get tired.    Check weight and blood pressure    · Weigh yourself every day. This will help you to know if fluid is building up in your body.  ? Weigh yourself every morning after you pee (urinate) and before you eat breakfast.  ? Wear the same amount of clothing each time.  ? Write down your daily weight. Give your record to your doctor.  · Check and write down your blood pressure as told by your doctor.  · Check your pulse as told by your doctor.    Dealing with very hot and very cold weather  · If it is very hot:  ? Avoid activities that take a lot of energy.  ? Use air conditioning or fans, or find a cooler place.  ? Avoid caffeine and alcohol.  ? Wear clothing that is loose-fitting, lightweight, and light-colored.  · If it is very cold:  ? Avoid activities that take a lot of energy.  ? Layer your clothes.  ? Wear mittens or gloves, a hat, and a scarf when you go outside.  ? Avoid alcohol.  Follow these instructions at home:  · Stay up to date with shots (vaccines). Get pneumococcal and flu (influenza) shots.  · Keep all follow-up visits as told by your doctor. This is important.  Contact a doctor if:  · You gain weight quickly.  · You have increasing shortness of breath.  · You cannot do your normal activities.  · You get tired easily.  · You cough a lot.  · You don't feel like eating or feel like you may vomit (nauseous).  · You become puffy (swell) in your hands, feet, ankles, or belly (abdomen).  · You cannot sleep well because it is hard to breathe.  · You feel like your heart is beating fast (palpitations).  · You get dizzy when you stand up.  Get help right away if:  · You have trouble breathing.  · You or someone else notices a change in your behavior, such as having trouble staying awake.  · You have chest pain or discomfort.  · You pass out (faint).  These symptoms may be an emergency. Do not wait to see if the symptoms will go away. Get medical help right away. Call  your local emergency services (911 in the [place] Do not drive yourself to the hospital.  Summary  · Heart failure is a serious condition. To care for yourself, you may have to change your diet, take medicines, and make other lifestyle changes.  · Take your medicines every day. Do not stop taking them unless your doctor tells you to do so.  · Eat heart-healthy foods, such as fresh or frozen fruits and vegetables, fish, lean meats, legumes, fat-free or low-fat dairy products, and whole-grain or high-fiber foods.  · Ask your doctor if you can drink alcohol. You may have to stop alcohol use if you have very bad heart failure.  · Contact your doctor if you gain weight quickly or feel that your heart is beating too fast. Get help right away if you pass out, or have chest pain or trouble breathing.  This information is not intended to replace advice given to you by your health care provider. Make sure you discuss any questions you have with your health care provider.  Document Revised: 03/31/2020 Document Reviewed: 04/01/2020  [employer] Patient Education © 2021 [employer]

## 2022-01-21 ENCOUNTER — HOSPITAL ENCOUNTER (OUTPATIENT)
Dept: CARDIOLOGY | Facility: HOSPITAL | Age: 62
Discharge: HOME | End: 2022-01-21
Admitting: FAMILY MEDICINE

## 2022-01-21 VITALS
BODY MASS INDEX: 42.66 KG/M2 | HEIGHT: 72 IN | WEIGHT: 315 LBS | SYSTOLIC BLOOD PRESSURE: 118 MMHG | DIASTOLIC BLOOD PRESSURE: 76 MMHG

## 2022-01-21 DIAGNOSIS — I11.0 HYPERTENSIVE HEART DISEASE WITH CHRONIC COMBINED SYSTOLIC AND DIASTOLIC CONGESTIVE HEART FAILURE: ICD-10-CM

## 2022-01-21 DIAGNOSIS — I50.42 HYPERTENSIVE HEART DISEASE WITH CHRONIC COMBINED SYSTOLIC AND DIASTOLIC CONGESTIVE HEART FAILURE: ICD-10-CM

## 2022-01-21 DIAGNOSIS — I42.0 DILATED CARDIOMYOPATHY: ICD-10-CM

## 2022-01-21 LAB
BH CV ECHO MEAS - AO MAX PG (FULL): 8.3 MMHG
BH CV ECHO MEAS - AO MAX PG: 9.6 MMHG
BH CV ECHO MEAS - AO MEAN PG (FULL): 5.4 MMHG
BH CV ECHO MEAS - AO MEAN PG: 6.1 MMHG
BH CV ECHO MEAS - AO ROOT AREA (BSA CORRECTED): 1.1
BH CV ECHO MEAS - AO ROOT AREA: 7 CM^2
BH CV ECHO MEAS - AO ROOT DIAM: 3 CM
BH CV ECHO MEAS - AO V2 MAX: 154.9 CM/SEC
BH CV ECHO MEAS - AO V2 MEAN: 120.9 CM/SEC
BH CV ECHO MEAS - AO V2 VTI: 25.3 CM
BH CV ECHO MEAS - AORTIC HR: 88.6 BPM
BH CV ECHO MEAS - AORTIC R-R: 0.68 SEC
BH CV ECHO MEAS - AVA(I,A): 1.5 CM^2
BH CV ECHO MEAS - AVA(I,D): 1.5 CM^2
BH CV ECHO MEAS - AVA(V,A): 1.4 CM^2
BH CV ECHO MEAS - AVA(V,D): 1.4 CM^2
BH CV ECHO MEAS - BSA(HAYCOCK): 2.9 M^2
BH CV ECHO MEAS - BSA: 2.7 M^2
BH CV ECHO MEAS - BZI_BMI: 45.7 KILOGRAMS/M^2
BH CV ECHO MEAS - BZI_METRIC_HEIGHT: 182.9 CM
BH CV ECHO MEAS - BZI_METRIC_WEIGHT: 152.9 KG
BH CV ECHO MEAS - CI(AO): 5.9 L/MIN/M^2
BH CV ECHO MEAS - CI(LVOT): 1.3 L/MIN/M^2
BH CV ECHO MEAS - CO(AO): 15.8 L/MIN
BH CV ECHO MEAS - CO(LVOT): 3.4 L/MIN
BH CV ECHO MEAS - EDV(CUBED): 280.4 ML
BH CV ECHO MEAS - EDV(TEICH): 219.4 ML
BH CV ECHO MEAS - EF(CUBED): 24.1 %
BH CV ECHO MEAS - EF(TEICH): 18.9 %
BH CV ECHO MEAS - ESV(CUBED): 212.7 ML
BH CV ECHO MEAS - ESV(TEICH): 177.9 ML
BH CV ECHO MEAS - FS: 8.8 %
BH CV ECHO MEAS - IVS/LVPW: 1.1
BH CV ECHO MEAS - IVSD: 1.1 CM
BH CV ECHO MEAS - LA DIMENSION(2D): 5.4 CM
BH CV ECHO MEAS - LV MASS(C)D: 300 GRAMS
BH CV ECHO MEAS - LV MASS(C)DI: 112.8 GRAMS/M^2
BH CV ECHO MEAS - LV MAX PG: 1.3 MMHG
BH CV ECHO MEAS - LV MEAN PG: 0.76 MMHG
BH CV ECHO MEAS - LV V1 MAX: 56.2 CM/SEC
BH CV ECHO MEAS - LV V1 MEAN: 41.5 CM/SEC
BH CV ECHO MEAS - LV V1 VTI: 10.1 CM
BH CV ECHO MEAS - LVIDD: 6.5 CM
BH CV ECHO MEAS - LVIDS: 6 CM
BH CV ECHO MEAS - LVOT AREA: 3.8 CM^2
BH CV ECHO MEAS - LVOT DIAM: 2.2 CM
BH CV ECHO MEAS - LVPWD: 0.99 CM
BH CV ECHO MEAS - MV MAX PG: 5.3 MMHG
BH CV ECHO MEAS - MV MEAN PG: 2.6 MMHG
BH CV ECHO MEAS - MV V2 MAX: 114.8 CM/SEC
BH CV ECHO MEAS - MV V2 MEAN: 75.8 CM/SEC
BH CV ECHO MEAS - MV V2 VTI: 23.1 CM
BH CV ECHO MEAS - MVA(VTI): 1.7 CM^2
BH CV ECHO MEAS - PA ACC TIME: 0.06 SEC
BH CV ECHO MEAS - PA MAX PG (FULL): 0.01 MMHG
BH CV ECHO MEAS - PA MAX PG: 2.3 MMHG
BH CV ECHO MEAS - PA MEAN PG (FULL): 0.14 MMHG
BH CV ECHO MEAS - PA MEAN PG: 1.3 MMHG
BH CV ECHO MEAS - PA PR(ACCEL): 53.3 MMHG
BH CV ECHO MEAS - PA V2 MAX: 75.3 CM/SEC
BH CV ECHO MEAS - PA V2 MEAN: 55.3 CM/SEC
BH CV ECHO MEAS - PA V2 VTI: 14.7 CM
BH CV ECHO MEAS - PI END-D VEL: 143.1 CM/SEC
BH CV ECHO MEAS - PI MAX PG: 12.2 MMHG
BH CV ECHO MEAS - PI MAX VEL: 174.7 CM/SEC
BH CV ECHO MEAS - PULM SYS VEL: 3 CM/SEC
BH CV ECHO MEAS - RAP SYSTOLE: 3 MMHG
BH CV ECHO MEAS - RV MAX PG: 2.3 MMHG
BH CV ECHO MEAS - RV MEAN PG: 1.2 MMHG
BH CV ECHO MEAS - RV V1 MAX: 75.1 CM/SEC
BH CV ECHO MEAS - RV V1 MEAN: 52 CM/SEC
BH CV ECHO MEAS - RV V1 VTI: 14.6 CM
BH CV ECHO MEAS - RVDD: 4.5 CM
BH CV ECHO MEAS - RVSP: 50.7 MMHG
BH CV ECHO MEAS - SI(AO): 67 ML/M^2
BH CV ECHO MEAS - SI(CUBED): 25.4 ML/M^2
BH CV ECHO MEAS - SI(LVOT): 14.6 ML/M^2
BH CV ECHO MEAS - SI(TEICH): 15.6 ML/M^2
BH CV ECHO MEAS - SV(AO): 178.3 ML
BH CV ECHO MEAS - SV(CUBED): 67.7 ML
BH CV ECHO MEAS - SV(LVOT): 38.9 ML
BH CV ECHO MEAS - SV(TEICH): 41.5 ML
BH CV ECHO MEAS - TR MAX VEL: 344.8 CM/SEC

## 2022-01-21 PROCEDURE — 93306 TTE W/DOPPLER COMPLETE: CPT | Performed by: INTERNAL MEDICINE

## 2022-01-21 PROCEDURE — 93306 TTE W/DOPPLER COMPLETE: CPT

## 2022-01-21 PROCEDURE — 25010000002 SULFUR HEXAFLUORIDE MICROSPH 60.7-25 MG RECONSTITUTED SUSPENSION: Performed by: INTERNAL MEDICINE

## 2022-01-21 RX ADMIN — SULFUR HEXAFLUORIDE 3 ML: KIT at 10:44

## 2022-01-24 DIAGNOSIS — I42.0 DILATED CARDIOMYOPATHY: Primary | ICD-10-CM

## 2022-01-24 DIAGNOSIS — I50.42 HYPERTENSIVE HEART DISEASE WITH CHRONIC COMBINED SYSTOLIC AND DIASTOLIC CONGESTIVE HEART FAILURE: ICD-10-CM

## 2022-01-24 DIAGNOSIS — I11.0 HYPERTENSIVE HEART DISEASE WITH CHRONIC COMBINED SYSTOLIC AND DIASTOLIC CONGESTIVE HEART FAILURE: ICD-10-CM

## 2022-02-01 ENCOUNTER — TELEPHONE (OUTPATIENT)
Dept: FAMILY MEDICINE CLINIC | Facility: CLINIC | Age: 62
End: 2022-02-01

## 2022-02-05 DIAGNOSIS — M15.9 PRIMARY OSTEOARTHRITIS INVOLVING MULTIPLE JOINTS: ICD-10-CM

## 2022-02-05 DIAGNOSIS — E11.42 TYPE 2 DIABETES MELLITUS WITH DIABETIC POLYNEUROPATHY, WITH LONG-TERM CURRENT USE OF INSULIN: ICD-10-CM

## 2022-02-05 DIAGNOSIS — Z79.4 TYPE 2 DIABETES MELLITUS WITH DIABETIC POLYNEUROPATHY, WITH LONG-TERM CURRENT USE OF INSULIN: ICD-10-CM

## 2022-02-07 ENCOUNTER — PATIENT ROUNDING (BHMG ONLY) (OUTPATIENT)
Dept: CARDIOLOGY | Facility: CLINIC | Age: 62
End: 2022-02-07

## 2022-02-07 ENCOUNTER — OFFICE VISIT (OUTPATIENT)
Dept: CARDIOLOGY | Facility: CLINIC | Age: 62
End: 2022-02-07

## 2022-02-07 VITALS
BODY MASS INDEX: 42.66 KG/M2 | SYSTOLIC BLOOD PRESSURE: 137 MMHG | HEIGHT: 72 IN | DIASTOLIC BLOOD PRESSURE: 84 MMHG | WEIGHT: 315 LBS | OXYGEN SATURATION: 92 % | HEART RATE: 78 BPM

## 2022-02-07 DIAGNOSIS — I50.23 ACUTE ON CHRONIC HFREF (HEART FAILURE WITH REDUCED EJECTION FRACTION): Primary | ICD-10-CM

## 2022-02-07 DIAGNOSIS — E66.01 MORBID OBESITY WITH BMI OF 40.0-44.9, ADULT: ICD-10-CM

## 2022-02-07 DIAGNOSIS — I25.119 CORONARY ARTERY DISEASE INVOLVING NATIVE CORONARY ARTERY OF NATIVE HEART WITH ANGINA PECTORIS: ICD-10-CM

## 2022-02-07 DIAGNOSIS — Z79.4 TYPE 2 DIABETES MELLITUS WITHOUT COMPLICATION, WITH LONG-TERM CURRENT USE OF INSULIN: ICD-10-CM

## 2022-02-07 DIAGNOSIS — E11.9 TYPE 2 DIABETES MELLITUS WITHOUT COMPLICATION, WITH LONG-TERM CURRENT USE OF INSULIN: ICD-10-CM

## 2022-02-07 DIAGNOSIS — I42.9 CARDIOMYOPATHY, UNSPECIFIED TYPE: ICD-10-CM

## 2022-02-07 PROCEDURE — 99204 OFFICE O/P NEW MOD 45 MIN: CPT | Performed by: INTERNAL MEDICINE

## 2022-02-07 RX ORDER — ASPIRIN 81 MG/1
81 TABLET ORAL DAILY
Qty: 90 TABLET | Refills: 3 | Status: SHIPPED | OUTPATIENT
Start: 2022-02-07

## 2022-02-07 RX ORDER — ASPIRIN 81 MG/1
81 TABLET ORAL 2 TIMES DAILY
COMMUNITY
End: 2022-02-07

## 2022-02-07 RX ORDER — METOPROLOL SUCCINATE 25 MG/1
25 TABLET, EXTENDED RELEASE ORAL DAILY
Qty: 90 TABLET | Refills: 3 | Status: SHIPPED | OUTPATIENT
Start: 2022-02-07 | End: 2023-02-23

## 2022-02-07 NOTE — PROGRESS NOTES
Cardiology Consult Note    Patient Identification:  Name: Sorin Allen  Age: 61 y.o.  Sex: male  :  1960  MRN: [id number]             Requesting Physician :  Natalie Steward MD      Reason for Consultation / Chief Complaint : CHF    History of Present Illness:      Mr. Sorin Allen has PMH of    Hypertensive cardiovascular disease with chronic diastolic CHF  Dyslipidemia  Hypertension  Obesity with BMI over 40  ESAU/CPAP  ED  Rheumatic fever as a child  Former smoker quit 3/6/2000  Family history mother has leukemia    Here for evaluation and treatment of CHF.  Patient is complaining of substernal chest pain, shortness of breath and dyspnea on exertion.  Patient has some exertional component to his chest pain also.  Patient's arterial blood pressure is 137/84, heart rate 78, O2 sat of 92% on room air.  Review of records reveal labs from 2022 with a proBNP 1548, CMP with a glucose of 110, hemoglobin A1c 6.7, lipid profile with cholesterol 187, triglycerides 92, HDL low at 35, .    EKG done 2021 reviewed/interpreted by me reveals sinus rhythm with rate of 95 bpm with poor R wave progression    Echocardiogram 2020 revealed normal LV systolic function EF 60% with diastolic dysfunction and RV enlargement and mild aortic stenosis    Assessment:  :    Acute on chronic HFrEF due to hypertensive cardiovascular disease  Cardiomyopathy  Hypertension  Diabetes  Obesity with BMI over 40      Recommendations / Plan:        Reviewed EKG results with patient.  Will decrease Lasix to 80 twice daily add Entresto continue Toprol and aspirin.  Patient was doing well till recently now is taking 80 3 times daily of Lasix and worsening of his CHF.  We will schedule cardiac cath to evaluate patient's etiology of cardiomyopathy and severe LV dysfunction.  Reviewed BMI over 44 patient would benefit from weight loss diet and exercise.  Reviewed records.  Patient had an echo 2022 which showed EF  of 26 to 30% with RV enlargement moderate MR and TR.  We will follow-up closely and consider further evaluation treatment.                   Diagnosis Plan   1. Acute on chronic HFrEF (heart failure with reduced ejection fraction) ([facility])  COVID PRE-OP / PRE-PROCEDURE SCREENING ORDER (NO ISOLATION) - Swab, Nasopharynx    Case Request Cath Lab: Left Heart Cath    CBC (No Diff)    Basic Metabolic Panel    Protime-INR    XR Chest 2 View   2. Cardiomyopathy, unspecified type ([facility])  COVID PRE-OP / PRE-PROCEDURE SCREENING ORDER (NO ISOLATION) - Swab, Nasopharynx    Case Request Cath Lab: Left Heart Cath    CBC (No Diff)    Basic Metabolic Panel    Protime-INR    XR Chest 2 View   3. Type 2 diabetes mellitus without complication, with long-term current use of insulin ([facility])  COVID PRE-OP / PRE-PROCEDURE SCREENING ORDER (NO ISOLATION) - Swab, Nasopharynx    Case Request Cath Lab: Left Heart Cath    CBC (No Diff)    Basic Metabolic Panel    Protime-INR    XR Chest 2 View   4. Morbid obesity with BMI of 40.0-44.9, adult ([facility])  COVID PRE-OP / PRE-PROCEDURE SCREENING ORDER (NO ISOLATION) - Swab, Nasopharynx    Case Request Cath Lab: Left Heart Cath    CBC (No Diff)    Basic Metabolic Panel    Protime-INR    XR Chest 2 View              Past Medical History:  Past Medical History:   Diagnosis Date   • Arthritis    • Cardiac disease    • Carpal tunnel syndrome    • CPAP (continuous positive airway pressure) dependence    • Diabetes mellitus (HCC)    • Diverticulitis of colon    • Erectile dysfunction    • Heart disease    • Hyperlipidemia    • Hypertension    • Neuromuscular disorder (HCC)    • ESAU (obstructive sleep apnea)    • Osteoarthritis      Past Surgical History:  Past Surgical History:   Procedure Laterality Date   • HERNIA REPAIR        Allergies:  No Known Allergies  Home Meds:  Current Meds:     Current Outpatient Medications:   •  ACCU-CHEK SOFTCLIX LANCETS lancets, by Other route. Use as instructed, Disp: , Rfl:   •   acetaminophen (TYLENOL) 500 MG tablet, Take 2 tablets by mouth Every 8 (Eight) Hours As Needed for Moderate Pain  (arthritic pain)., Disp: 180 tablet, Rfl: 5  •  Blood Glucose Calibration liquid, by In Vitro route., Disp: , Rfl:   •  Blood Glucose Monitoring Suppl (ACCU-CHEK ALEKS PLUS) w/Device kit, Use to test blood sugar twice a day, Disp: , Rfl:   •  Blood Glucose Monitoring Suppl (Accu-Chek Guide) w/Device kit, 1 kit 3 (Three) Times a Day. Use glucometer to check blood sugars 3 times a day. Dx. E11.42, Disp: 1 kit, Rfl: 0  •  Continuous Blood Gluc  (FreeStyle Troie 14 Day [place]) device, 1 Units 3 (Three) Times a Day Before Meals. Use continuous glucose monitor to monitor glucose regularly., Disp: 1 each, Rfl: 0  •  furosemide (LASIX) 80 MG tablet, Take 1 tablet by mouth 3 (Three) Times a Day., Disp: 270 tablet, Rfl: 1  •  gabapentin (NEURONTIN) 100 MG capsule, TAKE 1 CAPSULE BY MOUTH THREE TIMES A DAY, Disp: 90 capsule, Rfl: 2  •  glucose blood test strip, Use the test glucose up to 4 times a day. Dx: E11.42, Disp: 100 each, Rfl: 5  •  Insulin Pen Needle 32G X 4 MM misc, Use pen needle to deliver insulin subcutaneously daily.  Dx. E11.42, Disp: 30 each, Rfl: 5  •  potassium chloride ER (K-TAB) 20 MEQ tablet controlled-release ER tablet, Take 2 tablets by mouth Daily., Disp: 180 tablet, Rfl: 1  •  aspirin (aspirin) 81 MG EC tablet, Take 1 tablet by mouth Daily., Disp: 90 tablet, Rfl: 3  •  Continuous Blood Gluc Sensor (FreeStyle Torie 14 Day Sensor) misc, 1 Units Every 14 (Fourteen) Days. Use continuous glucose monitor to monitor glucose regularly., Disp: 6 each, Rfl: 1  •  Insulin Glargine (Lantus SoloStar) 100 UNIT/ML injection pen, Inject 60 Units under the skin into the appropriate area as directed Daily., Disp: 20 pen, Rfl: 1  •  meloxicam (MOBIC) 15 MG tablet, TAKE 1 TABLET BY MOUTH DAILY AS NEEDED FOR MODERATE PAIN . TAKE WITH FOOD!, Disp: 90 tablet, Rfl: 1  •  metoprolol succinate XL (TOPROL-XL)  "25 MG 24 hr tablet, Take 1 tablet by mouth Daily., Disp: 90 tablet, Rfl: 3  •  sacubitril-valsartan (ENTRESTO) 24-26 MG tablet, Take 1 tablet by mouth 2 (Two) Times a Day., Disp: 60 tablet, Rfl: 3  Social History:   Social History     Tobacco Use   • Smoking status: Former Smoker     Packs/day: 2.00     Years: 20.00     Pack years: 40.00     Quit date: 3/6/2000     Years since quittin.9   • Smokeless tobacco: Never Used   Substance Use Topics   • Alcohol use: Yes     Comment: rare      Family History:  Family History   Problem Relation Age of Onset   • Leukemia Mother    • Cancer Sister    • Cancer Maternal Grandmother         Review of Systems : Review of Systems   Constitutional: Negative for fever and malaise/fatigue.   HENT: Negative for ear pain and nosebleeds.    Eyes: Negative for blurred vision and double vision.   Cardiovascular: Positive for chest pain and leg swelling. Negative for dyspnea on exertion and palpitations.   Respiratory: Positive for shortness of breath. Negative for cough.    Skin: Negative for rash.   Musculoskeletal: Negative for joint pain.   Gastrointestinal: Negative for abdominal pain, nausea and vomiting.   Neurological: Negative for focal weakness and headaches.   Psychiatric/Behavioral: Negative for depression. The patient is not nervous/anxious.    All other systems reviewed and are negative.              Constitutional:       Physical Exam   /84 (BP Location: Left arm, Patient Position: Sitting, Cuff Size: Large Adult)   Pulse 78   Ht 182.9 cm (72\")   Wt (!) 150 kg (331 lb)   SpO2 92%   BMI 44.89 kg/m²   Physical Exam  General:  Appears in no acute distress, pleasant obese  Eyes: Sclera is anicteric,  conjunctiva is clear   HEENT:  No JVD. Thyroid not visibly enlarged. No mucosal pallor or cyanosis  Respiratory: Respirations regular and unlabored at rest.  Bilaterally good breath sounds, with good air entry in all fields. No crackles, rubs or wheezes " auscultated  Cardiovascular: S1,S2 Regular rate and rhythm. No murmur, rub or gallop auscultated. No pretibial pitting edema  Gastrointestinal: Abdomen soft, flat, non tender. Bowel sounds present.   Musculoskeletal:  No abnormal movements  Extremities: No digital clubbing or cyanosis  Skin: Color pink. Skin warm and dry to touch. No rashes  No xanthoma  Neuro: Alert and awake, no lateralizing deficits appreciated    Cardiographics  ECG: EKG tracing was  personally reviewed/interpreted by me from 11/14/2021 reveals sinus rhythm with rate of 95 bpm with poor R wave progression          Echocardiogram:   Results for orders placed during the hospital encounter of 01/21/22    Adult Transthoracic Echo Complete W/ Cont if Necessary Per Protocol    Interpretation Summary  · Left ventricular ejection fraction appears to be 26 - 30%.  · The right ventricular cavity is moderately dilated.  · Moderate mitral valve regurgitation is present.  · Moderate tricuspid valve regurgitation is present.  · No pericardial effusion noted      Imaging  Chest X-ray:   Imaging Results (Last 24 Hours)     ** No results found for the last 24 hours. **          Lab Review: I have reviewed the labs              @[email or web address]                  Raoul Tirado MD  2/11/2022, 15:48 EST      EMR Dragon/Transcription:   Dictated utilizing Dragon dictation  Much of the above report is an electronic transcription/translation of the spoken language to printed text using Dragon Software. As such, the subtleties and finesse of the spoken language may permit erroneous, or at times, nonsensical words or phrases to be inadvertently transcribed; thus changes may be made at a later date to rectify these errors.

## 2022-02-07 NOTE — H&P (VIEW-ONLY)
Cardiology Consult Note    Patient Identification:  Name: Sorin Allen  Age: 61 y.o.  Sex: male  :  1960  MRN: [id number]             Requesting Physician :  Natalie Steward MD      Reason for Consultation / Chief Complaint : CHF    History of Present Illness:      Mr. Sorin Allen has PMH of    Hypertensive cardiovascular disease with chronic diastolic CHF  Dyslipidemia  Hypertension  Obesity with BMI over 40  ESAU/CPAP  ED  Rheumatic fever as a child  Former smoker quit 3/6/2000  Family history mother has leukemia    Here for evaluation and treatment of CHF.  Patient is complaining of substernal chest pain, shortness of breath and dyspnea on exertion.  Patient has some exertional component to his chest pain also.  Patient's arterial blood pressure is 137/84, heart rate 78, O2 sat of 92% on room air.  Review of records reveal labs from 2022 with a proBNP 1548, CMP with a glucose of 110, hemoglobin A1c 6.7, lipid profile with cholesterol 187, triglycerides 92, HDL low at 35, .    EKG done 2021 reviewed/interpreted by me reveals sinus rhythm with rate of 95 bpm with poor R wave progression    Echocardiogram 2020 revealed normal LV systolic function EF 60% with diastolic dysfunction and RV enlargement and mild aortic stenosis    Assessment:  :    Acute on chronic HFrEF due to hypertensive cardiovascular disease  Cardiomyopathy  Hypertension  Diabetes  Obesity with BMI over 40      Recommendations / Plan:        Reviewed EKG results with patient.  Will decrease Lasix to 80 twice daily add Entresto continue Toprol and aspirin.  Patient was doing well till recently now is taking 80 3 times daily of Lasix and worsening of his CHF.  We will schedule cardiac cath to evaluate patient's etiology of cardiomyopathy and severe LV dysfunction.  Reviewed BMI over 44 patient would benefit from weight loss diet and exercise.  Reviewed records.  Patient had an echo 2022 which showed EF  of 26 to 30% with RV enlargement moderate MR and TR.  We will follow-up closely and consider further evaluation treatment.                   Diagnosis Plan   1. Acute on chronic HFrEF (heart failure with reduced ejection fraction) ([facility])  COVID PRE-OP / PRE-PROCEDURE SCREENING ORDER (NO ISOLATION) - Swab, Nasopharynx    Case Request Cath Lab: Left Heart Cath    CBC (No Diff)    Basic Metabolic Panel    Protime-INR    XR Chest 2 View   2. Cardiomyopathy, unspecified type ([facility])  COVID PRE-OP / PRE-PROCEDURE SCREENING ORDER (NO ISOLATION) - Swab, Nasopharynx    Case Request Cath Lab: Left Heart Cath    CBC (No Diff)    Basic Metabolic Panel    Protime-INR    XR Chest 2 View   3. Type 2 diabetes mellitus without complication, with long-term current use of insulin ([facility])  COVID PRE-OP / PRE-PROCEDURE SCREENING ORDER (NO ISOLATION) - Swab, Nasopharynx    Case Request Cath Lab: Left Heart Cath    CBC (No Diff)    Basic Metabolic Panel    Protime-INR    XR Chest 2 View   4. Morbid obesity with BMI of 40.0-44.9, adult ([facility])  COVID PRE-OP / PRE-PROCEDURE SCREENING ORDER (NO ISOLATION) - Swab, Nasopharynx    Case Request Cath Lab: Left Heart Cath    CBC (No Diff)    Basic Metabolic Panel    Protime-INR    XR Chest 2 View              Past Medical History:  Past Medical History:   Diagnosis Date   • Arthritis    • Cardiac disease    • Carpal tunnel syndrome    • CPAP (continuous positive airway pressure) dependence    • Diabetes mellitus (HCC)    • Diverticulitis of colon    • Erectile dysfunction    • Heart disease    • Hyperlipidemia    • Hypertension    • Neuromuscular disorder (HCC)    • ESAU (obstructive sleep apnea)    • Osteoarthritis      Past Surgical History:  Past Surgical History:   Procedure Laterality Date   • HERNIA REPAIR        Allergies:  No Known Allergies  Home Meds:  Current Meds:     Current Outpatient Medications:   •  ACCU-CHEK SOFTCLIX LANCETS lancets, by Other route. Use as instructed, Disp: , Rfl:   •   acetaminophen (TYLENOL) 500 MG tablet, Take 2 tablets by mouth Every 8 (Eight) Hours As Needed for Moderate Pain  (arthritic pain)., Disp: 180 tablet, Rfl: 5  •  Blood Glucose Calibration liquid, by In Vitro route., Disp: , Rfl:   •  Blood Glucose Monitoring Suppl (ACCU-CHEK ALEKS PLUS) w/Device kit, Use to test blood sugar twice a day, Disp: , Rfl:   •  Blood Glucose Monitoring Suppl (Accu-Chek Guide) w/Device kit, 1 kit 3 (Three) Times a Day. Use glucometer to check blood sugars 3 times a day. Dx. E11.42, Disp: 1 kit, Rfl: 0  •  Continuous Blood Gluc  (FreeStyle Torie 14 Day [place]) device, 1 Units 3 (Three) Times a Day Before Meals. Use continuous glucose monitor to monitor glucose regularly., Disp: 1 each, Rfl: 0  •  furosemide (LASIX) 80 MG tablet, Take 1 tablet by mouth 3 (Three) Times a Day., Disp: 270 tablet, Rfl: 1  •  gabapentin (NEURONTIN) 100 MG capsule, TAKE 1 CAPSULE BY MOUTH THREE TIMES A DAY, Disp: 90 capsule, Rfl: 2  •  glucose blood test strip, Use the test glucose up to 4 times a day. Dx: E11.42, Disp: 100 each, Rfl: 5  •  Insulin Pen Needle 32G X 4 MM misc, Use pen needle to deliver insulin subcutaneously daily.  Dx. E11.42, Disp: 30 each, Rfl: 5  •  potassium chloride ER (K-TAB) 20 MEQ tablet controlled-release ER tablet, Take 2 tablets by mouth Daily., Disp: 180 tablet, Rfl: 1  •  aspirin (aspirin) 81 MG EC tablet, Take 1 tablet by mouth Daily., Disp: 90 tablet, Rfl: 3  •  Continuous Blood Gluc Sensor (FreeStyle Torie 14 Day Sensor) misc, 1 Units Every 14 (Fourteen) Days. Use continuous glucose monitor to monitor glucose regularly., Disp: 6 each, Rfl: 1  •  Insulin Glargine (Lantus SoloStar) 100 UNIT/ML injection pen, Inject 60 Units under the skin into the appropriate area as directed Daily., Disp: 20 pen, Rfl: 1  •  meloxicam (MOBIC) 15 MG tablet, TAKE 1 TABLET BY MOUTH DAILY AS NEEDED FOR MODERATE PAIN . TAKE WITH FOOD!, Disp: 90 tablet, Rfl: 1  •  metoprolol succinate XL (TOPROL-XL)  "25 MG 24 hr tablet, Take 1 tablet by mouth Daily., Disp: 90 tablet, Rfl: 3  •  sacubitril-valsartan (ENTRESTO) 24-26 MG tablet, Take 1 tablet by mouth 2 (Two) Times a Day., Disp: 60 tablet, Rfl: 3  Social History:   Social History     Tobacco Use   • Smoking status: Former Smoker     Packs/day: 2.00     Years: 20.00     Pack years: 40.00     Quit date: 3/6/2000     Years since quittin.9   • Smokeless tobacco: Never Used   Substance Use Topics   • Alcohol use: Yes     Comment: rare      Family History:  Family History   Problem Relation Age of Onset   • Leukemia Mother    • Cancer Sister    • Cancer Maternal Grandmother         Review of Systems : Review of Systems   Constitutional: Negative for fever and malaise/fatigue.   HENT: Negative for ear pain and nosebleeds.    Eyes: Negative for blurred vision and double vision.   Cardiovascular: Positive for chest pain and leg swelling. Negative for dyspnea on exertion and palpitations.   Respiratory: Positive for shortness of breath. Negative for cough.    Skin: Negative for rash.   Musculoskeletal: Negative for joint pain.   Gastrointestinal: Negative for abdominal pain, nausea and vomiting.   Neurological: Negative for focal weakness and headaches.   Psychiatric/Behavioral: Negative for depression. The patient is not nervous/anxious.    All other systems reviewed and are negative.              Constitutional:       Physical Exam   /84 (BP Location: Left arm, Patient Position: Sitting, Cuff Size: Large Adult)   Pulse 78   Ht 182.9 cm (72\")   Wt (!) 150 kg (331 lb)   SpO2 92%   BMI 44.89 kg/m²   Physical Exam  General:  Appears in no acute distress, pleasant obese  Eyes: Sclera is anicteric,  conjunctiva is clear   HEENT:  No JVD. Thyroid not visibly enlarged. No mucosal pallor or cyanosis  Respiratory: Respirations regular and unlabored at rest.  Bilaterally good breath sounds, with good air entry in all fields. No crackles, rubs or wheezes " auscultated  Cardiovascular: S1,S2 Regular rate and rhythm. No murmur, rub or gallop auscultated. No pretibial pitting edema  Gastrointestinal: Abdomen soft, flat, non tender. Bowel sounds present.   Musculoskeletal:  No abnormal movements  Extremities: No digital clubbing or cyanosis  Skin: Color pink. Skin warm and dry to touch. No rashes  No xanthoma  Neuro: Alert and awake, no lateralizing deficits appreciated    Cardiographics  ECG: EKG tracing was  personally reviewed/interpreted by me from 11/14/2021 reveals sinus rhythm with rate of 95 bpm with poor R wave progression          Echocardiogram:   Results for orders placed during the hospital encounter of 01/21/22    Adult Transthoracic Echo Complete W/ Cont if Necessary Per Protocol    Interpretation Summary  · Left ventricular ejection fraction appears to be 26 - 30%.  · The right ventricular cavity is moderately dilated.  · Moderate mitral valve regurgitation is present.  · Moderate tricuspid valve regurgitation is present.  · No pericardial effusion noted      Imaging  Chest X-ray:   Imaging Results (Last 24 Hours)     ** No results found for the last 24 hours. **          Lab Review: I have reviewed the labs              [email or web address]                  Raoul Tirado MD  2/11/2022, 15:48 EST      EMR Dragon/Transcription:   Dictated utilizing Dragon dictation  Much of the above report is an electronic transcription/translation of the spoken language to printed text using Dragon Software. As such, the subtleties and finesse of the spoken language may permit erroneous, or at times, nonsensical words or phrases to be inadvertently transcribed; thus changes may be made at a later date to rectify these errors.

## 2022-02-08 PROBLEM — I50.23 ACUTE ON CHRONIC HFREF (HEART FAILURE WITH REDUCED EJECTION FRACTION): Status: ACTIVE | Noted: 2022-02-08

## 2022-02-10 RX ORDER — MELOXICAM 15 MG/1
15 TABLET ORAL DAILY PRN
Qty: 90 TABLET | Refills: 1 | Status: ON HOLD | OUTPATIENT
Start: 2022-02-10 | End: 2022-06-07

## 2022-02-10 RX ORDER — FLASH GLUCOSE SENSOR
1 KIT MISCELLANEOUS
Qty: 6 EACH | Refills: 1 | Status: SHIPPED | OUTPATIENT
Start: 2022-02-10 | End: 2022-09-26

## 2022-02-10 RX ORDER — INSULIN GLARGINE 100 [IU]/ML
60 INJECTION, SOLUTION SUBCUTANEOUS DAILY
Qty: 20 PEN | Refills: 1 | Status: SHIPPED | OUTPATIENT
Start: 2022-02-10 | End: 2022-08-02

## 2022-02-14 ENCOUNTER — HOSPITAL ENCOUNTER (OUTPATIENT)
Dept: GENERAL RADIOLOGY | Facility: HOSPITAL | Age: 62
Discharge: HOME | End: 2022-02-14

## 2022-02-14 ENCOUNTER — LAB (OUTPATIENT)
Dept: LAB | Facility: HOSPITAL | Age: 62
End: 2022-02-14

## 2022-02-14 DIAGNOSIS — I42.9 CARDIOMYOPATHY, UNSPECIFIED TYPE: ICD-10-CM

## 2022-02-14 DIAGNOSIS — Z79.4 TYPE 2 DIABETES MELLITUS WITHOUT COMPLICATION, WITH LONG-TERM CURRENT USE OF INSULIN: ICD-10-CM

## 2022-02-14 DIAGNOSIS — E66.01 MORBID OBESITY WITH BMI OF 40.0-44.9, ADULT: ICD-10-CM

## 2022-02-14 DIAGNOSIS — E11.9 TYPE 2 DIABETES MELLITUS WITHOUT COMPLICATION, WITH LONG-TERM CURRENT USE OF INSULIN: ICD-10-CM

## 2022-02-14 DIAGNOSIS — I50.23 ACUTE ON CHRONIC HFREF (HEART FAILURE WITH REDUCED EJECTION FRACTION): ICD-10-CM

## 2022-02-14 LAB
ANION GAP SERPL CALCULATED.3IONS-SCNC: 9 MMOL/L (ref 5–15)
BUN SERPL-MCNC: 18 MG/DL (ref 8–23)
BUN/CREAT SERPL: 17.1 (ref 7–25)
CALCIUM SPEC-SCNC: 9.3 MG/DL (ref 8.6–10.5)
CHLORIDE SERPL-SCNC: 100 MMOL/L (ref 98–107)
CO2 SERPL-SCNC: 30 MMOL/L (ref 22–29)
CREAT SERPL-MCNC: 1.05 MG/DL (ref 0.76–1.27)
DEPRECATED RDW RBC AUTO: 42.1 FL (ref 37–54)
ERYTHROCYTE [DISTWIDTH] IN BLOOD BY AUTOMATED COUNT: 13.3 % (ref 12.3–15.4)
GFR SERPL CREATININE-BSD FRML MDRD: 87 ML/MIN/1.73
GLUCOSE SERPL-MCNC: 131 MG/DL (ref 65–99)
HCT VFR BLD AUTO: 45.2 % (ref 37.5–51)
HGB BLD-MCNC: 14.9 G/DL (ref 13–17.7)
INR PPP: 1.04 (ref 0.93–1.1)
MCH RBC QN AUTO: 28.7 PG (ref 26.6–33)
MCHC RBC AUTO-ENTMCNC: 33 G/DL (ref 31.5–35.7)
MCV RBC AUTO: 87.1 FL (ref 79–97)
PLATELET # BLD AUTO: 277 10*3/MM3 (ref 140–450)
PMV BLD AUTO: 10.4 FL (ref 6–12)
POTASSIUM SERPL-SCNC: 4.2 MMOL/L (ref 3.5–5.2)
PROTHROMBIN TIME: 11.5 SECONDS (ref 9.6–11.7)
RBC # BLD AUTO: 5.19 10*6/MM3 (ref 4.14–5.8)
SARS-COV-2 ORF1AB RESP QL NAA+PROBE: NOT DETECTED
SODIUM SERPL-SCNC: 139 MMOL/L (ref 136–145)
WBC NRBC COR # BLD: 8.2 10*3/MM3 (ref 3.4–10.8)

## 2022-02-14 PROCEDURE — U0004 COV-19 TEST NON-CDC HGH THRU: HCPCS

## 2022-02-14 PROCEDURE — 85610 PROTHROMBIN TIME: CPT

## 2022-02-14 PROCEDURE — C9803 HOPD COVID-19 SPEC COLLECT: HCPCS

## 2022-02-14 PROCEDURE — 36415 COLL VENOUS BLD VENIPUNCTURE: CPT

## 2022-02-14 PROCEDURE — 80048 BASIC METABOLIC PNL TOTAL CA: CPT

## 2022-02-14 PROCEDURE — 71046 X-RAY EXAM CHEST 2 VIEWS: CPT

## 2022-02-14 PROCEDURE — 85027 COMPLETE CBC AUTOMATED: CPT

## 2022-02-16 ENCOUNTER — HOSPITAL ENCOUNTER (OUTPATIENT)
Facility: HOSPITAL | Age: 62
Setting detail: HOSPITAL OUTPATIENT SURGERY
Discharge: HOME | End: 2022-02-16
Attending: INTERNAL MEDICINE | Admitting: INTERNAL MEDICINE

## 2022-02-16 VITALS
HEART RATE: 88 BPM | OXYGEN SATURATION: 100 % | WEIGHT: 315 LBS | TEMPERATURE: 98.8 F | BODY MASS INDEX: 41.75 KG/M2 | SYSTOLIC BLOOD PRESSURE: 121 MMHG | DIASTOLIC BLOOD PRESSURE: 71 MMHG | RESPIRATION RATE: 11 BRPM | HEIGHT: 73 IN

## 2022-02-16 DIAGNOSIS — I42.9 CARDIOMYOPATHY, UNSPECIFIED TYPE: ICD-10-CM

## 2022-02-16 DIAGNOSIS — Z79.4 TYPE 2 DIABETES MELLITUS WITHOUT COMPLICATION, WITH LONG-TERM CURRENT USE OF INSULIN: ICD-10-CM

## 2022-02-16 DIAGNOSIS — I50.23 ACUTE ON CHRONIC HFREF (HEART FAILURE WITH REDUCED EJECTION FRACTION): ICD-10-CM

## 2022-02-16 DIAGNOSIS — E66.01 MORBID OBESITY WITH BMI OF 40.0-44.9, ADULT: ICD-10-CM

## 2022-02-16 DIAGNOSIS — E11.9 TYPE 2 DIABETES MELLITUS WITHOUT COMPLICATION, WITH LONG-TERM CURRENT USE OF INSULIN: ICD-10-CM

## 2022-02-16 LAB — GLUCOSE BLDC GLUCOMTR-MCNC: 98 MG/DL (ref 70–105)

## 2022-02-16 PROCEDURE — 0 IOPAMIDOL PER 1 ML: Performed by: INTERNAL MEDICINE

## 2022-02-16 PROCEDURE — 93458 L HRT ARTERY/VENTRICLE ANGIO: CPT | Performed by: INTERNAL MEDICINE

## 2022-02-16 PROCEDURE — C1894 INTRO/SHEATH, NON-LASER: HCPCS | Performed by: INTERNAL MEDICINE

## 2022-02-16 PROCEDURE — C1760 CLOSURE DEV, VASC: HCPCS | Performed by: INTERNAL MEDICINE

## 2022-02-16 PROCEDURE — 99152 MOD SED SAME PHYS/QHP 5/>YRS: CPT | Performed by: INTERNAL MEDICINE

## 2022-02-16 PROCEDURE — 25010000002 FENTANYL CITRATE (PF) 100 MCG/2ML SOLUTION: Performed by: INTERNAL MEDICINE

## 2022-02-16 PROCEDURE — C1769 GUIDE WIRE: HCPCS | Performed by: INTERNAL MEDICINE

## 2022-02-16 PROCEDURE — 82962 GLUCOSE BLOOD TEST: CPT

## 2022-02-16 PROCEDURE — 25010000002 MIDAZOLAM PER 1 MG: Performed by: INTERNAL MEDICINE

## 2022-02-16 RX ORDER — SODIUM CHLORIDE 9 MG/ML
30 INJECTION, SOLUTION INTRAVENOUS CONTINUOUS
Status: DISCONTINUED | OUTPATIENT
Start: 2022-02-16 | End: 2022-02-16

## 2022-02-16 RX ORDER — SODIUM CHLORIDE 9 MG/ML
100 INJECTION, SOLUTION INTRAVENOUS CONTINUOUS
Status: DISCONTINUED | OUTPATIENT
Start: 2022-02-16 | End: 2022-02-16 | Stop reason: SDUPTHER

## 2022-02-16 RX ORDER — MIDAZOLAM HYDROCHLORIDE 1 MG/ML
INJECTION INTRAMUSCULAR; INTRAVENOUS AS NEEDED
Status: DISCONTINUED | OUTPATIENT
Start: 2022-02-16 | End: 2022-02-16 | Stop reason: HOSPADM

## 2022-02-16 RX ORDER — FENTANYL CITRATE 50 UG/ML
INJECTION, SOLUTION INTRAMUSCULAR; INTRAVENOUS AS NEEDED
Status: DISCONTINUED | OUTPATIENT
Start: 2022-02-16 | End: 2022-02-16 | Stop reason: HOSPADM

## 2022-02-16 RX ORDER — LIDOCAINE HYDROCHLORIDE 20 MG/ML
INJECTION, SOLUTION INFILTRATION; PERINEURAL AS NEEDED
Status: DISCONTINUED | OUTPATIENT
Start: 2022-02-16 | End: 2022-02-16 | Stop reason: HOSPADM

## 2022-02-16 RX ORDER — ACETAMINOPHEN 325 MG/1
650 TABLET ORAL EVERY 4 HOURS PRN
Status: CANCELLED | OUTPATIENT
Start: 2022-02-16

## 2022-02-16 RX ADMIN — SODIUM CHLORIDE 30 ML/HR: 0.9 INJECTION, SOLUTION INTRAVENOUS at 13:46

## 2022-02-16 NOTE — DISCHARGE INSTRUCTIONS
Post Cath Instructions    Prescriptions given?  ***   Admission Meds Returned? ***  Medication Sheets given? ***   Smoking Cessation Info Given ***  Education Booklet *** given? ***    Call Dr. Tirado’s office to schedule a follow up appointment  at [phone].  Specific Physician Instructions:     1) Drink plenty of fluids for the next 24 hours.  This helps to eliminate the dye used in your procedure through urination.  You may resume a normal diet; however, try to avoid foods that would cause gas or constipation.    2) Sedative medication given to you during your catheterization may decrease your judgement and reaction time for up to 24-48 hours.  Therefore:  a. DO NOT drive or operate hazardous machinery (48 hours)  b. DO NOT consume alcoholic beverages  c. DO NOT make any important/legal decisions  d. Have someone stay with you for at least 24 hours    3) To allow proper healing and prevent bleeding, the following activities are to be strictly avoided for the next 24-48 hours:  a. Excessive bending at wound site  b. Straining (anything that would tense up muscles around the affected puncture site)  c. Lifting objects greater than 5 pounds, pushing, or pulling for 5 days    i. For Groin Cases:  1. Refrain from sexual activity  2. Refrain from running or vigorous walking  3. No prolonged sitting or standing  4. Limit stair climbing as much as possible    4) Keep the puncture site clean and dry.  You may remove the dressing tomorrow and replace it with a band-aid for at least one additional day.  Gently clean the site with mild soap and water.  No scrubbing/rubbing and lightly pat the area dry.  Showers are acceptable; however, avoid submerging in water (tub baths, hot tubs, swimming pools, dishwater, etc…) for at least one week.  The site should be completely healed before resuming these activities to reduce the risk of infection.  Check the site often.  Watch for signs and symptoms of infection and notify  your physician if any of the following occur:  a. Bleeding or an increase in swelling at the puncture site  b. Fever  c. Increased soreness around puncture site  d. Foul odor or significant drainage from the puncture site  e. Swelling, redness, or warmth at the puncture site    **A bruise or small “pea sized” lump under the skin at the puncture site is not unusual.  This should disappear within 3-4 weeks.**  5) CONTACT YOUR PHYSICIAN OR CALL 911 IF YOU EXPERIENCE ANY OF THE FOLLOWING:  a. Increased angina (chest pain) or frequent sensations of pressure, burning, pain, or other discomfort in the chest, arm, jaws, or stomach  b. Lightheadedness, dizziness, faint feeling, sweating, or difficulty breathing  c. Odd sensation changes like numbness, tingling, coldness, or pain in the arm or leg in which the catheter was inserted  d. Limb in which the catheter was inserted becomes pale/bluish in color    IMPORTANT:  Although this occurs very rarely, if you should develop bright red or excessive bleeding, feel a “pop” inside at the insertion site, or notice a sudden increase in swelling larger than a walnut, you should call 911.  Hold continuous firm pressure to the access site until emergency personnel arrive.  It is best if someone else can do this for you.

## 2022-03-03 DIAGNOSIS — E11.42 TYPE 2 DIABETES MELLITUS WITH DIABETIC POLYNEUROPATHY, WITH LONG-TERM CURRENT USE OF INSULIN: ICD-10-CM

## 2022-03-03 DIAGNOSIS — Z79.4 TYPE 2 DIABETES MELLITUS WITH DIABETIC POLYNEUROPATHY, WITH LONG-TERM CURRENT USE OF INSULIN: ICD-10-CM

## 2022-03-08 ENCOUNTER — TELEPHONE (OUTPATIENT)
Dept: FAMILY MEDICINE CLINIC | Facility: CLINIC | Age: 62
End: 2022-03-08

## 2022-03-09 RX ORDER — BLOOD SUGAR DIAGNOSTIC
STRIP MISCELLANEOUS
Qty: 100 EACH | Refills: 11 | Status: SHIPPED | OUTPATIENT
Start: 2022-03-09 | End: 2023-02-24 | Stop reason: SDUPTHER

## 2022-03-16 ENCOUNTER — OFFICE VISIT (OUTPATIENT)
Dept: CARDIOLOGY | Facility: CLINIC | Age: 62
End: 2022-03-16

## 2022-03-16 VITALS
HEART RATE: 76 BPM | SYSTOLIC BLOOD PRESSURE: 119 MMHG | HEIGHT: 73 IN | BODY MASS INDEX: 41.75 KG/M2 | OXYGEN SATURATION: 96 % | DIASTOLIC BLOOD PRESSURE: 80 MMHG | WEIGHT: 315 LBS

## 2022-03-16 DIAGNOSIS — I11.0 HYPERTENSIVE HEART DISEASE WITH CHRONIC COMBINED SYSTOLIC AND DIASTOLIC CONGESTIVE HEART FAILURE: ICD-10-CM

## 2022-03-16 DIAGNOSIS — Z79.4 TYPE 2 DIABETES MELLITUS WITHOUT COMPLICATION, WITH LONG-TERM CURRENT USE OF INSULIN: ICD-10-CM

## 2022-03-16 DIAGNOSIS — E66.01 MORBID OBESITY WITH BMI OF 40.0-44.9, ADULT: ICD-10-CM

## 2022-03-16 DIAGNOSIS — I42.0 DILATED CARDIOMYOPATHY: ICD-10-CM

## 2022-03-16 DIAGNOSIS — E11.9 TYPE 2 DIABETES MELLITUS WITHOUT COMPLICATION, WITH LONG-TERM CURRENT USE OF INSULIN: ICD-10-CM

## 2022-03-16 DIAGNOSIS — I50.42 HYPERTENSIVE HEART DISEASE WITH CHRONIC COMBINED SYSTOLIC AND DIASTOLIC CONGESTIVE HEART FAILURE: ICD-10-CM

## 2022-03-16 DIAGNOSIS — I50.22 CHRONIC SYSTOLIC HEART FAILURE: ICD-10-CM

## 2022-03-16 DIAGNOSIS — I50.22 CHRONIC HFREF (HEART FAILURE WITH REDUCED EJECTION FRACTION): Primary | ICD-10-CM

## 2022-03-16 PROCEDURE — 99214 OFFICE O/P EST MOD 30 MIN: CPT | Performed by: INTERNAL MEDICINE

## 2022-03-16 RX ORDER — DAPAGLIFLOZIN 10 MG/1
10 TABLET, FILM COATED ORAL DAILY
Qty: 90 TABLET | Refills: 3 | Status: SHIPPED | OUTPATIENT
Start: 2022-03-16 | End: 2023-02-23

## 2022-03-16 RX ORDER — FUROSEMIDE 80 MG
80 TABLET ORAL 4 TIMES DAILY
Qty: 360 TABLET | Refills: 3 | Status: SHIPPED | OUTPATIENT
Start: 2022-03-16

## 2022-03-16 NOTE — PROGRESS NOTES
Subjective:     Encounter Date:03/16/2022      Patient ID: Sorin Allen is a 61 y.o. male.    Chief Complaint : Follow-up for CHF, cardiomyopathy, hypertensive cardiovascular disease, obesity, dyslipidemia  History of Present Illness          Mr. Sorin Allen has PMH of    Hypertensive cardiovascular disease with chronic diastolic CHF  Chronic HFrEF due to systolic dysfunction from dilated cardiomyopathy  Cardiac cath 2/16/2022 EF of 15% no obstructive CAD  Dyslipidemia  Hypertension  Obesity with BMI over 40  ESAU/CPAP  ED  Rheumatic fever as a child  Former smoker quit 3/6/2000  Family history mother has leukemia    Here for follow-up.  Patient reportedly had cardiac cath 8 years ago was told he has cardiomyopathy and CHF.  Underwent repeat cath 2/16/2022 which revealed EF of 15% no obstructive CAD..  Patient is complaining of substernal chest pain, shortness of breath and dyspnea on exertion.  Patient's arterial blood pressure is 119/80, heart rate 76, O2 sat of 96% on room air.  Review of records reveal labs from 1/4/2022 with a proBNP 1548, CMP with a glucose of 110, hemoglobin A1c 6.7, lipid profile with cholesterol 187, triglycerides 92, HDL low at 35, .    EKG done 11/14/2021 reviewed/interpreted by me reveals sinus rhythm with rate of 95 bpm with poor R wave progression    Echocardiogram 5/1/2020 revealed normal LV systolic function EF 60% with diastolic dysfunction and RV enlargement and mild aortic stenosis  Echocardiogram 1/21/2022 revealed EF of 26 to 30%    Assessment:  :     chronic HFrEF due to combined hypertensive cardiovascular disease and systolic dysfunction from dilated cardiomyopathy EF of 15%  Dilated cardiomyopathy  Hypertension  Diabetes  Obesity with BMI over 40      Recommendations / Plan:        Reviewed cardiac cath results with patient.  Will continue Lasix to 80 4 times  daily add Entresto continue Toprol and aspirin.  We will add Farxiga.  We will check echocardiogram  to see if there is any improvement in LV function if not we will consider ICD once patient is on optimal medical therapy.  Reviewed BMI over 40, patient would benefit from weight loss diet and exercise.  Reviewed records.  Patient had an echo 1/21/2022 which showed EF of 26 to 30% with RV enlargement moderate MR and TR.  We will follow-up closely and consider further evaluation treatment.  Add farxiga  Follow 1 month and check echo and labs          Procedures cardiac cath 2/16/2022 performed and interpreted by me reveals dilated cardiomyopathy EF of 15%    The following portions of the patient's history were reviewed and updated as appropriate: allergies, current medications, past family history, past medical history, past social history, past surgical history and problem list.    Assessment:         [facility]     Diagnosis Plan   1. Chronic HFrEF (heart failure with reduced ejection fraction) ([facility])  Adult Transthoracic Echo Complete W/ Cont if Necessary Per Protocol    Basic Metabolic Panel    BNP    furosemide (LASIX) 80 MG tablet   2. Chronic systolic heart failure ([facility])  Adult Transthoracic Echo Complete W/ Cont if Necessary Per Protocol    Basic Metabolic Panel    BNP    furosemide (LASIX) 80 MG tablet    Records requested.  Declined beta-blocker and ACE inhibitor.  Encouraged low-salt diet.  Only willing to take Lasix.   3. Dilated cardiomyopathy ([facility])  Adult Transthoracic Echo Complete W/ Cont if Necessary Per Protocol    Basic Metabolic Panel    BNP    furosemide (LASIX) 80 MG tablet   4. Hypertensive heart disease with chronic combined systolic and diastolic congestive heart failure ([facility])  Adult Transthoracic Echo Complete W/ Cont if Necessary Per Protocol    Basic Metabolic Panel    BNP    furosemide (LASIX) 80 MG tablet   5. Type 2 diabetes mellitus without complication, with long-term current use of insulin ([facility])     6. Morbid obesity with BMI of 40.0-44.9, adult ([facility])            Plan:               Past  Medical History:  Past Medical History:   Diagnosis Date   • Arthritis    • Asthma    • Cardiac disease    • Carpal tunnel syndrome    • Congenital heart disease    • CPAP (continuous positive airway pressure) dependence    • Diabetes mellitus (HCC)    • Diverticulitis of colon    • Erectile dysfunction    • Heart disease    • Heart valve disease    • Hyperlipidemia    • Hypertension    • Neuromuscular disorder (HCC)    • ESAU (obstructive sleep apnea)    • Osteoarthritis      Past Surgical History:  Past Surgical History:   Procedure Laterality Date   • CARDIAC CATHETERIZATION N/A 2/16/2022    Procedure: Left Heart Cath;  Surgeon: Raoul Tirado MD;  Location: [facility] CATH INVASIVE LOCATION;  Service: Cardiovascular;  Laterality: N/A;   • HERNIA REPAIR        Allergies:  No Known Allergies  Home Meds:  Current Meds:     Current Outpatient Medications:   •  furosemide (LASIX) 80 MG tablet, Take 1 tablet by mouth 4 (Four) Times a Day., Disp: 360 tablet, Rfl: 3  •  ACCU-CHEK SOFTCLIX LANCETS lancets, by Other route. Use as instructed, Disp: , Rfl:   •  acetaminophen (TYLENOL) 500 MG tablet, Take 2 tablets by mouth Every 8 (Eight) Hours As Needed for Moderate Pain  (arthritic pain)., Disp: 180 tablet, Rfl: 5  •  aspirin (aspirin) 81 MG EC tablet, Take 1 tablet by mouth Daily., Disp: 90 tablet, Rfl: 3  •  Blood Glucose Calibration liquid, by In Vitro route., Disp: , Rfl:   •  Blood Glucose Monitoring Suppl (ACCU-CHEK ALEKS PLUS) w/Device kit, Use to test blood sugar twice a day, Disp: , Rfl:   •  Blood Glucose Monitoring Suppl (Accu-Chek Guide) w/Device kit, 1 kit 3 (Three) Times a Day. Use glucometer to check blood sugars 3 times a day. Dx. E11.42, Disp: 1 kit, Rfl: 0  •  Continuous Blood Gluc  (FreeStyle Torie 14 Day Adolphus) device, 1 Units 3 (Three) Times a Day Before Meals. Use continuous glucose monitor to monitor glucose regularly., Disp: 1 each, Rfl: 0  •  Continuous Blood Gluc Sensor (FreeStyle  Torie 14 Day Sensor) misc, 1 Units Every 14 (Fourteen) Days. Use continuous glucose monitor to monitor glucose regularly., Disp: 6 each, Rfl: 1  •  Dapagliflozin Propanediol (Farxiga) 10 MG tablet, Take 10 mg by mouth Daily., Disp: 90 tablet, Rfl: 3  •  gabapentin (NEURONTIN) 100 MG capsule, TAKE 1 CAPSULE BY MOUTH THREE TIMES A DAY (Patient taking differently: Take 100 mg by mouth 3 (Three) Times a Day As Needed.), Disp: 90 capsule, Rfl: 2  •  glucose blood (Accu-Chek Sigrid Plus) test strip, USE TESTING STRIP TO CHECK BLOOD SUGARS 3 TIMES A DAY. DX. E11.42, Disp: 100 each, Rfl: 11  •  Insulin Glargine (Lantus SoloStar) 100 UNIT/ML injection pen, Inject 60 Units under the skin into the appropriate area as directed Daily., Disp: 20 pen, Rfl: 1  •  Insulin Pen Needle 32G X 4 MM misc, Use pen needle to deliver insulin subcutaneously daily.  Dx. E11.42, Disp: 30 each, Rfl: 5  •  meloxicam (MOBIC) 15 MG tablet, TAKE 1 TABLET BY MOUTH DAILY AS NEEDED FOR MODERATE PAIN . TAKE WITH FOOD!, Disp: 90 tablet, Rfl: 1  •  metoprolol succinate XL (TOPROL-XL) 25 MG 24 hr tablet, Take 1 tablet by mouth Daily., Disp: 90 tablet, Rfl: 3  •  potassium chloride ER (K-TAB) 20 MEQ tablet controlled-release ER tablet, Take 2 tablets by mouth Daily., Disp: 180 tablet, Rfl: 1  •  sacubitril-valsartan (ENTRESTO) 24-26 MG tablet, Take 1 tablet by mouth 2 (Two) Times a Day., Disp: 60 tablet, Rfl: 3  Social History:   Social History     Tobacco Use   • Smoking status: Former Smoker     Packs/day: 2.00     Years: 20.00     Pack years: 40.00     Quit date: 3/6/2000     Years since quittin.0   • Smokeless tobacco: Never Used   Substance Use Topics   • Alcohol use: Yes     Comment: rare      Family History:  Family History   Problem Relation Age of Onset   • Leukemia Mother    • Cancer Sister    • Cancer Maternal Grandmother               Review of Systems   Constitutional: Positive for malaise/fatigue.   Cardiovascular: Positive for chest pain  "and leg swelling. Negative for palpitations.   Respiratory: Positive for shortness of breath.    Skin: Negative for rash.   Neurological: Negative for dizziness, light-headedness and numbness.     All other systems are negative         Objective:     Physical Exam  /80   Pulse 76   Ht 185.4 cm (73\")   Wt (!) 149 kg (329 lb)   SpO2 96%   BMI 43.41 kg/m²   General:  Appears in no acute distress, pleasant obese  Eyes: Sclera is anicteric,  conjunctiva is clear   HEENT:  No JVD.  No carotid bruits  Respiratory: Respirations regular and unlabored at rest.  Clear to auscultation  Cardiovascular: S1,S2 Regular rate and rhythm. No murmur, rub or gallop auscultated.   Extremities: No digital clubbing or cyanosis, trace to 1+ edema  Skin: Color pink. Skin warm and dry to touch. No rashes  No xanthoma  Neuro: Alert and awake.    Lab Reviewed:         Raoul Tirado MD  3/16/2022 10:36 EDT      Much of the above report is an electronic transcription/translation of the spoken language to printed text using Dragon Software. As such, the subtleties and finesse of the spoken language may permit erroneous, or at times, nonsensical words or phrases to be inadvertently transcribed; thus changes may be made at a later date to rectify these errors.         "

## 2022-04-06 ENCOUNTER — HOSPITAL ENCOUNTER (OUTPATIENT)
Dept: CARDIOLOGY | Facility: HOSPITAL | Age: 62
Discharge: HOME | End: 2022-04-06
Admitting: INTERNAL MEDICINE

## 2022-04-06 VITALS
HEIGHT: 73 IN | SYSTOLIC BLOOD PRESSURE: 135 MMHG | WEIGHT: 315 LBS | BODY MASS INDEX: 41.75 KG/M2 | DIASTOLIC BLOOD PRESSURE: 64 MMHG

## 2022-04-06 DIAGNOSIS — I50.22 CHRONIC HFREF (HEART FAILURE WITH REDUCED EJECTION FRACTION): ICD-10-CM

## 2022-04-06 DIAGNOSIS — I50.22 CHRONIC SYSTOLIC HEART FAILURE: ICD-10-CM

## 2022-04-06 DIAGNOSIS — I11.0 HYPERTENSIVE HEART DISEASE WITH CHRONIC COMBINED SYSTOLIC AND DIASTOLIC CONGESTIVE HEART FAILURE: ICD-10-CM

## 2022-04-06 DIAGNOSIS — I42.0 DILATED CARDIOMYOPATHY: ICD-10-CM

## 2022-04-06 DIAGNOSIS — I50.42 HYPERTENSIVE HEART DISEASE WITH CHRONIC COMBINED SYSTOLIC AND DIASTOLIC CONGESTIVE HEART FAILURE: ICD-10-CM

## 2022-04-06 PROCEDURE — 93306 TTE W/DOPPLER COMPLETE: CPT | Performed by: INTERNAL MEDICINE

## 2022-04-06 PROCEDURE — 93306 TTE W/DOPPLER COMPLETE: CPT

## 2022-04-07 LAB
BH CV ECHO MEAS - ACS: 2.05 CM
BH CV ECHO MEAS - AO MAX PG: 9.8 MMHG
BH CV ECHO MEAS - AO MEAN PG: 5.7 MMHG
BH CV ECHO MEAS - AO ROOT DIAM: 3.5 CM
BH CV ECHO MEAS - AO V2 MAX: 156.4 CM/SEC
BH CV ECHO MEAS - AO V2 VTI: 28.7 CM
BH CV ECHO MEAS - AVA(I,D): 1.2 CM2
BH CV ECHO MEAS - EDV(CUBED): 271.1 ML
BH CV ECHO MEAS - EDV(MOD-SP2): 182.8 ML
BH CV ECHO MEAS - EDV(MOD-SP4): 205.1 ML
BH CV ECHO MEAS - EF(MOD-SP2): 34.3 %
BH CV ECHO MEAS - EF(MOD-SP4): 32.2 %
BH CV ECHO MEAS - ESV(CUBED): 200.2 ML
BH CV ECHO MEAS - ESV(MOD-SP2): 120 ML
BH CV ECHO MEAS - ESV(MOD-SP4): 139.1 ML
BH CV ECHO MEAS - FS: 9.6 %
BH CV ECHO MEAS - IVS/LVPW: 1.49 CM
BH CV ECHO MEAS - IVSD: 1.43 CM
BH CV ECHO MEAS - LA DIMENSION: 6.2 CM
BH CV ECHO MEAS - LV MASS(C)D: 355.7 GRAMS
BH CV ECHO MEAS - LV MAX PG: 1.03 MMHG
BH CV ECHO MEAS - LV MEAN PG: 0.57 MMHG
BH CV ECHO MEAS - LV V1 MAX: 50.7 CM/SEC
BH CV ECHO MEAS - LV V1 VTI: 9.2 CM
BH CV ECHO MEAS - LVIDD: 6.5 CM
BH CV ECHO MEAS - LVIDS: 5.8 CM
BH CV ECHO MEAS - LVOT AREA: 3.8 CM2
BH CV ECHO MEAS - LVOT DIAM: 2.19 CM
BH CV ECHO MEAS - LVPWD: 0.96 CM
BH CV ECHO MEAS - MR MAX PG: 63.9 MMHG
BH CV ECHO MEAS - MR MAX VEL: 399.7 CM/SEC
BH CV ECHO MEAS - MV A MAX VEL: 30.7 CM/SEC
BH CV ECHO MEAS - MV DEC SLOPE: 518.7 CM/SEC2
BH CV ECHO MEAS - MV DEC TIME: 0.17 MSEC
BH CV ECHO MEAS - MV E MAX VEL: 88.9 CM/SEC
BH CV ECHO MEAS - MV E/A: 2.9
BH CV ECHO MEAS - MV MAX PG: 3.1 MMHG
BH CV ECHO MEAS - MV MEAN PG: 1.6 MMHG
BH CV ECHO MEAS - MV V2 VTI: 19.9 CM
BH CV ECHO MEAS - MVA(VTI): 1.74 CM2
BH CV ECHO MEAS - PA ACC TIME: 0.1 SEC
BH CV ECHO MEAS - PA PR(ACCEL): 32.3 MMHG
BH CV ECHO MEAS - PA V2 MAX: 70.1 CM/SEC
BH CV ECHO MEAS - RAP SYSTOLE: 8 MMHG
BH CV ECHO MEAS - RV MAX PG: 1.63 MMHG
BH CV ECHO MEAS - RV V1 MAX: 63.8 CM/SEC
BH CV ECHO MEAS - RV V1 VTI: 11.1 CM
BH CV ECHO MEAS - RVDD: 4.3 CM
BH CV ECHO MEAS - RVSP: 53.7 MMHG
BH CV ECHO MEAS - SV(LVOT): 34.5 ML
BH CV ECHO MEAS - SV(MOD-SP2): 62.7 ML
BH CV ECHO MEAS - SV(MOD-SP4): 66 ML
BH CV ECHO MEAS - TR MAX PG: 45.7 MMHG
BH CV ECHO MEAS - TR MAX VEL: 337.4 CM/SEC
MAXIMAL PREDICTED HEART RATE: 158 BPM
STRESS TARGET HR: 134 BPM

## 2022-04-11 ENCOUNTER — LAB (OUTPATIENT)
Dept: LAB | Facility: HOSPITAL | Age: 62
End: 2022-04-11

## 2022-04-11 DIAGNOSIS — I42.0 DILATED CARDIOMYOPATHY: ICD-10-CM

## 2022-04-11 DIAGNOSIS — I50.42 HYPERTENSIVE HEART DISEASE WITH CHRONIC COMBINED SYSTOLIC AND DIASTOLIC CONGESTIVE HEART FAILURE: ICD-10-CM

## 2022-04-11 DIAGNOSIS — I50.22 CHRONIC HFREF (HEART FAILURE WITH REDUCED EJECTION FRACTION): ICD-10-CM

## 2022-04-11 DIAGNOSIS — I11.0 HYPERTENSIVE HEART DISEASE WITH CHRONIC COMBINED SYSTOLIC AND DIASTOLIC CONGESTIVE HEART FAILURE: ICD-10-CM

## 2022-04-11 DIAGNOSIS — I50.22 CHRONIC SYSTOLIC HEART FAILURE: ICD-10-CM

## 2022-04-11 LAB
ANION GAP SERPL CALCULATED.3IONS-SCNC: 13.4 MMOL/L (ref 5–15)
BUN SERPL-MCNC: 19 MG/DL (ref 8–23)
BUN/CREAT SERPL: 14.5 (ref 7–25)
CALCIUM SPEC-SCNC: 9.7 MG/DL (ref 8.6–10.5)
CHLORIDE SERPL-SCNC: 98 MMOL/L (ref 98–107)
CO2 SERPL-SCNC: 28.6 MMOL/L (ref 22–29)
CREAT SERPL-MCNC: 1.31 MG/DL (ref 0.76–1.27)
EGFRCR SERPLBLD CKD-EPI 2021: 61.5 ML/MIN/1.73
GLUCOSE SERPL-MCNC: 107 MG/DL (ref 65–99)
NT-PROBNP SERPL-MCNC: 907 PG/ML (ref 0–900)
POTASSIUM SERPL-SCNC: 4.4 MMOL/L (ref 3.5–5.2)
SODIUM SERPL-SCNC: 140 MMOL/L (ref 136–145)

## 2022-04-11 PROCEDURE — 36415 COLL VENOUS BLD VENIPUNCTURE: CPT

## 2022-04-11 PROCEDURE — 80048 BASIC METABOLIC PNL TOTAL CA: CPT

## 2022-04-11 PROCEDURE — 83880 ASSAY OF NATRIURETIC PEPTIDE: CPT

## 2022-04-13 ENCOUNTER — OFFICE VISIT (OUTPATIENT)
Dept: FAMILY MEDICINE CLINIC | Facility: CLINIC | Age: 62
End: 2022-04-13

## 2022-04-13 VITALS
WEIGHT: 315 LBS | TEMPERATURE: 98.6 F | HEART RATE: 80 BPM | OXYGEN SATURATION: 97 % | BODY MASS INDEX: 41.75 KG/M2 | HEIGHT: 73 IN | DIASTOLIC BLOOD PRESSURE: 76 MMHG | SYSTOLIC BLOOD PRESSURE: 117 MMHG

## 2022-04-13 DIAGNOSIS — E66.01 MORBID (SEVERE) OBESITY DUE TO EXCESS CALORIES: ICD-10-CM

## 2022-04-13 DIAGNOSIS — Z00.00 ENCOUNTER FOR MEDICARE ANNUAL WELLNESS EXAM: ICD-10-CM

## 2022-04-13 DIAGNOSIS — I50.23 HYPERTENSIVE HEART DISEASE WITH ACUTE ON CHRONIC SYSTOLIC CONGESTIVE HEART FAILURE: Primary | ICD-10-CM

## 2022-04-13 DIAGNOSIS — M15.9 PRIMARY OSTEOARTHRITIS INVOLVING MULTIPLE JOINTS: ICD-10-CM

## 2022-04-13 DIAGNOSIS — I11.0 HYPERTENSIVE HEART DISEASE WITH ACUTE ON CHRONIC SYSTOLIC CONGESTIVE HEART FAILURE: Primary | ICD-10-CM

## 2022-04-13 DIAGNOSIS — G47.33 OSA ON CPAP: ICD-10-CM

## 2022-04-13 DIAGNOSIS — E11.69 MIXED DIABETIC HYPERLIPIDEMIA ASSOCIATED WITH TYPE 2 DIABETES MELLITUS: ICD-10-CM

## 2022-04-13 DIAGNOSIS — E78.2 MIXED DIABETIC HYPERLIPIDEMIA ASSOCIATED WITH TYPE 2 DIABETES MELLITUS: ICD-10-CM

## 2022-04-13 DIAGNOSIS — E11.42 TYPE 2 DIABETES MELLITUS WITH DIABETIC POLYNEUROPATHY, WITH LONG-TERM CURRENT USE OF INSULIN: ICD-10-CM

## 2022-04-13 DIAGNOSIS — Z12.11 ENCOUNTER FOR SCREENING FOR MALIGNANT NEOPLASM OF COLON: ICD-10-CM

## 2022-04-13 DIAGNOSIS — Z99.89 OSA ON CPAP: ICD-10-CM

## 2022-04-13 DIAGNOSIS — Z79.4 TYPE 2 DIABETES MELLITUS WITH DIABETIC POLYNEUROPATHY, WITH LONG-TERM CURRENT USE OF INSULIN: ICD-10-CM

## 2022-04-13 PROCEDURE — G0439 PPPS, SUBSEQ VISIT: HCPCS | Performed by: FAMILY MEDICINE

## 2022-04-13 PROCEDURE — 1170F FXNL STATUS ASSESSED: CPT | Performed by: FAMILY MEDICINE

## 2022-04-13 PROCEDURE — 1160F RVW MEDS BY RX/DR IN RCRD: CPT | Performed by: FAMILY MEDICINE

## 2022-04-13 NOTE — PROGRESS NOTES
The ABCs of the Annual Wellness Visit  Subsequent Medicare Wellness Visit    Chief Complaint   Patient presents with   • Medicare Wellness-subsequent      Subjective    History of Present Illness:  Sorin Allen is a 61 y.o. male who presents for a Subsequent Medicare Wellness Visit.    The following portions of the patient's history were reviewed and   updated as appropriate: allergies, current medications, past family history, past medical history, past social history, past surgical history and problem list.    Compared to one year ago, the patient feels his physical   health is worse.    Compared to one year ago, the patient feels his mental   health is worse.    Recent Hospitalizations:  He was admitted within the past 365 days at [facility].       Current Medical Providers:  Patient Care Team:  Natalie Steward MD as PCP - General (Family Medicine)  Raoul Tirado MD as Consulting Physician (Cardiology)    Outpatient Medications Prior to Visit   Medication Sig Dispense Refill   • ACCU-CHEK SOFTCLIX LANCETS lancets by Other route. Use as instructed     • acetaminophen (TYLENOL) 500 MG tablet Take 2 tablets by mouth Every 8 (Eight) Hours As Needed for Moderate Pain  (arthritic pain). 180 tablet 5   • aspirin (aspirin) 81 MG EC tablet Take 1 tablet by mouth Daily. 90 tablet 3   • Blood Glucose Calibration liquid by In Vitro route.     • Blood Glucose Monitoring Suppl (ACCU-CHEK ALEKS PLUS) w/Device kit Use to test blood sugar twice a day     • Blood Glucose Monitoring Suppl (Accu-Chek Guide) w/Device kit 1 kit 3 (Three) Times a Day. Use glucometer to check blood sugars 3 times a day. Dx. E11.42 1 kit 0   • Continuous Blood Gluc  (FreeStyle Otrie 14 Day [place]) device 1 Units 3 (Three) Times a Day Before Meals. Use continuous glucose monitor to monitor glucose regularly. 1 each 0   • Continuous Blood Gluc Sensor (FreeStyle Torie 14 Day Sensor) misc 1 Units Every 14 (Fourteen) Days.  Use continuous glucose monitor to monitor glucose regularly. 6 each 1   • Dapagliflozin Propanediol (Farxiga) 10 MG tablet Take 10 mg by mouth Daily. 90 tablet 3   • furosemide (LASIX) 80 MG tablet Take 1 tablet by mouth 4 (Four) Times a Day. 360 tablet 3   • gabapentin (NEURONTIN) 100 MG capsule TAKE 1 CAPSULE BY MOUTH THREE TIMES A DAY (Patient taking differently: Take 100 mg by mouth 3 (Three) Times a Day As Needed.) 90 capsule 2   • glucose blood (Accu-Chek Sigrid Plus) test strip USE TESTING STRIP TO CHECK BLOOD SUGARS 3 TIMES A DAY. DX. E11.42 100 each 11   • Insulin Glargine (Lantus SoloStar) 100 UNIT/ML injection pen Inject 60 Units under the skin into the appropriate area as directed Daily. 20 pen 1   • Insulin Pen Needle 32G X 4 MM misc Use pen needle to deliver insulin subcutaneously daily.  Dx. E11.42 30 each 5   • meloxicam (MOBIC) 15 MG tablet TAKE 1 TABLET BY MOUTH DAILY AS NEEDED FOR MODERATE PAIN . TAKE WITH FOOD! 90 tablet 1   • metoprolol succinate XL (TOPROL-XL) 25 MG 24 hr tablet Take 1 tablet by mouth Daily. 90 tablet 3   • potassium chloride ER (K-TAB) 20 MEQ tablet controlled-release ER tablet Take 2 tablets by mouth Daily. 180 tablet 1   • sacubitril-valsartan (ENTRESTO) 24-26 MG tablet Take 1 tablet by mouth 2 (Two) Times a Day. 60 tablet 3     No facility-administered medications prior to visit.       No opioid medication identified on active medication list. I have reviewed chart for other potential  high risk medication/s and harmful drug interactions in the elderly.          Aspirin is on active medication list. Aspirin use is indicated based on review of current medical condition/s. Pros and cons of this therapy have been discussed today. Benefits of this medication outweigh potential harm.  Patient has been encouraged to continue taking this medication.  .      Patient Active Problem List   Diagnosis   • Morbid (severe) obesity due to excess calories (HCC)   • Hypertensive heart  "disease with acute on chronic systolic congestive heart failure (HCC)   • Dilated cardiomyopathy (HCC)   • Type 2 diabetes mellitus with diabetic polyneuropathy, with long-term current use of insulin (HCC)   • Primary osteoarthritis involving multiple joints   • Primary osteoarthritis of left knee   • Statin declined   • Mixed diabetic hyperlipidemia associated with type 2 diabetes mellitus (HCC)   • Chest pain   • ESAU on CPAP   • Acute on chronic HFrEF (heart failure with reduced ejection fraction) (HCC)     Advance Care Planning  Advance Directive is not on file.  ACP discussion was held with the patient during this visit. Patient does not have an advance directive, information provided.          Objective    Vitals:    04/13/22 0922   BP: 117/76   BP Location: Right arm   Patient Position: Sitting   Cuff Size: Large Adult   Pulse: 80   Temp: 98.6 °F (37 °C)   TempSrc: Oral   SpO2: 97%   Weight: (!) 150 kg (330 lb)   Height: 185.4 cm (73\")   PainSc: 0-No pain     BMI Readings from Last 1 Encounters:   04/13/22 43.54 kg/m²   BMI is above normal parameters. Recommendations include: educational material, exercise counseling and nutrition counseling    Does the patient have evidence of cognitive impairment? Patient refused evaluation    Physical Exam  Vitals reviewed.   Constitutional:       General: He is not in acute distress.     Appearance: Normal appearance. He is morbidly obese.   HENT:      Head: Normocephalic and atraumatic.   Cardiovascular:      Rate and Rhythm: Normal rate.      Heart sounds: Normal heart sounds.   Pulmonary:      Effort: Pulmonary effort is normal.      Breath sounds: Normal breath sounds.   Musculoskeletal:         General: Swelling (left ankle) present.      Right lower leg: No edema.      Left lower leg: No edema.   Neurological:      Mental Status: He is alert and oriented to person, place, and time.   Psychiatric:         Mood and Affect: Mood normal.         Behavior: Behavior " normal.                 HEALTH RISK ASSESSMENT    Smoking Status:  Social History     Tobacco Use   Smoking Status Former Smoker   • Packs/day: 2.00   • Years: 20.00   • Pack years: 40.00   • Quit date: 3/6/2000   • Years since quittin.1   Smokeless Tobacco Never Used     Alcohol Consumption:  Social History     Substance and Sexual Activity   Alcohol Use Yes    Comment: rare     Fall Risk Screen:    NATALYA Fall Risk Assessment was completed, and patient is at LOW risk for falls.Assessment completed on:2022    Depression Screening:  PHQ-2/PHQ-9 Depression Screening 2022   Retired PHQ-9 Total Score -   Retired Total Score -   Little Interest or Pleasure in Doing Things 0-->not at all   Feeling Down, Depressed or Hopeless 1-->several days   PHQ-9: Brief Depression Severity Measure Score 1       Health Habits and Functional and Cognitive Screening:  Functional & Cognitive Status 2022   Do you have difficulty preparing food and eating? No   Do you have difficulty bathing yourself, getting dressed or grooming yourself? Yes   Do you have difficulty using the toilet? No   Do you have difficulty moving around from place to place? No   Do you have trouble with steps or getting out of a bed or a chair? Yes   Current Diet Well Balanced Diet   Dental Exam Not up to date   Eye Exam Not up to date   Exercise (times per week) 0 times per week   Current Exercises Include No Regular Exercise   Do you need help using the phone?  No   Are you deaf or do you have serious difficulty hearing?  No   Do you need help with transportation? No   Do you need help shopping? No   Do you need help preparing meals?  No   Do you need help with housework?  No   Do you need help with laundry? No   Do you need help taking your medications? Yes   Do you need help managing money? No   Do you ever drive or ride in a car without wearing a seat belt? No   Have you felt unusual stress, anger or loneliness in the last month? Yes   Who do  you live with? Spouse   If you need help, do you have trouble finding someone available to you? No   Have you been bothered in the last four weeks by sexual problems? No   Do you have difficulty concentrating, remembering or making decisions? No       Age-appropriate Screening Schedule:  Refer to the list below for future screening recommendations based on patient's age, sex and/or medical conditions. Orders for these recommended tests are listed in the plan section. The patient has been provided with a written plan.    Health Maintenance   Topic Date Due   • ZOSTER VACCINE (1 of 2) Never done   • DIABETIC EYE EXAM  Never done   • DIABETIC FOOT EXAM  12/16/2021   • TDAP/TD VACCINES (1 - Tdap) 01/04/2023 (Originally 11/11/1979)   • URINE MICROALBUMIN  06/25/2022   • HEMOGLOBIN A1C  07/04/2022   • INFLUENZA VACCINE  08/01/2022   • LIPID PANEL  01/04/2023              Assessment/Plan   CMS Preventative Services Quick Reference  Risk Factors Identified During Encounter  Cardiovascular Disease  Dementia/Memory   Depression/Dysphoria  Fall Risk-High or Moderate  Immunizations Discussed/Encouraged (specific Immunizations; Tdap and Shingrix  Inactivity/Sedentary  Obesity/Overweight   The above risks/problems have been discussed with the patient.  Follow up actions/plans if indicated are seen below in the Assessment/Plan Section.  Pertinent information has been shared with the patient in the After Visit Summary.    Problem List Items Addressed This Visit        Cardiac and Vasculature    Hypertensive heart disease with acute on chronic systolic congestive heart failure (HCC) - Primary    Overview     Echocardiogram in April 2022 revealed severe global left ventricular hypokinesis with LVEF 20%, severe left atrial and right ventricle enlargement. RVSP calculated at 60 mmHg consistent with pulmonary hypertension.  BP at goal, <140/90.  Continue Entresto 24-26 mg, Toprol 25 mg, and Farxiga 10 mg daily. Continue Lasix 80 mg QID  & KCl 40 mEq for symptomatic benefit.    Consider starting spironolactone and AICD.  Reviewed LDL goal.  Declines statin.  Followed by cardiology.            Mixed diabetic hyperlipidemia associated with type 2 diabetes mellitus (HCC)    Overview     Reviewed lipid panel, LDL goal, & 10-year ASCVD risk score.  Encouraged a diet rich in vegetables & 150 minutes of exercise weekly.  Declined statin.              Endocrine and Metabolic    Morbid (severe) obesity due to excess calories (HCC)    Overview     Body mass index is 43.54 kg/m². on 04/13/22.  Discussed health risks associated with obesity.  Encouraged diet rich in vegetables and 150 minutes of exercise weekly.           Type 2 diabetes mellitus with diabetic polyneuropathy, with long-term current use of insulin ([facility])    Overview     HbA1c at goal, <7%.  Monitoring regularly.  Monitoring renal function.  Continue Entresto and Farxiga for renal protection.  Decrease Basaglar to 55 units daily and Farxiga 10 mg daily.  Continue gabapentin PRN for pain.  Eye exam due. Report requested.              Musculoskeletal and Injuries    Primary osteoarthritis involving multiple joints    Overview     Advised < 3g acetaminophen/day hours PRN.  Encouraged regular exercise and stretching.  Discussed risk of worsen heart disease and kidney disease with NSAID use.              Sleep    ESAU on CPAP    Overview     Reports benefit and compliance with CPAP nightly.           Relevant Orders    Ambulatory Referral to Sleep Medicine      Other Visit Diagnoses     Encounter for screening for malignant neoplasm of colon        Relevant Orders    Cologuard - Stool, Per Rectum    Encounter for Medicare annual wellness exam                Follow Up:   Return in about 3 months (around 7/13/2022) for Recheck heart failure & DM II.     An After Visit Summary and PPPS were made available to the patient.    {Optional Chart Navigation Links Wrapup  Review (Popup)  Advance Care Planning   Labs  CC  Problem List  Visit Diagnosis  Medications  Result Review  Imaging  [facility]  BestPractice  SmartSets  SnapShot  Encounters  Notes  Media  Procedures :23}           Adult Transthoracic Echo Complete W/ Cont if Necessary Per Protocol (04/06/2022 10:53)  Cardiac Catheterization/Vascular Study (02/16/2022 17:11)  BNP (04/11/2022 08:37)  Basic Metabolic Panel (04/11/2022 08:37)  CBC (No Diff) (02/14/2022 09:20)  Hemoglobin A1c (01/04/2022 09:02)  Lipid panel (01/04/2022 09:02)  Comprehensive metabolic panel (01/04/2022 09:02)

## 2022-04-19 ENCOUNTER — OFFICE VISIT (OUTPATIENT)
Dept: CARDIOLOGY | Facility: CLINIC | Age: 62
End: 2022-04-19

## 2022-04-19 VITALS
DIASTOLIC BLOOD PRESSURE: 81 MMHG | SYSTOLIC BLOOD PRESSURE: 119 MMHG | BODY MASS INDEX: 41.75 KG/M2 | WEIGHT: 315 LBS | HEART RATE: 74 BPM | OXYGEN SATURATION: 96 % | HEIGHT: 73 IN

## 2022-04-19 DIAGNOSIS — I11.0 HYPERTENSIVE HEART DISEASE WITH CHRONIC COMBINED SYSTOLIC AND DIASTOLIC CONGESTIVE HEART FAILURE: ICD-10-CM

## 2022-04-19 DIAGNOSIS — I42.0 DILATED CARDIOMYOPATHY: ICD-10-CM

## 2022-04-19 DIAGNOSIS — E66.01 MORBID OBESITY WITH BMI OF 40.0-44.9, ADULT: ICD-10-CM

## 2022-04-19 DIAGNOSIS — I10 ESSENTIAL HYPERTENSION: ICD-10-CM

## 2022-04-19 DIAGNOSIS — I50.42 HYPERTENSIVE HEART DISEASE WITH CHRONIC COMBINED SYSTOLIC AND DIASTOLIC CONGESTIVE HEART FAILURE: ICD-10-CM

## 2022-04-19 DIAGNOSIS — E11.9 TYPE 2 DIABETES MELLITUS WITHOUT COMPLICATION, WITH LONG-TERM CURRENT USE OF INSULIN: ICD-10-CM

## 2022-04-19 DIAGNOSIS — Z79.4 INSULIN-REQUIRING OR DEPENDENT TYPE II DIABETES MELLITUS: ICD-10-CM

## 2022-04-19 DIAGNOSIS — I50.22 CHRONIC HFREF (HEART FAILURE WITH REDUCED EJECTION FRACTION): Primary | ICD-10-CM

## 2022-04-19 DIAGNOSIS — Z79.4 TYPE 2 DIABETES MELLITUS WITHOUT COMPLICATION, WITH LONG-TERM CURRENT USE OF INSULIN: ICD-10-CM

## 2022-04-19 DIAGNOSIS — E11.9 INSULIN-REQUIRING OR DEPENDENT TYPE II DIABETES MELLITUS: ICD-10-CM

## 2022-04-19 PROCEDURE — 99214 OFFICE O/P EST MOD 30 MIN: CPT | Performed by: INTERNAL MEDICINE

## 2022-04-19 NOTE — PROGRESS NOTES
Subjective:     Encounter Date:04/19/2022      Patient ID: Sorin Allen is a 61 y.o. male.    Chief Complaint : Follow-up for CHF, cardiomyopathy, hypertensive cardiovascular disease, obesity, dyslipidemia  History of Present Illness        Mr. Sorin Allen has PMH of    Hypertensive cardiovascular disease with chronic diastolic CHF  Chronic HFrEF due to systolic dysfunction from dilated cardiomyopathy  Cardiac cath 2/16/2022 EF of 15% no obstructive CAD  Dyslipidemia  Hypertension  Obesity with BMI over 40  ESAU/CPAP  ED  Rheumatic fever as a child  Former smoker quit 3/6/2000  Family history mother has leukemia    Here for follow-up.  Patient denies any chest pain.  Has class I dyspnea on exertion.  Patient's arterial blood pressure is 119/81, heart rate 74, O2 sat of 96% on room air.  BMI is over 40.  Patient reportedly had cardiac cath 8 years ago was told he has cardiomyopathy and CHF.  Underwent repeat cath 2/16/2022 which revealed EF of 15% no obstructive CAD..  Patient had repeat echocardiogram 4/6/2022 which is revealing persistent LV dysfunction with LVEF of 20% with severe pulmonary hypertension PA systolic pressure of 60 mmHg.  Review of records reveal labs from 1/4/2022 with a proBNP 1548, CMP with a glucose of 110, hemoglobin A1c 6.7, lipid profile with cholesterol 187, triglycerides 92, HDL low at 35, .    EKG done 11/14/2021 reviewed/interpreted by me reveals sinus rhythm with rate of 95 bpm with poor R wave progression    Echocardiogram 5/1/2020 revealed normal LV systolic function EF 60% with diastolic dysfunction and RV enlargement and mild aortic stenosis  Echocardiogram 1/21/2022 revealed EF of 26 to 30%  Repeat echo 4/6/2022 is revealing EF of 20% with severe pulmonary hypertension PA systolic pressure of 60 mmHg    Assessment:  :     chronic HFrEF due to combined hypertensive cardiovascular disease and systolic dysfunction from dilated cardiomyopathy EF of 15-20%  Dilated  cardiomyopathy  Hypertension  Diabetes  Obesity with BMI over 40      Recommendations / Plan:        We will continue medical management with aspirin, Farxiga, Entresto, furosemide, metoprolol  Patient is continuing to have heart failure on guideline directed medical therapy and optimal medical therapy.  We will schedule for ICD.  Shared decision making was done with patient.  Patient wants to wait till summer when his family is off from school to get the procedure done.  Risk benefits alternatives explained.  Follow-up with PMD for diabetes care.    Procedures cardiac cath 2/16/2022 performed and interpreted by me reveals dilated cardiomyopathy EF of 15%    Procedures see echo cath EKG in HPI.  The following portions of the patient's history were reviewed and updated as appropriate: allergies, current medications, past family history, past medical history, past social history, past surgical history and problem list.    Assessment:         [facility]     Diagnosis Plan   1. Chronic HFrEF (heart failure with reduced ejection fraction) (HCC)     2. Hypertensive heart disease with chronic combined systolic and diastolic congestive heart failure (HCC)     3. Dilated cardiomyopathy (HCC)     4. Type 2 diabetes mellitus without complication, with long-term current use of insulin (HCC)     5. Morbid obesity with BMI of 40.0-44.9, adult (HCC)     6. Insulin-requiring or dependent type II diabetes mellitus (HCC)     7. Essential hypertension            Plan:               Past Medical History:  Past Medical History:   Diagnosis Date   • Arthritis    • Asthma    • Cardiac disease    • Carpal tunnel syndrome    • Congenital heart disease    • CPAP (continuous positive airway pressure) dependence    • Diabetes mellitus (HCC)    • Diverticulitis of colon    • Erectile dysfunction    • Heart disease    • Heart valve disease    • Hyperlipidemia    • Hypertension    • Neuromuscular disorder (HCC)    • ESAU (obstructive sleep apnea)    •  Osteoarthritis      Past Surgical History:  Past Surgical History:   Procedure Laterality Date   • CARDIAC CATHETERIZATION N/A 2/16/2022    Procedure: Left Heart Cath;  Surgeon: Raoul Tirado MD;  Location: [facility] CATH INVASIVE LOCATION;  Service: Cardiovascular;  Laterality: N/A;   • HERNIA REPAIR        Allergies:  No Known Allergies  Home Meds:  Current Meds:     Current Outpatient Medications:   •  ACCU-CHEK SOFTCLIX LANCETS lancets, by Other route. Use as instructed, Disp: , Rfl:   •  acetaminophen (TYLENOL) 500 MG tablet, Take 2 tablets by mouth Every 8 (Eight) Hours As Needed for Moderate Pain  (arthritic pain)., Disp: 180 tablet, Rfl: 5  •  aspirin (aspirin) 81 MG EC tablet, Take 1 tablet by mouth Daily., Disp: 90 tablet, Rfl: 3  •  Blood Glucose Calibration liquid, by In Vitro route., Disp: , Rfl:   •  Blood Glucose Monitoring Suppl (ACCU-CHEK ALEKS PLUS) w/Device kit, Use to test blood sugar twice a day, Disp: , Rfl:   •  Blood Glucose Monitoring Suppl (Accu-Chek Guide) w/Device kit, 1 kit 3 (Three) Times a Day. Use glucometer to check blood sugars 3 times a day. Dx. E11.42, Disp: 1 kit, Rfl: 0  •  Continuous Blood Gluc  (FreeStyle Torie 14 Day [place]) device, 1 Units 3 (Three) Times a Day Before Meals. Use continuous glucose monitor to monitor glucose regularly., Disp: 1 each, Rfl: 0  •  Continuous Blood Gluc Sensor (FreeStyle Torie 14 Day Sensor) misc, 1 Units Every 14 (Fourteen) Days. Use continuous glucose monitor to monitor glucose regularly., Disp: 6 each, Rfl: 1  •  Dapagliflozin Propanediol (Farxiga) 10 MG tablet, Take 10 mg by mouth Daily., Disp: 90 tablet, Rfl: 3  •  furosemide (LASIX) 80 MG tablet, Take 1 tablet by mouth 4 (Four) Times a Day., Disp: 360 tablet, Rfl: 3  •  gabapentin (NEURONTIN) 100 MG capsule, TAKE 1 CAPSULE BY MOUTH THREE TIMES A DAY (Patient taking differently: Take 100 mg by mouth 3 (Three) Times a Day As Needed.), Disp: 90 capsule, Rfl: 2  •  glucose  "blood (Accu-Chek Sigrid Plus) test strip, USE TESTING STRIP TO CHECK BLOOD SUGARS 3 TIMES A DAY. DX. E11.42, Disp: 100 each, Rfl: 11  •  Insulin Glargine (Lantus SoloStar) 100 UNIT/ML injection pen, Inject 60 Units under the skin into the appropriate area as directed Daily., Disp: 20 pen, Rfl: 1  •  Insulin Pen Needle 32G X 4 MM misc, Use pen needle to deliver insulin subcutaneously daily.  Dx. E11.42, Disp: 30 each, Rfl: 5  •  meloxicam (MOBIC) 15 MG tablet, TAKE 1 TABLET BY MOUTH DAILY AS NEEDED FOR MODERATE PAIN . TAKE WITH FOOD!, Disp: 90 tablet, Rfl: 1  •  metoprolol succinate XL (TOPROL-XL) 25 MG 24 hr tablet, Take 1 tablet by mouth Daily., Disp: 90 tablet, Rfl: 3  •  potassium chloride ER (K-TAB) 20 MEQ tablet controlled-release ER tablet, Take 2 tablets by mouth Daily., Disp: 180 tablet, Rfl: 1  •  sacubitril-valsartan (ENTRESTO) 24-26 MG tablet, Take 1 tablet by mouth 2 (Two) Times a Day., Disp: 60 tablet, Rfl: 3  Social History:   Social History     Tobacco Use   • Smoking status: Former Smoker     Packs/day: 2.00     Years: 20.00     Pack years: 40.00     Quit date: 3/6/2000     Years since quittin.1   • Smokeless tobacco: Never Used   Substance Use Topics   • Alcohol use: Yes     Comment: rare      Family History:  Family History   Problem Relation Age of Onset   • Leukemia Mother    • Cancer Sister    • Cancer Maternal Grandmother               Review of Systems   Cardiovascular: Negative for chest pain, leg swelling and palpitations.   Respiratory: Positive for shortness of breath.    Neurological: Negative for dizziness and numbness.     All other systems are negative         Objective:     Physical Exam  /81 (BP Location: Right arm, Patient Position: Sitting, Cuff Size: Large Adult)   Pulse 74   Ht 185.4 cm (73\")   Wt (!) 150 kg (330 lb)   SpO2 96%   BMI 43.54 kg/m²   General:  Appears in no acute distress, pleasant obese  Eyes: Sclera is anicteric,  conjunctiva is clear   HEENT:  No " JVD.  No carotid bruits  Respiratory: Respirations regular and unlabored at rest.  Clear to auscultation  Cardiovascular: S1,S2 Regular rate and rhythm. No murmur, rub or gallop auscultated.   Extremities: No digital clubbing or cyanosis, no edema  Skin: Color pink. Skin warm and dry to touch. No rashes  No xanthoma  Neuro: Alert and awake.    Lab Reviewed:         Raoul Tirado MD  4/19/2022 23:03 EDT      Much of the above report is an electronic transcription/translation of the spoken language to printed text using Dragon Software. As such, the subtleties and finesse of the spoken language may permit erroneous, or at times, nonsensical words or phrases to be inadvertently transcribed; thus changes may be made at a later date to rectify these errors.

## 2022-04-29 PROBLEM — I10 ESSENTIAL HYPERTENSION: Status: ACTIVE | Noted: 2022-04-29

## 2022-05-03 DIAGNOSIS — R19.5 POSITIVE COLORECTAL CANCER SCREENING USING COLOGUARD TEST: Primary | ICD-10-CM

## 2022-05-04 ENCOUNTER — TELEPHONE (OUTPATIENT)
Dept: FAMILY MEDICINE CLINIC | Facility: CLINIC | Age: 62
End: 2022-05-04

## 2022-05-04 NOTE — TELEPHONE ENCOUNTER
Caller: SANDER    Relationship to patient: [employer]    Best call back number:     Patient is needing: THEY FAXED OVER AN ABNORMAL COLOGUARD RESULTS

## 2022-05-31 RX ORDER — SACUBITRIL AND VALSARTAN 24; 26 MG/1; MG/1
TABLET, FILM COATED ORAL
Qty: 180 TABLET | Refills: 3 | Status: SHIPPED | OUTPATIENT
Start: 2022-05-31 | End: 2023-02-28 | Stop reason: SDUPTHER

## 2022-05-31 NOTE — TELEPHONE ENCOUNTER
Rx Refill Note  Requested Prescriptions     Pending Prescriptions Disp Refills   • Entresto 24-26 MG tablet [Pharmacy Med Name: ENTRESTO 24 MG-26 MG TABLET] 180 tablet 3     Sig: TAKE 1 TABLET BY MOUTH TWICE A DAY      Last office visit with prescribing clinician: 4/19/2022      Next office visit with prescribing clinician: 7/12/2022            Niki Zamarripa MA  05/31/22, 08:17 EDT

## 2022-06-01 ENCOUNTER — TELEPHONE (OUTPATIENT)
Dept: CARDIOLOGY | Facility: CLINIC | Age: 62
End: 2022-06-01

## 2022-06-01 NOTE — TELEPHONE ENCOUNTER
Patient is scheduled for ICD implant on 6/7. Insurance is requiring a letter or cardiac note stating the patient's heart failure [facility] score before they will proceed with the approval process.

## 2022-06-04 DIAGNOSIS — I50.22 CHRONIC SYSTOLIC HEART FAILURE: ICD-10-CM

## 2022-06-04 RX ORDER — POTASSIUM CHLORIDE 1500 MG/1
TABLET, FILM COATED, EXTENDED RELEASE ORAL
Qty: 180 TABLET | Refills: 1 | Status: SHIPPED | OUTPATIENT
Start: 2022-06-04 | End: 2022-07-12

## 2022-06-06 DIAGNOSIS — I50.22 CHRONIC HFREF (HEART FAILURE WITH REDUCED EJECTION FRACTION): ICD-10-CM

## 2022-06-06 DIAGNOSIS — I50.42 HYPERTENSIVE HEART DISEASE WITH CHRONIC COMBINED SYSTOLIC AND DIASTOLIC CONGESTIVE HEART FAILURE: Primary | ICD-10-CM

## 2022-06-06 DIAGNOSIS — I11.0 HYPERTENSIVE HEART DISEASE WITH CHRONIC COMBINED SYSTOLIC AND DIASTOLIC CONGESTIVE HEART FAILURE: Primary | ICD-10-CM

## 2022-06-06 DIAGNOSIS — I50.22 CHRONIC SYSTOLIC HEART FAILURE: ICD-10-CM

## 2022-06-06 DIAGNOSIS — I10 ESSENTIAL HYPERTENSION: ICD-10-CM

## 2022-06-06 DIAGNOSIS — I25.119 CORONARY ARTERY DISEASE INVOLVING NATIVE CORONARY ARTERY OF NATIVE HEART WITH ANGINA PECTORIS: ICD-10-CM

## 2022-06-07 ENCOUNTER — LAB (OUTPATIENT)
Dept: LAB | Facility: HOSPITAL | Age: 62
End: 2022-06-07

## 2022-06-07 ENCOUNTER — HOSPITAL ENCOUNTER (OUTPATIENT)
Facility: HOSPITAL | Age: 62
Discharge: HOME | End: 2022-06-08
Attending: INTERNAL MEDICINE | Admitting: INTERNAL MEDICINE

## 2022-06-07 ENCOUNTER — HOSPITAL ENCOUNTER (OUTPATIENT)
Dept: GENERAL RADIOLOGY | Facility: HOSPITAL | Age: 62
Discharge: HOME | End: 2022-06-07

## 2022-06-07 ENCOUNTER — APPOINTMENT (OUTPATIENT)
Dept: GENERAL RADIOLOGY | Facility: HOSPITAL | Age: 62
End: 2022-06-07

## 2022-06-07 DIAGNOSIS — Z79.4 INSULIN-REQUIRING OR DEPENDENT TYPE II DIABETES MELLITUS: ICD-10-CM

## 2022-06-07 DIAGNOSIS — Z79.4 TYPE 2 DIABETES MELLITUS WITHOUT COMPLICATION, WITH LONG-TERM CURRENT USE OF INSULIN: ICD-10-CM

## 2022-06-07 DIAGNOSIS — I50.42 HYPERTENSIVE HEART DISEASE WITH CHRONIC COMBINED SYSTOLIC AND DIASTOLIC CONGESTIVE HEART FAILURE: ICD-10-CM

## 2022-06-07 DIAGNOSIS — I11.0 HYPERTENSIVE HEART DISEASE WITH CHRONIC COMBINED SYSTOLIC AND DIASTOLIC CONGESTIVE HEART FAILURE: ICD-10-CM

## 2022-06-07 DIAGNOSIS — I50.22 CHRONIC HFREF (HEART FAILURE WITH REDUCED EJECTION FRACTION): ICD-10-CM

## 2022-06-07 DIAGNOSIS — E11.9 INSULIN-REQUIRING OR DEPENDENT TYPE II DIABETES MELLITUS: ICD-10-CM

## 2022-06-07 DIAGNOSIS — I10 ESSENTIAL HYPERTENSION: ICD-10-CM

## 2022-06-07 DIAGNOSIS — E66.01 MORBID OBESITY WITH BMI OF 40.0-44.9, ADULT: ICD-10-CM

## 2022-06-07 DIAGNOSIS — E11.9 TYPE 2 DIABETES MELLITUS WITHOUT COMPLICATION, WITH LONG-TERM CURRENT USE OF INSULIN: ICD-10-CM

## 2022-06-07 DIAGNOSIS — I42.0 DILATED CARDIOMYOPATHY: ICD-10-CM

## 2022-06-07 PROBLEM — E11.42 TYPE 2 DIABETES MELLITUS WITH DIABETIC POLYNEUROPATHY, WITH LONG-TERM CURRENT USE OF INSULIN: Chronic | Status: ACTIVE | Noted: 2020-03-06

## 2022-06-07 LAB
ANION GAP SERPL CALCULATED.3IONS-SCNC: 11 MMOL/L (ref 5–15)
ANION GAP SERPL CALCULATED.3IONS-SCNC: 11 MMOL/L (ref 5–15)
BASOPHILS # BLD AUTO: 0 10*3/MM3 (ref 0–0.2)
BASOPHILS NFR BLD AUTO: 0.6 % (ref 0–1.5)
BUN SERPL-MCNC: 16 MG/DL (ref 8–23)
BUN SERPL-MCNC: 16 MG/DL (ref 8–23)
BUN/CREAT SERPL: 15.8 (ref 7–25)
BUN/CREAT SERPL: 16.8 (ref 7–25)
CALCIUM SPEC-SCNC: 8.7 MG/DL (ref 8.6–10.5)
CALCIUM SPEC-SCNC: 9 MG/DL (ref 8.6–10.5)
CHLORIDE SERPL-SCNC: 100 MMOL/L (ref 98–107)
CHLORIDE SERPL-SCNC: 99 MMOL/L (ref 98–107)
CO2 SERPL-SCNC: 29 MMOL/L (ref 22–29)
CO2 SERPL-SCNC: 29 MMOL/L (ref 22–29)
CREAT SERPL-MCNC: 0.95 MG/DL (ref 0.76–1.27)
CREAT SERPL-MCNC: 1.01 MG/DL (ref 0.76–1.27)
DEPRECATED RDW RBC AUTO: 47.7 FL (ref 37–54)
DEPRECATED RDW RBC AUTO: 49.4 FL (ref 37–54)
EGFRCR SERPLBLD CKD-EPI 2021: 84.1 ML/MIN/1.73
EGFRCR SERPLBLD CKD-EPI 2021: 90.5 ML/MIN/1.73
EOSINOPHIL # BLD AUTO: 0.3 10*3/MM3 (ref 0–0.4)
EOSINOPHIL NFR BLD AUTO: 4 % (ref 0.3–6.2)
ERYTHROCYTE [DISTWIDTH] IN BLOOD BY AUTOMATED COUNT: 15.5 % (ref 12.3–15.4)
ERYTHROCYTE [DISTWIDTH] IN BLOOD BY AUTOMATED COUNT: 16.1 % (ref 12.3–15.4)
GLUCOSE SERPL-MCNC: 103 MG/DL (ref 65–99)
GLUCOSE SERPL-MCNC: 81 MG/DL (ref 65–99)
HCT VFR BLD AUTO: 43.4 % (ref 37.5–51)
HCT VFR BLD AUTO: 45 % (ref 37.5–51)
HGB BLD-MCNC: 14.6 G/DL (ref 13–17.7)
HGB BLD-MCNC: 15 G/DL (ref 13–17.7)
INR PPP: 1.05 (ref 0.93–1.1)
INR PPP: 1.06 (ref 0.93–1.1)
LYMPHOCYTES # BLD AUTO: 2.4 10*3/MM3 (ref 0.7–3.1)
LYMPHOCYTES NFR BLD AUTO: 32.2 % (ref 19.6–45.3)
MCH RBC QN AUTO: 29.1 PG (ref 26.6–33)
MCH RBC QN AUTO: 29.3 PG (ref 26.6–33)
MCHC RBC AUTO-ENTMCNC: 33.2 G/DL (ref 31.5–35.7)
MCHC RBC AUTO-ENTMCNC: 33.6 G/DL (ref 31.5–35.7)
MCV RBC AUTO: 87.2 FL (ref 79–97)
MCV RBC AUTO: 87.6 FL (ref 79–97)
MONOCYTES # BLD AUTO: 0.6 10*3/MM3 (ref 0.1–0.9)
MONOCYTES NFR BLD AUTO: 8 % (ref 5–12)
NEUTROPHILS NFR BLD AUTO: 4.1 10*3/MM3 (ref 1.7–7)
NEUTROPHILS NFR BLD AUTO: 55.2 % (ref 42.7–76)
NRBC BLD AUTO-RTO: 0.1 /100 WBC (ref 0–0.2)
PLATELET # BLD AUTO: 219 10*3/MM3 (ref 140–450)
PLATELET # BLD AUTO: 225 10*3/MM3 (ref 140–450)
PMV BLD AUTO: 7.5 FL (ref 6–12)
PMV BLD AUTO: 7.5 FL (ref 6–12)
POTASSIUM SERPL-SCNC: 4.1 MMOL/L (ref 3.5–5.2)
POTASSIUM SERPL-SCNC: 4.1 MMOL/L (ref 3.5–5.2)
PROTHROMBIN TIME: 10.8 SECONDS (ref 9.6–11.7)
PROTHROMBIN TIME: 10.9 SECONDS (ref 9.6–11.7)
RBC # BLD AUTO: 4.97 10*6/MM3 (ref 4.14–5.8)
RBC # BLD AUTO: 5.14 10*6/MM3 (ref 4.14–5.8)
SARS-COV-2 RNA PNL SPEC NAA+PROBE: NOT DETECTED
SODIUM SERPL-SCNC: 139 MMOL/L (ref 136–145)
SODIUM SERPL-SCNC: 140 MMOL/L (ref 136–145)
WBC NRBC COR # BLD: 7.4 10*3/MM3 (ref 3.4–10.8)
WBC NRBC COR # BLD: 8.3 10*3/MM3 (ref 3.4–10.8)

## 2022-06-07 PROCEDURE — 71046 X-RAY EXAM CHEST 2 VIEWS: CPT

## 2022-06-07 PROCEDURE — C1894 INTRO/SHEATH, NON-LASER: HCPCS | Performed by: INTERNAL MEDICINE

## 2022-06-07 PROCEDURE — 33249 INSJ/RPLCMT DEFIB W/LEAD(S): CPT | Performed by: INTERNAL MEDICINE

## 2022-06-07 PROCEDURE — 36415 COLL VENOUS BLD VENIPUNCTURE: CPT | Performed by: INTERNAL MEDICINE

## 2022-06-07 PROCEDURE — 25010000002 MIDAZOLAM PER 1 MG: Performed by: INTERNAL MEDICINE

## 2022-06-07 PROCEDURE — 85027 COMPLETE CBC AUTOMATED: CPT | Performed by: INTERNAL MEDICINE

## 2022-06-07 PROCEDURE — 80048 BASIC METABOLIC PNL TOTAL CA: CPT | Performed by: INTERNAL MEDICINE

## 2022-06-07 PROCEDURE — 85610 PROTHROMBIN TIME: CPT | Performed by: INTERNAL MEDICINE

## 2022-06-07 PROCEDURE — 99153 MOD SED SAME PHYS/QHP EA: CPT | Performed by: INTERNAL MEDICINE

## 2022-06-07 PROCEDURE — 0 IOPAMIDOL PER 1 ML: Performed by: INTERNAL MEDICINE

## 2022-06-07 PROCEDURE — 25010000002 FENTANYL CITRATE (PF) 100 MCG/2ML SOLUTION: Performed by: INTERNAL MEDICINE

## 2022-06-07 PROCEDURE — 85025 COMPLETE CBC W/AUTO DIFF WBC: CPT | Performed by: INTERNAL MEDICINE

## 2022-06-07 PROCEDURE — C1722 AICD, SINGLE CHAMBER: HCPCS | Performed by: INTERNAL MEDICINE

## 2022-06-07 PROCEDURE — C1895 LEAD, AICD, ENDO DUAL COIL: HCPCS | Performed by: INTERNAL MEDICINE

## 2022-06-07 PROCEDURE — C9803 HOPD COVID-19 SPEC COLLECT: HCPCS

## 2022-06-07 PROCEDURE — 87635 SARS-COV-2 COVID-19 AMP PRB: CPT

## 2022-06-07 PROCEDURE — G0378 HOSPITAL OBSERVATION PER HR: HCPCS

## 2022-06-07 PROCEDURE — 71045 X-RAY EXAM CHEST 1 VIEW: CPT

## 2022-06-07 PROCEDURE — 99152 MOD SED SAME PHYS/QHP 5/>YRS: CPT | Performed by: INTERNAL MEDICINE

## 2022-06-07 PROCEDURE — 25010000002 VANCOMYCIN 10 G RECONSTITUTED SOLUTION: Performed by: INTERNAL MEDICINE

## 2022-06-07 DEVICE — LD DEFIB OPTISURE DF4 1COIL 8F 65CM: Type: IMPLANTABLE DEVICE | Site: HEART | Status: FUNCTIONAL

## 2022-06-07 DEVICE — IMPLANTABLE DEVICE: Type: IMPLANTABLE DEVICE | Site: HEART | Status: FUNCTIONAL

## 2022-06-07 RX ORDER — SODIUM CHLORIDE 0.9 % (FLUSH) 0.9 %
3-10 SYRINGE (ML) INJECTION AS NEEDED
Status: DISCONTINUED | OUTPATIENT
Start: 2022-06-07 | End: 2022-06-08 | Stop reason: HOSPADM

## 2022-06-07 RX ORDER — ASPIRIN 81 MG/1
81 TABLET ORAL DAILY
Status: DISCONTINUED | OUTPATIENT
Start: 2022-06-08 | End: 2022-06-08 | Stop reason: HOSPADM

## 2022-06-07 RX ORDER — POTASSIUM CHLORIDE 20 MEQ/1
20 TABLET, EXTENDED RELEASE ORAL DAILY
Refills: 1 | Status: DISCONTINUED | OUTPATIENT
Start: 2022-06-08 | End: 2022-06-08 | Stop reason: HOSPADM

## 2022-06-07 RX ORDER — ACETAMINOPHEN 500 MG
1000 TABLET ORAL EVERY 8 HOURS PRN
Status: DISCONTINUED | OUTPATIENT
Start: 2022-06-07 | End: 2022-06-08 | Stop reason: HOSPADM

## 2022-06-07 RX ORDER — FENTANYL CITRATE 50 UG/ML
INJECTION, SOLUTION INTRAMUSCULAR; INTRAVENOUS AS NEEDED
Status: DISCONTINUED | OUTPATIENT
Start: 2022-06-07 | End: 2022-06-07 | Stop reason: HOSPADM

## 2022-06-07 RX ORDER — METOPROLOL SUCCINATE 25 MG/1
25 TABLET, EXTENDED RELEASE ORAL DAILY
Status: DISCONTINUED | OUTPATIENT
Start: 2022-06-07 | End: 2022-06-08 | Stop reason: HOSPADM

## 2022-06-07 RX ORDER — NALOXONE HCL 0.4 MG/ML
0.4 VIAL (ML) INJECTION
Status: DISCONTINUED | OUTPATIENT
Start: 2022-06-07 | End: 2022-06-08 | Stop reason: HOSPADM

## 2022-06-07 RX ORDER — HYDROMORPHONE HCL 110MG/55ML
0.5 PATIENT CONTROLLED ANALGESIA SYRINGE INTRAVENOUS
Status: DISCONTINUED | OUTPATIENT
Start: 2022-06-07 | End: 2022-06-08 | Stop reason: HOSPADM

## 2022-06-07 RX ORDER — GABAPENTIN 100 MG/1
100 CAPSULE ORAL 3 TIMES DAILY PRN
Status: DISCONTINUED | OUTPATIENT
Start: 2022-06-07 | End: 2022-06-08 | Stop reason: HOSPADM

## 2022-06-07 RX ORDER — SODIUM CHLORIDE 0.9 % (FLUSH) 0.9 %
3 SYRINGE (ML) INJECTION EVERY 12 HOURS SCHEDULED
Status: DISCONTINUED | OUTPATIENT
Start: 2022-06-07 | End: 2022-06-08 | Stop reason: HOSPADM

## 2022-06-07 RX ORDER — LIDOCAINE HYDROCHLORIDE AND EPINEPHRINE 10; 10 MG/ML; UG/ML
INJECTION, SOLUTION INFILTRATION; PERINEURAL AS NEEDED
Status: DISCONTINUED | OUTPATIENT
Start: 2022-06-07 | End: 2022-06-07 | Stop reason: HOSPADM

## 2022-06-07 RX ORDER — FUROSEMIDE 40 MG/1
80 TABLET ORAL 4 TIMES DAILY
Status: DISCONTINUED | OUTPATIENT
Start: 2022-06-07 | End: 2022-06-08 | Stop reason: HOSPADM

## 2022-06-07 RX ORDER — MIDAZOLAM HYDROCHLORIDE 1 MG/ML
INJECTION INTRAMUSCULAR; INTRAVENOUS AS NEEDED
Status: DISCONTINUED | OUTPATIENT
Start: 2022-06-07 | End: 2022-06-07 | Stop reason: HOSPADM

## 2022-06-07 RX ADMIN — VANCOMYCIN HYDROCHLORIDE 1500 MG: 10 INJECTION, POWDER, LYOPHILIZED, FOR SOLUTION INTRAVENOUS at 13:52

## 2022-06-07 RX ADMIN — Medication 3 ML: at 21:44

## 2022-06-07 RX ADMIN — ACETAMINOPHEN 1000 MG: 500 TABLET, FILM COATED ORAL at 18:16

## 2022-06-07 NOTE — H&P
Patient Care Team:  Natalie Steward MD as PCP - General (Family Medicine)  Raoul Tirado MD as Consulting Physician (Cardiology)    Chief complaint here for ICD    Subjective     Patient has chronic HFrEF due to dilated cardiomyopathy EF of 15% here for ICD for primary prevention    History of Present Illness  History of Present Illness          Mr. Sorin Allen has PMH of     Hypertensive cardiovascular disease with chronic diastolic CHF  Chronic HFrEF due to systolic dysfunction from dilated cardiomyopathy  Cardiac cath 2/16/2022 EF of 15% no obstructive CAD  Dyslipidemia  Hypertension  Obesity with BMI over 40  ESAU/CPAP  ED  Rheumatic fever as a child  Former smoker quit 3/6/2000  Family history mother has leukemia     Here for follow-up.  Patient denies any chest pain.  Has class I dyspnea on exertion.  Patient's arterial blood pressure is 119/81, heart rate 74, O2 sat of 96% on room air.  BMI is over 40.  Patient reportedly had cardiac cath 8 years ago was told he has cardiomyopathy and CHF.  Underwent repeat cath 2/16/2022 which revealed EF of 15% no obstructive CAD..  Patient had repeat echocardiogram 4/6/2022 which is revealing persistent LV dysfunction with LVEF of 20% with severe pulmonary hypertension PA systolic pressure of 60 mmHg.  Review of records reveal labs from 1/4/2022 with a proBNP 1548, CMP with a glucose of 110, hemoglobin A1c 6.7, lipid profile with cholesterol 187, triglycerides 92, HDL low at 35, .     EKG done 11/14/2021 reviewed/interpreted by me reveals sinus rhythm with rate of 95 bpm with poor R wave progression     Echocardiogram 5/1/2020 revealed normal LV systolic function EF 60% with diastolic dysfunction and RV enlargement and mild aortic stenosis  Echocardiogram 1/21/2022 revealed EF of 26 to 30%  Repeat echo 4/6/2022 is revealing EF of 20% with severe pulmonary hypertension PA systolic pressure of 60 mmHg     Assessment:  :      chronic class I-II,  HFrEF due to combined hypertensive cardiovascular disease and systolic dysfunction from dilated cardiomyopathy EF of 15-20%  Dilated cardiomyopathy  Hypertension  Diabetes  Obesity with BMI over 40        Recommendations / Plan:         We will continue medical management with aspirin, Farxiga, Entresto, furosemide, metoprolol  Patient is continuing to have heart failure on guideline directed medical therapy and optimal medical therapy.  We will schedule for ICD.  Shared decision making was done with patient.  Patient wants to wait till summer when his family is off from school to get the procedure done.  Risk benefits alternatives explained.  Follow-up with PMD for diabetes care.     Procedures cardiac cath 2022 performed and interpreted by me reveals dilated cardiomyopathy EF of 15%     Review of Systems   See HPI    Past Medical History:   Diagnosis Date   • Arthritis    • Asthma    • Cardiac disease    • Carpal tunnel syndrome    • Congenital heart disease    • CPAP (continuous positive airway pressure) dependence    • Diabetes mellitus (HCC)    • Diverticulitis of colon    • Erectile dysfunction    • Heart disease    • Heart valve disease    • Hyperlipidemia    • Hypertension    • Neuromuscular disorder (HCC)    • ESAU (obstructive sleep apnea)    • Osteoarthritis      Past Surgical History:   Procedure Laterality Date   • CARDIAC CATHETERIZATION N/A 2022    Procedure: Left Heart Cath;  Surgeon: Raoul Tirado MD;  Location: [facility] CATH INVASIVE LOCATION;  Service: Cardiovascular;  Laterality: N/A;   • HERNIA REPAIR       Family History   Problem Relation Age of Onset   • Leukemia Mother    • Cancer Sister    • Cancer Maternal Grandmother      Social History     Tobacco Use   • Smoking status: Former Smoker     Packs/day: 2.00     Years: 20.00     Pack years: 40.00     Quit date: 3/6/2000     Years since quittin.2   • Smokeless tobacco: Never Used   Vaping Use   • Vaping Use: Never used    Substance Use Topics   • Alcohol use: Yes     Comment: rare   • Drug use: Never     E-cigarette/Vaping   • E-cigarette/Vaping Use Never User    • Passive Exposure No      E-cigarette/Vaping Substances     Medications Prior to Admission   Medication Sig Dispense Refill Last Dose   • acetaminophen (TYLENOL) 500 MG tablet Take 2 tablets by mouth Every 8 (Eight) Hours As Needed for Moderate Pain  (arthritic pain). 180 tablet 5 6/6/2022 at Unknown time   • aspirin (aspirin) 81 MG EC tablet Take 1 tablet by mouth Daily. 90 tablet 3 6/7/2022 at Unknown time   • Dapagliflozin Propanediol (Farxiga) 10 MG tablet Take 10 mg by mouth Daily. 90 tablet 3 6/6/2022 at Unknown time   • Entresto 24-26 MG tablet TAKE 1 TABLET BY MOUTH TWICE A  tablet 3 6/7/2022 at Unknown time   • furosemide (LASIX) 80 MG tablet Take 1 tablet by mouth 4 (Four) Times a Day. 360 tablet 3 6/7/2022 at Unknown time   • gabapentin (NEURONTIN) 100 MG capsule TAKE 1 CAPSULE BY MOUTH THREE TIMES A DAY (Patient taking differently: Take 100 mg by mouth 3 (Three) Times a Day As Needed.) 90 capsule 2 6/6/2022 at Unknown time   • meloxicam (MOBIC) 15 MG tablet TAKE 1 TABLET BY MOUTH DAILY AS NEEDED FOR MODERATE PAIN . TAKE WITH FOOD! 90 tablet 1 6/6/2022 at Unknown time   • metoprolol succinate XL (TOPROL-XL) 25 MG 24 hr tablet Take 1 tablet by mouth Daily. 90 tablet 3 6/6/2022 at Unknown time   • potassium chloride ER (K-TAB) 20 MEQ tablet controlled-release ER tablet TAKE 2 TABLETS BY MOUTH EVERY  tablet 1 6/7/2022 at Unknown time   • ACCU-CHEK SOFTCLIX LANCETS lancets by Other route. Use as instructed      • Blood Glucose Calibration liquid by In Vitro route.      • Blood Glucose Monitoring Suppl (ACCU-CHEK ALEKS PLUS) w/Device kit Use to test blood sugar twice a day      • Blood Glucose Monitoring Suppl (Accu-Chek Guide) w/Device kit 1 kit 3 (Three) Times a Day. Use glucometer to check blood sugars 3 times a day. Dx. E11.42 1 kit 0    •  "Continuous Blood Gluc  (FreeStyle Torie 14 Day [place]) device 1 Units 3 (Three) Times a Day Before Meals. Use continuous glucose monitor to monitor glucose regularly. 1 each 0    • Continuous Blood Gluc Sensor (FreeStyle Torie 14 Day Sensor) misc 1 Units Every 14 (Fourteen) Days. Use continuous glucose monitor to monitor glucose regularly. 6 each 1    • glucose blood (Accu-Chek Sigrid Plus) test strip USE TESTING STRIP TO CHECK BLOOD SUGARS 3 TIMES A DAY. DX. E11.42 100 each 11    • Insulin Glargine (Lantus SoloStar) 100 UNIT/ML injection pen Inject 60 Units under the skin into the appropriate area as directed Daily. 20 pen 1    • Insulin Pen Needle 32G X 4 MM misc Use pen needle to deliver insulin subcutaneously daily.  Dx. E11.42 30 each 5      Allergies:  Patient has no known allergies.    Objective      Vital Signs  Temp:  [98.4 °F (36.9 °C)] 98.4 °F (36.9 °C)  Heart Rate:  [70] 70  Resp:  [17] 17  BP: (110-131)/(62-91) 118/71    Physical Exam     Physical Exam   /71   Pulse 70   Temp 98.4 °F (36.9 °C) (Oral)   Resp 17   Ht 182.9 cm (72\")   Wt (!) 146 kg (321 lb 6.9 oz)   SpO2 98%   BMI 43.59 kg/m²   General:  Appears in no acute distress  Eyes: Sclera is anicteric,  conjunctiva is clear   HEENT:  No JVD. Thyroid not visibly enlarged. No mucosal pallor or cyanosis  Respiratory: Respirations regular and unlabored at rest.  Bilaterally good breath sounds, with good air entry in all fields. No crackles, rubs or wheezes auscultated  Cardiovascular: S1,S2 Regular rate and rhythm. No murmur, rub or gallop auscultated.  . No pretibial pitting edema  Gastrointestinal: Abdomen nondistended, soft  Musculoskeletal:  No abnormal movements  Extremities: No digital clubbing or cyanosis  Skin: Color pink. Skin warm and dry to touch. No rashes  No xanthoma  Neuro: Alert and awake, no lateralizing deficits appreciated    Results Review:    I reviewed the patient's new clinical results.      Assessment & Plan "       Acute on chronic HFrEF (heart failure with reduced ejection fraction) (HCC)    Essential hypertension    Morbid (severe) obesity due to excess calories (HCC)    Hypertensive heart disease with acute on chronic systolic congestive heart failure (HCC)    Dilated cardiomyopathy (HCC)    Type 2 diabetes mellitus with diabetic polyneuropathy, with long-term current use of insulin (HCC)      Assessment & Plan    I discussed the patients findings and my recommendations with patient    Raoul Tirado MD  06/07/22  14:52 EDT

## 2022-06-08 ENCOUNTER — READMISSION MANAGEMENT (OUTPATIENT)
Dept: CALL CENTER | Facility: HOSPITAL | Age: 62
End: 2022-06-08

## 2022-06-08 VITALS
HEIGHT: 72 IN | BODY MASS INDEX: 42.66 KG/M2 | TEMPERATURE: 98.3 F | RESPIRATION RATE: 22 BRPM | OXYGEN SATURATION: 94 % | SYSTOLIC BLOOD PRESSURE: 123 MMHG | DIASTOLIC BLOOD PRESSURE: 62 MMHG | HEART RATE: 73 BPM | WEIGHT: 315 LBS

## 2022-06-08 LAB
GLUCOSE BLDC GLUCOMTR-MCNC: 112 MG/DL (ref 70–105)
GLUCOSE BLDC GLUCOMTR-MCNC: 119 MG/DL (ref 70–105)
GLUCOSE BLDC GLUCOMTR-MCNC: 150 MG/DL (ref 70–105)

## 2022-06-08 PROCEDURE — 82962 GLUCOSE BLOOD TEST: CPT

## 2022-06-08 PROCEDURE — G0378 HOSPITAL OBSERVATION PER HR: HCPCS

## 2022-06-08 PROCEDURE — 94660 CPAP INITIATION&MGMT: CPT

## 2022-06-08 PROCEDURE — 94799 UNLISTED PULMONARY SVC/PX: CPT

## 2022-06-08 PROCEDURE — 99024 POSTOP FOLLOW-UP VISIT: CPT | Performed by: NURSE PRACTITIONER

## 2022-06-08 PROCEDURE — 25010000002 VANCOMYCIN 10 G RECONSTITUTED SOLUTION: Performed by: INTERNAL MEDICINE

## 2022-06-08 RX ADMIN — VANCOMYCIN HYDROCHLORIDE 1500 MG: 10 INJECTION, POWDER, LYOPHILIZED, FOR SOLUTION INTRAVENOUS at 01:46

## 2022-06-08 RX ADMIN — ACETAMINOPHEN 1000 MG: 500 TABLET, FILM COATED ORAL at 13:00

## 2022-06-08 RX ADMIN — ACETAMINOPHEN 1000 MG: 500 TABLET, FILM COATED ORAL at 04:25

## 2022-06-08 NOTE — PLAN OF CARE
Problem: Adult Inpatient Plan of Care  Goal: Absence of Hospital-Acquired Illness or Injury  Intervention: Identify and Manage Fall Risk  Recent Flowsheet Documentation  Taken 6/8/2022 0320 by Arturo Cabello RN  Safety Promotion/Fall Prevention:   assistive device/personal items within reach   clutter free environment maintained   nonskid shoes/slippers when out of bed   room organization consistent   safety round/check completed  Taken 6/8/2022 0015 by Arturo Cabello RN  Safety Promotion/Fall Prevention:   assistive device/personal items within reach   clutter free environment maintained   nonskid shoes/slippers when out of bed   room organization consistent   safety round/check completed  Taken 6/7/2022 2200 by Arturo Cabello RN  Safety Promotion/Fall Prevention: safety round/check completed  Taken 6/7/2022 2050 by Arturo Cabello RN  Safety Promotion/Fall Prevention:   assistive device/personal items within reach   clutter free environment maintained   nonskid shoes/slippers when out of bed   room organization consistent   safety round/check completed  Intervention: Prevent Skin Injury  Recent Flowsheet Documentation  Taken 6/8/2022 0320 by Arturo Cabello RN  Body Position: position changed independently  Taken 6/8/2022 0015 by Arturo Cabello RN  Body Position: position changed independently  Taken 6/7/2022 2050 by Arturo Cabello RN  Body Position: position changed independently  Intervention: Prevent and Manage VTE (Venous Thromboembolism) Risk  Recent Flowsheet Documentation  Taken 6/8/2022 0320 by Arturo Cabello RN  Activity Management: activity adjusted per tolerance  Taken 6/8/2022 0015 by Arturo Cabello RN  Activity Management: bedrest  Taken 6/7/2022 2050 by Arturo Cabello RN  Activity Management: bedrest  Intervention: Prevent Infection  Recent Flowsheet Documentation  Taken 6/8/2022 0320 by Arturo Cabello RN  Infection Prevention:   environmental  surveillance performed   hand hygiene promoted   rest/sleep promoted   single patient room provided  Taken 6/8/2022 0015 by Arturo Cabello RN  Infection Prevention:   environmental surveillance performed   hand hygiene promoted   rest/sleep promoted   single patient room provided  Taken 6/7/2022 2050 by Arturo Cabello RN  Infection Prevention:   environmental surveillance performed   hand hygiene promoted   rest/sleep promoted   single patient room provided  Goal: Optimal Comfort and Wellbeing  Intervention: Provide Person-Centered Care  Recent Flowsheet Documentation  Taken 6/8/2022 0320 by Arturo Cabello RN  Trust Relationship/Rapport:   care explained   choices provided   questions answered   questions encouraged  Taken 6/8/2022 0015 by Arturo Cabello RN  Trust Relationship/Rapport:   care explained   choices provided   questions answered   questions encouraged  Taken 6/7/2022 2050 by Arturo Cabello RN  Trust Relationship/Rapport:   care explained   choices provided   questions answered   questions encouraged   Goal Outcome Evaluation:

## 2022-06-08 NOTE — PROGRESS NOTES
"Heart Failure Program  Nurse Navigator  Discharge Planning    Patient Name:Sorin Allen  :1960  Cardiologist:Celestino  Current Admission Date: 2022   Previous Admission: 22  Admission frequency: 2 admissions in 6 months    Heart Failure history per record:    Symptoms on admission:pt here for ICD implant. Pt reports he has had HF for 9 years originally dx in [place] , his EF 25% but increased to 40%. He reports he was doing well until the last few month he noticed increase SOA and congestion, he began seeing Dr. Tirado at that time. Pt reports medication adherence but does not want to take medications at the hospital because they do not look like his normal meds.        Admission Weight:  Flowsheet Rows    Flowsheet Row First Filed Value   Admission Height 182.9 cm (72\") Documented at 2022 1130   Admission Weight 146 kg (321 lb 6.9 oz) Documented at 2022 1130            Current Home Medications:  Prior to Admission medications    Medication Sig Start Date End Date Taking? Authorizing Provider   acetaminophen (TYLENOL) 500 MG tablet Take 2 tablets by mouth Every 8 (Eight) Hours As Needed for Moderate Pain  (arthritic pain). 3/6/20  Yes Natalie Steward MD   aspirin (aspirin) 81 MG EC tablet Take 1 tablet by mouth Daily. 22  Yes Raoul Tirado MD   Dapagliflozin Propanediol (Farxiga) 10 MG tablet Take 10 mg by mouth Daily. 3/16/22  Yes Raoul Tirado MD   Entresto 24-26 MG tablet TAKE 1 TABLET BY MOUTH TWICE A DAY 22  Yes Raoul Tirado MD   furosemide (LASIX) 80 MG tablet Take 1 tablet by mouth 4 (Four) Times a Day. 3/16/22  Yes Raoul Tirado MD   gabapentin (NEURONTIN) 100 MG capsule TAKE 1 CAPSULE BY MOUTH THREE TIMES A DAY  Patient taking differently: Take 100 mg by mouth 3 (Three) Times a Day As Needed. 21  Yes Erick Cadet MD   Insulin Glargine (Lantus SoloStar) 100 UNIT/ML injection pen Inject 60 " Units under the skin into the appropriate area as directed Daily. 2/10/22  Yes Natalie Steward MD   metoprolol succinate XL (TOPROL-XL) 25 MG 24 hr tablet Take 1 tablet by mouth Daily. 2/7/22  Yes Raoul Tirado MD   potassium chloride ER (K-TAB) 20 MEQ tablet controlled-release ER tablet TAKE 2 TABLETS BY MOUTH EVERY DAY 6/4/22  Yes Erick Cadet MD   ACCU-CHEK SOFTCLIX LANCETS lancets by Other route. Use as instructed    Gaurav Monroy MD   Blood Glucose Calibration liquid by In Vitro route.    Gaurav Monroy MD   Blood Glucose Monitoring Suppl (ACCU-CHEK SIGRID PLUS) w/Device kit Use to test blood sugar twice a day    Gaurav Monroy MD   Blood Glucose Monitoring Suppl (Accu-Chek Guide) w/Device kit 1 kit 3 (Three) Times a Day. Use glucometer to check blood sugars 3 times a day. Dx. E11.42 2/26/21   Natalie Steward MD   Continuous Blood Gluc  (FreeStyle Torie 14 Day [place]) device 1 Units 3 (Three) Times a Day Before Meals. Use continuous glucose monitor to monitor glucose regularly. 9/29/21   Natalie Steward MD   Continuous Blood Gluc Sensor (FreeStyle Torie 14 Day Sensor) misc 1 Units Every 14 (Fourteen) Days. Use continuous glucose monitor to monitor glucose regularly. 2/10/22   Natalie Steward MD   glucose blood (Accu-Chek Sigrid Plus) test strip USE TESTING STRIP TO CHECK BLOOD SUGARS 3 TIMES A DAY. DX. E11.42 3/9/22   Natalie Steward MD   Insulin Pen Needle 32G X 4 MM misc Use pen needle to deliver insulin subcutaneously daily.  Dx. E11.42 2/11/21   Natalie Steward MD       Social history:   Pt from home, lives independently. No issues obtaining medications or taking them.     Smoking status:former    Diagnostics Testing:  proBNP level:      Echocardiogram:Results for orders placed during the hospital encounter of 04/06/22    Adult Transthoracic Echo Complete W/ Cont if Necessary Per Protocol    Interpretation Summary  LVE  with severe global left ventricular hypokinesis, estimated LV ejection fraction of 20%.  Moderate to severe right ventricle enlargement  Severe left atrial enlargement.  Aortic valve, mitral valve, tricuspid valve appears structurally normal, mild aortic, mitral and tricuspid regurgitation seen.  Calculated RV systolic pressure of 60 mmHg consistent with severe pulmonary hypertension  No pericardial effusion seen.  Proximal aorta appears normal in size.        Patient Assessment:   Pt lying in bed, resp even and unlabored, no SOA with conversation. No edema. Pt reports left chest sore from procedure.     Current O2: none  Home O2: cpap nightly adherent    Education provided to patient:  yes- Heart Failure disease education  yes -Symptom identification/management  yes -Daily Weights  yes- Diet education  yes- Fluid restriction (if ordered)  yes- Activity education  yes- Medication education  na- Smoking cessation  yes- Follow-up Appointments  yes-Provided information on how to access [facility] My HF Guide/Heart Failure Interactive workbook    Acceptance of learning: acceptance, cooperative, teachbakc    Heart Failure education interactive teaching session time: 30 minutes    GWTG: EF 20% - entresto, farxiga, toprol XL    Identified needs/barriers:   Medication adherence - review guideline medications, discussion on ICD purpose. I&O, daily weights    Intervention:   RN

## 2022-06-08 NOTE — CASE MANAGEMENT/SOCIAL WORK
Discharge Planning Assessment  [facility]     Patient Name: Sorin Allen  MRN: [id number]  Today's Date: 6/8/2022    Admit Date: 6/7/2022     Discharge Needs Assessment     Row Name 06/08/22 1219       Living Environment    People in Home spouse    Name(s) of People in Home spouse (Laura)    Current Living Arrangements home    Primary Care Provided by self    Provides Primary Care For no one    Family Caregiver if Needed spouse    Quality of Family Relationships helpful;involved;supportive    Able to Return to Prior Arrangements yes    Living Arrangement Comments Lives w/spouse       Resource/Environmental Concerns    Resource/Environmental Concerns none    Transportation Concerns none       Transition Planning    Patient/Family Anticipates Transition to home with family    Patient/Family Anticipated Services at Transition none    Transportation Anticipated family or friend will provide       Discharge Needs Assessment    Readmission Within the Last 30 Days no previous admission in last 30 days    Equipment Currently Used at Home cane, straight;cpap;walker, rolling    Concerns to be Addressed discharge planning    Anticipated Changes Related to Illness none    Equipment Needed After Discharge none    Provided Post Acute Provider List? N/A    Provided Post Acute Provider Quality & Resource List? N/A               Discharge Plan     Row Name 06/08/22 1221       Plan    Plan DC Home. Lives w/spouse.    Patient/Family in Agreement with Plan yes    Plan Comments CM to patient's room to complete initial assessment. Patient lives w/spouse. Patient has a cane, RW & CPAP. Patient is retired. Patient drives. Home pharmacy [facility] and declines Meds [facility]. Friend to transport at discharge.              Continued Care and Services - Admitted Since 6/7/2022    Coordination has not been started for this encounter.       Expected Discharge Date and Time     Expected Discharge Date Expected Discharge Time    Jun 8, 2022           Demographic Summary     Row Name 06/08/22 1218       General Information    Admission Type observation    Arrived From procedure suite    Required Notices Provided Observation Status Notice    Referral Source hospital clinician/department    Reason for Consult discharge planning    Preferred Language English       Contact Information    Permission Granted to Share Info With                Functional Status     Row Name 06/08/22 1219       Functional Status    Usual Activity Tolerance excellent    Current Activity Tolerance excellent       Functional Status, IADL    Medications independent    Meal Preparation independent    Housekeeping independent    Laundry independent    Shopping independent       Mental Status    General Appearance WDL WDL       Mental Status Summary    Recent Changes in Mental Status/Cognitive Functioning no changes       Employment/    Employment Status retired              Met with patient in room wearing PPE: mask, face shield/goggles, gloves, gown.      Maintained distance greater than six feet and spent less than 15 minutes in the room.    Monica Chong RN, MSN  Care Manager  [phone]

## 2022-06-08 NOTE — DISCHARGE INSTRUCTIONS
- Daily weight:  same time every day, same clothing   - Low Salt (sodium) diet   - Watch fluid intake       
Implemented All Universal Safety Interventions:  [place] to call system. Call bell, personal items and telephone within reach. Instruct patient to call for assistance. Room bathroom lighting operational. Non-slip footwear when patient is off stretcher. Physically safe environment: no spills, clutter or unnecessary equipment. Stretcher in lowest position, wheels locked, appropriate side rails in place.

## 2022-06-08 NOTE — OUTREACH NOTE
Prep Survey    Flowsheet Row Responses   [facility] patient discharged from? [facility]   Is LACE score < 7 ? Yes   Emergency Room discharge w/ pulse ox? No   Eligibility [facility]   Date of Admission 06/07/22   Date of Discharge 06/08/22   Discharge Disposition Home or Self Care   Discharge diagnosis ICD placement    Does the patient have one of the following disease processes/diagnoses(primary or secondary)? Other   Does the patient have Home health ordered? No   Is there a DME ordered? No   Prep survey completed? Yes          MYRON IGLESIAS - Registered Nurse

## 2022-06-08 NOTE — NURSING NOTE
Pt refusing medication at this time claiming that certain ones make him sick.  This RN explained that medications are the same, but can appear to look different depending on the .  PT refusing medications until dc tomorrow claiming he has meds at home

## 2022-06-08 NOTE — PLAN OF CARE
Left chest incision dressing changed and site is clean dry and intact. Patient given bathing instructions and follow up appointments made. Patient has refused all medications here and says he will take medications when he gets home. Patient is extensively educated on not taking medications as prescribed. VSS stable at this time. IV's removed. Patient to D/C via car with significant other. Will continue to monitor.

## 2022-06-08 NOTE — DISCHARGE SUMMARY
[facility]    Date of Admission: 6/7/2022    Date of Discharge:  6/8/2022    Length of stay:  LOS: 0 days     Admission Diagnosis/Presenting Problem/History of Present Illness  Active Hospital Problems    Diagnosis  POA   • Essential hypertension [I10]  Unknown   • Chronic HFrEF (heart failure with reduced ejection fraction) ([facility]) [I50.22]  Unknown   • Hypertensive heart disease with acute on chronic systolic congestive heart failure (HCC) [I11.0, I50.23]  Unknown   • Dilated cardiomyopathy ([facility]) [I42.0]  Unknown   • Type 2 diabetes mellitus with diabetic polyneuropathy, with long-term current use of insulin ([facility]) [E11.42, Z79.4]  Not Applicable   • Morbid (severe) obesity due to excess calories ([facility]) [E66.01]  Unknown      Resolved Hospital Problems   No resolved problems to display.      Discharge Diagnosis: status post ICD       Hospital Course  Sorin Allen is a 61 y.o. male presented for ICD placement on 6/7/2022. Patient was observed overnight without any complications.  Pain is minimal and has only required tylenol. Patient's ICD was interrogated and working well.  Dressing was removed, staples intact.  RN replaced dressing.  Discussed post ICD discharge instructions with patient.  Continue current medical management.  Patient to follow up next week for wound check and staple removal with pacemaker/ICD nurse.          Past Medical History:   Diagnosis Date   • Arthritis    • Asthma    • Cardiac disease    • Carpal tunnel syndrome    • Congenital heart disease    • CPAP (continuous positive airway pressure) dependence    • Diabetes mellitus (HCC)    • Diverticulitis of colon    • Erectile dysfunction    • Heart disease    • Heart valve disease    • Hyperlipidemia    • Hypertension    • Neuromuscular disorder ([facility])    • ESAU (obstructive sleep apnea)    • Osteoarthritis      Past Surgical History:   Procedure Laterality Date   • CARDIAC CATHETERIZATION N/A 2/16/2022    Procedure: Left Heart Cath;  Surgeon:  Raoul Tirado MD;  Location: [facility] INVASIVE LOCATION;  Service: Cardiovascular;  Laterality: N/A;   • HERNIA REPAIR       Social History     Socioeconomic History   • Marital status:    Tobacco Use   • Smoking status: Former Smoker     Packs/day: 2.00     Years: 20.00     Pack years: 40.00     Quit date: 3/6/2000     Years since quittin.2   • Smokeless tobacco: Never Used   Vaping Use   • Vaping Use: Never used   Substance and Sexual Activity   • Alcohol use: Yes     Comment: rare   • Drug use: Never   • Sexual activity: Defer       Procedures Performed    Single chamber ICD     Consults:   Consulting Physician(s)  Chat With All Active Members    Provider Relationship Specialty    Romario Agee MD  Consulting Physician Cardiology          Pertinent Test Results:     Lab Results (last 72 hours)     Procedure Component Value Units Date/Time    POC Glucose Once [[id number]]  (Abnormal) Collected: 22 0605    Specimen: Blood Updated: 22 0606     Glucose 119 mg/dL      Comment: Serial Number: [id number]:  [id number]       POC Glucose Once [[id number]]  (Abnormal) Collected: 22 0050    Specimen: Blood Updated: 22 0051     Glucose 150 mg/dL      Comment: Serial Number: [id number]:  [id number]       Basic Metabolic Panel [[id number]]  (Normal) Collected: 22 1400    Specimen: Blood Updated: 22 1428     Glucose 81 mg/dL      BUN 16 mg/dL      Creatinine 0.95 mg/dL      Sodium 140 mmol/L      Potassium 4.1 mmol/L      Comment: Slight hemolysis detected by analyzer. Results may be affected.        Chloride 100 mmol/L      CO2 29.0 mmol/L      Calcium 9.0 mg/dL      BUN/Creatinine Ratio 16.8     Anion Gap 11.0 mmol/L      eGFR 90.5 mL/min/1.73      Comment: National Kidney Foundation and American Society of Nephrology (ASN) Task Force recommended calculation based on the Chronic Kidney Disease Epidemiology Collaboration (CKD-EPI) equation refit without  adjustment for race.       Narrative:      GFR Normal >60  Chronic Kidney Disease <60  Kidney Failure <15      Protime-INR [[id number]]  (Normal) Collected: 06/07/22 1400    Specimen: Blood Updated: 06/07/22 1420     Protime 10.8 Seconds      INR 1.05    CBC (No Diff) [[id number]]  (Abnormal) Collected: 06/07/22 1400    Specimen: Blood Updated: 06/07/22 1409     WBC 8.30 10*3/mm3      RBC 5.14 10*6/mm3      Hemoglobin 15.0 g/dL      Hematocrit 45.0 %      MCV 87.6 fL      MCH 29.1 pg      MCHC 33.2 g/dL      RDW 15.5 %      RDW-SD 47.7 fl      MPV 7.5 fL      Platelets 219 10*3/mm3           Results for orders placed during the hospital encounter of 04/06/22    Adult Transthoracic Echo Complete W/ Cont if Necessary Per Protocol    Interpretation Summary  LVE with severe global left ventricular hypokinesis, estimated LV ejection fraction of 20%.  Moderate to severe right ventricle enlargement  Severe left atrial enlargement.  Aortic valve, mitral valve, tricuspid valve appears structurally normal, mild aortic, mitral and tricuspid regurgitation seen.  Calculated RV systolic pressure of 60 mmHg consistent with severe pulmonary hypertension  No pericardial effusion seen.  Proximal aorta appears normal in size.      Imaging Results (Last 72 Hours)     Procedure Component Value Units Date/Time    XR Chest 1 View [[id number]] Collected: 06/07/22 2027     Updated: 06/07/22 2030    Narrative:      DATE OF EXAM:  6/7/2022 8:24 PM     PROCEDURE:  XR CHEST 1 VW-     INDICATIONS:  Transvenous pacemaker placement, tobacco abuse, cough, heart disease.      COMPARISON:  06/07/2022 at 1117 hours.     TECHNIQUE:   Single radiographic view of the chest was obtained.     FINDINGS:  The patient is status post placement of a left-sided transvenous  pacemaker. There is no pneumothorax. The heart size is prominent, but  likely magnified by technique. The pulmonary vascular markings are  normal. The lungs and pleural spaces  are clear of  "active disease.       Impression:         1. Status post placement of a left-sided transvenous pacemaker. There is  no pneumothorax.  2. No active pulmonary disease.     Electronically Signed By-Emanuel Hoskins MD On:6/7/2022 8:28 PM  This report was finalized on [id number] by  Emanuel Hoskins MD.            Condition on Discharge:  Stable     Vital Signs  /62 (BP Location: Right arm, Patient Position: Sitting)   Pulse 73   Temp 98.3 °F (36.8 °C) (Oral)   Resp 22   Ht 182.9 cm (72\")   Wt (!) 148 kg (325 lb 13.4 oz)   SpO2 94%   BMI 44.19 kg/m²     Physical Exam  Vitals and nursing note reviewed. Exam conducted with a chaperone present.   Constitutional:       General: He is not in acute distress.     Appearance: Normal appearance. He is obese.   HENT:      Head: Normocephalic and atraumatic.   Eyes:      Pupils: Pupils are equal, round, and reactive to light.   Cardiovascular:      Rate and Rhythm: Normal rate.      Pulses: Normal pulses.      Heart sounds: Normal heart sounds.      Comments: Staples intact to left chest. No signs of infection noted.   Pulmonary:      Effort: Pulmonary effort is normal.      Breath sounds: Normal breath sounds.   Abdominal:      General: Bowel sounds are normal.      Palpations: Abdomen is soft.   Musculoskeletal:         General: Normal range of motion.      Cervical back: Normal range of motion and neck supple.   Skin:     General: Skin is warm and dry.   Neurological:      General: No focal deficit present.      Mental Status: He is alert and oriented to person, place, and time.   Psychiatric:         Mood and Affect: Mood normal.         Behavior: Behavior normal.         Discharge Disposition  Home or Self Care    Discharge Medications     Discharge Medications      Changes to Medications      Instructions Start Date   gabapentin 100 MG capsule  Commonly known as: NEURONTIN  What changed:   · when to take this  · reasons to take this   TAKE 1 CAPSULE BY MOUTH THREE " TIMES A DAY         Continue These Medications      Instructions Start Date   Accu-Chek Sigrid Plus test strip  Generic drug: glucose blood   USE TESTING STRIP TO CHECK BLOOD SUGARS 3 TIMES A DAY. DX. E11.42      Accu-Chek Sigrid Plus w/Device kit   Does not apply, Use to test blood sugar twice a day       Accu-Chek Guide w/Device kit   1 kit, Does not apply, 3 Times Daily, Use glucometer to check blood sugars 3 times a day. Dx. E11.42      Accu-Chek Softclix Lancets lancets   Other, Use as instructed       acetaminophen 500 MG tablet  Commonly known as: TYLENOL   1,000 mg, Oral, Every 8 Hours PRN      aspirin 81 MG EC tablet   81 mg, Oral, Daily      Blood Glucose Calibration liquid   In Vitro      Entresto 24-26 MG tablet  Generic drug: sacubitril-valsartan   TAKE 1 TABLET BY MOUTH TWICE A DAY      Farxiga 10 MG tablet  Generic drug: dapagliflozin Propanediol   10 mg, Oral, Daily      FreeStyle Torie 14 Day [place] device   1 Units, Does not apply, 3 Times Daily Before Meals, Use continuous glucose monitor to monitor glucose regularly.      FreeStyle Torie 14 Day Sensor misc   1 Units, Does not apply, Every 14 Days, Use continuous glucose monitor to monitor glucose regularly.      furosemide 80 MG tablet  Commonly known as: LASIX   80 mg, Oral, 4 Times Daily      Insulin Pen Needle 32G X 4 MM misc   Use pen needle to deliver insulin subcutaneously daily.  Dx. E11.42      Lantus SoloStar 100 UNIT/ML injection pen  Generic drug: Insulin Glargine   60 Units, Subcutaneous, Daily      metoprolol succinate XL 25 MG 24 hr tablet  Commonly known as: TOPROL-XL   25 mg, Oral, Daily      potassium chloride ER 20 MEQ tablet controlled-release ER tablet  Commonly known as: K-TAB   TAKE 2 TABLETS BY MOUTH EVERY DAY             Discharge Diet:     Activity at Discharge:     Follow-up Appointments  Future Appointments   Date Time Provider Department Center   6/15/2022  9:30 AM [facility] BELCHER, [facility] [place] [facility] NA CARD CTR  NA   7/12/2022  9:50 AM Raoul Tirado MD [facility] NA   8/2/2022 10:00 AM Natalie Steward MD [facility]   12/13/2022  9:30 AM [facility]   12/13/2022 10:00 AM Raoul Tirado MD [facility] NA       Risk for Readmission (LACE) Score: 6 (6/8/2022  6:01 AM)      MARIAN Scott  06/08/22  10:58 EDT   Electronically signed by MARIAN Scott, 06/08/22, 11:48 AM EDT.    Cardiology attending addendum :    I have personally reviewed and agree with the different components of documentation.

## 2022-06-08 NOTE — CASE MANAGEMENT/SOCIAL WORK
Case Management Discharge Note      Final Note: DC Home.    Provided Post Acute Provider List?: N/A  Provided Post Acute Provider Quality & Resource List?: N/A    Selected Continued Care - Discharged on 6/8/2022 Admission date: 6/7/2022 - Discharge disposition: Home or Self Care         Transportation Services  Private: Car (friend)    Final Discharge Disposition Code: 01 - home or self-care     Monica Chong RN, MSN  Care Manager  [phone]

## 2022-06-09 ENCOUNTER — TRANSITIONAL CARE MANAGEMENT TELEPHONE ENCOUNTER (OUTPATIENT)
Dept: CALL CENTER | Facility: HOSPITAL | Age: 62
End: 2022-06-09

## 2022-06-09 NOTE — OUTREACH NOTE
Call Center TCM Note    Flowsheet Row Responses   [facility] patient discharged from? [facility]   Does the patient have one of the following disease processes/diagnoses(primary or secondary)? Other   TCM attempt successful? No   Unsuccessful attempts Attempt 1          Geno Goldsmith MA    6/9/2022, 10:55 EDT

## 2022-06-09 NOTE — OUTREACH NOTE
Call Center TCM Note    Flowsheet Row Responses   [facility] facility patient discharged from? [facility]   Does the patient have one of the following disease processes/diagnoses(primary or secondary)? Other   TCM attempt successful? No   Unsuccessful attempts Attempt 2          Geno Goldsmith MA    6/9/2022, 15:47 EDT

## 2022-06-10 ENCOUNTER — TRANSITIONAL CARE MANAGEMENT TELEPHONE ENCOUNTER (OUTPATIENT)
Dept: CALL CENTER | Facility: HOSPITAL | Age: 62
End: 2022-06-10

## 2022-06-10 NOTE — OUTREACH NOTE
Call Center TCM Note    Flowsheet Row Responses   [facility] [place] patient discharged from? [facility]   Does the patient have one of the following disease processes/diagnoses(primary or secondary)? Other   TCM attempt successful? No   Unsuccessful attempts Attempt 3   Does the patient have a primary care provider?  Yes   Comments regarding PCP No available HOSP DC FU appt that meet TCM guidelines.           Dali Turner RN    6/10/2022, 13:52 EDT

## 2022-06-15 ENCOUNTER — CLINICAL SUPPORT NO REQUIREMENTS (OUTPATIENT)
Dept: CARDIOLOGY | Facility: CLINIC | Age: 62
End: 2022-06-15

## 2022-06-15 DIAGNOSIS — Z95.810 PRESENCE OF AUTOMATIC CARDIOVERTER/DEFIBRILLATOR (AICD): ICD-10-CM

## 2022-06-15 DIAGNOSIS — I50.23 HYPERTENSIVE HEART DISEASE WITH ACUTE ON CHRONIC SYSTOLIC CONGESTIVE HEART FAILURE: ICD-10-CM

## 2022-06-15 DIAGNOSIS — I11.0 HYPERTENSIVE HEART DISEASE WITH ACUTE ON CHRONIC SYSTOLIC CONGESTIVE HEART FAILURE: ICD-10-CM

## 2022-06-15 DIAGNOSIS — I50.22 CHRONIC HFREF (HEART FAILURE WITH REDUCED EJECTION FRACTION): Chronic | ICD-10-CM

## 2022-06-15 DIAGNOSIS — I42.0 DILATED CARDIOMYOPATHY: Primary | Chronic | ICD-10-CM

## 2022-06-15 PROCEDURE — 93282 PRGRMG EVAL IMPLANTABLE DFB: CPT | Performed by: INTERNAL MEDICINE

## 2022-06-15 NOTE — PROGRESS NOTES
Patient had ICD placed and is here today for staple removal. Interrogation of device is normal.  Removed dressing and staples with no issues.  Incision well approximated, no redness, swelling or drainage noted. Patient teaching related to incision care, left arm precautions and follow up care. Patient has an appt with Dr Tirado scheduled.

## 2022-07-12 ENCOUNTER — TELEPHONE (OUTPATIENT)
Dept: CARDIOLOGY | Facility: CLINIC | Age: 62
End: 2022-07-12

## 2022-07-12 ENCOUNTER — OFFICE VISIT (OUTPATIENT)
Dept: CARDIOLOGY | Facility: CLINIC | Age: 62
End: 2022-07-12

## 2022-07-12 VITALS
SYSTOLIC BLOOD PRESSURE: 123 MMHG | HEIGHT: 72 IN | WEIGHT: 315 LBS | OXYGEN SATURATION: 98 % | HEART RATE: 64 BPM | BODY MASS INDEX: 42.66 KG/M2 | DIASTOLIC BLOOD PRESSURE: 81 MMHG

## 2022-07-12 DIAGNOSIS — E11.9 TYPE 2 DIABETES MELLITUS WITHOUT COMPLICATION, WITH LONG-TERM CURRENT USE OF INSULIN: ICD-10-CM

## 2022-07-12 DIAGNOSIS — Z95.810 PRESENCE OF AUTOMATIC CARDIOVERTER/DEFIBRILLATOR (AICD): ICD-10-CM

## 2022-07-12 DIAGNOSIS — I50.22 CHRONIC HFREF (HEART FAILURE WITH REDUCED EJECTION FRACTION): Primary | ICD-10-CM

## 2022-07-12 DIAGNOSIS — E66.01 MORBID OBESITY WITH BMI OF 40.0-44.9, ADULT: ICD-10-CM

## 2022-07-12 DIAGNOSIS — I10 ESSENTIAL HYPERTENSION: ICD-10-CM

## 2022-07-12 DIAGNOSIS — Z79.4 TYPE 2 DIABETES MELLITUS WITHOUT COMPLICATION, WITH LONG-TERM CURRENT USE OF INSULIN: ICD-10-CM

## 2022-07-12 DIAGNOSIS — I42.0 DILATED CARDIOMYOPATHY: ICD-10-CM

## 2022-07-12 PROCEDURE — 99214 OFFICE O/P EST MOD 30 MIN: CPT | Performed by: INTERNAL MEDICINE

## 2022-07-12 PROCEDURE — 93000 ELECTROCARDIOGRAM COMPLETE: CPT | Performed by: INTERNAL MEDICINE

## 2022-07-12 RX ORDER — SPIRONOLACTONE 25 MG/1
25 TABLET ORAL DAILY
Qty: 90 TABLET | Refills: 3 | Status: SHIPPED | OUTPATIENT
Start: 2022-07-12

## 2022-07-12 RX ORDER — MELOXICAM 15 MG/1
15 TABLET ORAL AS NEEDED
COMMUNITY
End: 2022-08-08

## 2022-07-12 NOTE — TELEPHONE ENCOUNTER
Patient was seen I the officer today and told to come back in a month to see Macrina Castro. Patient in unable to make an appt during her schedule she has here at the office. He needs a morning appt. Gave patient order for labs. What do we need to advise for this patient as far as his 1 month follow up?

## 2022-07-14 NOTE — TELEPHONE ENCOUNTER
Mey or Gina, would either of you be able to add a spot for this patient to be seen in the morning? Not sure how to add spot.

## 2022-07-26 DIAGNOSIS — E11.42 TYPE 2 DIABETES MELLITUS WITH DIABETIC POLYNEUROPATHY, WITH LONG-TERM CURRENT USE OF INSULIN: ICD-10-CM

## 2022-07-26 DIAGNOSIS — Z79.4 TYPE 2 DIABETES MELLITUS WITH DIABETIC POLYNEUROPATHY, WITH LONG-TERM CURRENT USE OF INSULIN: ICD-10-CM

## 2022-08-02 ENCOUNTER — LAB (OUTPATIENT)
Dept: FAMILY MEDICINE CLINIC | Facility: CLINIC | Age: 62
End: 2022-08-02

## 2022-08-02 ENCOUNTER — OFFICE VISIT (OUTPATIENT)
Dept: FAMILY MEDICINE CLINIC | Facility: CLINIC | Age: 62
End: 2022-08-02

## 2022-08-02 VITALS
OXYGEN SATURATION: 97 % | BODY MASS INDEX: 42.66 KG/M2 | SYSTOLIC BLOOD PRESSURE: 116 MMHG | TEMPERATURE: 98.2 F | HEART RATE: 63 BPM | WEIGHT: 315 LBS | HEIGHT: 72 IN | DIASTOLIC BLOOD PRESSURE: 79 MMHG

## 2022-08-02 DIAGNOSIS — I11.0 HYPERTENSIVE HEART DISEASE WITH ACUTE ON CHRONIC SYSTOLIC CONGESTIVE HEART FAILURE: Primary | ICD-10-CM

## 2022-08-02 DIAGNOSIS — E11.42 TYPE 2 DIABETES MELLITUS WITH DIABETIC POLYNEUROPATHY, WITH LONG-TERM CURRENT USE OF INSULIN: ICD-10-CM

## 2022-08-02 DIAGNOSIS — Z79.4 TYPE 2 DIABETES MELLITUS WITH DIABETIC POLYNEUROPATHY, WITH LONG-TERM CURRENT USE OF INSULIN: ICD-10-CM

## 2022-08-02 DIAGNOSIS — I50.23 HYPERTENSIVE HEART DISEASE WITH ACUTE ON CHRONIC SYSTOLIC CONGESTIVE HEART FAILURE: Primary | ICD-10-CM

## 2022-08-02 LAB
ALBUMIN SERPL-MCNC: 4.2 G/DL (ref 3.5–5.2)
ALBUMIN UR-MCNC: 2.4 MG/DL
ALBUMIN/GLOB SERPL: 1.1 G/DL
ALP SERPL-CCNC: 72 U/L (ref 39–117)
ALT SERPL W P-5'-P-CCNC: 16 U/L (ref 1–41)
ANION GAP SERPL CALCULATED.3IONS-SCNC: 10.7 MMOL/L (ref 5–15)
AST SERPL-CCNC: 13 U/L (ref 1–40)
BILIRUB SERPL-MCNC: 0.5 MG/DL (ref 0–1.2)
BUN SERPL-MCNC: 19 MG/DL (ref 8–23)
BUN/CREAT SERPL: 16.4 (ref 7–25)
CALCIUM SPEC-SCNC: 9.6 MG/DL (ref 8.6–10.5)
CHLORIDE SERPL-SCNC: 98 MMOL/L (ref 98–107)
CHOLEST SERPL-MCNC: 222 MG/DL (ref 0–200)
CO2 SERPL-SCNC: 31.3 MMOL/L (ref 22–29)
CREAT SERPL-MCNC: 1.16 MG/DL (ref 0.76–1.27)
CREAT UR-MCNC: 112.4 MG/DL
EGFRCR SERPLBLD CKD-EPI 2021: 71.2 ML/MIN/1.73
GLOBULIN UR ELPH-MCNC: 3.8 GM/DL
GLUCOSE SERPL-MCNC: 155 MG/DL (ref 65–99)
HBA1C MFR BLD: 6.8 % (ref 3.5–5.6)
HDLC SERPL-MCNC: 37 MG/DL (ref 40–60)
LDLC SERPL CALC-MCNC: 157 MG/DL (ref 0–100)
LDLC/HDLC SERPL: 4.17 {RATIO}
MICROALBUMIN/CREAT UR: 21.4 MG/G
POTASSIUM SERPL-SCNC: 4 MMOL/L (ref 3.5–5.2)
PROT SERPL-MCNC: 8 G/DL (ref 6–8.5)
SODIUM SERPL-SCNC: 140 MMOL/L (ref 136–145)
TRIGL SERPL-MCNC: 153 MG/DL (ref 0–150)
VLDLC SERPL-MCNC: 28 MG/DL (ref 5–40)

## 2022-08-02 PROCEDURE — 83036 HEMOGLOBIN GLYCOSYLATED A1C: CPT | Performed by: FAMILY MEDICINE

## 2022-08-02 PROCEDURE — 80061 LIPID PANEL: CPT | Performed by: FAMILY MEDICINE

## 2022-08-02 PROCEDURE — 36415 COLL VENOUS BLD VENIPUNCTURE: CPT | Performed by: FAMILY MEDICINE

## 2022-08-02 PROCEDURE — 82570 ASSAY OF URINE CREATININE: CPT | Performed by: FAMILY MEDICINE

## 2022-08-02 PROCEDURE — 99214 OFFICE O/P EST MOD 30 MIN: CPT | Performed by: FAMILY MEDICINE

## 2022-08-02 PROCEDURE — 80053 COMPREHEN METABOLIC PANEL: CPT | Performed by: FAMILY MEDICINE

## 2022-08-02 PROCEDURE — 82043 UR ALBUMIN QUANTITATIVE: CPT | Performed by: FAMILY MEDICINE

## 2022-08-02 RX ORDER — INSULIN GLARGINE 100 [IU]/ML
45 INJECTION, SOLUTION SUBCUTANEOUS DAILY
Qty: 20 PEN | Refills: 1 | Status: SHIPPED | OUTPATIENT
Start: 2022-08-02 | End: 2022-08-16

## 2022-08-02 NOTE — PROGRESS NOTES
Assessment and Plan     Problem List Items Addressed This Visit        Cardiac and Vasculature    Hypertensive heart disease with acute on chronic systolic congestive heart failure (HCC) - Primary    Overview     Echocardiogram in April 2022 revealed severe global left ventricular hypokinesis with LVEF 20%, severe left atrial and right ventricle enlargement. RVSP calculated at 60 mmHg consistent with pulmonary hypertension.  BP at goal, <140/90.  Continue Entresto 24-26 mg, Toprol 25 mg, spironolactone 25 mg, and Farxiga 10 mg daily. Continue Lasix 80 mg QID & KCl 40 mEq for symptomatic benefit.   Reviewed LDL goal.  Declines statin.  Followed by cardiology.          Relevant Orders    Comprehensive Metabolic Panel    Microalbumin / Creatinine Urine Ratio - Urine, Clean Catch    Lipid Panel       Endocrine and Metabolic    Type 2 diabetes mellitus with diabetic polyneuropathy, with long-term current use of insulin ([facility]) (Chronic)    Overview     HbA1c at goal, <7%.  Monitoring regularly.  Monitoring renal function.  Continue Entresto and Farxiga for renal protection.  Decreased Basaglar to 45 units daily and Farxiga 10 mg daily.  Continue gabapentin PRN for pain.  Eye exam due. Report requested.         Relevant Medications    Insulin Pen Needle 32G X 4 MM misc    Insulin Glargine (Lantus SoloStar) 100 UNIT/ML injection pen    Other Relevant Orders    Comprehensive Metabolic Panel    Hemoglobin A1c    Microalbumin / Creatinine Urine Ratio - Urine, Clean Catch        Return in about 4 months (around 12/2/2022) for Recheck diabetes and heart disease.        Patient was given instructions and counseling regarding his condition or for health maintenance advice. Please see specific information pulled into the AVS if appropriate.        Sorin is a 61 y.o. being seen today for  Diabetes and Hypertensive heart disease with acute on chronic systolic c   Subjective   History of the Present Illness     Morbidly obese black  "male with CMHx of HFrEF and DM II presents for follow up.    Normotensive in office. Does not monitor ambulatory BP. Denies any chest pain or dyspnea. Complains of fatigue and lower extremity swelling.    Does not follow a diabetic diet. Chooses to eat whatever he'd like because he believes he's dying. Enjoys tropical fruits. Enjoys red meat. Reduced insulin intake to 45 units of nightly due to hypoglycemic alarms at night.     Has not been using CPAP due to recall. Does not want to undergo overnight sleep study.     Social History  He  reports that he quit smoking about 22 years ago. He has a 40.00 pack-year smoking history. He has never used smokeless tobacco. He reports current alcohol use. He reports that he does not use drugs.  Objective   Vital Signs          Vitals:    08/02/22 0956   BP: 116/79   BP Location: Right arm   Patient Position: Sitting   Cuff Size: Large Adult   Pulse: 63   Temp: 98.2 °F (36.8 °C)   TempSrc: Oral   SpO2: 97%   Weight: (!) 149 kg (329 lb)   Height: 182.9 cm (72\")     BP Readings from Last 1 Encounters:   08/02/22 116/79     Wt Readings from Last 3 Encounters:   08/02/22 (!) 149 kg (329 lb)   07/12/22 (!) 150 kg (330 lb)   06/08/22 (!) 148 kg (325 lb 13.4 oz)   Body mass index is 44.62 kg/m².     Physical Exam  Vitals reviewed.   Constitutional:       General: He is not in acute distress.     Appearance: Normal appearance. He is morbidly obese.   HENT:      Head: Normocephalic and atraumatic.   Cardiovascular:      Rate and Rhythm: Normal rate.      Pulses:           Dorsalis pedis pulses are 2+ on the right side and 2+ on the left side.      Heart sounds: Heart sounds are distant.   Pulmonary:      Effort: Pulmonary effort is normal.      Breath sounds: Examination of the right-lower field reveals decreased breath sounds. Examination of the left-lower field reveals decreased breath sounds. Decreased breath sounds present.   Musculoskeletal:      Right lower leg: No edema.      Left " lower leg: No edema.   Neurological:      Mental Status: He is alert and oriented to person, place, and time.   Psychiatric:         Mood and Affect: Mood normal.         Behavior: Behavior normal.      Comments: blase               Basic Metabolic Panel (06/07/2022 14:00)  CBC & Differential (06/07/2022 11:09)  Lipid panel (01/04/2022 09:02)  Hemoglobin A1c (01/04/2022 09:02)

## 2022-08-03 RX ORDER — PEN NEEDLE, DIABETIC 32GX 5/32"
NEEDLE, DISPOSABLE MISCELLANEOUS
Qty: 100 EACH | Refills: 5 | OUTPATIENT
Start: 2022-08-03

## 2022-08-07 DIAGNOSIS — M89.49 OTHER HYPERTROPHIC OSTEOARTHROPATHY, MULTIPLE SITES: ICD-10-CM

## 2022-08-08 RX ORDER — MELOXICAM 15 MG/1
TABLET ORAL
Qty: 90 TABLET | Refills: 1 | Status: SHIPPED | OUTPATIENT
Start: 2022-08-08

## 2022-08-13 DIAGNOSIS — E11.42 TYPE 2 DIABETES MELLITUS WITH DIABETIC POLYNEUROPATHY, WITH LONG-TERM CURRENT USE OF INSULIN: ICD-10-CM

## 2022-08-13 DIAGNOSIS — Z79.4 TYPE 2 DIABETES MELLITUS WITH DIABETIC POLYNEUROPATHY, WITH LONG-TERM CURRENT USE OF INSULIN: ICD-10-CM

## 2022-08-16 RX ORDER — INSULIN GLARGINE 100 [IU]/ML
45 INJECTION, SOLUTION SUBCUTANEOUS DAILY
Qty: 15 PEN | Refills: 1 | Status: SHIPPED | OUTPATIENT
Start: 2022-08-16 | End: 2023-01-19 | Stop reason: SDUPTHER

## 2022-08-19 ENCOUNTER — OFFICE VISIT (OUTPATIENT)
Dept: CARDIOLOGY | Facility: CLINIC | Age: 62
End: 2022-08-19

## 2022-08-19 VITALS
OXYGEN SATURATION: 95 % | SYSTOLIC BLOOD PRESSURE: 116 MMHG | HEIGHT: 72 IN | DIASTOLIC BLOOD PRESSURE: 77 MMHG | HEART RATE: 63 BPM | WEIGHT: 315 LBS | BODY MASS INDEX: 42.66 KG/M2

## 2022-08-19 DIAGNOSIS — I50.22 CHRONIC HFREF (HEART FAILURE WITH REDUCED EJECTION FRACTION): Primary | Chronic | ICD-10-CM

## 2022-08-19 DIAGNOSIS — E78.2 MIXED DIABETIC HYPERLIPIDEMIA ASSOCIATED WITH TYPE 2 DIABETES MELLITUS: ICD-10-CM

## 2022-08-19 DIAGNOSIS — E11.69 MIXED DIABETIC HYPERLIPIDEMIA ASSOCIATED WITH TYPE 2 DIABETES MELLITUS: ICD-10-CM

## 2022-08-19 DIAGNOSIS — I10 ESSENTIAL HYPERTENSION: Chronic | ICD-10-CM

## 2022-08-19 DIAGNOSIS — Z99.89 OSA ON CPAP: ICD-10-CM

## 2022-08-19 DIAGNOSIS — G47.33 OSA ON CPAP: ICD-10-CM

## 2022-08-19 DIAGNOSIS — Z53.20 STATIN DECLINED: ICD-10-CM

## 2022-08-19 DIAGNOSIS — I42.0 DILATED CARDIOMYOPATHY: Chronic | ICD-10-CM

## 2022-08-19 DIAGNOSIS — Z95.810 PRESENCE OF AUTOMATIC CARDIOVERTER/DEFIBRILLATOR (AICD): ICD-10-CM

## 2022-08-19 PROCEDURE — 99214 OFFICE O/P EST MOD 30 MIN: CPT | Performed by: NURSE PRACTITIONER

## 2022-08-19 NOTE — PROGRESS NOTES
Subjective:     Encounter Date: 08/19/2022    Patient ID: Sorin Allen is a 61 y.o. male.    Chief Complaint :one month Follow-up for Chronic combined heart failure; cardiomyopathy, hypertensive cardiovascular disease, obesity, dyslipidemia    History of Present Illness        Mr. Sorin Allen has PMH of    Hypertensive cardiovascular disease with chronic sysolic CHF  Chronic HFrEF due to systolic dysfunction from dilated cardiomyopathy  [facility] ICD 6/7/2022  Cardiac cath 2/16/2022 EF of 15% no obstructive CAD  Dyslipidemia  Hypertension  Obesity with BMI over 40  ESAU/CPAP  ED  Rheumatic fever as a child  Former smoker quit 3/6/2000  Family history mother has leukemia    Is here for one month follow up for HFrEF.  Patient was seen by Dr. Tirado and complained of continue edema without shortness of breath therefore he was started on Spironolactone as well.  Patient reports improved edema although still has trace swelling in the left ankle all the time.  Patient denies any chest pain, shortness of breath or palpitations.  He states once in a while he has a  Intermittent pain at his ICD site.    NYHA Class I-II       Blood pressure today is 16/77  HR 63 oxygen 95% on room air.  BMI 44      Labs 8/2/2022: Total cholesterol 222 triglycerides 153 HDL 37  A1c 6.8 glucose 155 BUN 19 creatinine 1.16    Chart Reviewed:  Cardiac cath 2/16/2022  revealed EF of 15% no obstructive CAD..  Patient had repeat echocardiogram 4/6/2022 which is revealing persistent LV dysfunction with LVEF of 20% with severe pulmonary hypertension PA systolic pressure of 60 mmHg.      Echocardiogram 5/1/2020 revealed normal LV systolic function EF 60% with diastolic dysfunction and RV enlargement and mild aortic stenosis  Echocardiogram 1/21/2022 revealed EF of 26 to 30%  Repeat echo 4/6/2022 is revealing EF of 20% with severe pulmonary hypertension PA systolic pressure of 60 mmHg            Procedures    The following portions of  the patient's history were reviewed and updated as appropriate: allergies, current medications, past family history, past medical history, past social history, past surgical history and problem list.    Assessment:         [facility]     Diagnosis Plan   1. Chronic HFrEF (heart failure with reduced ejection fraction) (HCC)     2. Dilated cardiomyopathy (HCC)     3. Essential hypertension     4. Mixed diabetic hyperlipidemia associated with type 2 diabetes mellitus (HCC)     5. Presence of automatic cardioverter/defibrillator (AICD)     6. ESAU on CPAP     7. Statin declined     Chronic HFrEF due to combined hypertensive cardiovascular disease and systolic dysfunction from dilated cardiomyopathy EF of 15-20%    Obesity with BMI over 40       Plan:         Discussed heart failure symptoms with patient   Discussed medications with patient   Discussed increasing entresto and decreasing lasix.... patient does not want to do this at this time and feels comfortable with what he is on    Discussed recent labs with patient- discussed high LDL and need for statin.  He has declined treatment     Patient has questions regarding high blood sugar without even eating, discussed with him and advised he follow up with his PCP     Patient also asking about CPAP recall causing his worsened heart failure.  Unlikely the cause     Continue guideline directed medical therapy with Entresto, metoprolol succ , farxiga and spironolactone.  Continue diuresis with lasix as well.     Follow-up with Dr. Beard in 3 months  Discussed with patient if he has any acute symptoms of heart failure to schedule an appointment with me    Past Medical History:  Past Medical History:   Diagnosis Date   • Arthritis    • Asthma    • Cardiac disease    • Carpal tunnel syndrome    • Congenital heart disease    • CPAP (continuous positive airway pressure) dependence    • Diabetes mellitus (HCC)    • Diverticulitis of colon    • Erectile dysfunction    • Heart disease     • Heart valve disease    • Hyperlipidemia    • Hypertension    • Neuromuscular disorder (HCC)    • ESAU (obstructive sleep apnea)    • Osteoarthritis      Past Surgical History:  Past Surgical History:   Procedure Laterality Date   • CARDIAC CATHETERIZATION N/A 2/16/2022    Procedure: Left Heart Cath;  Surgeon: Raoul Tirado MD;  Location: [facility] CATH INVASIVE LOCATION;  Service: Cardiovascular;  Laterality: N/A;   • CARDIAC ELECTROPHYSIOLOGY PROCEDURE N/A 6/7/2022    Procedure: ICD single-chamber, new [facility] aware;  Surgeon: Raoul Tirado MD;  Location: [facility] CATH INVASIVE LOCATION;  Service: Cardiovascular;  Laterality: N/A;   • HERNIA REPAIR        Allergies:  No Known Allergies  Home Meds:  Current Meds:     Current Outpatient Medications:   •  ACCU-CHEK SOFTCLIX LANCETS lancets, by Other route. Use as instructed, Disp: , Rfl:   •  acetaminophen (TYLENOL) 500 MG tablet, Take 2 tablets by mouth Every 8 (Eight) Hours As Needed for Moderate Pain  (arthritic pain)., Disp: 180 tablet, Rfl: 5  •  aspirin (aspirin) 81 MG EC tablet, Take 1 tablet by mouth Daily., Disp: 90 tablet, Rfl: 3  •  Blood Glucose Calibration liquid, by In Vitro route., Disp: , Rfl:   •  Blood Glucose Monitoring Suppl (ACCU-CHEK ALEKS PLUS) w/Device kit, Use to test blood sugar twice a day, Disp: , Rfl:   •  Blood Glucose Monitoring Suppl (Accu-Chek Guide) w/Device kit, 1 kit 3 (Three) Times a Day. Use glucometer to check blood sugars 3 times a day. Dx. E11.42, Disp: 1 kit, Rfl: 0  •  Continuous Blood Gluc  (FreeStyle Torie 14 Day Wayne) device, 1 Units 3 (Three) Times a Day Before Meals. Use continuous glucose monitor to monitor glucose regularly., Disp: 1 each, Rfl: 0  •  Continuous Blood Gluc Sensor (FreeStyle Torie 14 Day Sensor) misc, 1 Units Every 14 (Fourteen) Days. Use continuous glucose monitor to monitor glucose regularly., Disp: 6 each, Rfl: 1  •  Dapagliflozin Propanediol (Farxiga) 10 MG tablet,  Take 10 mg by mouth Daily., Disp: 90 tablet, Rfl: 3  •  Entresto 24-26 MG tablet, TAKE 1 TABLET BY MOUTH TWICE A DAY, Disp: 180 tablet, Rfl: 3  •  furosemide (LASIX) 80 MG tablet, Take 1 tablet by mouth 4 (Four) Times a Day., Disp: 360 tablet, Rfl: 3  •  gabapentin (NEURONTIN) 100 MG capsule, TAKE 1 CAPSULE BY MOUTH THREE TIMES A DAY (Patient taking differently: Take 100 mg by mouth 3 (Three) Times a Day As Needed.), Disp: 90 capsule, Rfl: 2  •  glucose blood (Accu-Chek Sigrid Plus) test strip, USE TESTING STRIP TO CHECK BLOOD SUGARS 3 TIMES A DAY. DX. E11.42, Disp: 100 each, Rfl: 11  •  Insulin Glargine (Lantus SoloStar) 100 UNIT/ML injection pen, Inject 45 Units under the skin into the appropriate area as directed Daily., Disp: 15 pen, Rfl: 1  •  Insulin Pen Needle 32G X 4 MM misc, Use pen needle to deliver insulin subcutaneously daily.  Dx. E11.42, Disp: 30 each, Rfl: 5  •  meloxicam (MOBIC) 15 MG tablet, TAKE 1 TABLET BY MOUTH DAILY AS NEEDED FOR MODERATE PAIN . TAKE WITH FOOD!, Disp: 90 tablet, Rfl: 1  •  metoprolol succinate XL (TOPROL-XL) 25 MG 24 hr tablet, Take 1 tablet by mouth Daily., Disp: 90 tablet, Rfl: 3  •  spironolactone (ALDACTONE) 25 MG tablet, Take 1 tablet by mouth Daily., Disp: 90 tablet, Rfl: 3  Social History:   Social History     Tobacco Use   • Smoking status: Former Smoker     Packs/day: 2.00     Years: 20.00     Pack years: 40.00     Quit date: 3/6/2000     Years since quittin.4   • Smokeless tobacco: Never Used   Substance Use Topics   • Alcohol use: Yes     Comment: rare      Family History:  Family History   Problem Relation Age of Onset   • Leukemia Mother    • Cancer Sister    • Cancer Maternal Grandmother               Review of Systems   Constitutional: Negative for malaise/fatigue.   Cardiovascular: Positive for leg swelling (left ankle ). Negative for chest pain (occasional pain at ICD site ), dyspnea on exertion and palpitations.   Respiratory: Negative for shortness of  "breath.    Skin: Negative for rash.   Neurological: Negative for dizziness, light-headedness and numbness.   All other systems reviewed and are negative.    All other systems are negative         Objective:     Vitals reviewed.   Constitutional:       Appearance: Healthy appearance. Not in distress.   Neck:      Vascular: No JVR. JVD normal.   Pulmonary:      Effort: Pulmonary effort is normal.      Breath sounds: Normal breath sounds. No wheezing. No rhonchi. No rales.   Chest:      Chest wall: Not tender to palpatation.   Cardiovascular:      PMI at left midclavicular line. Normal rate. Regular rhythm. Normal S1. Normal S2.      Murmurs: There is no murmur.      No gallop. No click. No rub.   Pulses:     Intact distal pulses.   Edema:     Peripheral edema (left ankle trace - 1+) present.  Abdominal:      General: Bowel sounds are normal.      Palpations: Abdomen is soft.      Tenderness: There is no abdominal tenderness.   Musculoskeletal: Normal range of motion.         General: No tenderness. Skin:     General: Skin is warm and dry.   Neurological:      General: No focal deficit present.      Mental Status: Alert and oriented to person, place and time.       /77 (BP Location: Right arm, Patient Position: Sitting, Cuff Size: Large Adult)   Pulse 63   Ht 182.9 cm (72\")   Wt (!) 150 kg (331 lb)   SpO2 95%   BMI 44.89 kg/m²       Lab Reviewed: as above         MARIAN Scott  8/19/2022 10:31 EDT  Electronically signed by MARIAN Scott, 08/19/22, 10:46 AM EDT.      EMR Dragon/Transcription:   \"Dictated utilizing Dragon dictation\".           "

## 2022-08-19 NOTE — PATIENT INSTRUCTIONS
- Daily weight:  same time every day, same clothing   - Low Salt (sodium) diet   - Watch fluid intake        Heart Failure Action Plan  A heart failure action plan helps you understand what to do when you have symptoms of heart failure. Your action plan is a color-coded plan that lists the symptoms to watch for and indicates what actions to take.  If you have symptoms in the red zone, you need medical care right away.  If you have symptoms in the yellow zone, you are having problems.  If you have symptoms in the green zone, you are doing well.  Follow the plan that was created by you and your health care provider. Review your plan each time you visit your health care provider.  Red zone  These signs and symptoms mean you should get medical help right away:  You have trouble breathing when resting.  You have a dry cough that is getting worse.  You have swelling or pain in your legs or abdomen that is getting worse.  You suddenly gain more than 2-3 lb (0.9-1.4 kg) in 24 hours, or more than 5 lb (2.3 kg) in a week. This amount may be more or less depending on your condition.  You have trouble staying awake or you feel confused.  You have chest pain.  You do not have an appetite.  You pass out.  You have worsening sadness or depression.  If you have any of these symptoms, call your local emergency services (911 in the [place] right away. Do not drive yourself to the hospital.  Yellow zone  These signs and symptoms mean your condition may be getting worse and you should make some changes:  You have trouble breathing when you are active, or you need to sleep with your head raised on extra pillows to help you breathe.  You have swelling in your legs or abdomen.  You gain 2-3 lb (0.9-1.4 kg) in 24 hours, or 5 lb (2.3 kg) in a week. This amount may be more or less depending on your condition.  You get tired easily.  You have trouble sleeping.  You have a dry cough.  If you have any of these symptoms:  Contact your health care  provider within the next day.  Your health care provider may adjust your medicines.  Green zone  These signs mean you are doing well and can continue what you are doing:  You do not have shortness of breath.  You have very little swelling or no new swelling.  Your weight is stable (no gain or loss).  You have a normal activity level.  You do not have chest pain or any other new symptoms.  Follow these instructions at home:  Take over-the-counter and prescription medicines only as told by your health care provider.  Weigh yourself daily. Your target weight is __________ lb (__________ kg).  Call your health care provider if you gain more than __________ lb (__________ kg) in 24 hours, or more than __________ lb (__________ kg) in a week.  Health care provider name: _____________________________________________________  Health care provider phone number: _____________________________________________________  Eat a heart-healthy diet. Work with a diet and nutrition specialist (dietitian) to create an eating plan that is best for you.  Keep all follow-up visits. This is important.  Where to find more information  American Heart Association: www.heart.org  Summary  A heart failure action plan helps you understand what to do when you have symptoms of heart failure.  Follow the action plan that was created by you and your health care provider.  Get help right away if you have any symptoms in the red zone.  This information is not intended to replace advice given to you by your health care provider. Make sure you discuss any questions you have with your health care provider.  Document Revised: 08/02/2021 Document Reviewed: 08/02/2021  Elsevier Patient Education © 2021 Elsevier Inc.        Heart Failure Eating Plan  Heart failure, also called congestive heart failure, occurs when your heart does not pump blood well enough to meet your body's needs for oxygen-rich blood. Heart failure is a long-term (chronic) condition.  "Living with heart failure can be challenging. Following your health care provider's instructions about a healthy lifestyle and working with a dietitian to choose the right foods may help to improve your symptoms. An eating plan for someone with heart failure will include changes that limit the intake of salt (sodium) and unhealthy fat.  What are tips for following this plan?  Reading food labels  Check food labels for the amount of sodium per serving. Choose foods that have less than 140 mg (milligrams) of sodium in each serving.  Check food labels for the number of calories per serving. This is important if you need to limit your daily calorie intake to lose weight.  Check food labels for the serving size. If you eat more than one serving, you will be eating more sodium and calories than what is listed on the label.  Look for foods that are labeled as \"sodium-free,\" \"very low sodium,\" or \"low sodium.\"  Foods labeled as \"reduced sodium\" or \"lightly salted\" may still have more sodium than what is recommended for you.  Cooking  Avoid adding salt when cooking. Ask your health care provider or dietitian before using salt substitutes.  Season food with salt-free seasonings, spices, or herbs. Check the label of seasoning mixes to make sure they do not contain salt.  Cook with heart-healthy oils, such as olive, canola, soybean, or sunflower oil.  Do not preciado foods. Cook foods using low-fat methods, such as baking, boiling, grilling, and broiling.  Limit unhealthy fats when cooking by:  Removing the skin from poultry, such as chicken.  Removing all visible fats from meats.  Skimming the fat off from stews, soups, and gravies before serving them.  Meal planning    Limit your intake of:  Processed, canned, or prepackaged foods.  Foods that are high in trans fat, such as fried foods.  Sweets, desserts, sugary drinks, and other foods with added sugar.  Full-fat dairy products, such as whole milk.  Eat a balanced diet. This may " include:  4-5 servings of fruit each day and 4-5 servings of vegetables each day. At each meal, try to fill one-half of your plate with fruits and vegetables.  Up to 6-8 servings of whole grains each day.  Up to 2 servings of lean meat, poultry, or fish each day. One serving of meat is equal to 3 oz (85 g). This is about the same size as a deck of cards.  2 servings of low-fat dairy each day.  Heart-healthy fats. Healthy fats called omega-3 fatty acids are found in foods such as flaxseed and cold-water fish like sardines, salmon, and mackerel.  Aim to eat 25-35 g (grams) of fiber a day. Foods that are high in fiber include apples, broccoli, carrots, beans, peas, and whole grains.  Do not add salt or condiments that contain salt (such as soy sauce) to foods before eating.  When eating at a restaurant, ask that your food be prepared with less salt or no salt, if possible.  Try to eat 2 or more vegetarian meals each week.  Eat more home-cooked food and eat less restaurant, buffet, and fast food.    General information  Do not eat more than 2,300 mg of sodium a day. The amount of sodium that is recommended for you may be lower, depending on your condition.  Maintain a healthy body weight as directed. Ask your health care provider what a healthy weight is for you.  Check your weight every day.  Work with your health care provider and dietitian to make a plan that is right for you to lose weight or maintain your current weight.  Limit how much fluid you drink. Ask your health care provider or dietitian how much fluid you can have each day.  Limit or avoid alcohol as told by your health care provider or dietitian.  Recommended foods  Fruits  All fresh, frozen, and canned fruits. Dried fruits, such as raisins, prunes, and cranberries.  Vegetables  All fresh vegetables. Vegetables that are frozen without sauce or added salt. Low-sodium or sodium-free canned vegetables.  Grains  Bread with less than 80 mg of sodium per slice.  Whole-wheat pasta, quinoa, and brown rice. Oats and oatmeal. Barley. Millet. Grits and cream of wheat. Whole-grain and whole-wheat cold cereal.  Meats and other protein foods  Lean cuts of meat. Skinless chicken and turkey. Fish with high omega-3 fatty acids, such as salmon, sardines, and other cold-water fishes. Eggs. Dried beans, peas, and edamame. Unsalted nuts and nut butters.  Dairy  Low-fat or nonfat (skim) milk and dried milk. Rice milk, soy milk, and almond milk. Low-fat or nonfat yogurt. Small amounts of reduced-sodium block cheese. Low-sodium cottage cheese.  Fats and oils  Olive, canola, soybean, flaxseed, avocado, or sunflower oil.  Sweets and desserts  Applesauce. Granola bars. Sugar-free pudding and gelatin. Frozen fruit bars.  Seasoning and other foods  Fresh and dried herbs. Lemon or lime juice. Vinegar. Low-sodium ketchup. Salt-free marinades, salad dressings, sauces, and seasonings.  The items listed above may not be a complete list of foods and beverages you can eat. Contact a dietitian for more information.  Foods to avoid  Fruits  Fruits that are dried with sodium-containing preservatives.  Vegetables  Canned vegetables. Frozen vegetables with sauce or seasonings. Creamed vegetables. French fries. Onion rings. Pickled vegetables and sauerkraut.  Grains  Bread with more than 80 mg of sodium per slice. Hot or cold cereal with more than 140 mg sodium per serving. Salted pretzels and crackers. Prepackaged breadcrumbs. Bagels, croissants, and biscuits.  Meats and other protein foods  Ribs and chicken wings. Purvis, ham, pepperoni, bologna, salami, and packaged luncheon meats. Hot dogs, bratwurst, and sausage. Canned meat. Smoked meat and fish. Salted nuts and seeds.  Dairy  Whole milk, half-and-half, and cream. Buttermilk. Processed cheese, cheese spreads, and cheese curds. Regular cottage cheese. Feta cheese. Shredded cheese. String cheese.  Fats and oils  Butter, lard, shortening, ghee, and purvis  fat. Canned and packaged gravies.  Seasoning and other foods  Onion salt, garlic salt, table salt, and sea salt. Marinades. Regular salad dressings. Relishes, pickles, and olives. Meat flavorings and tenderizers, and bouillon cubes. Horseradish, ketchup, and mustard. [facility] sauce. Teriyaki sauce, soy sauce (including reduced sodium). Hot sauce and Tabasco sauce. Steak sauce, fish sauce, oyster sauce, and cocktail sauce. Taco seasonings. Barbecue sauce. Tartar sauce.  The items listed above may not be a complete list of foods and beverages you should avoid. Contact a dietitian for more information.  Summary  A heart failure eating plan includes changes that limit your intake of sodium and unhealthy fat, and it may help you lose weight or maintain a healthy weight. Your health care provider may also recommend limiting how much fluid you drink.  Most people with heart failure should eat no more than 2,300 mg of salt (sodium) a day. The amount of sodium that is recommended for you may be lower, depending on your condition.  Contact your health care provider or dietitian before making any major changes to your diet.  This information is not intended to replace advice given to you by your health care provider. Make sure you discuss any questions you have with your health care provider.  Document Revised: 08/02/2021 Document Reviewed: 08/02/2021  Elsevier Patient Education © 2021 Elsevier Inc.

## 2022-09-25 DIAGNOSIS — Z79.4 TYPE 2 DIABETES MELLITUS WITH DIABETIC POLYNEUROPATHY, WITH LONG-TERM CURRENT USE OF INSULIN: ICD-10-CM

## 2022-09-25 DIAGNOSIS — E11.42 TYPE 2 DIABETES MELLITUS WITH DIABETIC POLYNEUROPATHY, WITH LONG-TERM CURRENT USE OF INSULIN: ICD-10-CM

## 2022-09-27 ENCOUNTER — TELEPHONE (OUTPATIENT)
Dept: FAMILY MEDICINE CLINIC | Facility: CLINIC | Age: 62
End: 2022-09-27

## 2022-09-27 NOTE — TELEPHONE ENCOUNTER
Caller: [employer]    Relationship: Other    Best call back number: [phone]    What was the call regarding: [facility] FROM [employer] STATED THAT THEY HAVE A UPDATED PLAN OF CARE FOR PATIENT AND IT CAN BE ACCESSED THROUGH THE [facility] WEB SITE PROVIDER PORTAL (AVAILTY).     [employer] STATED THAT [facility] IS HAVING ROUNDS IN TWO WEEKS AND IF PROVIDER WANTS TO PARTICIPATE PLEASE CALL NUMBER LISTED.     [email or web address]

## 2023-01-19 DIAGNOSIS — Z79.4 TYPE 2 DIABETES MELLITUS WITH DIABETIC POLYNEUROPATHY, WITH LONG-TERM CURRENT USE OF INSULIN: ICD-10-CM

## 2023-01-19 DIAGNOSIS — E11.42 TYPE 2 DIABETES MELLITUS WITH DIABETIC POLYNEUROPATHY, WITH LONG-TERM CURRENT USE OF INSULIN: ICD-10-CM

## 2023-01-19 RX ORDER — INSULIN GLARGINE 100 [IU]/ML
45 INJECTION, SOLUTION SUBCUTANEOUS DAILY
Qty: 45 ML | Refills: 3 | Status: SHIPPED | OUTPATIENT
Start: 2023-01-19 | End: 2023-04-02 | Stop reason: SDUPTHER

## 2023-01-26 DIAGNOSIS — E11.42 TYPE 2 DIABETES MELLITUS WITH DIABETIC POLYNEUROPATHY, WITH LONG-TERM CURRENT USE OF INSULIN: ICD-10-CM

## 2023-01-26 DIAGNOSIS — Z79.4 TYPE 2 DIABETES MELLITUS WITH DIABETIC POLYNEUROPATHY, WITH LONG-TERM CURRENT USE OF INSULIN: ICD-10-CM

## 2023-01-31 ENCOUNTER — TELEPHONE (OUTPATIENT)
Dept: CARDIOLOGY | Facility: CLINIC | Age: 63
End: 2023-01-31

## 2023-01-31 NOTE — TELEPHONE ENCOUNTER
Caller: Sorin Allen    Relationship to patient: Self    Best call back number: [phone]    Type of visit: 6MO FU    If rescheduling, when is the original appointment: APPT WAS TODAY 01.31.23, CANCELLED IN EPIC    Additional notes: PT CALLING TO [facility] APPT - OFFERED PT NEXT AVAIL 04.06.23 AND PT DOES NOT WANT TO WAIT THAT LONG - PT ALSO REPORTS DR SANTANA WAS CONCERNED WITH HIS POTASSIUM LEVELS AND PT INQUIRING IF OTHER LABS ARE NEEDED TO CONTINUE MONITORING LEVELS

## 2023-02-15 ENCOUNTER — TELEPHONE (OUTPATIENT)
Dept: CARDIOLOGY | Facility: CLINIC | Age: 63
End: 2023-02-15
Payer: MEDICARE

## 2023-02-22 ENCOUNTER — CLINICAL SUPPORT NO REQUIREMENTS (OUTPATIENT)
Dept: CARDIOLOGY | Facility: CLINIC | Age: 63
End: 2023-02-22
Payer: MEDICARE

## 2023-02-22 ENCOUNTER — OFFICE VISIT (OUTPATIENT)
Dept: CARDIOLOGY | Facility: CLINIC | Age: 63
End: 2023-02-22
Payer: MEDICARE

## 2023-02-22 VITALS
SYSTOLIC BLOOD PRESSURE: 117 MMHG | DIASTOLIC BLOOD PRESSURE: 74 MMHG | WEIGHT: 315 LBS | HEIGHT: 72 IN | BODY MASS INDEX: 42.66 KG/M2 | OXYGEN SATURATION: 97 % | HEART RATE: 69 BPM

## 2023-02-22 DIAGNOSIS — I11.0 HYPERTENSIVE HEART DISEASE WITH ACUTE ON CHRONIC SYSTOLIC CONGESTIVE HEART FAILURE: ICD-10-CM

## 2023-02-22 DIAGNOSIS — Z79.4 TYPE 2 DIABETES MELLITUS WITHOUT COMPLICATION, WITH LONG-TERM CURRENT USE OF INSULIN: ICD-10-CM

## 2023-02-22 DIAGNOSIS — R07.2 PRECORDIAL PAIN: Primary | ICD-10-CM

## 2023-02-22 DIAGNOSIS — I47.29 VENTRICULAR TACHYCARDIA (PAROXYSMAL): ICD-10-CM

## 2023-02-22 DIAGNOSIS — I50.22 CHRONIC HFREF (HEART FAILURE WITH REDUCED EJECTION FRACTION): ICD-10-CM

## 2023-02-22 DIAGNOSIS — E78.5 DYSLIPIDEMIA: ICD-10-CM

## 2023-02-22 DIAGNOSIS — Z99.89 OSA ON CPAP: ICD-10-CM

## 2023-02-22 DIAGNOSIS — Z53.20 STATIN DECLINED: ICD-10-CM

## 2023-02-22 DIAGNOSIS — G47.33 OSA ON CPAP: ICD-10-CM

## 2023-02-22 DIAGNOSIS — Z95.810 PRESENCE OF AUTOMATIC CARDIOVERTER/DEFIBRILLATOR (AICD): ICD-10-CM

## 2023-02-22 DIAGNOSIS — I42.0 DILATED CARDIOMYOPATHY: ICD-10-CM

## 2023-02-22 DIAGNOSIS — I10 ESSENTIAL HYPERTENSION: ICD-10-CM

## 2023-02-22 DIAGNOSIS — E66.01 MORBID OBESITY WITH BMI OF 40.0-44.9, ADULT: ICD-10-CM

## 2023-02-22 DIAGNOSIS — I42.0 DILATED CARDIOMYOPATHY: Primary | Chronic | ICD-10-CM

## 2023-02-22 DIAGNOSIS — I50.23 HYPERTENSIVE HEART DISEASE WITH ACUTE ON CHRONIC SYSTOLIC CONGESTIVE HEART FAILURE: ICD-10-CM

## 2023-02-22 DIAGNOSIS — E11.9 TYPE 2 DIABETES MELLITUS WITHOUT COMPLICATION, WITH LONG-TERM CURRENT USE OF INSULIN: ICD-10-CM

## 2023-02-22 DIAGNOSIS — I50.22 CHRONIC HFREF (HEART FAILURE WITH REDUCED EJECTION FRACTION): Chronic | ICD-10-CM

## 2023-02-22 PROCEDURE — 93282 PRGRMG EVAL IMPLANTABLE DFB: CPT | Performed by: INTERNAL MEDICINE

## 2023-02-22 PROCEDURE — 99214 OFFICE O/P EST MOD 30 MIN: CPT | Performed by: INTERNAL MEDICINE

## 2023-02-22 PROCEDURE — 93000 ELECTROCARDIOGRAM COMPLETE: CPT | Performed by: INTERNAL MEDICINE

## 2023-02-22 NOTE — PROGRESS NOTES
Subjective:     Encounter Date:02/22/2023      Patient ID: Sorin Allen is a 62 y.o. male.    Chief Complaint :Follow-up for CHF, cardiomyopathy, hypertensive cardiovascular disease, obesity, dyslipidemia    History of Present Illness      Mr. Sorin Allen has PMH of    Hypertensive cardiovascular disease with chronic diastolic CHF  Chronic HFrEF due to systolic dysfunction from dilated cardiomyopathy  [place] ICD 4/7/2022  Cardiac cath 2/16/2022 EF of 15% no obstructive CAD  Dyslipidemia  Hypertension  Obesity with BMI over 40  ESAU/CPAP  ED  Rheumatic fever as a child  Former smoker quit 3/6/2000  Family history mother has leukemia    Here for follow-up.  Patient complaining of chest pain.  Has class I-2 dyspnea on exertion.  Is complaining of some edema.  Device check today is revealing nonsustained ventricular tachycardia.    Patient's arterial blood pressure is 117/74, heart rate 69 bpm, O2 sat of 97% on room air.  BMI is over 40.    Patient reportedly had cardiac cath 8 years ago was told he has cardiomyopathy and CHF.  Underwent repeat cath 2/16/2022 which revealed EF of 15% no obstructive CAD..  Patient had repeat echocardiogram 4/6/2022 which is revealing persistent LV dysfunction with LVEF of 20% with severe pulmonary hypertension PA systolic pressure of 60 mmHg.  Review of records reveal labs from 1/4/2022 with a proBNP 1548, CMP with a glucose of 110, hemoglobin A1c 6.7, lipid profile with cholesterol 187, triglycerides 92, HDL low at 35, .  Labs from 4/11/2022 reveal proBNP normal at 907.  Labs from 6/7/2022 revealed normal CBC and BMP.  Labs from 8/2/2022 reveal CMP with a glucose of 155, A1c 6.8.  Lipid profile with cholesterol 222, triglycerides 153, HDL 37, .    EKG done 11/14/2021 reviewed/interpreted by me reveals sinus rhythm with rate of 95 bpm with poor R wave progression    Echocardiogram 5/1/2020 revealed normal LV systolic function EF 60% with diastolic dysfunction  and RV enlargement and mild aortic stenosis  Echocardiogram 1/21/2022 revealed EF of 26 to 30%  Repeat echo 4/6/2022 is revealing EF of 20% with severe pulmonary hypertension PA systolic pressure of 60 mmHg    Assessment:  :    Chest pain  Nonsustained ventricular tachycardia  Chronic HFrEF due to combined hypertensive cardiovascular disease and systolic dysfunction from dilated cardiomyopathy EF of 15-20%    Dilated cardiomyopathy  Hypertension  Diabetes  Obesity with BMI over 40      Recommendations / Plan:        Reviewed EKG results with patient.  We will schedule a stress Cardiolite to evaluate chest pain.  Will check BMP to check electrolytes  We will continue medical management with aspirin, Farxiga, Entresto, furosemide, metoprolol, Aldactone as tolerated to help with cardiomyopathy, CHF, hypertension,.    Follow-up in heart failure clinic.  Follow-up with PMD for diabetes care.  Patient refused statins.  Procedures cardiac cath 2/16/2022 performed and interpreted by me reveals dilated cardiomyopathy EF of 15%               ECG 12 Lead    Date/Time: 2/22/2023 11:58 AM  Performed by: Raoul Tirado MD  Authorized by: Raoul Tirado MD   Comparison: compared with previous ECG from 7/12/2021  Comparison to previous ECG: EKG done today reviewed/interpreted by me reveals sinus rhythm with a rate of 72 bpm, no new change compared EKG from 7/12/2021              Copied text in this portion of the note has been reviewed and is accurate as of 2/22/2023  The following portions of the patient's history were reviewed and updated as appropriate: allergies, current medications, past family history, past medical history, past social history, past surgical history and problem list.    Assessment:         [facility]     Diagnosis Plan   1. Precordial pain  Stress Test With Myocardial Perfusion One Day    Basic Metabolic Panel    BNP      2. Chronic HFrEF (heart failure with reduced ejection fraction) (HCC)   Stress Test With Myocardial Perfusion One Day    Basic Metabolic Panel    BNP      3. Dilated cardiomyopathy (HCC)  Stress Test With Myocardial Perfusion One Day    Basic Metabolic Panel    BNP      4. Essential hypertension  Stress Test With Myocardial Perfusion One Day    Basic Metabolic Panel    BNP      5. Presence of automatic cardioverter/defibrillator (AICD)  Stress Test With Myocardial Perfusion One Day    Basic Metabolic Panel    BNP      6. ESAU on CPAP  Stress Test With Myocardial Perfusion One Day    Basic Metabolic Panel    BNP      7. Statin declined  Stress Test With Myocardial Perfusion One Day    Basic Metabolic Panel    BNP      8. Type 2 diabetes mellitus without complication, with long-term current use of insulin (HCC)  Stress Test With Myocardial Perfusion One Day    Basic Metabolic Panel    BNP      9. Dyslipidemia  Stress Test With Myocardial Perfusion One Day    Basic Metabolic Panel    BNP      10. Morbid obesity with BMI of 40.0-44.9, adult (HCC)  Stress Test With Myocardial Perfusion One Day    Basic Metabolic Panel    BNP      11. Ventricular tachycardia (paroxysmal)  Basic Metabolic Panel    BNP             Plan:               Past Medical History:  Past Medical History:   Diagnosis Date   • Arthritis    • Asthma    • Cardiac disease    • Carpal tunnel syndrome    • Congenital heart disease    • CPAP (continuous positive airway pressure) dependence    • Diabetes mellitus (HCC)    • Diverticulitis of colon    • Erectile dysfunction    • Heart disease    • Heart valve disease    • Hyperlipidemia    • Hypertension    • Neuromuscular disorder (HCC)    • ESAU (obstructive sleep apnea)    • Osteoarthritis      Past Surgical History:  Past Surgical History:   Procedure Laterality Date   • CARDIAC CATHETERIZATION N/A 2/16/2022    Procedure: Left Heart Cath;  Surgeon: Raoul Tirado MD;  Location: [facility] CATH INVASIVE LOCATION;  Service: Cardiovascular;  Laterality: N/A;   • CARDIAC  ELECTROPHYSIOLOGY PROCEDURE N/A 6/7/2022    Procedure: ICD single-chamber, new [place] aware;  Surgeon: Raoul Tirado MD;  Location: [facility] CATH INVASIVE LOCATION;  Service: Cardiovascular;  Laterality: N/A;   • HERNIA REPAIR        Allergies:  No Known Allergies  Home Meds:  Current Meds:     Current Outpatient Medications:   •  ACCU-CHEK SOFTCLIX LANCETS lancets, by Other route. Use as instructed, Disp: , Rfl:   •  acetaminophen (TYLENOL) 500 MG tablet, Take 2 tablets by mouth Every 8 (Eight) Hours As Needed for Moderate Pain  (arthritic pain)., Disp: 180 tablet, Rfl: 5  •  aspirin (aspirin) 81 MG EC tablet, Take 1 tablet by mouth Daily., Disp: 90 tablet, Rfl: 3  •  Blood Glucose Calibration liquid, by In Vitro route., Disp: , Rfl:   •  Blood Glucose Monitoring Suppl (ACCU-CHEK SIGRID PLUS) w/Device kit, Use to test blood sugar twice a day, Disp: , Rfl:   •  Blood Glucose Monitoring Suppl (Accu-Chek Guide) w/Device kit, 1 kit 3 (Three) Times a Day. Use glucometer to check blood sugars 3 times a day. Dx. E11.42, Disp: 1 kit, Rfl: 0  •  Continuous Blood Gluc  (FreeStyle Torie 14 Day [place]) device, 1 Units 3 (Three) Times a Day Before Meals. Use continuous glucose monitor to monitor glucose regularly., Disp: 1 each, Rfl: 0  •  Continuous Blood Gluc Sensor (FreeStyle Torie 2 Sensor) misc, USE 1 SENSOR EVERY 14 DAYS AS DIRECTED, Disp: 6 each, Rfl: 1  •  Dapagliflozin Propanediol (Farxiga) 10 MG tablet, Take 10 mg by mouth Daily., Disp: 90 tablet, Rfl: 3  •  Entresto 24-26 MG tablet, TAKE 1 TABLET BY MOUTH TWICE A DAY, Disp: 180 tablet, Rfl: 3  •  furosemide (LASIX) 80 MG tablet, Take 1 tablet by mouth 4 (Four) Times a Day., Disp: 360 tablet, Rfl: 3  •  gabapentin (NEURONTIN) 100 MG capsule, TAKE 1 CAPSULE BY MOUTH THREE TIMES A DAY (Patient taking differently: Take 100 mg by mouth 3 (Three) Times a Day As Needed.), Disp: 90 capsule, Rfl: 2  •  glucose blood (Accu-Chek Sigrid Plus) test strip, USE  "TESTING STRIP TO CHECK BLOOD SUGARS 3 TIMES A DAY. DX. E11.42, Disp: 100 each, Rfl: 11  •  Insulin Glargine (Lantus SoloStar) 100 UNIT/ML injection pen, Inject 45 Units under the skin into the appropriate area as directed Daily., Disp: 45 mL, Rfl: 3  •  Insulin Pen Needle 32G X 4 MM misc, Use pen needle to deliver insulin subcutaneously daily.  Dx. E11.42, Disp: 30 each, Rfl: 5  •  meloxicam (MOBIC) 15 MG tablet, TAKE 1 TABLET BY MOUTH DAILY AS NEEDED FOR MODERATE PAIN . TAKE WITH FOOD!, Disp: 90 tablet, Rfl: 1  •  metoprolol succinate XL (TOPROL-XL) 25 MG 24 hr tablet, Take 1 tablet by mouth Daily., Disp: 90 tablet, Rfl: 3  •  spironolactone (ALDACTONE) 25 MG tablet, Take 1 tablet by mouth Daily., Disp: 90 tablet, Rfl: 3  Social History:   Social History     Tobacco Use   • Smoking status: Former     Packs/day: 2.00     Years: 20.00     Pack years: 40.00     Types: Cigarettes     Quit date: 3/6/2000     Years since quittin.9   • Smokeless tobacco: Never   Substance Use Topics   • Alcohol use: Yes     Comment: rare      Family History:  Family History   Problem Relation Age of Onset   • Leukemia Mother    • Cancer Sister    • Cancer Maternal Grandmother               Review of Systems   Constitutional: Negative for malaise/fatigue.   Cardiovascular: Positive for chest pain and leg swelling. Negative for palpitations.   Respiratory: Positive for shortness of breath.    Skin: Negative for rash.   Neurological: Positive for numbness. Negative for dizziness and light-headedness.     All other systems are negative         Objective:     Physical Exam  /74   Pulse 69   Ht 182.9 cm (72\")   Wt (!) 152 kg (336 lb)   SpO2 97%   BMI 45.57 kg/m²   General:  Appears in no acute distress  Eyes: Sclera is anicteric,  conjunctiva is clear   HEENT:  No JVD.  No carotid bruits  Respiratory: Respirations regular and unlabored at rest.  Clear to auscultation  Cardiovascular: S1,S2 Regular rate and rhythm. No murmur, rub " "or gallop auscultated.   Extremities: No digital clubbing or cyanosis, no edema  Skin: Color pink. Skin warm and dry to touch. No rashes  No xanthoma  Neuro: Alert and awake.    Lab Reviewed:         Raoul Tirado MD  2/22/2023 12:03 EST      EMR Dragon/Transcription:   \"Dictated utilizing Dragon dictation\".        "

## 2023-02-23 ENCOUNTER — TELEPHONE (OUTPATIENT)
Dept: CARDIOLOGY | Facility: CLINIC | Age: 63
End: 2023-02-23
Payer: MEDICARE

## 2023-02-23 RX ORDER — DAPAGLIFLOZIN 10 MG/1
TABLET, FILM COATED ORAL
Qty: 90 TABLET | Refills: 3 | Status: SHIPPED | OUTPATIENT
Start: 2023-02-23

## 2023-02-23 RX ORDER — METOPROLOL SUCCINATE 25 MG/1
TABLET, EXTENDED RELEASE ORAL
Qty: 90 TABLET | Refills: 3 | Status: SHIPPED | OUTPATIENT
Start: 2023-02-23

## 2023-02-23 NOTE — TELEPHONE ENCOUNTER
Rx Refill Note  Requested Prescriptions     Pending Prescriptions Disp Refills   • Farxiga 10 MG tablet [Pharmacy Med Name: FARXIGA 10 MG TABLET] 90 tablet 3     Sig: TAKE 1 TABLET BY MOUTH EVERY DAY   • metoprolol succinate XL (TOPROL-XL) 25 MG 24 hr tablet [Pharmacy Med Name: METOPROLOL SUCC ER 25 MG TAB] 90 tablet 3     Sig: TAKE 1 TABLET BY MOUTH EVERY DAY      Last office visit with prescribing clinician: 2/22/2023   Last telemedicine visit with prescribing clinician: Visit date not found   Next office visit with prescribing clinician: Visit date not found                         Would you like a call back once the refill request has been completed: [] Yes [] No    If the office needs to give you a call back, can they leave a voicemail: [] Yes [] No    Niki Zamarripa MA  02/23/23, 09:21 EST

## 2023-02-24 DIAGNOSIS — E11.42 TYPE 2 DIABETES MELLITUS WITH DIABETIC POLYNEUROPATHY, WITH LONG-TERM CURRENT USE OF INSULIN: ICD-10-CM

## 2023-02-24 DIAGNOSIS — Z79.4 TYPE 2 DIABETES MELLITUS WITH DIABETIC POLYNEUROPATHY, WITH LONG-TERM CURRENT USE OF INSULIN: ICD-10-CM

## 2023-02-27 ENCOUNTER — TELEPHONE (OUTPATIENT)
Dept: CARDIOLOGY | Facility: CLINIC | Age: 63
End: 2023-02-27
Payer: MEDICARE

## 2023-02-27 NOTE — TELEPHONE ENCOUNTER
Received fax from [facility]  Saying Entresto 24-26mg is on backorder/strength on avail currently  There's no date on the fax    Called [facility] to confirm this

## 2023-02-28 ENCOUNTER — LAB (OUTPATIENT)
Dept: LAB | Facility: HOSPITAL | Age: 63
End: 2023-02-28
Payer: MEDICARE

## 2023-02-28 DIAGNOSIS — Z95.810 PRESENCE OF AUTOMATIC CARDIOVERTER/DEFIBRILLATOR (AICD): ICD-10-CM

## 2023-02-28 DIAGNOSIS — I47.29 VENTRICULAR TACHYCARDIA (PAROXYSMAL): ICD-10-CM

## 2023-02-28 DIAGNOSIS — Z99.89 OSA ON CPAP: ICD-10-CM

## 2023-02-28 DIAGNOSIS — I42.0 DILATED CARDIOMYOPATHY: ICD-10-CM

## 2023-02-28 DIAGNOSIS — G47.33 OSA ON CPAP: ICD-10-CM

## 2023-02-28 DIAGNOSIS — Z79.4 TYPE 2 DIABETES MELLITUS WITHOUT COMPLICATION, WITH LONG-TERM CURRENT USE OF INSULIN: ICD-10-CM

## 2023-02-28 DIAGNOSIS — R07.2 PRECORDIAL PAIN: ICD-10-CM

## 2023-02-28 DIAGNOSIS — Z53.20 STATIN DECLINED: ICD-10-CM

## 2023-02-28 DIAGNOSIS — E11.9 TYPE 2 DIABETES MELLITUS WITHOUT COMPLICATION, WITH LONG-TERM CURRENT USE OF INSULIN: ICD-10-CM

## 2023-02-28 DIAGNOSIS — E66.01 MORBID OBESITY WITH BMI OF 40.0-44.9, ADULT: ICD-10-CM

## 2023-02-28 DIAGNOSIS — I10 ESSENTIAL HYPERTENSION: ICD-10-CM

## 2023-02-28 DIAGNOSIS — I50.22 CHRONIC HFREF (HEART FAILURE WITH REDUCED EJECTION FRACTION): ICD-10-CM

## 2023-02-28 DIAGNOSIS — E78.5 DYSLIPIDEMIA: ICD-10-CM

## 2023-02-28 LAB
ANION GAP SERPL CALCULATED.3IONS-SCNC: 10.1 MMOL/L (ref 5–15)
BUN SERPL-MCNC: 27 MG/DL (ref 8–23)
BUN/CREAT SERPL: 20.3 (ref 7–25)
CALCIUM SPEC-SCNC: 9.7 MG/DL (ref 8.6–10.5)
CHLORIDE SERPL-SCNC: 98 MMOL/L (ref 98–107)
CO2 SERPL-SCNC: 31.9 MMOL/L (ref 22–29)
CREAT SERPL-MCNC: 1.33 MG/DL (ref 0.76–1.27)
EGFRCR SERPLBLD CKD-EPI 2021: 60.1 ML/MIN/1.73
GLUCOSE SERPL-MCNC: 155 MG/DL (ref 65–99)
NT-PROBNP SERPL-MCNC: 378 PG/ML (ref 0–900)
POTASSIUM SERPL-SCNC: 4.5 MMOL/L (ref 3.5–5.2)
SODIUM SERPL-SCNC: 140 MMOL/L (ref 136–145)

## 2023-02-28 PROCEDURE — 80048 BASIC METABOLIC PNL TOTAL CA: CPT

## 2023-02-28 PROCEDURE — 36415 COLL VENOUS BLD VENIPUNCTURE: CPT

## 2023-02-28 PROCEDURE — 83880 ASSAY OF NATRIURETIC PEPTIDE: CPT

## 2023-02-28 RX ORDER — SACUBITRIL AND VALSARTAN 24; 26 MG/1; MG/1
1 TABLET, FILM COATED ORAL 2 TIMES DAILY
Qty: 180 TABLET | Refills: 3 | Status: SHIPPED | OUTPATIENT
Start: 2023-02-28

## 2023-02-28 NOTE — TELEPHONE ENCOUNTER
Rx Refill Note  Requested Prescriptions     Pending Prescriptions Disp Refills   • sacubitril-valsartan (Entresto) 24-26 MG tablet 180 tablet 3     Sig: Take 1 tablet by mouth 2 (Two) Times a Day.      Last office visit with prescribing clinician: 2/22/2023   Last telemedicine visit with prescribing clinician: 3/21/2023   Next office visit with prescribing clinician: 3/21/2023                         Would you like a call back once the refill request has been completed: [] Yes [] No    If the office needs to give you a call back, can they leave a voicemail: [] Yes [] No    Niki Zamarripa MA  02/28/23, 09:41 EST

## 2023-04-02 DIAGNOSIS — E11.42 TYPE 2 DIABETES MELLITUS WITH DIABETIC POLYNEUROPATHY, WITH LONG-TERM CURRENT USE OF INSULIN: ICD-10-CM

## 2023-04-02 DIAGNOSIS — Z79.4 TYPE 2 DIABETES MELLITUS WITH DIABETIC POLYNEUROPATHY, WITH LONG-TERM CURRENT USE OF INSULIN: ICD-10-CM

## 2023-04-02 RX ORDER — INSULIN GLARGINE 100 [IU]/ML
45 INJECTION, SOLUTION SUBCUTANEOUS DAILY
Qty: 45 ML | Refills: 3 | Status: SHIPPED | OUTPATIENT
Start: 2023-04-02

## 2023-04-02 NOTE — TELEPHONE ENCOUNTER
Please let pt know that I refilled his lantus but he needs to schedule a follow up appointment and is due for labs.

## 2023-04-06 DIAGNOSIS — Z79.4 TYPE 2 DIABETES MELLITUS WITH DIABETIC POLYNEUROPATHY, WITH LONG-TERM CURRENT USE OF INSULIN: ICD-10-CM

## 2023-04-06 DIAGNOSIS — E11.42 TYPE 2 DIABETES MELLITUS WITH DIABETIC POLYNEUROPATHY, WITH LONG-TERM CURRENT USE OF INSULIN: ICD-10-CM

## 2023-04-07 DIAGNOSIS — E11.42 TYPE 2 DIABETES MELLITUS WITH DIABETIC POLYNEUROPATHY, WITH LONG-TERM CURRENT USE OF INSULIN: ICD-10-CM

## 2023-04-07 DIAGNOSIS — Z79.4 TYPE 2 DIABETES MELLITUS WITH DIABETIC POLYNEUROPATHY, WITH LONG-TERM CURRENT USE OF INSULIN: ICD-10-CM

## 2023-04-07 NOTE — TELEPHONE ENCOUNTER
Patient walked in this morning and wanted to know why his prescription for Blood Gluc Sensor was wrote for 1 every 14 days , he said he does not want to go to the pharmacy twice a month to get these refills, only once a month.

## 2023-04-22 DIAGNOSIS — I42.0 DILATED CARDIOMYOPATHY: ICD-10-CM

## 2023-04-22 DIAGNOSIS — I50.42 HYPERTENSIVE HEART DISEASE WITH CHRONIC COMBINED SYSTOLIC AND DIASTOLIC CONGESTIVE HEART FAILURE: ICD-10-CM

## 2023-04-22 DIAGNOSIS — I50.22 CHRONIC HFREF (HEART FAILURE WITH REDUCED EJECTION FRACTION): ICD-10-CM

## 2023-04-22 DIAGNOSIS — I11.0 HYPERTENSIVE HEART DISEASE WITH CHRONIC COMBINED SYSTOLIC AND DIASTOLIC CONGESTIVE HEART FAILURE: ICD-10-CM

## 2023-04-22 DIAGNOSIS — I50.22 CHRONIC SYSTOLIC HEART FAILURE: ICD-10-CM

## 2023-04-24 RX ORDER — FUROSEMIDE 80 MG
TABLET ORAL
Qty: 360 TABLET | Refills: 3 | Status: SHIPPED | OUTPATIENT
Start: 2023-04-24

## 2023-04-24 NOTE — TELEPHONE ENCOUNTER
Rx Refill Note  Requested Prescriptions     Pending Prescriptions Disp Refills   • furosemide (LASIX) 80 MG tablet [Pharmacy Med Name: FUROSEMIDE 80 MG TABLET] 360 tablet 3     Sig: TAKE 1 TABLET BY MOUTH 4 TIMES A DAY.      Last office visit with prescribing clinician: 2/22/2023     Next office visit with prescribing clinician: 9/20/2023                       Alyssa Juares MA  04/24/23, 08:25 EDT

## 2023-04-25 ENCOUNTER — HOSPITAL ENCOUNTER (OUTPATIENT)
Dept: CARDIOLOGY | Facility: HOSPITAL | Age: 63
Discharge: HOME | End: 2023-04-25
Payer: MEDICARE

## 2023-04-25 ENCOUNTER — TELEPHONE (OUTPATIENT)
Dept: CARDIOLOGY | Facility: CLINIC | Age: 63
End: 2023-04-25

## 2023-04-25 DIAGNOSIS — E78.5 DYSLIPIDEMIA: ICD-10-CM

## 2023-04-25 DIAGNOSIS — I50.22 CHRONIC HFREF (HEART FAILURE WITH REDUCED EJECTION FRACTION): ICD-10-CM

## 2023-04-25 DIAGNOSIS — Z53.20 STATIN DECLINED: ICD-10-CM

## 2023-04-25 DIAGNOSIS — R07.2 PRECORDIAL PAIN: ICD-10-CM

## 2023-04-25 DIAGNOSIS — Z79.4 TYPE 2 DIABETES MELLITUS WITHOUT COMPLICATION, WITH LONG-TERM CURRENT USE OF INSULIN: ICD-10-CM

## 2023-04-25 DIAGNOSIS — Z95.810 PRESENCE OF AUTOMATIC CARDIOVERTER/DEFIBRILLATOR (AICD): ICD-10-CM

## 2023-04-25 DIAGNOSIS — G47.33 OSA ON CPAP: ICD-10-CM

## 2023-04-25 DIAGNOSIS — I10 ESSENTIAL HYPERTENSION: ICD-10-CM

## 2023-04-25 DIAGNOSIS — Z99.89 OSA ON CPAP: ICD-10-CM

## 2023-04-25 DIAGNOSIS — E11.9 TYPE 2 DIABETES MELLITUS WITHOUT COMPLICATION, WITH LONG-TERM CURRENT USE OF INSULIN: ICD-10-CM

## 2023-04-25 DIAGNOSIS — I42.0 DILATED CARDIOMYOPATHY: ICD-10-CM

## 2023-04-25 DIAGNOSIS — E66.01 MORBID OBESITY WITH BMI OF 40.0-44.9, ADULT: ICD-10-CM

## 2023-04-25 LAB
BH CV REST NUCLEAR ISOTOPE DOSE: 10.8 MCI
BH CV STRESS BP STAGE 1: NORMAL
BH CV STRESS COMMENTS STAGE 1: NORMAL
BH CV STRESS DOSE REGADENOSON STAGE 1: 0.4
BH CV STRESS DURATION MIN STAGE 1: 0
BH CV STRESS DURATION SEC STAGE 1: 10
BH CV STRESS HR STAGE 1: 74
BH CV STRESS NUCLEAR ISOTOPE DOSE: 31.4 MCI
BH CV STRESS PROTOCOL 1: NORMAL
BH CV STRESS RECOVERY BP: NORMAL MMHG
BH CV STRESS RECOVERY HR: 102 BPM
BH CV STRESS STAGE 1: 1
MAXIMAL PREDICTED HEART RATE: 157 BPM
STRESS BASELINE BP: NORMAL MMHG
STRESS BASELINE HR: 74 BPM
STRESS TARGET HR: 133 BPM

## 2023-04-25 PROCEDURE — 78452 HT MUSCLE IMAGE SPECT MULT: CPT

## 2023-04-25 PROCEDURE — 25010000002 REGADENOSON 0.4 MG/5ML SOLUTION: Performed by: INTERNAL MEDICINE

## 2023-04-25 PROCEDURE — 0 TECHNETIUM SESTAMIBI: Performed by: INTERNAL MEDICINE

## 2023-04-25 PROCEDURE — A9500 TC99M SESTAMIBI: HCPCS | Performed by: INTERNAL MEDICINE

## 2023-04-25 PROCEDURE — 93017 CV STRESS TEST TRACING ONLY: CPT

## 2023-04-25 RX ORDER — REGADENOSON 0.08 MG/ML
0.4 INJECTION, SOLUTION INTRAVENOUS
Status: COMPLETED | OUTPATIENT
Start: 2023-04-25 | End: 2023-04-25

## 2023-04-25 RX ADMIN — REGADENOSON 0.4 MG: 0.08 INJECTION, SOLUTION INTRAVENOUS at 11:13

## 2023-04-25 RX ADMIN — TECHNETIUM TC 99M SESTAMIBI 1 DOSE: 1 INJECTION INTRAVENOUS at 11:13

## 2023-04-25 RX ADMIN — TECHNETIUM TC 99M SESTAMIBI 1 DOSE: 1 INJECTION INTRAVENOUS at 08:44

## 2023-04-25 NOTE — TELEPHONE ENCOUNTER
Per Dr Tirado   Showed Cardiomyopathy (weak heart muscle)  Makes it harder for the heart to pump blood,  Will continue to monitor     No blockages

## 2023-05-09 ENCOUNTER — LAB (OUTPATIENT)
Dept: FAMILY MEDICINE CLINIC | Facility: CLINIC | Age: 63
End: 2023-05-09
Payer: MEDICARE

## 2023-05-09 ENCOUNTER — OFFICE VISIT (OUTPATIENT)
Dept: FAMILY MEDICINE CLINIC | Facility: CLINIC | Age: 63
End: 2023-05-09
Payer: MEDICARE

## 2023-05-09 VITALS
RESPIRATION RATE: 16 BRPM | DIASTOLIC BLOOD PRESSURE: 85 MMHG | HEART RATE: 74 BPM | TEMPERATURE: 98.1 F | BODY MASS INDEX: 42.66 KG/M2 | HEIGHT: 72 IN | WEIGHT: 315 LBS | OXYGEN SATURATION: 97 % | SYSTOLIC BLOOD PRESSURE: 129 MMHG

## 2023-05-09 DIAGNOSIS — E78.2 MIXED DIABETIC HYPERLIPIDEMIA ASSOCIATED WITH TYPE 2 DIABETES MELLITUS: ICD-10-CM

## 2023-05-09 DIAGNOSIS — Z79.4 TYPE 2 DIABETES MELLITUS WITH DIABETIC POLYNEUROPATHY, WITH LONG-TERM CURRENT USE OF INSULIN: Primary | Chronic | ICD-10-CM

## 2023-05-09 DIAGNOSIS — R05.2 SUBACUTE COUGH: ICD-10-CM

## 2023-05-09 DIAGNOSIS — E11.69 MIXED DIABETIC HYPERLIPIDEMIA ASSOCIATED WITH TYPE 2 DIABETES MELLITUS: ICD-10-CM

## 2023-05-09 DIAGNOSIS — Z12.5 PROSTATE CANCER SCREENING: ICD-10-CM

## 2023-05-09 DIAGNOSIS — E11.42 TYPE 2 DIABETES MELLITUS WITH DIABETIC POLYNEUROPATHY, WITH LONG-TERM CURRENT USE OF INSULIN: Primary | Chronic | ICD-10-CM

## 2023-05-09 LAB
ALBUMIN SERPL-MCNC: 4.1 G/DL (ref 3.5–5.2)
ALBUMIN/GLOB SERPL: 1.1 G/DL
ALP SERPL-CCNC: 69 U/L (ref 39–117)
ALT SERPL W P-5'-P-CCNC: 15 U/L (ref 1–41)
ANION GAP SERPL CALCULATED.3IONS-SCNC: 11.6 MMOL/L (ref 5–15)
AST SERPL-CCNC: 13 U/L (ref 1–40)
BILIRUB BLD-MCNC: NEGATIVE MG/DL
BILIRUB SERPL-MCNC: 0.3 MG/DL (ref 0–1.2)
BUN SERPL-MCNC: 31 MG/DL (ref 8–23)
BUN/CREAT SERPL: 23 (ref 7–25)
CALCIUM SPEC-SCNC: 9.5 MG/DL (ref 8.6–10.5)
CHLORIDE SERPL-SCNC: 97 MMOL/L (ref 98–107)
CHOLEST SERPL-MCNC: 213 MG/DL (ref 0–200)
CLARITY, POC: CLEAR
CO2 SERPL-SCNC: 28.4 MMOL/L (ref 22–29)
COLOR UR: YELLOW
CREAT SERPL-MCNC: 1.35 MG/DL (ref 0.76–1.27)
EGFRCR SERPLBLD CKD-EPI 2021: 59 ML/MIN/1.73
EXPIRATION DATE: ABNORMAL
GLOBULIN UR ELPH-MCNC: 3.9 GM/DL
GLUCOSE SERPL-MCNC: 130 MG/DL (ref 65–99)
GLUCOSE UR STRIP-MCNC: ABNORMAL MG/DL
HBA1C MFR BLD: 7.3 % (ref 4.8–5.6)
HDLC SERPL-MCNC: 32 MG/DL (ref 40–60)
KETONES UR QL: NEGATIVE
LDLC SERPL CALC-MCNC: 146 MG/DL (ref 0–100)
LDLC/HDLC SERPL: 4.45 {RATIO}
LEUKOCYTE EST, POC: NEGATIVE
Lab: ABNORMAL
NITRITE UR-MCNC: NEGATIVE MG/ML
PH UR: 6 [PH] (ref 5–8)
POTASSIUM SERPL-SCNC: 4.3 MMOL/L (ref 3.5–5.2)
PROT SERPL-MCNC: 8 G/DL (ref 6–8.5)
PROT UR STRIP-MCNC: NEGATIVE MG/DL
PSA SERPL-MCNC: 0.8 NG/ML (ref 0–4)
RBC # UR STRIP: ABNORMAL /UL
SODIUM SERPL-SCNC: 137 MMOL/L (ref 136–145)
SP GR UR: 1.01 (ref 1–1.03)
TRIGL SERPL-MCNC: 193 MG/DL (ref 0–150)
UROBILINOGEN UR QL: ABNORMAL
VLDLC SERPL-MCNC: 35 MG/DL (ref 5–40)

## 2023-05-09 PROCEDURE — G0103 PSA SCREENING: HCPCS | Performed by: PHYSICIAN ASSISTANT

## 2023-05-09 PROCEDURE — 83036 HEMOGLOBIN GLYCOSYLATED A1C: CPT | Performed by: PHYSICIAN ASSISTANT

## 2023-05-09 PROCEDURE — 80053 COMPREHEN METABOLIC PANEL: CPT | Performed by: PHYSICIAN ASSISTANT

## 2023-05-09 PROCEDURE — 36415 COLL VENOUS BLD VENIPUNCTURE: CPT | Performed by: PHYSICIAN ASSISTANT

## 2023-05-09 PROCEDURE — 80061 LIPID PANEL: CPT | Performed by: PHYSICIAN ASSISTANT

## 2023-05-09 NOTE — PROGRESS NOTES
Subjective   Sorin Allen is a 62 y.o. male.     History of Present Illness  Pt presents to follow up on his diabetes.  He has been coughing more over the last 2 months and also having some pain in bilateral posterior ribs/lower back that is worse when lying down or when he goes to get up after lying down. He denies any urinary symptoms.  He denies any fevers.  He is up to date on eye exam.  Currently taking 60 units of lantus once daily.  Using his CPAP nightly.       The following portions of the patient's history were reviewed and updated as appropriate: allergies, current medications, past family history, past medical history, past social history, past surgical history and problem list.  Past Medical History:   Diagnosis Date   • Arthritis    • Asthma    • Cardiac disease    • Carpal tunnel syndrome    • Congenital heart disease    • CPAP (continuous positive airway pressure) dependence    • Diabetes mellitus    • Diverticulitis of colon    • Erectile dysfunction    • Heart disease    • Heart valve disease    • Hyperlipidemia    • Hypertension    • Neuromuscular disorder    • ESAU (obstructive sleep apnea)    • Osteoarthritis      Past Surgical History:   Procedure Laterality Date   • CARDIAC CATHETERIZATION N/A 2/16/2022    Procedure: Left Heart Cath;  Surgeon: Raoul Tirado MD;  Location: [facility] CATH INVASIVE LOCATION;  Service: Cardiovascular;  Laterality: N/A;   • CARDIAC ELECTROPHYSIOLOGY PROCEDURE N/A 6/7/2022    Procedure: ICD single-chamber, new [place] aware;  Surgeon: Raoul Tirado MD;  Location: [facility] CATH INVASIVE LOCATION;  Service: Cardiovascular;  Laterality: N/A;   • HERNIA REPAIR       Family History   Problem Relation Age of Onset   • Leukemia Mother    • Cancer Sister    • Cancer Maternal Grandmother      Social History     Socioeconomic History   • Marital status:    Tobacco Use   • Smoking status: Former     Packs/day: 2.00     Years: 20.00     Pack years:  40.00     Types: Cigarettes     Quit date: 3/6/2000     Years since quittin.1   • Smokeless tobacco: Never   Vaping Use   • Vaping Use: Never used   Substance and Sexual Activity   • Alcohol use: Yes     Comment: rare   • Drug use: Never   • Sexual activity: Defer         Current Outpatient Medications:   •  aspirin (aspirin) 81 MG EC tablet, Take 1 tablet by mouth Daily., Disp: 90 tablet, Rfl: 3  •  Continuous Blood Gluc Sensor (FreeStyle Torie 2 Sensor) [facility], Apply 1 each topically Every 14 (Fourteen) Days. Dispense 2 per every 28 days., Disp: 2 each, Rfl: 5  •  Farxiga 10 MG tablet, TAKE 1 TABLET BY MOUTH EVERY DAY, Disp: 90 tablet, Rfl: 3  •  furosemide (LASIX) 80 MG tablet, TAKE 1 TABLET BY MOUTH 4 TIMES A DAY., Disp: 360 tablet, Rfl: 3  •  gabapentin (NEURONTIN) 100 MG capsule, TAKE 1 CAPSULE BY MOUTH THREE TIMES A DAY (Patient taking differently: Take 1 capsule by mouth 3 (Three) Times a Day As Needed.), Disp: 90 capsule, Rfl: 2  •  Insulin Glargine (Lantus SoloStar) 100 UNIT/ML injection pen, Inject 45 Units under the skin into the appropriate area as directed Daily., Disp: 45 mL, Rfl: 3  •  meloxicam (MOBIC) 15 MG tablet, TAKE 1 TABLET BY MOUTH DAILY AS NEEDED FOR MODERATE PAIN . TAKE WITH FOOD!, Disp: 90 tablet, Rfl: 1  •  metoprolol succinate XL (TOPROL-XL) 25 MG 24 hr tablet, TAKE 1 TABLET BY MOUTH EVERY DAY, Disp: 90 tablet, Rfl: 3  •  sacubitril-valsartan (Entresto) 24-26 MG tablet, Take 1 tablet by mouth 2 (Two) Times a Day., Disp: 180 tablet, Rfl: 3  •  spironolactone (ALDACTONE) 25 MG tablet, Take 1 tablet by mouth Daily., Disp: 90 tablet, Rfl: 3  •  ACCU-CHEK SOFTCLIX LANCETS lancets, by Other route. Use as instructed, Disp: , Rfl:   •  acetaminophen (TYLENOL) 500 MG tablet, Take 2 tablets by mouth Every 8 (Eight) Hours As Needed for Moderate Pain  (arthritic pain)., Disp: 180 tablet, Rfl: 5  •  Blood Glucose Calibration liquid, by In Vitro route., Disp: , Rfl:   •  Blood Glucose Monitoring  "Suppl (ACCU-CHEK ALEKS PLUS) w/Device kit, Use to test blood sugar twice a day, Disp: , Rfl:   •  Blood Glucose Monitoring Suppl (Accu-Chek Guide) w/Device kit, 1 kit 3 (Three) Times a Day. Use glucometer to check blood sugars 3 times a day. Dx. E11.42, Disp: 1 kit, Rfl: 0  •  Continuous Blood Gluc  (FreeStyle Torie 14 Day [place]) device, 1 Units 3 (Three) Times a Day Before Meals. Use continuous glucose monitor to monitor glucose regularly., Disp: 1 each, Rfl: 0  •  glucose blood (Accu-Chek Aleks Plus) test strip, USE TESTING STRIP TO CHECK BLOOD SUGARS 3 TIMES A DAY. DX. E11.42, Disp: 100 each, Rfl: 11  •  Insulin Pen Needle 32G X 4 MM misc, Use pen needle to deliver insulin subcutaneously daily.  Dx. E11.42, Disp: 30 each, Rfl: 5    Review of Systems   Constitutional: Negative for activity change, appetite change, chills, diaphoresis, fatigue, fever, unexpected weight gain and unexpected weight loss.   HENT: Negative for congestion and trouble swallowing.    Respiratory: Positive for cough. Negative for chest tightness, shortness of breath and wheezing.    Cardiovascular: Negative for chest pain, palpitations and leg swelling.   Gastrointestinal: Negative for abdominal pain, constipation, diarrhea, nausea and vomiting.   Musculoskeletal: Positive for arthralgias and back pain. Negative for gait problem.   Neurological: Positive for weakness. Negative for dizziness, light-headedness, headache and confusion.     /85 (BP Location: Right arm, Patient Position: Sitting, Cuff Size: Large Adult)   Pulse 74   Temp 98.1 °F (36.7 °C) (Temporal)   Resp 16   Ht 182.9 cm (72\")   Wt (!) 151 kg (333 lb)   SpO2 97%   BMI 45.16 kg/m²       Objective   Physical Exam  Vitals and nursing note reviewed.   Constitutional:       Appearance: Normal appearance. He is obese.   HENT:      Head: Normocephalic and atraumatic.   Neck:      Thyroid: No thyroid mass, thyromegaly or thyroid tenderness.      Vascular: No " carotid bruit.   Cardiovascular:      Rate and Rhythm: Normal rate and regular rhythm.      Heart sounds: Normal heart sounds.   Pulmonary:      Effort: Pulmonary effort is normal.      Breath sounds: Normal breath sounds.   Abdominal:      General: Abdomen is flat. Bowel sounds are normal.      Palpations: Abdomen is soft.      Tenderness: There is no abdominal tenderness. There is no right CVA tenderness or left CVA tenderness.   Musculoskeletal:         General: Tenderness present. Normal range of motion.      Cervical back: Normal range of motion and neck supple.        Back:       Right lower leg: No edema.      Left lower leg: No edema.   Skin:     General: Skin is warm and dry.   Neurological:      General: No focal deficit present.      Mental Status: He is alert and oriented to person, place, and time.   Psychiatric:         Mood and Affect: Mood normal.         Behavior: Behavior normal.         Thought Content: Thought content normal.         Procedures       Diagnoses and all orders for this visit:    1. Type 2 diabetes mellitus with diabetic polyneuropathy, with long-term current use of insulin (HCC) (Primary)  Comments:  Will check labs and contact with results. Continue current medications.    Orders:  -     Comprehensive Metabolic Panel  -     Hemoglobin A1c  -     POCT urinalysis dipstick, automated  -     Continuous Blood Gluc Sensor (FreeStyle Torie 2 Sensor) misc; Apply 1 each topically Every 14 (Fourteen) Days. Dispense 2 per every 28 days.  Dispense: 2 each; Refill: 5    2. Mixed diabetic hyperlipidemia associated with type 2 diabetes mellitus  Comments:  Will check labs and contact with results.  He has declined statins.  Orders:  -     Lipid Panel    3. Prostate cancer screening  Comments:  Will check PSA.  Denies any current symptoms.  Orders:  -     PSA SCREENING    4. Subacute cough  Comments:  No fevers or wheezing.  Will check chest xray.  Orders:  -     XR Chest 2 View; Future      I  spent 30 minutes of patient care including reviewing pt's previous hx, reviewing current symptoms, performing exam, ordering tests, discussing treatment plan and completing my note.

## 2023-05-15 ENCOUNTER — HOSPITAL ENCOUNTER (OUTPATIENT)
Dept: GENERAL RADIOLOGY | Facility: HOSPITAL | Age: 63
Discharge: HOME | End: 2023-05-15
Admitting: PHYSICIAN ASSISTANT
Payer: MEDICARE

## 2023-05-15 DIAGNOSIS — R05.2 SUBACUTE COUGH: ICD-10-CM

## 2023-05-15 PROCEDURE — 71046 X-RAY EXAM CHEST 2 VIEWS: CPT

## 2023-05-22 RX ORDER — METOPROLOL SUCCINATE 25 MG/1
25 TABLET, EXTENDED RELEASE ORAL DAILY
Qty: 90 TABLET | Refills: 3 | Status: SHIPPED | OUTPATIENT
Start: 2023-05-22

## 2023-05-22 NOTE — TELEPHONE ENCOUNTER
Rx Refill Note  Requested Prescriptions     Pending Prescriptions Disp Refills   • metoprolol succinate XL (TOPROL-XL) 25 MG 24 hr tablet 90 tablet 3     Sig: Take 1 tablet by mouth Daily.      Last office visit with prescribing clinician: 2/22/2023   Last telemedicine visit with prescribing clinician: 4/25/2023   Next office visit with prescribing clinician: 9/20/2023                         Would you like a call back once the refill request has been completed: [] Yes [] No    If the office needs to give you a call back, can they leave a voicemail: [] Yes [] No    Niki Zamarripa MA  05/22/23, 09:18 EDT

## 2023-08-10 ENCOUNTER — OFFICE VISIT (OUTPATIENT)
Dept: FAMILY MEDICINE CLINIC | Facility: CLINIC | Age: 63
End: 2023-08-10
Payer: MEDICARE

## 2023-08-10 VITALS
SYSTOLIC BLOOD PRESSURE: 120 MMHG | HEART RATE: 71 BPM | OXYGEN SATURATION: 97 % | WEIGHT: 315 LBS | TEMPERATURE: 98.2 F | BODY MASS INDEX: 44.08 KG/M2 | DIASTOLIC BLOOD PRESSURE: 82 MMHG

## 2023-08-10 DIAGNOSIS — Z99.89 OSA ON CPAP: ICD-10-CM

## 2023-08-10 DIAGNOSIS — Z00.00 MEDICARE ANNUAL WELLNESS VISIT, SUBSEQUENT: Primary | ICD-10-CM

## 2023-08-10 DIAGNOSIS — Z79.4 TYPE 2 DIABETES MELLITUS WITH DIABETIC POLYNEUROPATHY, WITH LONG-TERM CURRENT USE OF INSULIN: ICD-10-CM

## 2023-08-10 DIAGNOSIS — G47.33 OSA ON CPAP: ICD-10-CM

## 2023-08-10 DIAGNOSIS — E11.69 MIXED DIABETIC HYPERLIPIDEMIA ASSOCIATED WITH TYPE 2 DIABETES MELLITUS: ICD-10-CM

## 2023-08-10 DIAGNOSIS — E66.01 OBESITY, CLASS III, BMI 40-49.9 (MORBID OBESITY): ICD-10-CM

## 2023-08-10 DIAGNOSIS — E11.42 TYPE 2 DIABETES MELLITUS WITH DIABETIC POLYNEUROPATHY, WITH LONG-TERM CURRENT USE OF INSULIN: ICD-10-CM

## 2023-08-10 DIAGNOSIS — E78.2 MIXED DIABETIC HYPERLIPIDEMIA ASSOCIATED WITH TYPE 2 DIABETES MELLITUS: ICD-10-CM

## 2023-08-10 NOTE — PROGRESS NOTES
The ABCs of the Annual Wellness Visit  Subsequent Medicare Wellness Visit    Subjective      Sorin Allen is a 62 y.o. male who presents for a Subsequent Medicare Wellness Visit.    The following portions of the patient's history were reviewed and   updated as appropriate: allergies, current medications, past family history, past medical history, past social history, past surgical history, and problem list.    He is using his CPAP every night.  He does have a cough  Compared to one year ago, the patient feels his physical   health is the same.    Compared to one year ago, the patient feels his mental   health is the same.    Recent Hospitalizations:  He was not admitted to the hospital during the last year.       Current Medical Providers:  Patient Care Team:  Stacy Perez PA as PCP - General (Physician Assistant)  Raoul Tirado MD as Consulting Physician (Cardiology)  Macrina Castro APRN as Nurse Practitioner (Nurse Practitioner)    Outpatient Medications Prior to Visit   Medication Sig Dispense Refill    ACCU-CHEK SOFTCLIX LANCETS lancets by Other route. Use as instructed      acetaminophen (TYLENOL) 500 MG tablet Take 2 tablets by mouth Every 8 (Eight) Hours As Needed for Moderate Pain  (arthritic pain). 180 tablet 5    aspirin (aspirin) 81 MG EC tablet Take 1 tablet by mouth Daily. 90 tablet 3    Blood Glucose Calibration liquid by In Vitro route.      Blood Glucose Monitoring Suppl (ACCU-CHEK ALEKS PLUS) w/Device kit Use to test blood sugar twice a day      Blood Glucose Monitoring Suppl (Accu-Chek Guide) w/Device kit 1 kit 3 (Three) Times a Day. Use glucometer to check blood sugars 3 times a day. Dx. E11.42 1 kit 0    Continuous Blood Gluc  (FreeStyle Torie 14 Day [place]) device 1 Units 3 (Three) Times a Day Before Meals. Use continuous glucose monitor to monitor glucose regularly. 1 each 0    Continuous Blood Gluc Sensor (FreeStyle Torie 2 Sensor) [facility] Apply 1 each topically Every 14  (Fourteen) Days. Dispense 2 per every 28 days. 2 each 5    Farxiga 10 MG tablet TAKE 1 TABLET BY MOUTH EVERY DAY 90 tablet 3    furosemide (LASIX) 80 MG tablet TAKE 1 TABLET BY MOUTH 4 TIMES A DAY. 360 tablet 3    gabapentin (NEURONTIN) 100 MG capsule TAKE 1 CAPSULE BY MOUTH THREE TIMES A DAY (Patient taking differently: Take 1 capsule by mouth 3 (Three) Times a Day As Needed.) 90 capsule 2    glucose blood (Accu-Chek [place] Plus) test strip USE TESTING STRIP TO CHECK BLOOD SUGARS 3 TIMES A DAY. DX. E11.42 100 each 11    Insulin Glargine (Lantus SoloStar) 100 UNIT/ML injection pen Inject 45 Units under the skin into the appropriate area as directed Daily. 45 mL 3    Insulin Pen Needle 32G X 4 MM misc Use pen needle to deliver insulin subcutaneously daily.  Dx. E11.42 30 each 5    metoprolol succinate XL (TOPROL-XL) 25 MG 24 hr tablet Take 1 tablet by mouth Daily. 90 tablet 3    sacubitril-valsartan (Entresto) 24-26 MG tablet Take 1 tablet by mouth 2 (Two) Times a Day. 180 tablet 2    spironolactone (ALDACTONE) 25 MG tablet TAKE 1 TABLET BY MOUTH EVERY DAY 90 tablet 3    meloxicam (MOBIC) 15 MG tablet TAKE 1 TABLET BY MOUTH DAILY AS NEEDED FOR MODERATE PAIN . TAKE WITH FOOD! 90 tablet 1     No facility-administered medications prior to visit.       No opioid medication identified on active medication list. I have reviewed chart for other potential  high risk medication/s and harmful drug interactions in the elderly.        Aspirin is on active medication list. Aspirin use is indicated based on review of current medical condition/s. Pros and cons of this therapy have been discussed today. Benefits of this medication outweigh potential harm.  Patient has been encouraged to continue taking this medication.  .      Patient Active Problem List   Diagnosis    Morbid (severe) obesity due to excess calories    Hypertensive heart disease with acute on chronic systolic congestive heart failure    Dilated cardiomyopathy    Type  "2 diabetes mellitus with diabetic polyneuropathy, with long-term current use of insulin    Primary osteoarthritis involving multiple joints    Primary osteoarthritis of left knee    Statin declined    Mixed diabetic hyperlipidemia associated with type 2 diabetes mellitus    Chest pain    ESAU on CPAP    Chronic HFrEF (heart failure with reduced ejection fraction)    Essential hypertension    Presence of automatic cardioverter/defibrillator (AICD)     Advance Care Planning   Advance Care Planning     Advance Directive is not on file.  ACP discussion was held with the patient during this visit. Patient does not have an advance directive, information provided.     Objective    Vitals:    08/10/23 1029 08/10/23 1050   BP: 120/82    BP Location: Right arm    Patient Position: Sitting    Cuff Size: Large Adult    Pulse: 71    Temp: 98.2 øF (36.8 øC)    TempSrc: Temporal    SpO2: 97%    Weight: (!) 161 kg (356 lb) (!) 147 kg (325 lb)   PainSc:  0-No pain     Estimated body mass index is 44.08 kg/mý as calculated from the following:    Height as of 23: 182.9 cm (72\").    Weight as of this encounter: 147 kg (325 lb).    Class 3 Severe Obesity (BMI >=40). Obesity-related health conditions include the following: obstructive sleep apnea, hypertension, dyslipidemias, and osteoarthritis. Obesity is improving with lifestyle modifications. BMI is is above average; BMI management plan is completed. We discussed portion control and increasing exercise.      Does the patient have evidence of cognitive impairment?   No            HEALTH RISK ASSESSMENT    Smoking Status:  Social History     Tobacco Use   Smoking Status Former    Packs/day: 2.00    Years: 20.00    Pack years: 40.00    Types: Cigarettes    Quit date: 3/6/2000    Years since quittin.4   Smokeless Tobacco Never     Alcohol Consumption:  Social History     Substance and Sexual Activity   Alcohol Use Not Currently    Comment: rare     Fall Risk Screen:    STEADI Fall " Risk Assessment was completed, and patient is at LOW risk for falls.Assessment completed on:8/10/2023    Depression Screenin/10/2023    10:27 AM   PHQ-2/PHQ-9 Depression Screening   Little Interest or Pleasure in Doing Things 0-->not at all   Feeling Down, Depressed or Hopeless 0-->not at all   PHQ-9: Brief Depression Severity Measure Score 0       Health Habits and Functional and Cognitive Screenin/10/2023    10:31 AM   Functional & Cognitive Status   Do you have difficulty preparing food and eating? No   Do you have difficulty bathing yourself, getting dressed or grooming yourself? No   Do you have difficulty using the toilet? No   Do you have difficulty moving around from place to place? No   Do you have trouble with steps or getting out of a bed or a chair? Yes   Current Diet Other   Dental Exam Not up to date   Eye Exam Not up to date        Eye Exam Comment due in the next few months    Exercise (times per week) 0 times per week   Current Exercises Include No Regular Exercise;Walking   Do you need help using the phone?  No   Are you deaf or do you have serious difficulty hearing?  No   Do you need help to go to places out of walking distance? No   Do you need help shopping? No   Do you need help preparing meals?  No   Do you need help with housework?  No   Do you need help with laundry? No   Do you need help taking your medications? No   Do you need help managing money? No   Do you ever drive or ride in a car without wearing a seat belt? No   Have you felt unusual stress, anger or loneliness in the last month? No   Who do you live with? Spouse   If you need help, do you have trouble finding someone available to you? No   Have you been bothered in the last four weeks by sexual problems? No   Do you have difficulty concentrating, remembering or making decisions? No       Age-appropriate Screening Schedule:  Refer to the list below for future screening recommendations based on patient's age, sex  and/or medical conditions. Orders for these recommended tests are listed in the plan section. The patient has been provided with a written plan.    Health Maintenance   Topic Date Due    TDAP/TD VACCINES (1 - Tdap) Never done    ZOSTER VACCINE (1 of 2) Never done    DIABETIC FOOT EXAM  12/16/2021    COVID-19 Vaccine (4 - Pfizer series) 01/27/2022    Pneumococcal Vaccine 0-64 (2 - PCV) 01/04/2023    ANNUAL WELLNESS VISIT  04/13/2023    DIABETIC EYE EXAM  07/05/2023    URINE MICROALBUMIN  08/02/2023    INFLUENZA VACCINE  10/01/2023    HEMOGLOBIN A1C  11/09/2023    LIPID PANEL  05/09/2024    COLORECTAL CANCER SCREENING  04/30/2025    HEPATITIS C SCREENING  Completed                  CMS Preventative Services Quick Reference  Risk Factors Identified During Encounter:    Chronic Pain: Natural history and expected course discussed. Questions answered.  Immunizations Discussed/Encouraged: Tdap, Pneumococcal 23, and Shingrix  Inactivity/Sedentary: Patient was advised to exercise at least 150 minutes a week per CDC recommendations.    The above risks/problems have been discussed with the patient.  Pertinent information has been shared with the patient in the After Visit Summary.    Diagnoses and all orders for this visit:    1. Medicare annual wellness visit, subsequent (Primary)  Comments:  Work on healthy heart diet and exercising at least 150 minutes per day.    2. Type 2 diabetes mellitus with diabetic polyneuropathy, with long-term current use of insulin  Comments:  Continue current medications. Get fasting labs done at your convenience.  Orders:  -     Microalbumin / Creatinine Urine Ratio - Urine, Clean Catch; Future  -     Comprehensive Metabolic Panel; Future  -     Hemoglobin A1c; Future    3. ESAU on CPAP  Comments:  Continue using nightly.    4. Obesity, Class III, BMI 40-49.9 (morbid obesity)  Comments:  Work on decreasing portions and getting regular exercise.    5. Mixed diabetic hyperlipidemia associated with  type 2 diabetes mellitus  Comments:  Recheck fasting labs.  Orders:  -     Lipid Panel; Future        Follow Up:   Next Medicare Wellness visit to be scheduled in 1 year.      An After Visit Summary and PPPS were made available to the patient.

## 2023-08-18 DIAGNOSIS — E11.42 TYPE 2 DIABETES MELLITUS WITH DIABETIC POLYNEUROPATHY, WITH LONG-TERM CURRENT USE OF INSULIN: Chronic | ICD-10-CM

## 2023-08-18 DIAGNOSIS — Z79.4 TYPE 2 DIABETES MELLITUS WITH DIABETIC POLYNEUROPATHY, WITH LONG-TERM CURRENT USE OF INSULIN: Chronic | ICD-10-CM

## 2023-08-23 ENCOUNTER — LAB (OUTPATIENT)
Dept: FAMILY MEDICINE CLINIC | Facility: CLINIC | Age: 63
End: 2023-08-23
Payer: MEDICARE

## 2023-08-23 DIAGNOSIS — E11.42 TYPE 2 DIABETES MELLITUS WITH DIABETIC POLYNEUROPATHY, WITH LONG-TERM CURRENT USE OF INSULIN: ICD-10-CM

## 2023-08-23 DIAGNOSIS — E78.2 MIXED DIABETIC HYPERLIPIDEMIA ASSOCIATED WITH TYPE 2 DIABETES MELLITUS: ICD-10-CM

## 2023-08-23 DIAGNOSIS — Z79.4 TYPE 2 DIABETES MELLITUS WITH DIABETIC POLYNEUROPATHY, WITH LONG-TERM CURRENT USE OF INSULIN: ICD-10-CM

## 2023-08-23 DIAGNOSIS — E11.69 MIXED DIABETIC HYPERLIPIDEMIA ASSOCIATED WITH TYPE 2 DIABETES MELLITUS: ICD-10-CM

## 2023-08-23 LAB
ALBUMIN SERPL-MCNC: 4 G/DL (ref 3.5–5.2)
ALBUMIN UR-MCNC: 1.3 MG/DL
ALBUMIN/GLOB SERPL: 1 G/DL
ALP SERPL-CCNC: 68 U/L (ref 39–117)
ALT SERPL W P-5'-P-CCNC: 12 U/L (ref 1–41)
ANION GAP SERPL CALCULATED.3IONS-SCNC: 11 MMOL/L (ref 5–15)
AST SERPL-CCNC: 14 U/L (ref 1–40)
BILIRUB SERPL-MCNC: 0.4 MG/DL (ref 0–1.2)
BUN SERPL-MCNC: 26 MG/DL (ref 8–23)
BUN/CREAT SERPL: 20.2 (ref 7–25)
CALCIUM SPEC-SCNC: 10 MG/DL (ref 8.6–10.5)
CHLORIDE SERPL-SCNC: 98 MMOL/L (ref 98–107)
CHOLEST SERPL-MCNC: 219 MG/DL (ref 0–200)
CO2 SERPL-SCNC: 31 MMOL/L (ref 22–29)
CREAT SERPL-MCNC: 1.29 MG/DL (ref 0.76–1.27)
CREAT UR-MCNC: 44 MG/DL
EGFRCR SERPLBLD CKD-EPI 2021: 62.3 ML/MIN/1.73
GLOBULIN UR ELPH-MCNC: 3.9 GM/DL
GLUCOSE SERPL-MCNC: 129 MG/DL (ref 65–99)
HBA1C MFR BLD: 7.1 % (ref 4.8–5.6)
HDLC SERPL-MCNC: 34 MG/DL (ref 40–60)
LDLC SERPL CALC-MCNC: 162 MG/DL (ref 0–100)
LDLC/HDLC SERPL: 4.71 {RATIO}
MICROALBUMIN/CREAT UR: 29.5 MG/G
POTASSIUM SERPL-SCNC: 4.4 MMOL/L (ref 3.5–5.2)
PROT SERPL-MCNC: 7.9 G/DL (ref 6–8.5)
SODIUM SERPL-SCNC: 140 MMOL/L (ref 136–145)
TRIGL SERPL-MCNC: 125 MG/DL (ref 0–150)
VLDLC SERPL-MCNC: 23 MG/DL (ref 5–40)

## 2023-08-23 PROCEDURE — 82570 ASSAY OF URINE CREATININE: CPT | Performed by: PHYSICIAN ASSISTANT

## 2023-08-23 PROCEDURE — 80061 LIPID PANEL: CPT | Performed by: PHYSICIAN ASSISTANT

## 2023-08-23 PROCEDURE — 80053 COMPREHEN METABOLIC PANEL: CPT | Performed by: PHYSICIAN ASSISTANT

## 2023-08-23 PROCEDURE — 36415 COLL VENOUS BLD VENIPUNCTURE: CPT

## 2023-08-23 PROCEDURE — 83036 HEMOGLOBIN GLYCOSYLATED A1C: CPT | Performed by: PHYSICIAN ASSISTANT

## 2023-08-23 PROCEDURE — 82043 UR ALBUMIN QUANTITATIVE: CPT | Performed by: PHYSICIAN ASSISTANT

## 2023-09-20 ENCOUNTER — OFFICE VISIT (OUTPATIENT)
Dept: CARDIOLOGY | Facility: CLINIC | Age: 63
End: 2023-09-20
Payer: MEDICARE

## 2023-09-20 ENCOUNTER — CLINICAL SUPPORT NO REQUIREMENTS (OUTPATIENT)
Dept: CARDIOLOGY | Facility: CLINIC | Age: 63
End: 2023-09-20
Payer: MEDICARE

## 2023-09-20 VITALS
HEIGHT: 72 IN | OXYGEN SATURATION: 98 % | SYSTOLIC BLOOD PRESSURE: 115 MMHG | HEART RATE: 68 BPM | DIASTOLIC BLOOD PRESSURE: 77 MMHG | WEIGHT: 315 LBS | BODY MASS INDEX: 42.66 KG/M2

## 2023-09-20 DIAGNOSIS — I10 ESSENTIAL HYPERTENSION: ICD-10-CM

## 2023-09-20 DIAGNOSIS — I50.22 CHRONIC HFREF (HEART FAILURE WITH REDUCED EJECTION FRACTION): Primary | ICD-10-CM

## 2023-09-20 DIAGNOSIS — Z95.810 PRESENCE OF AUTOMATIC CARDIOVERTER/DEFIBRILLATOR (AICD): ICD-10-CM

## 2023-09-20 DIAGNOSIS — Z79.4 TYPE 2 DIABETES MELLITUS WITHOUT COMPLICATION, WITH LONG-TERM CURRENT USE OF INSULIN: ICD-10-CM

## 2023-09-20 DIAGNOSIS — Z99.89 OSA ON CPAP: ICD-10-CM

## 2023-09-20 DIAGNOSIS — E66.01 MORBID OBESITY WITH BMI OF 40.0-44.9, ADULT: ICD-10-CM

## 2023-09-20 DIAGNOSIS — E11.9 TYPE 2 DIABETES MELLITUS WITHOUT COMPLICATION, WITH LONG-TERM CURRENT USE OF INSULIN: ICD-10-CM

## 2023-09-20 DIAGNOSIS — G47.33 OSA ON CPAP: ICD-10-CM

## 2023-09-20 DIAGNOSIS — Z53.20 STATIN DECLINED: ICD-10-CM

## 2023-09-20 DIAGNOSIS — I42.0 DILATED CARDIOMYOPATHY: Primary | Chronic | ICD-10-CM

## 2023-09-20 DIAGNOSIS — E78.5 DYSLIPIDEMIA: ICD-10-CM

## 2023-09-20 DIAGNOSIS — I50.22 CHRONIC HFREF (HEART FAILURE WITH REDUCED EJECTION FRACTION): Chronic | ICD-10-CM

## 2023-09-20 DIAGNOSIS — I42.0 DILATED CARDIOMYOPATHY: ICD-10-CM

## 2023-09-20 NOTE — PROGRESS NOTES
Subjective:     Encounter Date:09/20/2023      Patient ID: Sorin Allen is a 62 y.o. male.    Chief Complaint and history of present illness:       Follow-up for CHF, cardiomyopathy, hypertensive cardiovascular disease, obesity, dyslipidemia     History of Present Illness       Mr. Sorin Allen has PMH of     Hypertensive cardiovascular disease with chronic diastolic CHF  Chronic HFrEF due to systolic dysfunction from dilated cardiomyopathy  [place] ICD 4/7/2022  Cardiac cath 2/16/2022 EF of 15% no obstructive CAD  Dyslipidemia  Hypertension  Obesity with BMI over 40  ESAU/CPAP  ED  Rheumatic fever as a child  Former smoker quit 3/6/2000  Family history mother has leukemia     Here for follow-up.  Patient complaining of chest pain.  Has class I-2 dyspnea on exertion.  Is complaining of some edema.  Device check today is revealing nonsustained ventricular tachycardia.     Patient's arterial blood pressure is 117/74, heart rate 69 bpm, O2 sat of 97% on room air.  BMI is over 40.     Patient reportedly had cardiac cath 8 years ago was told he has cardiomyopathy and CHF.  Underwent repeat cath 2/16/2022 which revealed EF of 15% no obstructive CAD..  Patient had repeat echocardiogram 4/6/2022 which is revealing persistent LV dysfunction with LVEF of 20% with severe pulmonary hypertension PA systolic pressure of 60 mmHg.  Review of records reveal labs from 1/4/2022 with a proBNP 1548, CMP with a glucose of 110, hemoglobin A1c 6.7, lipid profile with cholesterol 187, triglycerides 92, HDL low at 35, .  Labs from 4/11/2022 reveal proBNP normal at 907.  Labs from 6/7/2022 revealed normal CBC and BMP.  Labs from 8/2/2022 reveal CMP with a glucose of 155, A1c 6.8.  Lipid profile with cholesterol 222, triglycerides 153, HDL 37, ..  Labs from 8/23/2023 reveal CMP with a glucose of 129, creatinine 1.29 and EGFR of 62.  Lipid profile with cholesterol 219, triglycerides 125, HDL 34, , A1c 7.1.     EKG  done 11/14/2021 reviewed/interpreted by me reveals sinus rhythm with rate of 95 bpm with poor R wave progression     Echocardiogram 5/1/2020 revealed normal LV systolic function EF 60% with diastolic dysfunction and RV enlargement and mild aortic stenosis  Echocardiogram 1/21/2022 revealed EF of 26 to 30%  Repeat echo 4/6/2022 is revealing EF of 20% with severe pulmonary hypertension PA systolic pressure of 60 mmHg     Assessment:  :    Chronic HFrEF due to combined hypertensive cardiovascular disease and systolic dysfunction from dilated cardiomyopathy EF of 15-20%     Dilated cardiomyopathy  Hypertension  Diabetes  Obesity with BMI over 40        Recommendations / Plan:         Reviewed EKG results with patient.  Patient is refusing statins.  We will continue medical management with aspirin, Farxiga, Entresto, furosemide, metoprolol, Aldactone as tolerated to help with cardiomyopathy, CHF, hypertension,.     Follow-up in heart failure clinic.  Follow-up with PMD for diabetes care.  Procedures cardiac cath 2/16/2022 performed and interpreted by me reveals dilated cardiomyopathy EF of 15%  Lexiscan Cardiolite performed 4/25/2023 revealed patchy uptake EF of 35%.          ECG 12 Lead    Date/Time: 9/20/2023 12:43 PM  Performed by: Raoul Tirado MD  Authorized by: Raoul Tirado MD   Comparison: compared with previous ECG from 2/22/2023  Comparison to previous ECG: EKG done today reviewed/interpreted by me reveals sinus rhythm with rate of 66 bpm with first-degree AV block and Q waves in V1 V2 V3 consistent with anterior MI, no new change compared EKG from 2/22/2023        Copied text in this portion of the note has been reviewed and is accurate as of 9/24/2023  The following portions of the patient's history were reviewed and updated as appropriate: allergies, current medications, past family history, past medical history, past social history, past surgical history and problem  list.    Assessment:         [facility]       Diagnosis Plan   1. Chronic HFrEF (heart failure with reduced ejection fraction)        2. Dilated cardiomyopathy        3. Presence of automatic cardioverter/defibrillator (AICD)        4. Essential hypertension        5. Statin declined        6. Type 2 diabetes mellitus without complication, with long-term current use of insulin        7. Dyslipidemia        8. Morbid obesity with BMI of 40.0-44.9, adult        9. ESAU on CPAP               Plan:               Past Medical History:  Past Medical History:   Diagnosis Date    Arthritis     Asthma     Cardiac disease     Carpal tunnel syndrome     Congenital heart disease     CPAP (continuous positive airway pressure) dependence     Diabetes mellitus     Diverticulitis of colon     Erectile dysfunction     Heart disease     Heart valve disease     Hyperlipidemia     Hypertension     Neuromuscular disorder     ESAU (obstructive sleep apnea)     Osteoarthritis      Past Surgical History:  Past Surgical History:   Procedure Laterality Date    CARDIAC CATHETERIZATION N/A 2/16/2022    Procedure: Left Heart Cath;  Surgeon: Raoul Tirado MD;  Location: [facility] CATH INVASIVE LOCATION;  Service: Cardiovascular;  Laterality: N/A;    CARDIAC ELECTROPHYSIOLOGY PROCEDURE N/A 6/7/2022    Procedure: ICD single-chamber, new [place] aware;  Surgeon: Raoul Tirado MD;  Location: [facility] CATH INVASIVE LOCATION;  Service: Cardiovascular;  Laterality: N/A;    HERNIA REPAIR        Allergies:  No Known Allergies  Home Meds:  Current Meds:     Current Outpatient Medications:     ACCU-CHEK SOFTCLIX LANCETS lancets, by Other route. Use as instructed, Disp: , Rfl:     acetaminophen (TYLENOL) 500 MG tablet, Take 2 tablets by mouth Every 8 (Eight) Hours As Needed for Moderate Pain  (arthritic pain)., Disp: 180 tablet, Rfl: 5    aspirin (aspirin) 81 MG EC tablet, Take 1 tablet by mouth Daily., Disp: 90 tablet, Rfl: 3    Blood Glucose  Calibration liquid, by In Vitro route., Disp: , Rfl:     Blood Glucose Monitoring Suppl (ACCU-CHEK ALEKS PLUS) w/Device kit, Use to test blood sugar twice a day, Disp: , Rfl:     Blood Glucose Monitoring Suppl (Accu-Chek Guide) w/Device kit, 1 kit 3 (Three) Times a Day. Use glucometer to check blood sugars 3 times a day. Dx. E11.42, Disp: 1 kit, Rfl: 0    Continuous Blood Gluc  (FreeStyle Torie 14 Day [place]) device, 1 Units 3 (Three) Times a Day Before Meals. Use continuous glucose monitor to monitor glucose regularly., Disp: 1 each, Rfl: 0    Continuous Blood Gluc Sensor (FreeStyle Torie 2 Sensor) [facility], Apply 1 each topically Every 14 (Fourteen) Days. Dispense 2 per every 28 days., Disp: 2 each, Rfl: 5    Farxiga 10 MG tablet, TAKE 1 TABLET BY MOUTH EVERY DAY, Disp: 90 tablet, Rfl: 3    furosemide (LASIX) 80 MG tablet, TAKE 1 TABLET BY MOUTH 4 TIMES A DAY., Disp: 360 tablet, Rfl: 3    gabapentin (NEURONTIN) 100 MG capsule, TAKE 1 CAPSULE BY MOUTH THREE TIMES A DAY (Patient taking differently: Take 1 capsule by mouth 3 (Three) Times a Day As Needed.), Disp: 90 capsule, Rfl: 2    glucose blood (Accu-Chek Aleks Plus) test strip, USE TESTING STRIP TO CHECK BLOOD SUGARS 3 TIMES A DAY. DX. E11.42, Disp: 100 each, Rfl: 11    Insulin Glargine (Lantus SoloStar) 100 UNIT/ML injection pen, Inject 45 Units under the skin into the appropriate area as directed Daily., Disp: 45 mL, Rfl: 3    Insulin Pen Needle 32G X 4 MM misc, Use pen needle to deliver insulin subcutaneously daily.  Dx. E11.42, Disp: 30 each, Rfl: 5    metoprolol succinate XL (TOPROL-XL) 25 MG 24 hr tablet, Take 1 tablet by mouth Daily., Disp: 90 tablet, Rfl: 3    sacubitril-valsartan (Entresto) 24-26 MG tablet, Take 1 tablet by mouth 2 (Two) Times a Day., Disp: 180 tablet, Rfl: 2    spironolactone (ALDACTONE) 25 MG tablet, TAKE 1 TABLET BY MOUTH EVERY DAY, Disp: 90 tablet, Rfl: 3  Social History:   Social History     Tobacco Use    Smoking status:  "Former     Packs/day: 2.00     Years: 20.00     Pack years: 40.00     Types: Cigarettes     Quit date: 3/6/2000     Years since quittin.5    Smokeless tobacco: Never   Substance Use Topics    Alcohol use: Not Currently     Comment: rare      Family History:  Family History   Problem Relation Age of Onset    Leukemia Mother     Cancer Sister     Cancer Maternal Grandmother               Review of Systems   Constitutional: Positive for malaise/fatigue.   Cardiovascular:  Positive for leg swelling. Negative for chest pain and palpitations.   Respiratory:  Negative for shortness of breath.    Skin:  Negative for rash.   Neurological:  Positive for light-headedness and numbness. Negative for dizziness.   All other systems are negative         Objective:     Physical Exam  /77   Pulse 68   Ht 182.9 cm (72\")   Wt (!) 148 kg (327 lb)   SpO2 98%   BMI 44.35 kg/m²   General:  Appears in no acute distress  Eyes: Sclera is anicteric,  conjunctiva is clear   HEENT:  No JVD.  No carotid bruits  Respiratory: Respirations regular and unlabored at rest.  Clear to auscultation  Cardiovascular: S1,S2 Regular rate and rhythm. No murmur, rub or gallop auscultated.   Extremities: No digital clubbing or cyanosis, no edema  Skin: Color pink. Skin warm and dry to touch. No rashes  No xanthoma  Neuro: Alert and awake.    Lab Reviewed:         Raoul Tirado MD  2023 12:47 EDT      EMR Dragon/Transcription:   \"Dictated utilizing Dragon dictation\".        "

## 2023-09-29 DIAGNOSIS — Z79.4 TYPE 2 DIABETES MELLITUS WITH DIABETIC POLYNEUROPATHY, WITH LONG-TERM CURRENT USE OF INSULIN: ICD-10-CM

## 2023-09-29 DIAGNOSIS — E11.42 TYPE 2 DIABETES MELLITUS WITH DIABETIC POLYNEUROPATHY, WITH LONG-TERM CURRENT USE OF INSULIN: ICD-10-CM

## 2023-09-29 RX ORDER — INSULIN GLARGINE 100 [IU]/ML
45 INJECTION, SOLUTION SUBCUTANEOUS DAILY
Qty: 45 ML | Refills: 3 | Status: SHIPPED | OUTPATIENT
Start: 2023-09-29

## 2024-01-09 ENCOUNTER — TELEPHONE (OUTPATIENT)
Dept: FAMILY MEDICINE CLINIC | Facility: CLINIC | Age: 64
End: 2024-01-09

## 2024-01-09 DIAGNOSIS — E11.42 TYPE 2 DIABETES MELLITUS WITH DIABETIC POLYNEUROPATHY, WITH LONG-TERM CURRENT USE OF INSULIN: Chronic | ICD-10-CM

## 2024-01-09 DIAGNOSIS — Z79.4 TYPE 2 DIABETES MELLITUS WITH DIABETIC POLYNEUROPATHY, WITH LONG-TERM CURRENT USE OF INSULIN: Chronic | ICD-10-CM

## 2024-01-09 NOTE — TELEPHONE ENCOUNTER
Caller: JARVIS    Relationship: [facility]    Best call back number: [phone]     What medication are you requesting: FreeStyle Torie 2 Sensors    If a prescription is needed, what is your preferred pharmacy and phone number: [id number] - [place], [place] - [address] [phone] [facility] [phone]      Additional notes: JARVIS STATES THE PHARMACY NEEDS A NEW PRESCRIPTION STATING THE PATIENT'S DIAGNOSIS. PATIENT HAS MEDICARE PART B AND MEDICAID AS HIS SECONDARY COVERAGE. BOTH NEED TO BE USED TO MAKE THE COPAY $0

## 2024-01-28 ENCOUNTER — APPOINTMENT (OUTPATIENT)
Dept: CT IMAGING | Facility: HOSPITAL | Age: 64
End: 2024-01-28
Payer: MEDICARE

## 2024-01-28 ENCOUNTER — HOSPITAL ENCOUNTER (INPATIENT)
Facility: HOSPITAL | Age: 64
LOS: 2 days | Discharge: LEFT BEFORE DISCHARGE | End: 2024-01-30
Attending: EMERGENCY MEDICINE | Admitting: INTERNAL MEDICINE
Payer: MEDICARE

## 2024-01-28 ENCOUNTER — APPOINTMENT (OUTPATIENT)
Dept: GENERAL RADIOLOGY | Facility: HOSPITAL | Age: 64
End: 2024-01-28
Payer: MEDICARE

## 2024-01-28 DIAGNOSIS — J96.01 ACUTE RESPIRATORY FAILURE WITH HYPOXIA: Primary | ICD-10-CM

## 2024-01-28 DIAGNOSIS — I50.22 CHRONIC HFREF (HEART FAILURE WITH REDUCED EJECTION FRACTION): Chronic | ICD-10-CM

## 2024-01-28 DIAGNOSIS — J11.1 INFLUENZA: ICD-10-CM

## 2024-01-28 DIAGNOSIS — J44.1 COPD WITH ACUTE EXACERBATION: ICD-10-CM

## 2024-01-28 LAB
ALBUMIN SERPL-MCNC: 4.1 G/DL (ref 3.5–5.2)
ALBUMIN/GLOB SERPL: 1 G/DL
ALP SERPL-CCNC: 70 U/L (ref 39–117)
ALT SERPL W P-5'-P-CCNC: 10 U/L (ref 1–41)
ANION GAP SERPL CALCULATED.3IONS-SCNC: 13 MMOL/L (ref 5–15)
ANION GAP SERPL CALCULATED.3IONS-SCNC: 15 MMOL/L (ref 5–15)
APTT PPP: 34.6 SECONDS (ref 61–76.5)
AST SERPL-CCNC: 18 U/L (ref 1–40)
BASOPHILS # BLD AUTO: 0 10*3/MM3 (ref 0–0.2)
BASOPHILS # BLD AUTO: 0.1 10*3/MM3 (ref 0–0.2)
BASOPHILS NFR BLD AUTO: 0.4 % (ref 0–1.5)
BASOPHILS NFR BLD AUTO: 0.6 % (ref 0–1.5)
BILIRUB SERPL-MCNC: 0.4 MG/DL (ref 0–1.2)
BUN SERPL-MCNC: 26 MG/DL (ref 8–23)
BUN SERPL-MCNC: 28 MG/DL (ref 8–23)
BUN/CREAT SERPL: 17.3 (ref 7–25)
BUN/CREAT SERPL: 19 (ref 7–25)
CALCIUM SPEC-SCNC: 8.9 MG/DL (ref 8.6–10.5)
CALCIUM SPEC-SCNC: 8.9 MG/DL (ref 8.6–10.5)
CHLORIDE SERPL-SCNC: 96 MMOL/L (ref 98–107)
CHLORIDE SERPL-SCNC: 97 MMOL/L (ref 98–107)
CO2 SERPL-SCNC: 26 MMOL/L (ref 22–29)
CO2 SERPL-SCNC: 27 MMOL/L (ref 22–29)
CREAT SERPL-MCNC: 1.47 MG/DL (ref 0.76–1.27)
CREAT SERPL-MCNC: 1.5 MG/DL (ref 0.76–1.27)
D DIMER PPP FEU-MCNC: 0.89 MG/L (FEU) (ref 0–0.64)
D-LACTATE SERPL-SCNC: 1.1 MMOL/L (ref 0.3–2)
DEPRECATED RDW RBC AUTO: 47.7 FL (ref 37–54)
DEPRECATED RDW RBC AUTO: 49.9 FL (ref 37–54)
EGFRCR SERPLBLD CKD-EPI 2021: 51.7 ML/MIN/1.73
EGFRCR SERPLBLD CKD-EPI 2021: 52.9 ML/MIN/1.73
EOSINOPHIL # BLD AUTO: 0 10*3/MM3 (ref 0–0.4)
EOSINOPHIL # BLD AUTO: 0.1 10*3/MM3 (ref 0–0.4)
EOSINOPHIL NFR BLD AUTO: 0.2 % (ref 0.3–6.2)
EOSINOPHIL NFR BLD AUTO: 1.2 % (ref 0.3–6.2)
ERYTHROCYTE [DISTWIDTH] IN BLOOD BY AUTOMATED COUNT: 15 % (ref 12.3–15.4)
ERYTHROCYTE [DISTWIDTH] IN BLOOD BY AUTOMATED COUNT: 15 % (ref 12.3–15.4)
FLUAV RNA RESP QL NAA+PROBE: DETECTED
FLUBV RNA RESP QL NAA+PROBE: NOT DETECTED
GEN 5 2HR TROPONIN T REFLEX: 37 NG/L
GLOBULIN UR ELPH-MCNC: 4.2 GM/DL
GLUCOSE BLDC GLUCOMTR-MCNC: 133 MG/DL (ref 70–105)
GLUCOSE BLDC GLUCOMTR-MCNC: 221 MG/DL (ref 70–105)
GLUCOSE SERPL-MCNC: 133 MG/DL (ref 65–99)
GLUCOSE SERPL-MCNC: 136 MG/DL (ref 65–99)
HCT VFR BLD AUTO: 37.3 % (ref 37.5–51)
HCT VFR BLD AUTO: 38.5 % (ref 37.5–51)
HGB BLD-MCNC: 12 G/DL (ref 13–17.7)
HGB BLD-MCNC: 12.6 G/DL (ref 13–17.7)
HOLD SPECIMEN: NORMAL
INR PPP: 1.1 (ref 0.93–1.1)
LYMPHOCYTES # BLD AUTO: 0.3 10*3/MM3 (ref 0.7–3.1)
LYMPHOCYTES # BLD AUTO: 0.5 10*3/MM3 (ref 0.7–3.1)
LYMPHOCYTES NFR BLD AUTO: 5.3 % (ref 19.6–45.3)
LYMPHOCYTES NFR BLD AUTO: 6 % (ref 19.6–45.3)
MAGNESIUM SERPL-MCNC: 2 MG/DL (ref 1.6–2.4)
MAGNESIUM SERPL-MCNC: 2.1 MG/DL (ref 1.6–2.4)
MCH RBC QN AUTO: 28.9 PG (ref 26.6–33)
MCH RBC QN AUTO: 29.8 PG (ref 26.6–33)
MCHC RBC AUTO-ENTMCNC: 32.2 G/DL (ref 31.5–35.7)
MCHC RBC AUTO-ENTMCNC: 32.7 G/DL (ref 31.5–35.7)
MCV RBC AUTO: 89.6 FL (ref 79–97)
MCV RBC AUTO: 91.2 FL (ref 79–97)
MONOCYTES # BLD AUTO: 0.3 10*3/MM3 (ref 0.1–0.9)
MONOCYTES # BLD AUTO: 0.8 10*3/MM3 (ref 0.1–0.9)
MONOCYTES NFR BLD AUTO: 10.1 % (ref 5–12)
MONOCYTES NFR BLD AUTO: 4 % (ref 5–12)
NEUTROPHILS NFR BLD AUTO: 5.8 10*3/MM3 (ref 1.7–7)
NEUTROPHILS NFR BLD AUTO: 6.7 10*3/MM3 (ref 1.7–7)
NEUTROPHILS NFR BLD AUTO: 82.1 % (ref 42.7–76)
NEUTROPHILS NFR BLD AUTO: 90.1 % (ref 42.7–76)
NRBC BLD AUTO-RTO: 0 /100 WBC (ref 0–0.2)
NRBC BLD AUTO-RTO: 0 /100 WBC (ref 0–0.2)
NT-PROBNP SERPL-MCNC: 574.8 PG/ML (ref 0–900)
PHOSPHATE SERPL-MCNC: 2.6 MG/DL (ref 2.5–4.5)
PLATELET # BLD AUTO: 219 10*3/MM3 (ref 140–450)
PLATELET # BLD AUTO: 253 10*3/MM3 (ref 140–450)
PMV BLD AUTO: 7.5 FL (ref 6–12)
PMV BLD AUTO: 8.3 FL (ref 6–12)
POTASSIUM SERPL-SCNC: 4.1 MMOL/L (ref 3.5–5.2)
POTASSIUM SERPL-SCNC: 4.2 MMOL/L (ref 3.5–5.2)
PROT SERPL-MCNC: 8.3 G/DL (ref 6–8.5)
PROTHROMBIN TIME: 11.9 SECONDS (ref 9.6–11.7)
RBC # BLD AUTO: 4.16 10*6/MM3 (ref 4.14–5.8)
RBC # BLD AUTO: 4.22 10*6/MM3 (ref 4.14–5.8)
RSV RNA NPH QL NAA+NON-PROBE: NOT DETECTED
SARS-COV-2 RNA RESP QL NAA+PROBE: NOT DETECTED
SODIUM SERPL-SCNC: 136 MMOL/L (ref 136–145)
SODIUM SERPL-SCNC: 138 MMOL/L (ref 136–145)
TROPONIN T DELTA: -6 NG/L
TROPONIN T SERPL HS-MCNC: 43 NG/L
WBC NRBC COR # BLD AUTO: 6.5 10*3/MM3 (ref 3.4–10.8)
WBC NRBC COR # BLD AUTO: 8.1 10*3/MM3 (ref 3.4–10.8)

## 2024-01-28 PROCEDURE — 25010000002 HEPARIN (PORCINE) PER 1000 UNITS: Performed by: INTERNAL MEDICINE

## 2024-01-28 PROCEDURE — 94664 DEMO&/EVAL PT USE INHALER: CPT

## 2024-01-28 PROCEDURE — 25010000002 METHYLPREDNISOLONE PER 125 MG: Performed by: EMERGENCY MEDICINE

## 2024-01-28 PROCEDURE — 94761 N-INVAS EAR/PLS OXIMETRY MLT: CPT

## 2024-01-28 PROCEDURE — 85610 PROTHROMBIN TIME: CPT | Performed by: EMERGENCY MEDICINE

## 2024-01-28 PROCEDURE — 25510000001 IOPAMIDOL PER 1 ML: Performed by: EMERGENCY MEDICINE

## 2024-01-28 PROCEDURE — 36415 COLL VENOUS BLD VENIPUNCTURE: CPT

## 2024-01-28 PROCEDURE — 83880 ASSAY OF NATRIURETIC PEPTIDE: CPT | Performed by: EMERGENCY MEDICINE

## 2024-01-28 PROCEDURE — 85025 COMPLETE CBC W/AUTO DIFF WBC: CPT | Performed by: INTERNAL MEDICINE

## 2024-01-28 PROCEDURE — 87040 BLOOD CULTURE FOR BACTERIA: CPT | Performed by: EMERGENCY MEDICINE

## 2024-01-28 PROCEDURE — 85025 COMPLETE CBC W/AUTO DIFF WBC: CPT | Performed by: EMERGENCY MEDICINE

## 2024-01-28 PROCEDURE — 94799 UNLISTED PULMONARY SVC/PX: CPT

## 2024-01-28 PROCEDURE — 71045 X-RAY EXAM CHEST 1 VIEW: CPT

## 2024-01-28 PROCEDURE — 93005 ELECTROCARDIOGRAM TRACING: CPT | Performed by: EMERGENCY MEDICINE

## 2024-01-28 PROCEDURE — 87637 SARSCOV2&INF A&B&RSV AMP PRB: CPT | Performed by: EMERGENCY MEDICINE

## 2024-01-28 PROCEDURE — 83735 ASSAY OF MAGNESIUM: CPT | Performed by: EMERGENCY MEDICINE

## 2024-01-28 PROCEDURE — 80053 COMPREHEN METABOLIC PANEL: CPT | Performed by: EMERGENCY MEDICINE

## 2024-01-28 PROCEDURE — 85379 FIBRIN DEGRADATION QUANT: CPT | Performed by: EMERGENCY MEDICINE

## 2024-01-28 PROCEDURE — 84100 ASSAY OF PHOSPHORUS: CPT | Performed by: INTERNAL MEDICINE

## 2024-01-28 PROCEDURE — 85730 THROMBOPLASTIN TIME PARTIAL: CPT | Performed by: EMERGENCY MEDICINE

## 2024-01-28 PROCEDURE — 94640 AIRWAY INHALATION TREATMENT: CPT

## 2024-01-28 PROCEDURE — 99291 CRITICAL CARE FIRST HOUR: CPT

## 2024-01-28 PROCEDURE — 83605 ASSAY OF LACTIC ACID: CPT

## 2024-01-28 PROCEDURE — 93005 ELECTROCARDIOGRAM TRACING: CPT

## 2024-01-28 PROCEDURE — 25010000002 ONDANSETRON PER 1 MG: Performed by: INTERNAL MEDICINE

## 2024-01-28 PROCEDURE — 83735 ASSAY OF MAGNESIUM: CPT | Performed by: INTERNAL MEDICINE

## 2024-01-28 PROCEDURE — 82948 REAGENT STRIP/BLOOD GLUCOSE: CPT

## 2024-01-28 PROCEDURE — 71275 CT ANGIOGRAPHY CHEST: CPT

## 2024-01-28 PROCEDURE — 63710000001 INSULIN GLARGINE PER 5 UNITS: Performed by: INTERNAL MEDICINE

## 2024-01-28 PROCEDURE — 84484 ASSAY OF TROPONIN QUANT: CPT | Performed by: EMERGENCY MEDICINE

## 2024-01-28 RX ORDER — SODIUM CHLORIDE 0.9 % (FLUSH) 0.9 %
10 SYRINGE (ML) INJECTION AS NEEDED
Status: DISCONTINUED | OUTPATIENT
Start: 2024-01-28 | End: 2024-01-31 | Stop reason: HOSPADM

## 2024-01-28 RX ORDER — BISACODYL 5 MG/1
5 TABLET, DELAYED RELEASE ORAL DAILY PRN
Status: DISCONTINUED | OUTPATIENT
Start: 2024-01-28 | End: 2024-01-29

## 2024-01-28 RX ORDER — AMOXICILLIN 250 MG
2 CAPSULE ORAL 2 TIMES DAILY
Status: DISCONTINUED | OUTPATIENT
Start: 2024-01-28 | End: 2024-01-29

## 2024-01-28 RX ORDER — CODEINE PHOSPHATE/GUAIFENESIN 10-100MG/5
5 LIQUID (ML) ORAL EVERY 4 HOURS PRN
Status: DISCONTINUED | OUTPATIENT
Start: 2024-01-28 | End: 2024-01-31 | Stop reason: HOSPADM

## 2024-01-28 RX ORDER — BISACODYL 10 MG
10 SUPPOSITORY, RECTAL RECTAL DAILY PRN
Status: DISCONTINUED | OUTPATIENT
Start: 2024-01-28 | End: 2024-01-29

## 2024-01-28 RX ORDER — SODIUM CHLORIDE 9 MG/ML
40 INJECTION, SOLUTION INTRAVENOUS AS NEEDED
Status: DISCONTINUED | OUTPATIENT
Start: 2024-01-28 | End: 2024-01-31 | Stop reason: HOSPADM

## 2024-01-28 RX ORDER — OSELTAMIVIR PHOSPHATE 75 MG/1
75 CAPSULE ORAL EVERY 12 HOURS SCHEDULED
Status: DISCONTINUED | OUTPATIENT
Start: 2024-01-29 | End: 2024-01-31 | Stop reason: HOSPADM

## 2024-01-28 RX ORDER — METHYLPREDNISOLONE SODIUM SUCCINATE 125 MG/2ML
80 INJECTION, POWDER, LYOPHILIZED, FOR SOLUTION INTRAMUSCULAR; INTRAVENOUS ONCE
Status: COMPLETED | OUTPATIENT
Start: 2024-01-28 | End: 2024-01-28

## 2024-01-28 RX ORDER — INSULIN LISPRO 100 [IU]/ML
2-7 INJECTION, SOLUTION INTRAVENOUS; SUBCUTANEOUS
Status: DISCONTINUED | OUTPATIENT
Start: 2024-01-28 | End: 2024-01-31 | Stop reason: HOSPADM

## 2024-01-28 RX ORDER — IPRATROPIUM BROMIDE AND ALBUTEROL SULFATE 2.5; .5 MG/3ML; MG/3ML
3 SOLUTION RESPIRATORY (INHALATION) ONCE
Status: COMPLETED | OUTPATIENT
Start: 2024-01-28 | End: 2024-01-28

## 2024-01-28 RX ORDER — ONDANSETRON 2 MG/ML
4 INJECTION INTRAMUSCULAR; INTRAVENOUS EVERY 6 HOURS PRN
Status: DISCONTINUED | OUTPATIENT
Start: 2024-01-28 | End: 2024-01-31 | Stop reason: HOSPADM

## 2024-01-28 RX ORDER — DEXTROSE MONOHYDRATE 25 G/50ML
25 INJECTION, SOLUTION INTRAVENOUS
Status: DISCONTINUED | OUTPATIENT
Start: 2024-01-28 | End: 2024-01-31 | Stop reason: HOSPADM

## 2024-01-28 RX ORDER — FUROSEMIDE 40 MG/1
40 TABLET ORAL
Status: DISCONTINUED | OUTPATIENT
Start: 2024-01-28 | End: 2024-01-29

## 2024-01-28 RX ORDER — POLYETHYLENE GLYCOL 3350 17 G/17G
17 POWDER, FOR SOLUTION ORAL DAILY PRN
Status: DISCONTINUED | OUTPATIENT
Start: 2024-01-28 | End: 2024-01-29

## 2024-01-28 RX ORDER — ACETAMINOPHEN 325 MG/1
650 TABLET ORAL EVERY 4 HOURS PRN
Status: DISCONTINUED | OUTPATIENT
Start: 2024-01-28 | End: 2024-01-31 | Stop reason: HOSPADM

## 2024-01-28 RX ORDER — SODIUM CHLORIDE 0.9 % (FLUSH) 0.9 %
10 SYRINGE (ML) INJECTION EVERY 12 HOURS SCHEDULED
Status: DISCONTINUED | OUTPATIENT
Start: 2024-01-28 | End: 2024-01-31 | Stop reason: HOSPADM

## 2024-01-28 RX ORDER — IBUPROFEN 600 MG/1
1 TABLET ORAL
Status: DISCONTINUED | OUTPATIENT
Start: 2024-01-28 | End: 2024-01-31 | Stop reason: HOSPADM

## 2024-01-28 RX ORDER — METOPROLOL SUCCINATE 25 MG/1
25 TABLET, EXTENDED RELEASE ORAL
Status: DISCONTINUED | OUTPATIENT
Start: 2024-01-29 | End: 2024-01-31 | Stop reason: HOSPADM

## 2024-01-28 RX ORDER — NICOTINE POLACRILEX 4 MG
15 LOZENGE BUCCAL
Status: DISCONTINUED | OUTPATIENT
Start: 2024-01-28 | End: 2024-01-31 | Stop reason: HOSPADM

## 2024-01-28 RX ORDER — HEPARIN SODIUM 5000 [USP'U]/ML
5000 INJECTION, SOLUTION INTRAVENOUS; SUBCUTANEOUS EVERY 12 HOURS SCHEDULED
Status: DISCONTINUED | OUTPATIENT
Start: 2024-01-28 | End: 2024-01-31 | Stop reason: HOSPADM

## 2024-01-28 RX ORDER — OSELTAMIVIR PHOSPHATE 75 MG/1
75 CAPSULE ORAL ONCE
Status: COMPLETED | OUTPATIENT
Start: 2024-01-28 | End: 2024-01-28

## 2024-01-28 RX ORDER — NITROGLYCERIN 0.4 MG/1
0.4 TABLET SUBLINGUAL
Status: DISCONTINUED | OUTPATIENT
Start: 2024-01-28 | End: 2024-01-31 | Stop reason: HOSPADM

## 2024-01-28 RX ADMIN — IOPAMIDOL 100 ML: 755 INJECTION, SOLUTION INTRAVENOUS at 16:38

## 2024-01-28 RX ADMIN — OSELTAMIVIR PHOSPHATE 75 MG: 75 CAPSULE ORAL at 18:00

## 2024-01-28 RX ADMIN — METHYLPREDNISOLONE SODIUM SUCCINATE 80 MG: 125 INJECTION, POWDER, FOR SOLUTION INTRAMUSCULAR; INTRAVENOUS at 14:58

## 2024-01-28 RX ADMIN — Medication 10 ML: at 21:36

## 2024-01-28 RX ADMIN — ACETAMINOPHEN 650 MG: 325 TABLET, FILM COATED ORAL at 21:44

## 2024-01-28 RX ADMIN — FUROSEMIDE 40 MG: 40 TABLET ORAL at 18:01

## 2024-01-28 RX ADMIN — HEPARIN SODIUM 5000 UNITS: 5000 INJECTION, SOLUTION INTRAVENOUS; SUBCUTANEOUS at 21:35

## 2024-01-28 RX ADMIN — ONDANSETRON 4 MG: 2 INJECTION INTRAMUSCULAR; INTRAVENOUS at 21:34

## 2024-01-28 RX ADMIN — IPRATROPIUM BROMIDE AND ALBUTEROL SULFATE 3 ML: .5; 3 SOLUTION RESPIRATORY (INHALATION) at 15:22

## 2024-01-28 RX ADMIN — INSULIN GLARGINE 20 UNITS: 100 INJECTION, SOLUTION SUBCUTANEOUS at 21:35

## 2024-01-28 NOTE — ED PROVIDER NOTES
Subjective   History of Present Illness  Chief complaint: Patient is a 63-year-old who presents with shortness of breath.  He states that for the last 3 days has had progressive worsening of his breathing.  He has a history of congestive heart failure.  He has been sleeping with his CPAP to be able to lay flat.  He is coughing and occasionally there is blood in it.  His temperatures been as high as 100.0.  He does have a pacemaker/defibrillator.  He does take a diuretic and took it last this morning.  With progressive symptoms and no improvement he presented here for evaluation.  No active chest pain currently.  He has vomited 1 time this morning as well.  He has no abdominal pain.  He is not normally on oxygen his oxygen saturation was 83%    Context:    Duration: 3 days    Timing:    Severity: Severe    Associated Symptoms:        PCP:  LMP:      Review of Systems   Constitutional:  Positive for fever.   HENT:  Positive for congestion.    Respiratory:  Positive for cough and shortness of breath.    Cardiovascular:  Positive for leg swelling. Negative for chest pain.   Gastrointestinal:  Positive for nausea and vomiting.   Genitourinary: Negative.    Musculoskeletal: Negative.    Skin: Negative.        Past Medical History:   Diagnosis Date    Arthritis     Asthma     Cardiac disease     Carpal tunnel syndrome     Congenital heart disease     CPAP (continuous positive airway pressure) dependence     Diabetes mellitus     Diverticulitis of colon     Erectile dysfunction     Heart disease     Heart valve disease     Hyperlipidemia     Hypertension     Neuromuscular disorder     ESAU (obstructive sleep apnea)     Osteoarthritis        No Known Allergies    Past Surgical History:   Procedure Laterality Date    CARDIAC CATHETERIZATION N/A 2/16/2022    Procedure: Left Heart Cath;  Surgeon: Raoul Tirado MD;  Location: [facility] CATH INVASIVE LOCATION;  Service: Cardiovascular;  Laterality: N/A;    CARDIAC  ELECTROPHYSIOLOGY PROCEDURE N/A 2022    Procedure: ICD single-chamber, new [place] aware;  Surgeon: Raoul Tirado MD;  Location: [facility] INVASIVE LOCATION;  Service: Cardiovascular;  Laterality: N/A;    HERNIA REPAIR         Family History   Problem Relation Age of Onset    Leukemia Mother     Cancer Sister     Cancer Maternal Grandmother        Social History     Socioeconomic History    Marital status:    Tobacco Use    Smoking status: Former     Packs/day: 2.00     Years: 20.00     Additional pack years: 0.00     Total pack years: 40.00     Types: Cigarettes     Quit date: 3/6/2000     Years since quittin.9    Smokeless tobacco: Never   Vaping Use    Vaping Use: Never used   Substance and Sexual Activity    Alcohol use: Not Currently     Comment: rare    Drug use: Never    Sexual activity: Defer           Objective   Physical Exam  Vitals and nursing note reviewed.   Constitutional:       Appearance: He is obese. He is ill-appearing.   HENT:      Head: Normocephalic.   Cardiovascular:      Rate and Rhythm: Regular rhythm. Tachycardia present.   Pulmonary:      Effort: Pulmonary effort is normal.      Breath sounds: Decreased breath sounds and rales present.   Abdominal:      Palpations: Abdomen is soft.      Tenderness: There is no abdominal tenderness.   Musculoskeletal:      Right lower leg: No tenderness. Edema present.      Left lower leg: No tenderness. Edema present.   Neurological:      Mental Status: He is alert and oriented to person, place, and time.   Psychiatric:         Mood and Affect: Mood normal.         Procedures           ED Course                                             Medical Decision Making  Patient was seen and evaluated for the above problem    Differential diagnose includes was not limited to CHF, COPD exacerbation, pneumonia, PE, pneumothorax    Patient had chest x-ray showing no significant signs of increased edema.  There is no pneumothorax.  No  dense infiltrate.  This was interpreted by myself.  EKG shows sinus tachycardia rate of 113 low voltage.  His D-dimer was elevated and CT chest shows no signs of pulmonary embolism.  His flu is positive he does have reactive airway disease on his exam.  Likely this is causing his hypoxia.  He remains on oxygen here in the emergency department.  Is received steroids and nebulizers.  He does receive Tamiflu and will be admitted to the hospital for hypoxemic respiratory failure.  Discussed with patient verbalized understanding I discussed with  who agrees to admit the patient.  Critical care time 40 minutes.    Problems Addressed:  Acute respiratory failure with hypoxia: complicated acute illness or injury  COPD with acute exacerbation: complicated acute illness or injury  Influenza: complicated acute illness or injury    Amount and/or Complexity of Data Reviewed  Labs: ordered. Decision-making details documented in ED Course.     Details: BNP normal.  Troponin mildly elevated.  Labs reviewed by myself.  Radiology: ordered and independent interpretation performed.     Details: Radiology interpreted by myself  ECG/medicine tests: ordered and independent interpretation performed.     Details: EKG interpreted by myself sinus tachycardia rate of 113 low voltage    Risk  Prescription drug management.  Decision regarding hospitalization.    Critical Care  Total time providing critical care: 40 minutes        Final diagnoses:   None   Acute hypoxemic respiratory failure  Influenza    ED Disposition  ED Disposition       None            No follow-up provider specified.       Medication List      No changes were made to your prescriptions during this visit.            Norbert Harris DO  01/28/24 0795

## 2024-01-28 NOTE — H&P
History and Physical   Sorin Allen : 1960 MRN:[id number] LOS:0     Reason for admission: Flu     Assessment / Plan     !!! Resume home medication as appropriate once home medication is verified by pharmacist.     #Acute hypoxic respiratory failure secondary to asthma exacerbation precipitated by flu infection  -Presented with shortness of breath and found to be hypoxia on the room air currently stable on the 4 L nasal cannula.  Flu positive.  CT angio chest without any pulmonary embolism.  -Started on Tamiflu and will continue.    #EARNESTINE on CKD  -baseline Cr at 1.2 and now at 1.5   -Monitor BMP daily avoid nephrotoxic medication intake output.    #Trop leak from demand ischemia in the setting of flu infection  -trend trop and tele     #Dilated cardiomyopathy s/p ICD placement in    -Will need to continue home medication once pharmacy verified.  -pt is on high dose lasix at home ( the dose is not verified yet ) and in the meantime will put on lasix 40 mg PO bid and please change to home dose once verified     #Diabetes type 2  -SSI glargine Accu-Chek.  Home oral medication on hold during hospitalization.        Nutrition: Diet: Regular/House Diet; Texture: Regular Texture (IDDSI 7); Fluid Consistency: Thin (IDDSI 0)     DVT Prophylaxis:   Mechanical Order History:       None          Pharmalogical Order History:        Ordered     Dose Route Frequency Stop    24 1018  heparin (porcine) 5000 UNIT/ML injection 5,000 Units         5,000 Units SC Every 12 Hours Scheduled --                     History of Present illness     A 63 y.o. old male patient with PMH of asthma congenital heart disease ICD placement on 2022 diabetes mellitus hyperlipidemia hypertension ESAU dependent on CPAP osteoarthritis heart disease presents to the hospital with complaints of feeling tired and shortness of breath and found to be hypoxia on the room air.  Patient is not using any home oxygen but he used CPAP at  nighttime.  Patient is currently on 4 L nasal cannula.  CT angio chest without pulmonary embolism.  Troponin leak is present.  Patient is started on Tamiflu.    Admitted for hypoxic respiratory failure secondary to flu infection.    ED course: Tamiflu CTA chest     Subjective / Review of systems     Review of Systems   Feeling SOB    Past Medical/Surgical/Social/Family History & Allergies     Past Medical History:   Diagnosis Date    Arthritis     Asthma     Cardiac disease     Carpal tunnel syndrome     Congenital heart disease     CPAP (continuous positive airway pressure) dependence     Diabetes mellitus     Diverticulitis of colon     Erectile dysfunction     Heart disease     Heart valve disease     Hyperlipidemia     Hypertension     Neuromuscular disorder     ESAU (obstructive sleep apnea)     Osteoarthritis       Past Surgical History:   Procedure Laterality Date    CARDIAC CATHETERIZATION N/A 2022    Procedure: Left Heart Cath;  Surgeon: Raoul Tirado MD;  Location: [facility] CATH INVASIVE LOCATION;  Service: Cardiovascular;  Laterality: N/A;    CARDIAC ELECTROPHYSIOLOGY PROCEDURE N/A 2022    Procedure: ICD single-chamber, new [place] aware;  Surgeon: Raoul Tirado MD;  Location:  [facility] CATH INVASIVE LOCATION;  Service: Cardiovascular;  Laterality: N/A;    HERNIA REPAIR        Social History     Socioeconomic History    Marital status:    Tobacco Use    Smoking status: Former     Packs/day: 2.00     Years: 20.00     Additional pack years: 0.00     Total pack years: 40.00     Types: Cigarettes     Quit date: 3/6/2000     Years since quittin.9    Smokeless tobacco: Never   Vaping Use    Vaping Use: Never used   Substance and Sexual Activity    Alcohol use: Not Currently     Comment: rare    Drug use: Never    Sexual activity: Defer      Family History   Problem Relation Age of Onset    Leukemia Mother     Cancer Sister     Cancer Maternal Grandmother       No Known  Allergies     Home Medications     Prior to Admission medications    Medication Sig Start Date End Date Taking? Authorizing Provider   ACCU-CHEK SOFTCLIX LANCETS lancets by Other route. Use as instructed    Gaurav Monroy MD   acetaminophen (TYLENOL) 500 MG tablet Take 2 tablets by mouth Every 8 (Eight) Hours As Needed for Moderate Pain  (arthritic pain). 3/6/20   Natalie Steward MD   aspirin (aspirin) 81 MG EC tablet Take 1 tablet by mouth Daily. 2/7/22   Raoul Tirado MD   Blood Glucose Calibration liquid by In Vitro route.    ProviderGaurav MD   Blood Glucose Monitoring Suppl (ACCU-CHEK SIGRID PLUS) w/Device kit Use to test blood sugar twice a day    Gaurav Monroy MD   Blood Glucose Monitoring Suppl (Accu-Chek Guide) w/Device kit 1 kit 3 (Three) Times a Day. Use glucometer to check blood sugars 3 times a day. Dx. E11.42 2/26/21   Natalie Steawrd MD   Continuous Blood Gluc  (FreeStyle Torie 14 Day [place]) device 1 Units 3 (Three) Times a Day Before Meals. Use continuous glucose monitor to monitor glucose regularly. 9/29/21   Natalie Steward MD   Continuous Blood Gluc Sensor (FreeStyle Torie 2 Sensor) [facility] Apply 1 each topically Every 14 (Fourteen) Days. Dispense 2 per every 28 days. DX E11.42 1/9/24   Stacy Perez PA   Farxiga 10 MG tablet TAKE 1 TABLET BY MOUTH EVERY DAY 2/23/23   Raoul Tirado MD   furosemide (LASIX) 80 MG tablet TAKE 1 TABLET BY MOUTH 4 TIMES A DAY. 4/24/23   Raoul Tirado MD   gabapentin (NEURONTIN) 100 MG capsule TAKE 1 CAPSULE BY MOUTH THREE TIMES A DAY  Patient taking differently: Take 1 capsule by mouth 3 (Three) Times a Day As Needed. 6/27/21   Erick Cadet MD   glucose blood (Accu-Chek Sigrid Plus) test strip USE TESTING STRIP TO CHECK BLOOD SUGARS 3 TIMES A DAY. DX. E11.42 2/24/23   Stacy Perez PA   Insulin Glargine (Lantus SoloStar) 100 UNIT/ML injection pen Inject 45 Units under the skin  into the appropriate area as directed Daily. 9/29/23   Stacy Perez PA   Insulin Pen Needle 32G X 4 MM misc Use pen needle to deliver insulin subcutaneously daily.  Dx. E11.42 1/26/23   Stacy Perez PA   metoprolol succinate XL (TOPROL-XL) 25 MG 24 hr tablet Take 1 tablet by mouth Daily. 5/22/23   Raoul Tirado MD   sacubitril-valsartan (Entresto) 24-26 MG tablet Take 1 tablet by mouth 2 (Two) Times a Day. 6/26/23   Raoul Tirado MD   spironolactone (ALDACTONE) 25 MG tablet TAKE 1 TABLET BY MOUTH EVERY DAY 7/3/23   Raoul Tirado MD      Objective / Physical Exam   Vital signs:  Temp: 98.7 °F (37.1 °C)  BP: 131/70  Heart Rate: 95  Resp: 21  SpO2: 93 %  Weight: (!) 147 kg (325 lb)    Admission Weight: Weight: (!) 147 kg (325 lb)    Physical Exam   Physical Exam  HENT:      Head: Normocephalic and atraumatic.      Nose: Nose normal.   Eyes:      Extraocular Movements: Extraocular movements intact.      Conjunctivae/sclera: Conjunctivae normal.      Pupils: Pupils are equal, round, and reactive to light.   Cardiovascular:      Rate and Rhythm: normal       Pulses: Normal pulses.      Heart sounds: Normal heart sounds.   Pulmonary:      Effort: normal      Breath sounds: normal   Abdominal:      General: Abdomen is flat. Bowel sounds are normal.      Palpations: Abdomen is soft.   Musculoskeletal:         General: Normal range of motion.      Cervical back: Normal range of motion and neck supple.   Skin:     General: Skin is dry.   Neurological:      General: No focal deficit present.      Mental Status: alert.   Psychiatric:         Mood and Affect: Mood normal.        Labs     Results from last 7 days   Lab Units 01/28/24  1408   WBC 10*3/mm3 8.10   HEMATOCRIT % 38.5   PLATELETS 10*3/mm3 253      Results from last 7 days   Lab Units 01/28/24  1408   SODIUM mmol/L 138   POTASSIUM mmol/L 4.1   CHLORIDE mmol/L 97*   CO2 mmol/L 26.0   BUN mg/dL 26*   CREATININE mg/dL 1.50*           Current Medications   Scheduled Meds:heparin (porcine), 5,000 Units, Subcutaneous, Q12H  insulin glargine, 20 Units, Subcutaneous, Nightly  insulin lispro, 2-7 Units, Subcutaneous, 4x Daily AC & at Bedtime  metoprolol succinate XL, 25 mg, Oral, Q24H  oseltamivir, 75 mg, Oral, Once  senna-docusate sodium, 2 tablet, Oral, BID  sodium chloride, 10 mL, Intravenous, Q12H         Continuous Infusions:      Reese Kim MD  [facility]   01/28/24   17:42 EST

## 2024-01-28 NOTE — Clinical Note
Level of Care: Med/Surg [1]   Admitting Physician: LEILANI SAINZ [[id number]]   Attending Physician: LEILANI SAINZ [[id number]]

## 2024-01-29 LAB
ANION GAP SERPL CALCULATED.3IONS-SCNC: 14 MMOL/L (ref 5–15)
BASOPHILS # BLD AUTO: 0 10*3/MM3 (ref 0–0.2)
BASOPHILS NFR BLD AUTO: 0.2 % (ref 0–1.5)
BUN SERPL-MCNC: 34 MG/DL (ref 8–23)
BUN/CREAT SERPL: 22.2 (ref 7–25)
CALCIUM SPEC-SCNC: 9 MG/DL (ref 8.6–10.5)
CHLORIDE SERPL-SCNC: 97 MMOL/L (ref 98–107)
CO2 SERPL-SCNC: 26 MMOL/L (ref 22–29)
CREAT SERPL-MCNC: 1.53 MG/DL (ref 0.76–1.27)
DEPRECATED RDW RBC AUTO: 48.1 FL (ref 37–54)
EGFRCR SERPLBLD CKD-EPI 2021: 50.5 ML/MIN/1.73
EOSINOPHIL # BLD AUTO: 0 10*3/MM3 (ref 0–0.4)
EOSINOPHIL NFR BLD AUTO: 0 % (ref 0.3–6.2)
ERYTHROCYTE [DISTWIDTH] IN BLOOD BY AUTOMATED COUNT: 15.1 % (ref 12.3–15.4)
GLUCOSE BLDC GLUCOMTR-MCNC: 110 MG/DL (ref 70–105)
GLUCOSE BLDC GLUCOMTR-MCNC: 135 MG/DL (ref 70–105)
GLUCOSE BLDC GLUCOMTR-MCNC: 179 MG/DL (ref 70–105)
GLUCOSE BLDC GLUCOMTR-MCNC: 211 MG/DL (ref 70–105)
GLUCOSE SERPL-MCNC: 205 MG/DL (ref 65–99)
HCT VFR BLD AUTO: 38 % (ref 37.5–51)
HGB BLD-MCNC: 12.1 G/DL (ref 13–17.7)
LYMPHOCYTES # BLD AUTO: 0.7 10*3/MM3 (ref 0.7–3.1)
LYMPHOCYTES NFR BLD AUTO: 11.9 % (ref 19.6–45.3)
MAGNESIUM SERPL-MCNC: 2.4 MG/DL (ref 1.6–2.4)
MCH RBC QN AUTO: 29.3 PG (ref 26.6–33)
MCHC RBC AUTO-ENTMCNC: 32 G/DL (ref 31.5–35.7)
MCV RBC AUTO: 91.6 FL (ref 79–97)
MONOCYTES # BLD AUTO: 0.2 10*3/MM3 (ref 0.1–0.9)
MONOCYTES NFR BLD AUTO: 2.8 % (ref 5–12)
NEUTROPHILS NFR BLD AUTO: 4.9 10*3/MM3 (ref 1.7–7)
NEUTROPHILS NFR BLD AUTO: 85.1 % (ref 42.7–76)
NRBC BLD AUTO-RTO: 0.1 /100 WBC (ref 0–0.2)
PHOSPHATE SERPL-MCNC: 4.1 MG/DL (ref 2.5–4.5)
PLATELET # BLD AUTO: 243 10*3/MM3 (ref 140–450)
PMV BLD AUTO: 8.2 FL (ref 6–12)
POTASSIUM SERPL-SCNC: 4.4 MMOL/L (ref 3.5–5.2)
QT INTERVAL: 328 MS
QTC INTERVAL: 450 MS
RBC # BLD AUTO: 4.14 10*6/MM3 (ref 4.14–5.8)
SODIUM SERPL-SCNC: 137 MMOL/L (ref 136–145)
TROPONIN T SERPL HS-MCNC: 36 NG/L
WBC NRBC COR # BLD AUTO: 5.7 10*3/MM3 (ref 3.4–10.8)

## 2024-01-29 PROCEDURE — 36415 COLL VENOUS BLD VENIPUNCTURE: CPT | Performed by: INTERNAL MEDICINE

## 2024-01-29 PROCEDURE — 84100 ASSAY OF PHOSPHORUS: CPT | Performed by: INTERNAL MEDICINE

## 2024-01-29 PROCEDURE — 63710000001 INSULIN GLARGINE PER 5 UNITS: Performed by: INTERNAL MEDICINE

## 2024-01-29 PROCEDURE — 85025 COMPLETE CBC W/AUTO DIFF WBC: CPT | Performed by: INTERNAL MEDICINE

## 2024-01-29 PROCEDURE — 82948 REAGENT STRIP/BLOOD GLUCOSE: CPT

## 2024-01-29 PROCEDURE — 83735 ASSAY OF MAGNESIUM: CPT | Performed by: INTERNAL MEDICINE

## 2024-01-29 PROCEDURE — 63710000001 INSULIN LISPRO (HUMAN) PER 5 UNITS: Performed by: INTERNAL MEDICINE

## 2024-01-29 PROCEDURE — 25010000002 HEPARIN (PORCINE) PER 1000 UNITS: Performed by: INTERNAL MEDICINE

## 2024-01-29 PROCEDURE — 99222 1ST HOSP IP/OBS MODERATE 55: CPT | Performed by: INTERNAL MEDICINE

## 2024-01-29 PROCEDURE — 82948 REAGENT STRIP/BLOOD GLUCOSE: CPT | Performed by: INTERNAL MEDICINE

## 2024-01-29 PROCEDURE — 84484 ASSAY OF TROPONIN QUANT: CPT | Performed by: INTERNAL MEDICINE

## 2024-01-29 PROCEDURE — 80048 BASIC METABOLIC PNL TOTAL CA: CPT | Performed by: INTERNAL MEDICINE

## 2024-01-29 RX ORDER — ASPIRIN 81 MG/1
81 TABLET ORAL DAILY
Status: DISCONTINUED | OUTPATIENT
Start: 2024-01-29 | End: 2024-01-31 | Stop reason: HOSPADM

## 2024-01-29 RX ORDER — FUROSEMIDE 40 MG/1
80 TABLET ORAL
Status: DISCONTINUED | OUTPATIENT
Start: 2024-01-29 | End: 2024-01-31 | Stop reason: HOSPADM

## 2024-01-29 RX ADMIN — OSELTAMIVIR PHOSPHATE 75 MG: 75 CAPSULE ORAL at 09:06

## 2024-01-29 RX ADMIN — FUROSEMIDE 40 MG: 40 TABLET ORAL at 09:06

## 2024-01-29 RX ADMIN — ACETAMINOPHEN 650 MG: 325 TABLET, FILM COATED ORAL at 22:10

## 2024-01-29 RX ADMIN — Medication 10 ML: at 09:07

## 2024-01-29 RX ADMIN — METOPROLOL SUCCINATE 25 MG: 25 TABLET, EXTENDED RELEASE ORAL at 09:06

## 2024-01-29 RX ADMIN — DOCUSATE SODIUM 50MG AND SENNOSIDES 8.6MG 2 TABLET: 8.6; 5 TABLET, FILM COATED ORAL at 09:07

## 2024-01-29 RX ADMIN — ASPIRIN 81 MG: 81 TABLET, COATED ORAL at 14:05

## 2024-01-29 RX ADMIN — INSULIN GLARGINE 25 UNITS: 100 INJECTION, SOLUTION SUBCUTANEOUS at 21:18

## 2024-01-29 RX ADMIN — ACETAMINOPHEN 650 MG: 325 TABLET, FILM COATED ORAL at 03:58

## 2024-01-29 RX ADMIN — Medication 10 ML: at 21:18

## 2024-01-29 RX ADMIN — OSELTAMIVIR PHOSPHATE 75 MG: 75 CAPSULE ORAL at 21:18

## 2024-01-29 RX ADMIN — INSULIN LISPRO 2 UNITS: 100 INJECTION, SOLUTION INTRAVENOUS; SUBCUTANEOUS at 09:08

## 2024-01-29 RX ADMIN — HEPARIN SODIUM 5000 UNITS: 5000 INJECTION, SOLUTION INTRAVENOUS; SUBCUTANEOUS at 09:07

## 2024-01-29 RX ADMIN — FUROSEMIDE 80 MG: 40 TABLET ORAL at 17:09

## 2024-01-29 RX ADMIN — HEPARIN SODIUM 5000 UNITS: 5000 INJECTION, SOLUTION INTRAVENOUS; SUBCUTANEOUS at 21:18

## 2024-01-29 NOTE — SIGNIFICANT NOTE
01/29/24 1536   Living Situation   Current Living Arrangements apartment   Potentially Unsafe Housing Conditions none   Food Insecurity   Within the past 12 months, you worried that your food would run out before you got the money to buy more. Never true   Within the past 12 months, the food you bought just didn't last and you didn't have money to get more. Never true   Transportation Needs   In the past 12 months, has lack of transportation kept you from medical appointments or from getting medications? no   In the past 12 months, has lack of transportation kept you from meetings, work, or from getting things needed for daily living? No   Utilities   In the past 12 months has the electric, gas, oil, or water company threatened to shut off services in your home? No   Abuse Screen (yes response referral indicated)   Feels Unsafe at Home or Work/School no   Feels Threatened by Someone no   Does Anyone Try to Keep You From Having Contact with Others or Doing Things Outside Your Home? no   Physical Signs of Abuse Present no   Financial Resource Strain   How hard is it for you to pay for the very basics like food, housing, medical care, and heating? Somewhat   Employment   Do you want help finding or keeping work or a job? I do not need or want help   Family and Community Support   If for any reason you need help with day-to-day activities such as bathing, preparing meals, shopping, managing finances, etc., do you get the help you need? I get all the help I need   How often do you feel lonely or isolated from those around you? Never   Education   Preferred Language English   Do you want help with school or training? For example, starting or completing job training or getting a high school diploma, GED or equivalent No   Physical Activity   On average, how many days per week do you engage in moderate to strenuous exercise (like a brisk walk)? Pt Declined   On average, how many minutes do you engage in exercise at this  level? Pt Declined   Alcohol Use   Q1: How often do you have a drink containing alcohol? Monthly or l   Q2: How many drinks containing alcohol do you have on a typical day when you are drinking? 1 or 2   Q3: How often do you have six or more drinks on one occasion? Never   Disabilities   Concentrating, Remembering or Making Decisions Difficulty no   Doing Errands Independently Difficulty (such as shopping) no

## 2024-01-29 NOTE — PLAN OF CARE
Patient reports he is up ad sandhya in room and has no mobility difficulties. No Physical Therapy needs, will complete order.

## 2024-01-29 NOTE — CONSULTS
Cardiology Consult Note    Patient Identification:  Name: Sorin Allen  Age: 63 y.o.  Sex: male  :  1960  MRN: [id number]             Requesting Physician :  Dr. Shetty     Reason for Consultation / Chief Complaint : patient co-management        History of Present Illness:        Mr. Sorin Allen has PMH of     Hypertensive cardiovascular disease with chronic diastolic CHF  Chronic HFrEF due to systolic dysfunction from dilated cardiomyopathy  [place] ICD 2022  Cardiac cath 2022 EF of 15% no obstructive CAD  Dyslipidemia  Hypertension  Obesity with BMI over 40  ESAU/CPAP  ED  Rheumatic fever as a child  Former smoker quit 3/6/2000  Family history mother has leukemia      Presented to the ED on 2024 with shortness of breath and cough.  Patient reports low grade fever and body aches.  He has a cough with blood tinged sputum, first thing in the morning then it is sometimes greenish. He denies any chest pain.   On room air, his oxygen sat is around 88-92%.   He is positive for influenza A.     Labs show  HS troponin 43--37--36,  pro bnp normal at 574  BUN 26 creatinine 1.50   D-dimer mildly elevated however CT was negative for PE but does show a 3mm subpleural nodule within the right upper lobe and paraseptal emphysematous changes in the right lung.     Cardiology attending addendum :    I have personally performed a face-to-face diagnostic evaluation, physical exam and reviewed data on this patient.  I have reviewed documentation done by me and nurse practitioner  and corrected as needed.  And agree with the different components of documentation.Greater than 50% of the time spent in the care of this patient was provided by attending consultant/me.  63-year-old with previous history of hypertensive cardiovascular disease chronic diastolic heart failure and systolic heart failure and ICD presented with shortness of breath cough low-grade fever blood-tinged sputum was found to have  hypoxic respiratory failure from influenza A.    Chart review:  Patient reportedly had cardiac cath 8 years ago was told he has cardiomyopathy and CHF.  Underwent repeat cath 2/16/2022 which revealed EF of 15% no obstructive CAD..  Patient had repeat echocardiogram 4/6/2022 which is revealing persistent LV dysfunction with LVEF of 20% with severe pulmonary hypertension PA systolic pressure of 60 mmHg.          Echocardiogram 5/1/2020 revealed normal LV systolic function EF 60% with diastolic dysfunction and RV enlargement and mild aortic stenosis  Echocardiogram 1/21/2022 revealed EF of 26 to 30%  Repeat echo 4/6/2022 is revealing EF of 20% with severe pulmonary hypertension PA systolic pressure of 60 mmHg     Assessment:  :    Hypoxic respiratory failure  Influenza A  Elevated troponin  Renal insufficiency     Chronic HFrEF due to combined hypertensive cardiovascular disease and systolic dysfunction from dilated cardiomyopathy EF of 15-20%  Dilated cardiomyopathy  Hypertension  Diabetes  Obesity with BMI over 40        Recommendations / Plan:        Telemetry to monitor rhythm  Diuretics with p.o. Lasix  Continue beta-blockers with metoprolol  Blood pressure is borderline will hold Entresto for now  Monitor hemodynamics closely  Monitor renal function and electrolytes and urine output closely.  Patient thinks he is feeling better and wants to go home.  Discussed patient's condition with him.  Patient previously refused statins.  And is still refusing statins.  We will continue medical management with aspirin,  furosemide, metoprolol as tolerated to help with cardiomyopathy, CHF     Follow-up in heart failure clinic.  Follow-up with PMD for diabetes care.  Procedures cardiac cath 2/16/2022 performed and interpreted by me reveals dilated cardiomyopathy EF of 15%  Lexiscan Cardiolite performed 4/25/2023 revealed patchy uptake EF of 35%.  Discussed with hospitalist Dr. Shetty.  Patient has been started on  Tamiflu.                 Diagnosis Plan   1. Acute respiratory failure with hypoxia        2. COPD with acute exacerbation        3. Influenza        4. Chronic HFrEF (heart failure with reduced ejection fraction)  Ambulatory Referral to Heart Failure Clinic                 Past Medical History:  Past Medical History:   Diagnosis Date    Arthritis     Asthma     Cardiac disease     Carpal tunnel syndrome     Congenital heart disease     CPAP (continuous positive airway pressure) dependence     Diabetes mellitus     Diverticulitis of colon     Erectile dysfunction     Heart disease     Heart valve disease     Hyperlipidemia     Hypertension     Neuromuscular disorder     ESAU (obstructive sleep apnea)     Osteoarthritis      Past Surgical History:  Past Surgical History:   Procedure Laterality Date    CARDIAC CATHETERIZATION N/A 2/16/2022    Procedure: Left Heart Cath;  Surgeon: Raoul Tirado MD;  Location:  [facility];  Service: Cardiovascular;  Laterality: N/A;    CARDIAC ELECTROPHYSIOLOGY PROCEDURE N/A 6/7/2022    Procedure: ICD single-chamber, new [place] [facility] aware;  Surgeon: Raoul Tirado MD;  Location:  [facility];  Service: Cardiovascular;  Laterality: N/A;    HERNIA REPAIR        Allergies:  No Known Allergies  Home Meds:  Medications Prior to Admission   Medication Sig Dispense Refill Last Dose    acetaminophen (TYLENOL) 500 MG tablet Take 2 tablets by mouth Every 8 (Eight) Hours As Needed for Moderate Pain  (arthritic pain). 180 tablet 5 1/28/2024    aspirin (aspirin) 81 MG EC tablet Take 1 tablet by mouth Daily. 90 tablet 3 1/28/2024    Farxiga 10 MG tablet TAKE 1 TABLET BY MOUTH EVERY DAY 90 tablet 3 1/27/2024    furosemide (LASIX) 80 MG tablet TAKE 1 TABLET BY MOUTH 4 TIMES A DAY. 360 tablet 3 1/28/2024    metoprolol succinate XL (TOPROL-XL) 25 MG 24 hr tablet Take 1 tablet by mouth Daily. 90 tablet 3 1/28/2024    sacubitril-valsartan (Entresto)  24-26 MG tablet Take 1 tablet by mouth 2 (Two) Times a Day. 180 tablet 2 1/28/2024    spironolactone (ALDACTONE) 25 MG tablet TAKE 1 TABLET BY MOUTH EVERY DAY 90 tablet 3 1/27/2024    ACCU-CHEK SOFTCLIX LANCETS lancets by Other route. Use as instructed       Blood Glucose Calibration liquid by In Vitro route.       Blood Glucose Monitoring Suppl (ACCU-CHEK SIGRID PLUS) w/Device kit Use to test blood sugar twice a day       Blood Glucose Monitoring Suppl (Accu-Chek Guide) w/Device kit 1 kit 3 (Three) Times a Day. Use glucometer to check blood sugars 3 times a day. Dx. E11.42 1 kit 0     Continuous Blood Gluc  (FreeStyle Torie 14 Day Gustine) device 1 Units 3 (Three) Times a Day Before Meals. Use continuous glucose monitor to monitor glucose regularly. 1 each 0     Continuous Blood Gluc Sensor (FreeStyle Torie 2 Sensor) [facility] Apply 1 each topically Every 14 (Fourteen) Days. Dispense 2 per every 28 days. DX E11.42 2 each 5     gabapentin (NEURONTIN) 100 MG capsule TAKE 1 CAPSULE BY MOUTH THREE TIMES A DAY (Patient taking differently: Take 1 capsule by mouth 3 (Three) Times a Day As Needed.) 90 capsule 2 More than a month    glucose blood (Accu-Chek Sigrid Plus) test strip USE TESTING STRIP TO CHECK BLOOD SUGARS 3 TIMES A DAY. DX. E11.42 100 each 11     Insulin Glargine (Lantus SoloStar) 100 UNIT/ML injection pen Inject 45 Units under the skin into the appropriate area as directed Daily. 45 mL 3     Insulin Pen Needle 32G X 4 MM misc Use pen needle to deliver insulin subcutaneously daily.  Dx. E11.42 30 each 5      Current Meds:     Current Facility-Administered Medications:     acetaminophen (TYLENOL) tablet 650 mg, 650 mg, Oral, Q4H Julio BERNAL Yadana, MD, 650 mg at 01/29/24 0358    aspirin EC tablet 81 mg, 81 mg, Oral, Daily, Jeff Shetty MD, 81 mg at 01/29/24 1405    Calcium Replacement - Follow Nurse / BPA Driven Protocol, , Does not apply, Julio BERNAL Yadana, MD    dextrose (D50W) (25 g/50 mL) IV injection 25  g, 25 g, Intravenous, Q15 Min PRN, Reese Kim MD    dextrose (GLUTOSE) oral gel 15 g, 15 g, Oral, Q15 Min PRN, Reese Kim MD    furosemide (LASIX) tablet 80 mg, 80 mg, Oral, BID, Macrina Castro, APRN, 80 mg at 01/29/24 1709    glucagon (GLUCAGEN) injection 1 mg, 1 mg, Intramuscular, Q15 Min PRN, Reese Kim MD    guaiFENesin-codeine (GUAIFENESIN AC) 100-10 MG/5ML liquid 5 mL, 5 mL, Oral, Q4H PRN, Reese Kim MD    heparin (porcine) 5000 UNIT/ML injection 5,000 Units, 5,000 Units, Subcutaneous, Q12H, Reese Kim MD, 5,000 Units at 01/29/24 0907    insulin glargine (LANTUS, SEMGLEE) injection 25 Units, 25 Units, Subcutaneous, Nightly, Jeff Shetty MD    insulin lispro (HUMALOG/ADMELOG) injection 2-7 Units, 2-7 Units, Subcutaneous, 4x Daily AC & at Bedtime, Reese Kim MD, 2 Units at 01/29/24 0908    Magnesium Standard Dose Replacement - Follow Nurse / BPA Driven Protocol, , Does not apply, Julio BERNAL Yadana, MD    metoprolol succinate XL (TOPROL-XL) 24 hr tablet 25 mg, 25 mg, Oral, Q24H, Reese Kim MD, 25 mg at 01/29/24 0906    nitroglycerin (NITROSTAT) SL tablet 0.4 mg, 0.4 mg, Sublingual, Q5 Min PRN, Reese Kim MD    ondansetron (ZOFRAN) injection 4 mg, 4 mg, Intravenous, Q6H PRN, Reese Kim MD, 4 mg at 01/28/24 2134    oseltamivir (TAMIFLU) capsule 75 mg, 75 mg, Oral, Q12H, Reese Kim MD, 75 mg at 01/29/24 0906    Phosphorus Replacement - Follow Nurse / BPA Driven Protocol, , Does not apply, Julio BERNAL Yadana, MD    Potassium Replacement - Follow Nurse / BPA Driven Protocol, , Does not apply, PRN, Reese Kim MD    [Held by provider] sacubitril-valsartan (ENTRESTO) 24-26 MG tablet 1 tablet, 1 tablet, Oral, BID, Jeff Shetty MD    [COMPLETED] Insert Peripheral IV, , , Once **AND** sodium chloride 0.9 % flush 10 mL, 10 mL, Intravenous, PRN, Norbert Harris,     sodium chloride 0.9 % flush 10 mL, 10 mL, Intravenous, Q12H, Reese Kim MD, 10 mL at 01/29/24 0907    sodium chloride 0.9 % flush 10  "mL, 10 mL, Intravenous, Julio BERNAL Yadana, MD    sodium chloride 0.9 % infusion 40 mL, 40 mL, IntravenousGABE Oo, Yadana, MD  Social History:   Social History     Tobacco Use    Smoking status: Former     Packs/day: 2.00     Years: 20.00     Additional pack years: 0.00     Total pack years: 40.00     Types: Cigarettes     Quit date: 3/6/2000     Years since quittin.9    Smokeless tobacco: Never   Substance Use Topics    Alcohol use: Not Currently     Comment: rare      Family History:  Family History   Problem Relation Age of Onset    Leukemia Mother     Cancer Sister     Cancer Maternal Grandmother         Review of Systems : Review of Systems   Cardiovascular:  Positive for leg swelling (left leg, chronic). Negative for chest pain and irregular heartbeat.   Respiratory:  Positive for cough, shortness of breath and sputum production.            Constitutional:  Temp:  [97.6 °F (36.4 °C)-100.5 °F (38.1 °C)] 98.4 °F (36.9 °C)  Heart Rate:  [71-99] 88  Resp:  [16-20] 16  BP: (101-133)/(49-86) 122/77    Physical Exam   /77 (BP Location: Left arm, Patient Position: Lying)   Pulse 88   Temp 98.4 °F (36.9 °C) (Oral)   Resp 16   Ht 185.4 cm (73\")   Wt (!) 147 kg (325 lb)   SpO2 96%   BMI 42.88 kg/m²   Physical Exam  General:  Appears in no acute distress  Eyes: Sclerae are anicteric,  conjunctivae are clear   HEENT:  No JVD. Thyroid not visibly enlarged. No mucosal pallor or cyanosis  Respiratory: Respirations regular and unlabored at rest.  Bilaterally good breath sounds with good air entry in all fields. No crackles, rubs or wheezes auscultated  Cardiovascular: S1,S2 Regular rate and rhythm. No murmur, rub or gallop auscultated. Trace edema left leg   Gastrointestinal: Abdomen soft, flat, nontender. Bowel sounds present.   Musculoskeletal:  No abnormal movements  Extremities: No digital clubbing or cyanosis  Skin: Color pink. Skin warm and dry to touch. No rashes  No xanthoma  Neuro: Alert and awake, no " lateralizing deficits appreciated    Cardiographics  ECG: EKG tracing was  personally reviewed/interpreted by me  ECG 12 Lead Dyspnea   Final Result   HEART RATE= 113  bpm   RR Interval= 532  ms   GA Interval= 191  ms   P Horizontal Axis= -9  deg   P Front Axis= 57  deg   QRSD Interval= 104  ms   QT Interval= 328  ms   QTcB= 450  ms   QRS Axis= 0  deg   T Wave Axis= 67  deg   - BORDERLINE ECG -   Sinus tachycardia   Markedly posterior QRS axis   Low voltage, extremity leads   When compared with ECG of 14-Nov-2021 20:17:10,   Significant axis, voltage or hypertrophy change   Electronically Signed By: Norbert Harris ([facility]) 29-Jan-2024 06:43:51   Date and Time of Study: 2024-01-28 13:08:35      SCANNED - TELEMETRY     Final Result          Telemetry: sinus rhythm     Echocardiogram:   Results for orders placed during the hospital encounter of 04/06/22    Adult Transthoracic Echo Complete W/ Cont if Necessary Per Protocol    Interpretation Summary  LVE with severe global left ventricular hypokinesis, estimated LV ejection fraction of 20%.  Moderate to severe right ventricle enlargement  Severe left atrial enlargement.  Aortic valve, mitral valve, tricuspid valve appears structurally normal, mild aortic, mitral and tricuspid regurgitation seen.  Calculated RV systolic pressure of 60 mmHg consistent with severe pulmonary hypertension  No pericardial effusion seen.  Proximal aorta appears normal in size.      Imaging  Chest X-ray:   Imaging Results (Last 24 Hours)       ** No results found for the last 24 hours. **            Lab Review: I have reviewed the labs  Results from last 7 days   Lab Units 01/29/24  0051 01/28/24  1613 01/28/24  1408   HSTROP T ng/L 36* 37* 43*     Results from last 7 days   Lab Units 01/29/24  0051   MAGNESIUM mg/dL 2.4     Results from last 7 days   Lab Units 01/29/24  0051   SODIUM mmol/L 137   POTASSIUM mmol/L 4.4   BUN mg/dL 34*   CREATININE mg/dL 1.53*   CALCIUM mg/dL 9.0         Results  from last 7 days   Lab Units 01/28/24  1408   PROBNP pg/mL 574.8     Results from last 7 days   Lab Units 01/29/24  0051 01/28/24  1758 01/28/24  1408   WBC 10*3/mm3 5.70 6.50 8.10   HEMOGLOBIN g/dL 12.1* 12.0* 12.6*   HEMATOCRIT % 38.0 37.3* 38.5   PLATELETS 10*3/mm3 243 219 253     Results from last 7 days   Lab Units 01/28/24  1408   INR  1.10   APTT seconds 34.6*             Raoul Tirado MD  1/29/2024, 17:11 EST      SEGUN Hooper/Transcription:   Dictated utilizing Dragon dictation

## 2024-01-29 NOTE — CASE MANAGEMENT/SOCIAL WORK
Social Work Assessment  [facility]     Patient Name: Sorin Allen  MRN: [id number]  Today's Date: 1/29/2024    Admit Date: 1/28/2024         Discharge Plan       Row Name 01/29/24 1531       Plan    Plan Comments Linda consulted for financial assistance. Sw met with Pt at bedside. Pt reported that he and his wife are not eligible for government programs due to how much money they have. Pt is able to pay for bills. Pt utilizes local food triana when needed. Linda provided Pt with a local resource guide for assistance and sent referral on [facility] to see if they are able to assist Pt with rent. No further needs identified.        BALDEMAR Orr, MSW    Phone: [phone]  Fax: [phone]  Email: [email or web address]

## 2024-01-29 NOTE — PLAN OF CARE
Goal Outcome Evaluation:      Patient complained of pain this shift, see MAR. Patient remains on 4L NC. Patient stable at this time.

## 2024-01-29 NOTE — CASE MANAGEMENT/SOCIAL WORK
Discharge Planning Assessment   [facility]     Patient Name: Sorin Allen  MRN: [id number]  Today's Date: 1/29/2024    Admit Date: 1/28/2024    Plan: Return home. Home CPAP HS.   Discharge Needs Assessment       Row Name 01/29/24 1307       Living Environment    People in Home child(javier), dependent;spouse    Name(s) of People in Home Wife-Edouard, 12 year old son    Current Living Arrangements apartment    Potentially Unsafe Housing Conditions none    In the past 12 months has the electric, gas, oil, or water company threatened to shut off services in your home? No    Primary Care Provided by self    Provides Primary Care For child(javier)    Family Caregiver if Needed spouse;other relative(s)    Family Caregiver Names Wife, Sister in laws, Niece/Nephew    Quality of Family Relationships helpful;involved;supportive    Able to Return to Prior Arrangements yes       Resource/Environmental Concerns    Resource/Environmental Concerns financial    Financial Concerns rent or mortgage, unable to afford  pt reports difficulty with rent payments but has been denied before for assistance    Transportation Concerns none       Transportation Needs    In the past 12 months, has lack of transportation kept you from medical appointments or from getting medications? no    In the past 12 months, has lack of transportation kept you from meetings, work, or from getting things needed for daily living? No       Food Insecurity    Within the past 12 months, you worried that your food would run out before you got the money to buy more. Never true    Within the past 12 months, the food you bought just didn't last and you didn't have money to get more. Never true       Transition Planning    Patient/Family Anticipates Transition to home;home with family    Patient/Family Anticipated Services at Transition none    Transportation Anticipated car, drives self;family or friend will provide       Discharge Needs Assessment    Readmission Within  the Last 30 Days no previous admission in last 30 days    Equipment Currently Used at Home cpap;glucometer  Freestyle Torie    Concerns to be Addressed denies needs/concerns at this time;no discharge needs identified    Anticipated Changes Related to Illness none    Provided Post Acute Provider List? N/A    Provided Post Acute Provider Quality & Resource List? N/A                   Discharge Plan       Row Name 01/29/24 1304       Plan    Plan Return home. Home CPAP HS.    Patient/Family in Agreement with Plan yes    Plan Comments CM met with patient at bedside. Pt lives at home with wife and 12 year old son. He drives and is independent with ADL's. PCP and pharmacy confirmed. The only reported DME used at CPAP HS. Pt is not current with HHC/PT services at home and denies transportation concerns. Did reports issues with rent payments but reports he has tried for assistance programs but does not qualify. Wife does not drive so other family members will provide transportation at discharge.             Expected Discharge Date and Time       Expected Discharge Date Expected Discharge Time    Jan 30, 2024            Demographic Summary       Row Name 01/29/24 1306       General Information    Admission Type inpatient    Arrived From emergency department    Referral Source admission list    Reason for Consult care coordination/care conference;discharge planning    Preferred Language English       Contact Information    Permission Granted to Share Info With                    Functional Status       Row Name 01/29/24 1307       Functional Status    Usual Activity Tolerance good    Current Activity Tolerance good       Functional Status, IADL    Medications independent    Meal Preparation independent    Housekeeping independent    Laundry independent    Shopping independent             Megan Naegele, RN     Office Phone: [phone]  Office Cell: [phone]

## 2024-01-29 NOTE — PLAN OF CARE
Problem: Hypertension Comorbidity  Goal: Blood Pressure in Desired Range  Outcome: Ongoing, Progressing     Problem: Adjustment to Illness (Sepsis/Septic Shock)  Goal: Optimal Coping  Outcome: Ongoing, Progressing     Problem: Bleeding (Sepsis/Septic Shock)  Goal: Absence of Bleeding  Outcome: Ongoing, Progressing     Problem: Glycemic Control Impaired (Sepsis/Septic Shock)  Goal: Blood Glucose Level Within Desired Range  Outcome: Ongoing, Progressing     Problem: Infection Progression (Sepsis/Septic Shock)  Goal: Absence of Infection Signs and Symptoms  Outcome: Ongoing, Progressing     Problem: Nutrition Impaired (Sepsis/Septic Shock)  Goal: Optimal Nutrition Intake  Outcome: Ongoing, Progressing     Problem: Fall Injury Risk  Goal: Absence of Fall and Fall-Related Injury  Outcome: Ongoing, Progressing  Intervention: Promote Injury-Free Environment  Recent Flowsheet Documentation  Taken 1/29/2024 1200 by Marilee Jose, RN  Safety Promotion/Fall Prevention: safety round/check completed  Taken 1/29/2024 1000 by Marilee Jose, RN  Safety Promotion/Fall Prevention: safety round/check completed  Taken 1/29/2024 0845 by Marilee Jose, RN  Safety Promotion/Fall Prevention: safety round/check completed   Goal Outcome Evaluation:

## 2024-01-29 NOTE — PROGRESS NOTES
Hospitalist Progress Note   Sorin Allen : 1960 MRN:[id number] LOS:1     Principal Problem: Flu     Reason for follow up: All the medical problems listed below    Summary     A 63 y.o. male with PMH of asthma, history of ICD due to congenital heart disease, diabetes, hypertension, ESAU on CPAP presented to the hospital for shortness of breath and was admitted with a principal diagnosis of Flu.  Started on Tamiflu.  CTA without any pulmonary embolism.    Also found to have slightly worsening of his baseline creatinine.  Cardiology was consulted to readjust his heart failure medications including diuretics.    Assessment / Plan     Influenza A /hypoxia  Currently on Tamiflu, wean off oxygen as tolerated.    Chronic Systolic heart failure with ICD/pulmonary hypertension:   Currently well compensated.   Last ECHO from 2022 showed an EF of 20%, RVSP of 60.  Cardiac cath in  showed nonobstructing CAD.  Repeat an echocardiogram now.  Continue with metoprolol.  Home Lasix, Aldactone, Entresto and Farxiga on hold due to slightly elevated creatinine.  On pretty high dose of home Lasix 80 Mg 4 times a day, will get cardiology consult to optimize the diuretic regimen.  Monitor Input/Output very closely. Follow daily weights.   Net IO Since Admission: -45 mL [24 1340]    Diabetes mellitus Type 2, insulin-dependent : well controlled.   Started Lantus at a reduced dose of 25 units nightly.  Accu checks ACHS and c/w humalog coverage as needed.   Last A1c of 7.1.    Essential Hypertension: well controlled.   Continue metoprolol.   Titrate medications as needed.    CKD stage III: Previous creatinine around 1.35.  No EARNESTINE present.  Diabetic neuropathy: On gabapentin.  Obstructive sleep apnea: On home CPAP.  Dyslipidemia: Chronic and stable.  Declined statins in the past.  Morbid Obesity: Body mass index is 42.88 kg/m².    Disposition: Likely home in a.m.    Subjective / Review of systems     Review of Systems    Feels better, wants to go home.  Discussed with him that he is still hypoxic needing oxygen and not appropriate for discharge.  Denies any cough or phlegm.  Objective / Physical Exam   Vital signs:  Temp: 98 °F (36.7 °C)  BP: 122/77  Heart Rate: 76  Resp: 20  SpO2: 96 %  Weight: (!) 147 kg (325 lb)    Admission Weight: Weight: (!) 147 kg (325 lb)  Current Weight: Weight: (!) 147 kg (325 lb)    Input/Output in last 24 hours:    Intake/Output Summary (Last 24 hours) at 1/29/2024 1340  Last data filed at 1/29/2024 1117  Gross per 24 hour   Intake 980 ml   Output 1025 ml   Net -45 ml      Physical Exam  Vitals and nursing note reviewed.   Constitutional:       General: He is not in acute distress.     Appearance: Normal appearance.   HENT:      Mouth/Throat:      Mouth: Mucous membranes are moist.      Pharynx: Oropharynx is clear.   Eyes:      General: No scleral icterus.     Extraocular Movements: Extraocular movements intact.      Conjunctiva/sclera: Conjunctivae normal.   Cardiovascular:      Rate and Rhythm: Normal rate.      Heart sounds: No murmur heard.     No gallop.   Pulmonary:      Breath sounds: Normal breath sounds. No wheezing or rales.      Comments: Decreased breath sounds bilaterally due to body habitus.  Abdominal:      General: Bowel sounds are normal.      Palpations: Abdomen is soft.      Tenderness: There is no abdominal tenderness.   Musculoskeletal:         General: No tenderness.      Right lower leg: No edema.      Left lower leg: No edema.   Neurological:      General: No focal deficit present.      Mental Status: He is alert and oriented to person, place, and time.        Radiology and Labs     Results from last 7 days   Lab Units 01/29/24  0051 01/28/24  1758 01/28/24  1408   WBC 10*3/mm3 5.70 6.50 8.10   HEMATOCRIT % 38.0 37.3* 38.5   PLATELETS 10*3/mm3 243 219 253      Results from last 7 days   Lab Units 01/29/24  0051 01/28/24  1758 01/28/24  1408   SODIUM mmol/L 137 136 138    POTASSIUM mmol/L 4.4 4.2 4.1   CHLORIDE mmol/L 97* 96* 97*   CO2 mmol/L 26.0 27.0 26.0   BUN mg/dL 34* 28* 26*   CREATININE mg/dL 1.53* 1.47* 1.50*      Current medications   Scheduled Meds: aspirin, 81 mg, Oral, Daily  furosemide, 80 mg, Oral, BID  heparin (porcine), 5,000 Units, Subcutaneous, Q12H  insulin glargine, 25 Units, Subcutaneous, Nightly  insulin lispro, 2-7 Units, Subcutaneous, 4x Daily AC & at Bedtime  metoprolol succinate XL, 25 mg, Oral, Q24H  oseltamivir, 75 mg, Oral, Q12H  [Held by provider] sacubitril-valsartan, 1 tablet, Oral, BID  sodium chloride, 10 mL, Intravenous, Q12H      Continuous Infusions:      Reviewed all other data in the last 24 hours, including but not limited to vitals, labs, microbiology, imaging and pertinent notes from other providers.     Jeff Shetty MD   [facility]  01/29/24   13:40 EST

## 2024-01-29 NOTE — DISCHARGE INSTR - OTHER ORDERS
TRANSPORTATION: Medicaid: [phone] or [facility] ([facility]) [phone]

## 2024-01-30 ENCOUNTER — APPOINTMENT (OUTPATIENT)
Dept: CARDIOLOGY | Facility: HOSPITAL | Age: 64
End: 2024-01-30
Payer: MEDICARE

## 2024-01-30 VITALS
WEIGHT: 315 LBS | TEMPERATURE: 98.2 F | OXYGEN SATURATION: 94 % | RESPIRATION RATE: 21 BRPM | HEART RATE: 77 BPM | SYSTOLIC BLOOD PRESSURE: 120 MMHG | BODY MASS INDEX: 41.75 KG/M2 | DIASTOLIC BLOOD PRESSURE: 75 MMHG | HEIGHT: 73 IN

## 2024-01-30 LAB
ANION GAP SERPL CALCULATED.3IONS-SCNC: 10 MMOL/L (ref 5–15)
BASOPHILS # BLD AUTO: 0 10*3/MM3 (ref 0–0.2)
BASOPHILS NFR BLD AUTO: 0.3 % (ref 0–1.5)
BH CV ECHO MEAS - ACS: 1.39 CM
BH CV ECHO MEAS - AO MAX PG: 11.7 MMHG
BH CV ECHO MEAS - AO MEAN PG: 6.4 MMHG
BH CV ECHO MEAS - AO ROOT DIAM: 3.4 CM
BH CV ECHO MEAS - AO V2 MAX: 171 CM/SEC
BH CV ECHO MEAS - AO V2 VTI: 31.2 CM
BH CV ECHO MEAS - AVA(I,D): 1.79 CM2
BH CV ECHO MEAS - EDV(CUBED): 152.4 ML
BH CV ECHO MEAS - EDV(MOD-SP4): 126.3 ML
BH CV ECHO MEAS - EF(MOD-BP): 51 %
BH CV ECHO MEAS - EF(MOD-SP4): 50.6 %
BH CV ECHO MEAS - ESV(CUBED): 110.2 ML
BH CV ECHO MEAS - ESV(MOD-SP4): 62.4 ML
BH CV ECHO MEAS - FS: 10.2 %
BH CV ECHO MEAS - IVS/LVPW: 1.07 CM
BH CV ECHO MEAS - IVSD: 1.29 CM
BH CV ECHO MEAS - LA DIMENSION: 4.6 CM
BH CV ECHO MEAS - LV DIASTOLIC VOL/BSA (35-75): 47.8 CM2
BH CV ECHO MEAS - LV MASS(C)D: 273.8 GRAMS
BH CV ECHO MEAS - LV MAX PG: 3 MMHG
BH CV ECHO MEAS - LV MEAN PG: 1.31 MMHG
BH CV ECHO MEAS - LV SYSTOLIC VOL/BSA (12-30): 23.6 CM2
BH CV ECHO MEAS - LV V1 MAX: 86.6 CM/SEC
BH CV ECHO MEAS - LV V1 VTI: 15 CM
BH CV ECHO MEAS - LVIDD: 5.3 CM
BH CV ECHO MEAS - LVIDS: 4.8 CM
BH CV ECHO MEAS - LVOT AREA: 3.7 CM2
BH CV ECHO MEAS - LVOT DIAM: 2.18 CM
BH CV ECHO MEAS - LVPWD: 1.2 CM
BH CV ECHO MEAS - MV A MAX VEL: 71.6 CM/SEC
BH CV ECHO MEAS - MV DEC SLOPE: 180.7 CM/SEC2
BH CV ECHO MEAS - MV DEC TIME: 0.23 SEC
BH CV ECHO MEAS - MV E MAX VEL: 42.3 CM/SEC
BH CV ECHO MEAS - MV E/A: 0.59
BH CV ECHO MEAS - MV MAX PG: 3.2 MMHG
BH CV ECHO MEAS - MV MEAN PG: 1.1 MMHG
BH CV ECHO MEAS - MV V2 VTI: 17.9 CM
BH CV ECHO MEAS - MVA(VTI): 3.1 CM2
BH CV ECHO MEAS - PA ACC TIME: 0.08 SEC
BH CV ECHO MEAS - PA V2 MAX: 82.3 CM/SEC
BH CV ECHO MEAS - PULM A REVS DUR: 0.12 SEC
BH CV ECHO MEAS - PULM A REVS VEL: 23.6 CM/SEC
BH CV ECHO MEAS - PULM DIAS VEL: 26.1 CM/SEC
BH CV ECHO MEAS - PULM S/D: 1.55
BH CV ECHO MEAS - PULM SYS VEL: 40.5 CM/SEC
BH CV ECHO MEAS - RAP SYSTOLE: 8 MMHG
BH CV ECHO MEAS - RV MAX PG: 2.7 MMHG
BH CV ECHO MEAS - RV V1 MAX: 81.5 CM/SEC
BH CV ECHO MEAS - RV V1 VTI: 16.3 CM
BH CV ECHO MEAS - RVDD: 4.1 CM
BH CV ECHO MEAS - RVSP: 31.3 MMHG
BH CV ECHO MEAS - SI(MOD-SP4): 24.2 ML/M2
BH CV ECHO MEAS - SV(LVOT): 55.8 ML
BH CV ECHO MEAS - SV(MOD-SP4): 63.9 ML
BH CV ECHO MEAS - TR MAX PG: 23.3 MMHG
BH CV ECHO MEAS - TR MAX VEL: 240.6 CM/SEC
BUN SERPL-MCNC: 47 MG/DL (ref 8–23)
BUN/CREAT SERPL: 33.1 (ref 7–25)
CALCIUM SPEC-SCNC: 8.6 MG/DL (ref 8.6–10.5)
CHLORIDE SERPL-SCNC: 97 MMOL/L (ref 98–107)
CO2 SERPL-SCNC: 29 MMOL/L (ref 22–29)
CREAT SERPL-MCNC: 1.42 MG/DL (ref 0.76–1.27)
DEPRECATED RDW RBC AUTO: 49 FL (ref 37–54)
EGFRCR SERPLBLD CKD-EPI 2021: 55.2 ML/MIN/1.73
EOSINOPHIL # BLD AUTO: 0 10*3/MM3 (ref 0–0.4)
EOSINOPHIL NFR BLD AUTO: 0.1 % (ref 0.3–6.2)
ERYTHROCYTE [DISTWIDTH] IN BLOOD BY AUTOMATED COUNT: 14.8 % (ref 12.3–15.4)
GLUCOSE BLDC GLUCOMTR-MCNC: 105 MG/DL (ref 70–105)
GLUCOSE BLDC GLUCOMTR-MCNC: 116 MG/DL (ref 70–105)
GLUCOSE BLDC GLUCOMTR-MCNC: 132 MG/DL (ref 70–105)
GLUCOSE SERPL-MCNC: 120 MG/DL (ref 65–99)
HCT VFR BLD AUTO: 38.4 % (ref 37.5–51)
HGB BLD-MCNC: 12.4 G/DL (ref 13–17.7)
LYMPHOCYTES # BLD AUTO: 1.4 10*3/MM3 (ref 0.7–3.1)
LYMPHOCYTES NFR BLD AUTO: 20.8 % (ref 19.6–45.3)
MAGNESIUM SERPL-MCNC: 2.3 MG/DL (ref 1.6–2.4)
MCH RBC QN AUTO: 29 PG (ref 26.6–33)
MCHC RBC AUTO-ENTMCNC: 32.3 G/DL (ref 31.5–35.7)
MCV RBC AUTO: 89.7 FL (ref 79–97)
MONOCYTES # BLD AUTO: 1 10*3/MM3 (ref 0.1–0.9)
MONOCYTES NFR BLD AUTO: 13.9 % (ref 5–12)
NEUTROPHILS NFR BLD AUTO: 4.5 10*3/MM3 (ref 1.7–7)
NEUTROPHILS NFR BLD AUTO: 64.9 % (ref 42.7–76)
NRBC BLD AUTO-RTO: 0.1 /100 WBC (ref 0–0.2)
PHOSPHATE SERPL-MCNC: 3.4 MG/DL (ref 2.5–4.5)
PLATELET # BLD AUTO: 226 10*3/MM3 (ref 140–450)
PMV BLD AUTO: 8 FL (ref 6–12)
POTASSIUM SERPL-SCNC: 4.2 MMOL/L (ref 3.5–5.2)
RBC # BLD AUTO: 4.28 10*6/MM3 (ref 4.14–5.8)
SODIUM SERPL-SCNC: 136 MMOL/L (ref 136–145)
WBC NRBC COR # BLD AUTO: 6.9 10*3/MM3 (ref 3.4–10.8)

## 2024-01-30 PROCEDURE — 25010000002 HEPARIN (PORCINE) PER 1000 UNITS: Performed by: INTERNAL MEDICINE

## 2024-01-30 PROCEDURE — 84100 ASSAY OF PHOSPHORUS: CPT | Performed by: INTERNAL MEDICINE

## 2024-01-30 PROCEDURE — 94618 PULMONARY STRESS TESTING: CPT

## 2024-01-30 PROCEDURE — 25010000002 SULFUR HEXAFLUORIDE MICROSPH 60.7-25 MG RECONSTITUTED SUSPENSION: Performed by: INTERNAL MEDICINE

## 2024-01-30 PROCEDURE — 93306 TTE W/DOPPLER COMPLETE: CPT

## 2024-01-30 PROCEDURE — 80048 BASIC METABOLIC PNL TOTAL CA: CPT | Performed by: INTERNAL MEDICINE

## 2024-01-30 PROCEDURE — 93306 TTE W/DOPPLER COMPLETE: CPT | Performed by: INTERNAL MEDICINE

## 2024-01-30 PROCEDURE — 99232 SBSQ HOSP IP/OBS MODERATE 35: CPT | Performed by: INTERNAL MEDICINE

## 2024-01-30 PROCEDURE — 83735 ASSAY OF MAGNESIUM: CPT | Performed by: INTERNAL MEDICINE

## 2024-01-30 PROCEDURE — 36415 COLL VENOUS BLD VENIPUNCTURE: CPT | Performed by: INTERNAL MEDICINE

## 2024-01-30 PROCEDURE — 85025 COMPLETE CBC W/AUTO DIFF WBC: CPT | Performed by: INTERNAL MEDICINE

## 2024-01-30 PROCEDURE — 82948 REAGENT STRIP/BLOOD GLUCOSE: CPT

## 2024-01-30 PROCEDURE — 82948 REAGENT STRIP/BLOOD GLUCOSE: CPT | Performed by: INTERNAL MEDICINE

## 2024-01-30 RX ADMIN — METOPROLOL SUCCINATE 25 MG: 25 TABLET, EXTENDED RELEASE ORAL at 08:48

## 2024-01-30 RX ADMIN — ACETAMINOPHEN 650 MG: 325 TABLET, FILM COATED ORAL at 08:48

## 2024-01-30 RX ADMIN — FUROSEMIDE 80 MG: 40 TABLET ORAL at 08:48

## 2024-01-30 RX ADMIN — OSELTAMIVIR PHOSPHATE 75 MG: 75 CAPSULE ORAL at 08:48

## 2024-01-30 RX ADMIN — FUROSEMIDE 80 MG: 40 TABLET ORAL at 18:10

## 2024-01-30 RX ADMIN — SULFUR HEXAFLUORIDE 5 ML: KIT at 13:00

## 2024-01-30 RX ADMIN — Medication 10 ML: at 08:48

## 2024-01-30 RX ADMIN — HEPARIN SODIUM 5000 UNITS: 5000 INJECTION, SOLUTION INTRAVENOUS; SUBCUTANEOUS at 08:49

## 2024-01-30 RX ADMIN — ASPIRIN 81 MG: 81 TABLET, COATED ORAL at 08:48

## 2024-01-30 NOTE — PROCEDURES
Exercise Oximetry    Patient Name:Sorin Allen   MRN: [id number]   Date: 01/30/24             ROOM AIR BASELINE   SpO2% 87% on room air   Heart Rate    Blood Pressure      EXERCISE ON ROOM AIR SpO2% EXERCISE ON O2 @  2 LPM SpO2%   1 MINUTE  1 MINUTE 89   2 MINUTES  2 MINUTES 92   3 MINUTES  3 MINUTES 92   4 MINUTES  4 MINUTES 93   5 MINUTES  5 MINUTES 93   6 MINUTES  6 MINUTES 93              Distance Walked   Distance Walked   Dyspnea (Drake Scale)   Dyspnea (Drake Scale)   Fatigue (Drake Scale)   Fatigue (Drake Scale)   SpO2% Post Exercise   SpO2% Post Exercise 94% 2L NC   HR Post Exercise   HR Post Exercise   Time to Recovery   Time to Recovery     Comments: Patient sats are 94% on 2L nasal cannula

## 2024-01-30 NOTE — PROGRESS NOTES
Cardiology Progress Note    Patient Identification:  Name: Sorin Allen  Age: 63 y.o.  Sex: male  :  1960  MRN: [id number]                 Follow Up / Chief Complaint: Influenza, Chronic HFrEF  Chief Complaint   Patient presents with    Shortness of Breath     Cough, vomiting, fever, started 2 days ago. Sometimes productive sometimes not.              Interval History: Patient presented with cough, congestion, fever.  + influenza.  Patient having intermittent episodes of hypoxia.        NP NOTE:  Patient seen with Dr. Tirado.  Echocardiogram being done at bedside.  LV EF has improved.  Will restart Entresto. Monitor renal function closely   Continue Lasix at lower dose than home.     Electronically signed by MARIAN Scott, 24, 1:08 PM EST.    Cardiology attending addendum :    I have personally performed a face-to-face diagnostic evaluation, physical exam and reviewed data on this patient.  I have reviewed documentation done by me and nurse practitioner  and corrected as needed.  And agree with the different components of documentation.Greater than 50% of the time spent in the care of this patient was provided by attending consultant/me.     Subjective: Patient seen and examined.  Chart reviewed.  Labs reviewed.; discussed with bedside nurse         Objective:  HS troponin 43--37--36,  pro bnp normal at 574  BUN 26 creatinine 1.50   2024:  BUN 47, creatinine 1.42 glucose 120 hgb 12.4     History of present illness:    Mr. Sorin Allen has PMH of     Hypertensive cardiovascular disease with chronic diastolic CHF  Chronic HFrEF due to systolic dysfunction from dilated cardiomyopathy  [place] ICD 2022  Cardiac cath 2022 EF of 15% no obstructive CAD  Dyslipidemia  Hypertension  Obesity with BMI over 40  ESAU/CPAP  ED  Rheumatic fever as a child  Former smoker quit 3/6/2000  Family history mother has leukemia        Presented to the ED on 2024 with shortness of breath and  cough.  Patient reports low grade fever and body aches.  He has a cough with blood tinged sputum, first thing in the morning then it is sometimes greenish. He denies any chest pain.   On room air, his oxygen sat is around 88-92%.   He is positive for influenza A.      Labs show  HS troponin 43--37--36,  pro bnp normal at 574  BUN 26 creatinine 1.50   D-dimer mildly elevated however CT was negative for PE but does show a 3mm subpleural nodule within the right upper lobe and paraseptal emphysematous changes in the right lung.      Chart review:  Patient reportedly had cardiac cath 8 years ago was told he has cardiomyopathy and CHF.  Underwent repeat cath 2/16/2022 which revealed EF of 15% no obstructive CAD..  Patient had repeat echocardiogram 4/6/2022 which is revealing persistent LV dysfunction with LVEF of 20% with severe pulmonary hypertension PA systolic pressure of 60 mmHg.        Echocardiogram 5/1/2020 revealed normal LV systolic function EF 60% with diastolic dysfunction and RV enlargement and mild aortic stenosis  Echocardiogram 1/21/2022 revealed EF of 26 to 30%  Repeat echo 4/6/2022 is revealing EF of 20% with severe pulmonary hypertension PA systolic pressure of 60 mmHg     Assessment:  :     Hypoxic respiratory failure  Influenza A  Elevated troponin  Renal insufficiency     Chronic HFrEF due to combined hypertensive cardiovascular disease and systolic dysfunction from dilated cardiomyopathy EF of 15-20%  Dilated cardiomyopathy  Hypertension  Diabetes  Obesity with BMI over 40        Recommendations / Plan:         Telemetry to monitor rhythm  Diuretics with p.o. Lasix  Continue beta-blockers with metoprolol  Blood pressure is borderline will hold Entresto for now  Monitor hemodynamics closely  Monitor renal function and electrolytes and urine output closely.  Patient thinks he is feeling better and wants to go home.  Discussed patient's condition with him.  Patient previously refused statins.  And is  still refusing statins.  We will continue medical management with aspirin,  furosemide, metoprolol as tolerated to help with cardiomyopathy, CHF     Follow-up in heart failure clinic.  Follow-up with PMD for diabetes care.  Procedures cardiac cath 2/16/2022 performed and interpreted by me reveals dilated cardiomyopathy EF of 15%  Lexiscan Cardiolite performed 4/25/2023 revealed patchy uptake EF of 35%.    Patient had repeat echo done today which is revealing significant improvement of LV function.  EF appears to be 50%.  Restart Entresto when renal function improves.  Continue medical management and risk factor modification.                 Copied text in this portion of the note has been reviewed and is accurate as of 1/30/2024    Past Medical History:  Past Medical History:   Diagnosis Date    Arthritis     Asthma     Cardiac disease     Carpal tunnel syndrome     Congenital heart disease     CPAP (continuous positive airway pressure) dependence     Diabetes mellitus     Diverticulitis of colon     Erectile dysfunction     Heart disease     Heart valve disease     Hyperlipidemia     Hypertension     Neuromuscular disorder     ESAU (obstructive sleep apnea)     Osteoarthritis      Past Surgical History:  Past Surgical History:   Procedure Laterality Date    CARDIAC CATHETERIZATION N/A 2/16/2022    Procedure: Left Heart Cath;  Surgeon: Raoul Tirado MD;  Location: [facility] CATH INVASIVE LOCATION;  Service: Cardiovascular;  Laterality: N/A;    CARDIAC ELECTROPHYSIOLOGY PROCEDURE N/A 6/7/2022    Procedure: ICD single-chamber, new [place] aware;  Surgeon: Raoul Tirado MD;  Location: [facility] CATH INVASIVE LOCATION;  Service: Cardiovascular;  Laterality: N/A;    HERNIA REPAIR          Social History:   Social History     Tobacco Use    Smoking status: Former     Packs/day: 2.00     Years: 20.00     Additional pack years: 0.00     Total pack years: 40.00     Types: Cigarettes     Quit date: 3/6/2000  "    Years since quittin.9    Smokeless tobacco: Never   Substance Use Topics    Alcohol use: Not Currently     Comment: rare      Family History:  Family History   Problem Relation Age of Onset    Leukemia Mother     Cancer Sister     Cancer Maternal Grandmother           Allergies:  No Known Allergies  Scheduled Meds:  aspirin, 81 mg, Daily  furosemide, 80 mg, BID  heparin (porcine), 5,000 Units, Q12H  insulin glargine, 25 Units, Nightly  insulin lispro, 2-7 Units, 4x Daily AC & at Bedtime  metoprolol succinate XL, 25 mg, Q24H  oseltamivir, 75 mg, Q12H  sacubitril-valsartan, 1 tablet, BID  sodium chloride, 10 mL, Q12H          Review of Systems:   Review of Systems   Respiratory:  Positive for cough and sputum production.          Constitutional:  Temp:  [97.8 °F (36.6 °C)-99 °F (37.2 °C)] 99 °F (37.2 °C)  Heart Rate:  [] 89  Resp:  [14-21] 21  BP: (101-124)/(67-79) 110/72    Physical Exam   /72   Pulse 89   Temp 99 °F (37.2 °C) (Oral)   Resp 21   Ht 185.4 cm (73\")   Wt (!) 147 kg (325 lb)   SpO2 94%   BMI 42.88 kg/m²   General:  Appears in no acute distress  Eyes: Sclera is anicteric,  conjunctiva is clear   HEENT:  No JVD. Thyroid not visibly enlarged. No mucosal pallor or cyanosis  Respiratory: Respirations regular and unlabored at rest.  Bilaterally good breath sounds, with good air entry in all fields. No crackles, rubs or wheezes auscultated  Cardiovascular: S1,S2 Regular rate and rhythm. No murmur, rub or gallop auscultated.  .Chronic trace edema left worse than right   Gastrointestinal: Abdomen nondistended, soft  Musculoskeletal:  No abnormal movements  Extremities: No digital clubbing or cyanosis  Skin: Color pink. Skin warm and dry to touch. No rashes  No xanthoma  Neuro: Alert and awake, no lateralizing deficits appreciated    INTAKE AND OUTPUT:    Intake/Output Summary (Last 24 hours) at 2024 1327  Last data filed at 2024 1114  Gross per 24 hour   Intake 720 ml "   Output 2300 ml   Net -1580 ml       Cardiographics  Telemetry: sinus rhythm     ECG:   ECG 12 Lead Dyspnea   Final Result   HEART RATE= 113  bpm   RR Interval= 532  ms   IA Interval= 191  ms   P Horizontal Axis= -9  deg   P Front Axis= 57  deg   QRSD Interval= 104  ms   QT Interval= 328  ms   QTcB= 450  ms   QRS Axis= 0  deg   T Wave Axis= 67  deg   - BORDERLINE ECG -   Sinus tachycardia   Markedly posterior QRS axis   Low voltage, extremity leads   When compared with ECG of 14-Nov-2021 20:17:10,   Significant axis, voltage or hypertrophy change   Electronically Signed By: Norbert Harris (BENNY) 29-Jan-2024 06:43:51   Date and Time of Study: 2024-01-28 13:08:35      SCANNED - TELEMETRY     Final Result      SCANNED - TELEMETRY     Final Result      SCANNED - TELEMETRY     Final Result      SCANNED - TELEMETRY     Final Result      SCANNED - TELEMETRY     Final Result      SCANNED - TELEMETRY     Final Result        I have personally reviewed EKG    Echocardiogram: Results for orders placed during the hospital encounter of 04/06/22    Adult Transthoracic Echo Complete W/ Cont if Necessary Per Protocol    Interpretation Summary  LVE with severe global left ventricular hypokinesis, estimated LV ejection fraction of 20%.  Moderate to severe right ventricle enlargement  Severe left atrial enlargement.  Aortic valve, mitral valve, tricuspid valve appears structurally normal, mild aortic, mitral and tricuspid regurgitation seen.  Calculated RV systolic pressure of 60 mmHg consistent with severe pulmonary hypertension  No pericardial effusion seen.  Proximal aorta appears normal in size.      Lab Review   I have reviewed the labs  Results from last 7 days   Lab Units 01/29/24  0051 01/28/24  1613 01/28/24  1408   HSTROP T ng/L 36* 37* 43*     Results from last 7 days   Lab Units 01/30/24  0040   MAGNESIUM mg/dL 2.3     Results from last 7 days   Lab Units 01/30/24  0040   SODIUM mmol/L 136   POTASSIUM mmol/L 4.2   BUN mg/dL  "47*   CREATININE mg/dL 1.42*   CALCIUM mg/dL 8.6         Results from last 7 days   Lab Units 01/30/24  0040 01/29/24  0051 01/28/24  1758   WBC 10*3/mm3 6.90 5.70 6.50   HEMOGLOBIN g/dL 12.4* 12.1* 12.0*   HEMATOCRIT % 38.4 38.0 37.3*   PLATELETS 10*3/mm3 226 243 219     Results from last 7 days   Lab Units 01/28/24  1408   INR  1.10   APTT seconds 34.6*       RADIOLOGY:  Imaging Results (Last 24 Hours)       ** No results found for the last 24 hours. **                  )1/30/2024  MD SEGUN Santana/Transcription:   \"Dictated utilizing Dragon dictation\".   "

## 2024-01-30 NOTE — PROGRESS NOTES
"[facility] MEDICINE SERVICE  DAILY PROGRESS NOTE      Patient Name: Sorin Allen  Date of Admission: 1/28/2024  Today's Date: 01/30/24  Length of Stay: 2  Primary Care Physician: Stacy Perez PA    Subjective   Patient is stable today.  He denies any complaints.  No overnight events.      Objective    Temp:  [97.8 °F (36.6 °C)-99 °F (37.2 °C)] 98.6 °F (37 °C)  Heart Rate:  [] 79  Resp:  [14-20] 14  BP: (101-124)/(67-79) 110/72  Physical Exam  General: NAD, AAOx3  HEENT: AT NC, EOMI  Neck: No JVD  CVS: S1/S2 present, RRR, no M/R/G  Lungs: CTA B/L  Abdomen: soft, NT, ND, BS+  Ext: no edema  Skin: no rashes, bruises or discolorations  Neuro: no focal deficits  Psych: Not agitated       Results Review:  I have reviewed the labs, radiology results, and diagnostic studies.    Laboratory Data:   Results from last 7 days   Lab Units 01/30/24  0040 01/29/24  0051 01/28/24  1758   WBC 10*3/mm3 6.90 5.70 6.50   HEMOGLOBIN g/dL 12.4* 12.1* 12.0*   HEMATOCRIT % 38.4 38.0 37.3*   PLATELETS 10*3/mm3 226 243 219        Results from last 7 days   Lab Units 01/30/24  0040 01/29/24  0051 01/28/24  1758 01/28/24  1408   SODIUM mmol/L 136 137 136 138   POTASSIUM mmol/L 4.2 4.4 4.2 4.1   CHLORIDE mmol/L 97* 97* 96* 97*   CO2 mmol/L 29.0 26.0 27.0 26.0   BUN mg/dL 47* 34* 28* 26*   CREATININE mg/dL 1.42* 1.53* 1.47* 1.50*   CALCIUM mg/dL 8.6 9.0 8.9 8.9   BILIRUBIN mg/dL  --   --   --  0.4   ALK PHOS U/L  --   --   --  70   ALT (SGPT) U/L  --   --   --  10   AST (SGOT) U/L  --   --   --  18   GLUCOSE mg/dL 120* 205* 136* 133*       Culture Data:   Blood Culture   Date Value Ref Range Status   01/28/2024 No growth at 24 hours  Preliminary   01/28/2024 No growth at 24 hours  Preliminary     No results found for: \"URINECX\"  No results found for: \"RESPCX\"  No results found for: \"WOUNDCX\"  No results found for: \"STOOLCX\"  No components found for: \"BODYFLD\"    Radiology Data:   Imaging Results (Last 24 Hours)       ** No " results found for the last 24 hours. **            I have reviewed the patient's current medications.     Assessment/Plan     Acute respiratory failure with hypoxia  - secondary to influenza  - treat underlying cause  - wean off oxygen as tolerated    2) influenza  - droplet precautions  - tamiflu  - supportive care    3) CKD  - appears to be near baseline  - monitor with labs    4) HTN  - home meds    5) DM  - ISS, lantus, accuchecks, diet    6) CHF  - does not appear to be in exacerbation  - cardiology consulted, appreciate recs  - metoprolol, entresto, lasix  - strict I/O and daily weights    7) ESAU  - CPAP    8) GI/DVT ppx  - none/heparin            Electronically signed by Francisco Tello MD, 01/30/24, 11:49 EST.

## 2024-01-30 NOTE — PLAN OF CARE
Goal Outcome Evaluation:               Pt continues with 4l02 and blood sugar checks. No other issues this shift.  Will continue to monitor.

## 2024-01-31 NOTE — DISCHARGE SUMMARY
[facility] Medicine Services  Discharge Summary      Date of Admission: 1/28/2024  Date of Discharge:  1/30/2024  Primary Care Physician: Stacy Perez PA    Presenting Problem/History of Present Illness:  Flu [J11.1]  Influenza [J11.1]  COPD with acute exacerbation [J44.1]  Acute respiratory failure with hypoxia [J96.01]       Final Discharge Diagnoses:  Active Hospital Problems    Diagnosis     **Flu        Consults:   Consults       Date and Time Order Name Status Description    1/29/2024  9:15 AM Inpatient Cardiology Consult Completed             Procedures Performed:            01/30 1100 Walking Oximetry    Pertinent Test Results:   Lab Results (most recent)       Procedure Component Value Units Date/Time    POC Glucose Once [[id number]]  (Abnormal) Collected: 01/30/24 1653    Specimen: Blood Updated: 01/30/24 1655     Glucose 116 mg/dL      Comment: Serial Number: [id number]:  [id number]       Blood Culture - Blood, Arm, Left [[id number]]  (Normal) Collected: 01/28/24 1408    Specimen: Blood from Arm, Left Updated: 01/30/24 1415     Blood Culture No growth at 2 days    Blood Culture - Blood, Arm, Right [[id number]]  (Normal) Collected: 01/28/24 1408    Specimen: Blood from Arm, Right Updated: 01/30/24 1415     Blood Culture No growth at 2 days    POC Glucose 4x Daily Before Meals & at Bedtime [[id number]]  (Abnormal) Collected: 01/30/24 1115    Specimen: Blood Updated: 01/30/24 1117     Glucose 132 mg/dL      Comment: Serial Number: [id number]:  [id number]       CBC & Differential [[id number]]  (Abnormal) Collected: 01/30/24 0040    Specimen: Blood Updated: 01/30/24 0131    Narrative:      The following orders were created for panel order CBC & Differential.  Procedure                               Abnormality         Status                     ---------                               -----------         ------                     CBC Auto Differential[[id number]]        Abnormal             Final result                 Please view results for these tests on the individual orders.    CBC Auto Differential [[id number]]  (Abnormal) Collected: 01/30/24 0040    Specimen: Blood Updated: 01/30/24 0131     WBC 6.90 10*3/mm3      RBC 4.28 10*6/mm3      Hemoglobin 12.4 g/dL      Hematocrit 38.4 %      MCV 89.7 fL      MCH 29.0 pg      MCHC 32.3 g/dL      RDW 14.8 %      RDW-SD 49.0 fl      MPV 8.0 fL      Platelets 226 10*3/mm3      Neutrophil % 64.9 %      Lymphocyte % 20.8 %      Monocyte % 13.9 %      Eosinophil % 0.1 %      Basophil % 0.3 %      Neutrophils, Absolute 4.50 10*3/mm3      Lymphocytes, Absolute 1.40 10*3/mm3      Monocytes, Absolute 1.00 10*3/mm3      Eosinophils, Absolute 0.00 10*3/mm3      Basophils, Absolute 0.00 10*3/mm3      nRBC 0.1 /100 WBC     Basic Metabolic Panel [[id number]]  (Abnormal) Collected: 01/30/24 0040    Specimen: Blood Updated: 01/30/24 0115     Glucose 120 mg/dL      BUN 47 mg/dL      Creatinine 1.42 mg/dL      Sodium 136 mmol/L      Potassium 4.2 mmol/L      Chloride 97 mmol/L      CO2 29.0 mmol/L      Calcium 8.6 mg/dL      BUN/Creatinine Ratio 33.1     Anion Gap 10.0 mmol/L      eGFR 55.2 mL/min/1.73     Narrative:      GFR Normal >60  Chronic Kidney Disease <60  Kidney Failure <15      Magnesium [[id number]]  (Normal) Collected: 01/30/24 0040    Specimen: Blood Updated: 01/30/24 0115     Magnesium 2.3 mg/dL     Phosphorus [[id number]]  (Normal) Collected: 01/30/24 0040    Specimen: Blood Updated: 01/30/24 0115     Phosphorus 3.4 mg/dL     Phosphorus [[id number]]  (Normal) Collected: 01/29/24 0051    Specimen: Blood Updated: 01/29/24 0238     Phosphorus 4.1 mg/dL     Basic Metabolic Panel [[id number]]  (Abnormal) Collected: 01/29/24 0051    Specimen: Blood Updated: 01/29/24 0237     Glucose 205 mg/dL      BUN 34 mg/dL      Creatinine 1.53 mg/dL      Sodium 137 mmol/L      Potassium 4.4 mmol/L      Comment: Slight hemolysis detected by analyzer. Result may be falsely  elevated.        Chloride 97 mmol/L      CO2 26.0 mmol/L      Calcium 9.0 mg/dL      BUN/Creatinine Ratio 22.2     Anion Gap 14.0 mmol/L      eGFR 50.5 mL/min/1.73     Narrative:      GFR Normal >60  Chronic Kidney Disease <60  Kidney Failure <15      Magnesium [[id number]]  (Normal) Collected: 01/29/24 0051    Specimen: Blood Updated: 01/29/24 0237     Magnesium 2.4 mg/dL     High Sensitivity Troponin T [[id number]]  (Abnormal) Collected: 01/29/24 0051    Specimen: Blood Updated: 01/29/24 0236     HS Troponin T 36 ng/L     Narrative:      High Sensitive Troponin T Reference Range:  <14.0 ng/L- Negative Female for AMI  <22.0 ng/L- Negative Male for AMI  >=14 - Abnormal Female indicating possible myocardial injury.  >=22 - Abnormal Male indicating possible myocardial injury.   Clinicians would have to utilize clinical acumen, EKG, Troponin, and serial changes to determine if it is an Acute Myocardial Infarction or myocardial injury due to an underlying chronic condition.         CBC & Differential [[id number]]  (Abnormal) Collected: 01/29/24 0051    Specimen: Blood Updated: 01/29/24 0134    Narrative:      The following orders were created for panel order CBC & Differential.  Procedure                               Abnormality         Status                     ---------                               -----------         ------                     CBC Auto Differential[[id number]]        Abnormal            Final result                 Please view results for these tests on the individual orders.    CBC Auto Differential [[id number]]  (Abnormal) Collected: 01/29/24 0051    Specimen: Blood Updated: 01/29/24 0134     WBC 5.70 10*3/mm3      RBC 4.14 10*6/mm3      Hemoglobin 12.1 g/dL      Hematocrit 38.0 %      MCV 91.6 fL      MCH 29.3 pg      MCHC 32.0 g/dL      RDW 15.1 %      RDW-SD 48.1 fl      MPV 8.2 fL      Platelets 243 10*3/mm3      Neutrophil % 85.1 %      Lymphocyte % 11.9 %      Monocyte % 2.8 %      Eosinophil %  0.0 %      Basophil % 0.2 %      Neutrophils, Absolute 4.90 10*3/mm3      Lymphocytes, Absolute 0.70 10*3/mm3      Monocytes, Absolute 0.20 10*3/mm3      Eosinophils, Absolute 0.00 10*3/mm3      Basophils, Absolute 0.00 10*3/mm3      nRBC 0.1 /100 WBC     High Sensitivity Troponin T 2Hr [[id number]]  (Abnormal) Collected: 01/28/24 1613    Specimen: Blood Updated: 01/28/24 1643     HS Troponin T 37 ng/L      Troponin T Delta -6 ng/L     Narrative:      High Sensitive Troponin T Reference Range:  <14.0 ng/L- Negative Female for AMI  <22.0 ng/L- Negative Male for AMI  >=14 - Abnormal Female indicating possible myocardial injury.  >=22 - Abnormal Male indicating possible myocardial injury.   Clinicians would have to utilize clinical acumen, EKG, Troponin, and serial changes to determine if it is an Acute Myocardial Infarction or myocardial injury due to an underlying chronic condition.         Extra Tubes [[id number]] Collected: 01/28/24 1408    Specimen: Blood, Venous Line Updated: 01/28/24 1515    Narrative:      The following orders were created for panel order Extra Tubes.  Procedure                               Abnormality         Status                     ---------                               -----------         ------                     Gold Top - SST[[id number]]                                   Final result                 Please view results for these tests on the individual orders.    Gold Top - SST [[id number]] Collected: 01/28/24 1408    Specimen: Blood Updated: 01/28/24 1515     Extra Tube Hold for add-ons.     Comment: Auto resulted.       COVID-19, FLU A/B, RSV PCR 1 HR TAT - Swab, Nasopharynx [[id number]]  (Abnormal) Collected: 01/28/24 1409    Specimen: Swab from Nasopharynx Updated: 01/28/24 1452     COVID19 Not Detected     Influenza A PCR Detected     Influenza B PCR Not Detected     RSV, PCR Not Detected    Narrative:      Fact sheet for providers: https://www.fda.gov/media/958103/download    Fact  sheet for patients: https://www.fda.gov/media/585656/download    Test performed by PCR.    Comprehensive Metabolic Panel [[id number]]  (Abnormal) Collected: 01/28/24 1408    Specimen: Blood Updated: 01/28/24 1443     Glucose 133 mg/dL      BUN 26 mg/dL      Creatinine 1.50 mg/dL      Sodium 138 mmol/L      Potassium 4.1 mmol/L      Chloride 97 mmol/L      CO2 26.0 mmol/L      Calcium 8.9 mg/dL      Total Protein 8.3 g/dL      Albumin 4.1 g/dL      ALT (SGPT) 10 U/L      AST (SGOT) 18 U/L      Alkaline Phosphatase 70 U/L      Total Bilirubin 0.4 mg/dL      Globulin 4.2 gm/dL      A/G Ratio 1.0 g/dL      BUN/Creatinine Ratio 17.3     Anion Gap 15.0 mmol/L      eGFR 51.7 mL/min/1.73     Narrative:      GFR Normal >60  Chronic Kidney Disease <60  Kidney Failure <15      BNP [[id number]]  (Normal) Collected: 01/28/24 1408    Specimen: Blood Updated: 01/28/24 1443     proBNP 574.8 pg/mL     Narrative:      This assay is used as an aid in the diagnosis of individuals suspected of having heart failure. It can be used as an aid in the diagnosis of acute decompensated heart failure (ADHF) in patients presenting with signs and symptoms of ADHF to the emergency department (ED). In addition, NT-proBNP of <300 pg/mL indicates ADHF is not likely.    Age Range Result Interpretation  NT-proBNP Concentration (pg/mL:      <50             Positive            >450                   Gray                 300-450                    Negative             <300    50-75           Positive            >900                  Gray                300-900                  Negative            <300      >75             Positive            >1800                  Gray                300-1800                  Negative            <300    High Sensitivity Troponin T [[id number]]  (Abnormal) Collected: 01/28/24 1408    Specimen: Blood Updated: 01/28/24 1443     HS Troponin T 43 ng/L     Narrative:      High Sensitive Troponin T Reference Range:  <14.0 ng/L-  "Negative Female for AMI  <22.0 ng/L- Negative Male for AMI  >=14 - Abnormal Female indicating possible myocardial injury.  >=22 - Abnormal Male indicating possible myocardial injury.   Clinicians would have to utilize clinical acumen, EKG, Troponin, and serial changes to determine if it is an Acute Myocardial Infarction or myocardial injury due to an underlying chronic condition.         Protime-INR [[id number]]  (Abnormal) Collected: 01/28/24 1408    Specimen: Blood Updated: 01/28/24 1437     Protime 11.9 Seconds      INR 1.10    aPTT [[id number]]  (Abnormal) Collected: 01/28/24 1408    Specimen: Blood Updated: 01/28/24 1437     PTT 34.6 seconds     D-dimer, Quantitative [[id number]]  (Abnormal) Collected: 01/28/24 1408    Specimen: Blood Updated: 01/28/24 1437     D-Dimer, Quantitative 0.89 mg/L (FEU)     Narrative:      According to the assay 's published package insert, a normal (<0.50 mg/L (FEU)) D-dimer result in conjunction with a non-high clinical probability assessment, excludes deep vein thrombosis (DVT) and pulmonary embolism (PE) with high sensitivity.    D-dimer values increase with age and this can make VTE exclusion of an older population difficult. To address this, the [employer], based on best available evidence and recent guidelines, recommends that clinicians use age-adjusted D-dimer thresholds in patients greater than 50 years of age with: a) a low probability of PE who do not meet all Pulmonary Embolism Rule Out Criteria, or b) in those with intermediate probability of PE.   The formula for an age-adjusted D-dimer cut-off is \"age/100\".  For example, a 60 year old patient would have an age-adjusted cut-off of 0.60 mg/L (FEU) and an 80 year old 0.80 mg/L (FEU).    POC Lactate [[id number]]  (Normal) Collected: 01/28/24 1413    Specimen: Blood Updated: 01/28/24 1415     Lactate 1.1 mmol/L      Comment: Serial Number: [id number]:  [id number]             Imaging " Results (Most Recent)       Procedure Component Value Units Date/Time    CT Angiogram Chest Pulmonary Embolism [[id number]] Collected: 01/28/24 1639     Updated: 01/28/24 1649    Narrative:      CT ANGIOGRAM CHEST PULMONARY EMBOLISM    Date of Exam: 1/28/2024 4:36 PM EST    Indication: Pulmonary embolism (PE) suspected, positive D-dimer.    Comparison: None available.    Technique: Axial CT images were obtained of the chest after the uneventful intravenous administration of iodinated contrast utilizing pulmonary embolism protocol.  Sagittal and coronal reconstructions were performed.  Automated exposure control and   iterative reconstruction methods were used.    FINDINGS:    Thoracic inlet: Mild prominence/nodularity of the right thyroid lobe.    Pulmonary arteries: No filling defects are identified within the pulmonary arteries to suggest acute pulmonary embolism.    Great vessels: Mild atherosclerotic plaque within the aortic arch. Otherwise, the thoracic aorta and proximal arch vessels appear unremarkable.    Mediastinum/Keiry: No pathologically enlarged mediastinal lymph nodes are seen. The esophagus is unremarkable.    Lung parenchyma: Paraseptal emphysematous changes within the right lung. No acute infiltrate is identified. There is mild left basilar atelectasis. There is a 3 mm subpleural nodule within the right upper lobe, too small to warrant imaging follow-up by   Fleischner Society guidelines (series 4, image 57). Right lower lobe granuloma is seen.    Trachea and airways: The trachea and central airways appear unremarkable.    Pleural space: No significant pleural effusion or pneumothorax.    Heart and pericardium: Cardiac pacemaker leads are noted. Otherwise, the heart and pericardium appear unremarkable.    Chest wall: No acute or suspicious osseous or soft tissue lesion is identified. Left chest wall pacemaker is present.    Upper abdomen: No acute abnormality is identified within the visualized upper  abdomen.      Impression:      1.No acute abnormality is identified within the thorax. Specifically, there is no evidence of acute pulmonary embolism.  2.There is a 3 mm subpleural nodule within the right upper lobe, too small to warrant imaging follow-up by Fleischner Society guidelines (series 4, image 57). Optional follow-up chest CT in 1 year may be considered.   3.Paraseptal emphysematous changes within the right lung. Please correlate with patient's smoking history/risk factors to determine whether the patient meets criteria for routine lung cancer screening with low dose chest CT.      Electronically Signed: Erasto Moore MD    1/28/2024 4:47 PM EST    Workstation ID: [id number]    XR Chest 1 View [[id number]] Collected: 01/28/24 1351     Updated: 01/28/24 1354    Narrative:      XR CHEST 1 VW    Date of Exam: 1/28/2024 1:48 PM EST    Indication: soa    Comparison: 5/15/2023    Findings:  Left chest wall cardiac device in unchanged position. Unchanged enlarged cardiomediastinal silhouette. No new focal airspace opacity. No pleural effusion or pneumothorax. No acute osseous abnormality.      Impression:      Impression:  Unchanged enlarged cardiac silhouette. No new focal airspace consolidation.      Electronically Signed: Chucho Wheeler MD    1/28/2024 1:51 PM EST    Workstation ID: [id number]            Chief Complaint on Day of Discharge: SOB, flu    Hospital Course:  A 63 y.o. 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  Patient is not using any home oxygen but he used CPAP at nighttime.  Patient is currently on 4 L nasal cannula.  CT angio chest without pulmonary embolism.  Troponin leak is present.  Patient is started on Tamiflu.     Admitted for  "hypoxic respiratory failure secondary to flu infection.    The patient was treated with tamiflu and supportive care for his respiratory failure.  He had a walking oximetry test on 1/30 showing that he still needed 2L of O2 to maintain his oxygen saturation.  Rather than wait longer and attempt to wean himself off oxygen, the patient chose to sign out AMA.    Condition on Discharge:  stable    Physical Exam on Discharge:  /75 (BP Location: Left arm, Patient Position: Lying)   Pulse 77   Temp 98.2 °F (36.8 °C) (Oral)   Resp 21   Ht 185.4 cm (73\")   Wt (!) 147 kg (325 lb)   SpO2 94%   BMI 42.88 kg/m²   Physical Exam  General: NAD, AAOx3  HEENT: AT NC, EOMI  Neck: No JVD  CVS: S1/S2 present, RRR, no M/R/G  Lungs: CTA B/L  Abdomen: soft, NT, ND, BS+  Ext: no edema  Skin: no rashes, bruises or discolorations  Neuro: no focal deficits  Psych: Not agitated     Discharge Disposition:  Left Against Medical Advice    Discharge Medications:     Discharge Medications        Continue These Medications        Instructions Start Date   Accu-Chek Sigrid Plus w/Device kit   Does not apply, Use to test blood sugar twice a day       Accu-Chek Guide w/Device kit   1 kit, Does not apply, 3 Times Daily, Use glucometer to check blood sugars 3 times a day. Dx. E11.42      Accu-Chek Softclix Lancets lancets   Other, Use as instructed       Blood Glucose Calibration liquid   In Vitro      FreeStyle Torie 14 Day [place] device   1 Units, Does not apply, 3 Times Daily Before Meals, Use continuous glucose monitor to monitor glucose regularly.      FreeStyle Torie 2 Sensor misc   1 each, Apply externally, Every 14 Days, Dispense 2 per every 28 days. DX E11.42      Insulin Pen Needle 32G X 4 MM misc   Use pen needle to deliver insulin subcutaneously daily.  Dx. E11.42             ASK your doctor about these medications        Instructions Start Date   acetaminophen 500 MG tablet  Commonly known as: TYLENOL   1,000 mg, Oral, Every 8 " Hours PRN      aspirin 81 MG EC tablet   81 mg, Oral, Daily      Entresto 24-26 MG tablet  Generic drug: sacubitril-valsartan   1 tablet, Oral, 2 Times Daily      Farxiga 10 MG tablet  Generic drug: dapagliflozin Propanediol   TAKE 1 TABLET BY MOUTH EVERY DAY      furosemide 80 MG tablet  Commonly known as: LASIX   TAKE 1 TABLET BY MOUTH 4 TIMES A DAY.      gabapentin 100 MG capsule  Commonly known as: NEURONTIN   TAKE 1 CAPSULE BY MOUTH THREE TIMES A DAY      glucose blood test strip  Commonly known as: Accu-Chek Sigrid Plus   USE TESTING STRIP TO CHECK BLOOD SUGARS 3 TIMES A DAY. DX. E11.42      Lantus SoloStar 100 UNIT/ML injection pen  Generic drug: Insulin Glargine   45 Units, Subcutaneous, Daily      metoprolol succinate XL 25 MG 24 hr tablet  Commonly known as: TOPROL-XL   25 mg, Oral, Daily      spironolactone 25 MG tablet  Commonly known as: ALDACTONE   TAKE 1 TABLET BY MOUTH EVERY DAY               Discharge Diet:  diabetic    Activity at Discharge:  as tolerated    Discharge Care Plan/Instructions: f/u PCP    Time: less than 30 minutes

## 2024-01-31 NOTE — CASE MANAGEMENT/SOCIAL WORK
Case Management Discharge Note      Final Note: Left AMA.    Selected Continued Care - Discharged on 1/30/2024 Admission date: 1/28/2024 - Discharge disposition: Left Against Medical Advice       Transportation Services  Private: Car    Final Discharge Disposition Code: 07 - left AMA

## 2024-01-31 NOTE — CASE MANAGEMENT/SOCIAL WORK
Continued Stay Note  DREW Bautista     Patient Name: Sorin Allen  MRN: [id number]  Today's Date: 1/30/2024    Admit Date: 1/28/2024     Discharge Plan       Row Name 01/30/24 7208       Plan    Plan Comments Recieved call from Nurse that patient wants to leave at 10PM tonight once ride gets here and will leave AMA if needed and patient needs Oxygen. explained that Will need Home oxygen order and Physician documentation supporting Need for Oxygen. explained without this i cannot arrange home oxygen and if MD does decide to DC please make sure that information is in place and I can work on setting that up. walking Ox test results are in place but MD did not write for DC today or place home oxygen order at this time.                      Nahed Miller RN

## 2024-01-31 NOTE — NURSING NOTE
Patient requesting to leave AMA. Patient A&O x4 and able to make his own decisions. Patient educated on risks of leaving AMA. MD notified of patient leaving AMA.

## 2024-02-02 LAB
BACTERIA SPEC AEROBE CULT: NORMAL
BACTERIA SPEC AEROBE CULT: NORMAL

## 2024-02-05 PROBLEM — I51.89 DEPRESSED RIGHT VENTRICULAR SYSTOLIC FUNCTION: Chronic | Status: ACTIVE | Noted: 2024-02-05

## 2024-02-05 PROBLEM — J44.9 COPD SUGGESTED BY INITIAL EVALUATION: Chronic | Status: ACTIVE | Noted: 2024-02-05

## 2024-02-05 PROBLEM — I50.23 HYPERTENSIVE HEART DISEASE WITH ACUTE ON CHRONIC SYSTOLIC CONGESTIVE HEART FAILURE: Status: RESOLVED | Noted: 2020-03-06 | Resolved: 2024-02-05

## 2024-02-05 PROBLEM — J96.11 CHRONIC HYPOXEMIC RESPIRATORY FAILURE: Status: ACTIVE | Noted: 2024-02-05

## 2024-02-05 PROBLEM — I50.32 DIASTOLIC HEART FAILURE, STAGE C, CHRONIC: Status: ACTIVE | Noted: 2024-02-05

## 2024-02-05 PROBLEM — R06.09 DYSPNEA ON EXERTION: Chronic | Status: ACTIVE | Noted: 2024-02-05

## 2024-02-05 PROBLEM — I50.22 CHRONIC HFREF (HEART FAILURE WITH REDUCED EJECTION FRACTION): Chronic | Status: RESOLVED | Noted: 2022-02-08 | Resolved: 2024-02-05

## 2024-02-05 PROBLEM — N18.30 CKD (CHRONIC KIDNEY DISEASE) STAGE 3, GFR 30-59 ML/MIN: Chronic | Status: ACTIVE | Noted: 2024-02-05

## 2024-02-05 PROBLEM — E88.810 METABOLIC SYNDROME X: Chronic | Status: ACTIVE | Noted: 2024-02-05

## 2024-02-05 PROBLEM — I42.0 DILATED CARDIOMYOPATHY: Chronic | Status: RESOLVED | Noted: 2020-03-06 | Resolved: 2024-02-05

## 2024-02-05 PROBLEM — I11.0 HYPERTENSIVE HEART DISEASE WITH ACUTE ON CHRONIC SYSTOLIC CONGESTIVE HEART FAILURE: Status: RESOLVED | Noted: 2020-03-06 | Resolved: 2024-02-05

## 2024-02-05 PROBLEM — Z91.89 AT RISK FOR FLUID VOLUME OVERLOAD: Status: ACTIVE | Noted: 2024-02-05

## 2024-02-05 NOTE — PROGRESS NOTES
"Cardiology Heart Failure Clinic Note  [facility]\" [facility]    Patient ID: Sorin Allen  is a 63 y.o. male.    Encounter Date:02/06/2024       Assessment:    Diagnoses and all orders for this visit:    1. core Pulmonale (Primary)  Overview:  Depressed right ventricular systolic function       2. Diastolic heart failure, stage c, chronic  Overview:  A. Diastolic HF w/ recovered and now preserved LV systolic function   b.  EF 51% with grade 2 diastolic dysfunction (HFrEF)         I. H/o reduced systolic LV dysfunction               1. EF 35% (Feb23 TTE)  C.  s/p [facility] ICD      3. Stage 3a chronic kidney disease    4. At risk for fluid volume overload  Overview:  A. Cor Pulmonale  B. Chronic diastolic HF ( HFpEF) w/ h/o (HFrEF)           I. EF 51% GR 2 dd ( tte 1/29/24)  C. Stage 3a CKD      5. Chronic hypoxemic respiratory failure  Overview:  A. Presumed COPD         I. > 40 pack year smoking Hx  B. Cor Pulmonale         I. RV systolic dysfunction  C. ESAU      6. COPD suggested by initial evaluation  Overview:  A. >  40-pack-year smoking history      7. Dyspnea on exertion  -     Basic Metabolic Panel; Future  -     Magnesium; Future  -     proBNP; Future  -     Basic Metabolic Panel; Standing  -     Basic Metabolic Panel  -     Magnesium; Standing  -     Magnesium  -     proBNP; Standing  -     proBNP    8. Presence of automatic cardioverter/defibrillator (AICD)    9. Type 2 diabetes mellitus with diabetic polyneuropathy, with long-term current use of insulin  Overview:  HbA1c at goal, <7%.  Monitoring regularly.  Monitoring renal function.  Continue Entresto and Farxiga for renal protection.  Decreased Basaglar to 45 units daily and Farxiga 10 mg daily.  Continue gabapentin PRN for pain.  Eye exam due. Report requested.      10. Metabolic syndrome X  Overview:  A.  Benign essential hypertension  B.  Mixed dyslipidemia         I. Refuses statin  C.  Obesity          I. ESAU  D.  T2DM         I. HbA1c at " goal, <7%.       11. Primary osteoarthritis involving multiple joints  Overview:  Advised < 3g acetaminophen/day hours PRN.  Encouraged regular exercise and stretching.  Discussed risk of worsen heart disease and kidney disease with NSAID use.      12. Statin declined    Other orders  -     ECG 12 Lead Dyspnea; Standing  -     ECG 12 Lead Dyspnea        Plan/discussion    Volume overload    Heart Failure Core Measures addressed    Type of Overload multifactorial  Chronic diastolic heart failure (HFpEF)  H/o systolic heart failure  Cor pulmonale  Stage IIIa CKD    Most recent EF & Diastolic dysfunction if available: (TTE 1/2924)   51% Gr 2 DD    [place] Heart association Class & Stage : IIC    HF Meds Pre Visit    Beta Blocker: Toprol-XL 25 mg daily  ARNI/ACE/ARB: Entresto 24 to 26 mg twice daily  SGLT 2 inhibitors: Farxiga 10 mg daily  Diuretics: Lasix 80 mg 4 times daily  Does tend to miss some doses daily  Aldosterone Antagonist: Current Aldactone 25 mg daily  Digitalis: N/A  Vasodilators & Nitrates: None currently indicated    HF Meds Post Visit    Beta Blocker: Toprol-XL 25 mg daily  ARNI/ACE/ARB: Entresto 24 to 26 mg BID  SGLT 2 inhibitors: Farxiga 10 mg daily  Diuretics upon release from clinic: Continuing current diuresis  80 mg 4 times daily Lasix  As he has been taking as no evidence of volume overload on today's exam  Furoscix: Currently not a candidate as he has not been volume overloaded  Aldosterone Antagonist: Spironolactone 25 mg daily  Digitalis: N/A  Vasodilators & Nitrates: Currently not indicated          Cardiac medicines reviewed with risk, benefits, and necessity of each discussed.       ____________________________________________________________    Discussion    Already on heart failure core measures including beta-blocker, ARNI, SGLT2, diuretic,  Aldactone   Patient does inform me that he does not consistently take his diuretic 4 times daily which seems evident given his renal function  "today which seems somewhat normalized with a creatinine of 1.2  He does have a about a 5-year history of chronic unexplained left greater than right lower extremity edema he says he knows when his right leg started to swell up he is \"beginning to get into trouble\"  Made no real med changes today  On physical exam he is not significantly volume overloaded  He defers the idea of seeing dietitian as his [place] [facility] culture is so different from the [place] he has trouble understanding them when he is previously spoken with dietitians  I am going to see back in approximately 1 month.    Patient was strongly encouraged to should he needed to come in if he becomes volume overloaded or has significant lower extremity edema  Would add typical heart failure nursing interventions including:        A. Fluid restriction at less than 2000 cc daily                                      I. Seemingly taking excessive fluid presently       B. Daily weights        C.  1 g low-sodium diet      D. Consideration for cardiac rehabilitation    I did the following activities:preparing for the visit, with Sorin Allen  including reviewing tests, once pt arrived in clinic I also performed a medically appropriate examination and/or evaluation , I personally spent considerable time counseling and educating the patient/family/caregiver, ordering medications, tests, or procedures, referring and communicating with other health care professionals , and documenting information in the medical record. I estimate including preparation 45 minutes             Subjective:   No chief complaint on file.      HPI:  63-year-old obese -American male immigrant from [place] who is followed  by Dr. Tirado's with a PMH of recovered systolic HF(HFrEF) w/ ongoing diastolic heart failure (HFrEF) by definition presenting more as (HFpEF)   hIS (TTE 1/29/24) LVEF of 51% with grade 2 DD. PLWae note previously in Feb 23 EF was only 35% s/p [facility] " "ICD implant.  Cor pulmonale with moderate RV systolic dysfunction.  Chronic unexplained 5-year history of left greater than right lower extremity edema around the ankle region, presumed COPD with a 40-pack-year smoking history, ceased, chronic hypoxemic respiratory failure.  Metabolic syndrome with all 4 components including hypertension, dyslipidemia (refuses statin) morbid obesity, obstructive sleep apnea, T2DM with polyneuropathy. Comes in to [facility] HF clinic 6 February 24 for his initial visit.  Currently not volume overloaded admits to some degree of medication noncompliance and actual medication) he is unaware of the majority of the medications he takes and/or how often he takes them.  Not had any recent significant lower extremity edema however he does as noted continues to have some chronic trace left lower extremity ankle region edema which has been longstanding.  Prior to admission he does admit to drinking several fruit drinks and \"a lot of water\"    ROS:  Constitutional: Mild chronic weakness,  fatigue, No fever, rigors, chills   Eyes: No recent vision changes, eye pain   ENT/oropharynx: No recent difficulty swallowing, sore throat, epistaxis, changes in hearing   Cardiovascular: No recent chest pain, chest tightness, palpitations except as noted in HPI, paroxysmal nocturnal dyspnea, orthopnea, diaphoresis, dizziness & no pre or ana maría syncopal episodes   Respiratory: No significant episodic at rest shortness of breath, + dyspnea on exertion at somewhere close to 1 block, no significant productive cough, does experience and early a.m. and periodic throughout the day what was described as a \"smoker's cough\", no active wheezing and no hemoptysis   Gastrointestinal: No abdominal pain, nausea, vomiting, diarrhea, bloody stools   Genitourinary: No hematuria, dysuria other than increased frequency   Neurological: No obvious apparent significant headache, tremors, numbness,  or hemiparesis  "   Musculoskeletal: No change in his typical cramps, myalgias,  joint pain, no significant joint swelling   Integument: No recent rash, chronic left greater than right lower extremity edema      Patient Active Problem List   Diagnosis    Morbid (severe) obesity due to excess calories    Type 2 diabetes mellitus with diabetic polyneuropathy, with long-term current use of insulin    Primary osteoarthritis involving multiple joints    Primary osteoarthritis of left knee    Statin declined    Mixed diabetic hyperlipidemia associated with type 2 diabetes mellitus    Chest pain    ESAU on CPAP    Essential hypertension    Presence of automatic cardioverter/defibrillator (AICD)    Flu    Dyspnea on exertion    Metabolic syndrome X    core Pulmonale    Diastolic heart failure, stage c, chronic    CKD (chronic kidney disease) stage 3a, GFR 55 ml/min    COPD suggested by initial evaluation    Chronic hypoxemic respiratory failure    At risk for fluid volume overload       Past Medical History:   Diagnosis Date    Arthritis     Asthma     Cardiac disease     Carpal tunnel syndrome     CKD (chronic kidney disease) stage 3a, GFR 55 ml/min 2/5/2024    Congenital heart disease     CPAP (continuous positive airway pressure) dependence     Diabetes mellitus     Diverticulitis of colon     Erectile dysfunction     Heart disease     Heart valve disease     Hyperlipidemia     Hypertension     Neuromuscular disorder     ESAU (obstructive sleep apnea)     Osteoarthritis        Past Surgical History:   Procedure Laterality Date    CARDIAC CATHETERIZATION N/A 2/16/2022    Procedure: Left Heart Cath;  Surgeon: Raoul Tirado MD;  Location:  [facility];  Service: Cardiovascular;  Laterality: N/A;    CARDIAC ELECTROPHYSIOLOGY PROCEDURE N/A 6/7/2022    Procedure: ICD single-chamber, new [facility] aware;  Surgeon: Raoul Tirado MD;  Location:  [facility];  Service: Cardiovascular;   Laterality: N/A;    HERNIA REPAIR         Social History     Socioeconomic History    Marital status:    Tobacco Use    Smoking status: Former     Packs/day: 2.00     Years: 20.00     Additional pack years: 0.00     Total pack years: 40.00     Types: Cigarettes     Quit date: 3/6/2000     Years since quittin.9    Smokeless tobacco: Never   Vaping Use    Vaping Use: Never used   Substance and Sexual Activity    Alcohol use: Not Currently     Comment: rare    Drug use: Never    Sexual activity: Defer       No Known Allergies      Current Outpatient Medications:     ACCU-CHEK SOFTCLIX LANCETS lancets, by Other route. Use as instructed, Disp: , Rfl:     acetaminophen (TYLENOL) 500 MG tablet, Take 2 tablets by mouth Every 8 (Eight) Hours As Needed for Moderate Pain  (arthritic pain)., Disp: 180 tablet, Rfl: 5    aspirin (aspirin) 81 MG EC tablet, Take 1 tablet by mouth Daily., Disp: 90 tablet, Rfl: 3    Blood Glucose Calibration liquid, by In Vitro route., Disp: , Rfl:     Blood Glucose Monitoring Suppl (ACCU-CHEK ALEKS PLUS) w/Device kit, Use to test blood sugar twice a day, Disp: , Rfl:     Blood Glucose Monitoring Suppl (Accu-Chek Guide) w/Device kit, 1 kit 3 (Three) Times a Day. Use glucometer to check blood sugars 3 times a day. Dx. E11.42, Disp: 1 kit, Rfl: 0    Continuous Blood Gluc  (FreeStyle Torie 14 Day [place]) device, 1 Units 3 (Three) Times a Day Before Meals. Use continuous glucose monitor to monitor glucose regularly., Disp: 1 each, Rfl: 0    Continuous Blood Gluc Sensor (FreeStyle Torie 2 Sensor) [facility] [place], Apply 1 each topically Every 14 (Fourteen) Days. Dispense 2 per every 28 days. DX E11.42, Disp: 2 each, Rfl: 5    Farxiga 10 MG tablet, TAKE 1 TABLET BY MOUTH EVERY DAY, Disp: 90 tablet, Rfl: 3    furosemide (LASIX) 80 MG tablet, TAKE 1 TABLET BY MOUTH 4 TIMES A DAY., Disp: 360 tablet, Rfl: 3    gabapentin (NEURONTIN) 100 MG capsule, TAKE 1 CAPSULE BY MOUTH THREE TIMES A DAY (Patient  taking differently: Take 1 capsule by mouth 3 (Three) Times a Day As Needed.), Disp: 90 capsule, Rfl: 2    glucose blood (Accu-Chek Sigrid Plus) test strip, USE TESTING STRIP TO CHECK BLOOD SUGARS 3 TIMES A DAY. DX. E11.42, Disp: 100 each, Rfl: 11    Insulin Glargine (Lantus SoloStar) 100 UNIT/ML injection pen, Inject 45 Units under the skin into the appropriate area as directed Daily., Disp: 45 mL, Rfl: 3    Insulin Pen Needle 32G X 4 MM misc, Use pen needle to deliver insulin subcutaneously daily.  Dx. E11.42, Disp: 30 each, Rfl: 5    metoprolol succinate XL (TOPROL-XL) 25 MG 24 hr tablet, Take 1 tablet by mouth Daily., Disp: 90 tablet, Rfl: 3    sacubitril-valsartan (Entresto) 24-26 MG tablet, Take 1 tablet by mouth 2 (Two) Times a Day., Disp: 180 tablet, Rfl: 2    spironolactone (ALDACTONE) 25 MG tablet, TAKE 1 TABLET BY MOUTH EVERY DAY, Disp: 90 tablet, Rfl: 3    Immunization History   Administered Date(s) Administered    COVID-19 (PFIZER) Purple Cap Monovalent 03/11/2021, 04/01/2021, 12/02/2021    Pneumococcal Polysaccharide (PPSV23) 01/04/2022       Most recent EKG as reviewed:  Procedures     Most recent echo as reviewed:  Results for orders placed during the hospital encounter of 01/28/24    Adult Transthoracic Echo Complete W/ Cont if Necessary Per Protocol    Interpretation Summary    Left ventricular systolic function is low normal. Calculated left ventricular EF = 51%    The left ventricular cavity is mildly dilated.    Left ventricular wall thickness is consistent with mild concentric hypertrophy.    Left ventricular diastolic function is consistent with (grade II w/high LAP) pseudonormalization.    Moderately reduced right ventricular systolic function noted.    The right ventricular cavity is dilated.    The left atrial cavity is moderate to severely dilated.    Left atrial volume is severely increased.    The right atrial cavity is moderate to severely  dilated.    There is mild calcification of the  aortic valve.    Estimated right ventricular systolic pressure from tricuspid regurgitation is normal (<35 mmHg).    Conclusion      Lumason contrast was given to better evaluate LV function  LVE with mild concentric LVH and lower limits of normal contractility.  Estimated LV ejection fraction of 50%.  Severe RV enlargement seen.  Right ventricular systolic dysfunction seen.  Pacer lead seen in RV.  Severe left atrial enlargement by volume.  Moderate to severe right atrial enlargement.  Pulmonic valve is not well visualized.  Aortic valve appears calcified and sclerosed.  No significant stenosis seen.  Mitral valve, tricuspid valve appears structurally normal, mild tricuspid regurgitation seen.  Calculated RV systolic pressure of 34 mmHg  No pericardial effusion seen.  Proximal aorta appears normal in size.      Most recent stress test results:  Results for orders placed during the hospital encounter of 23    Stress Test With Myocardial Perfusion One Day    Interpretation Summary  LEXISCAN CARDIOLITE REPORT    DATE OF PROCEDURE:  23    INDICATION FOR PROCEDURE:  Chest pain, chronic HFrEF, dilated cardiomyopathy, hypertension, ICD, diabetes, dyslipidemia, obesity with BMI of 40    PROCEDURE PERFORMED: Lexiscan Cardiolite    PROCEDURE COMMENTS:    After informed consent was obtained.  Patient's resting heart rate was 74 bpm, resting blood pressure was 142/84, resting EKG revealed sinus rhythm.  Patient was given 0.4 mg of regadenosine for stress testing.  There was no significant change in heart rate, blood pressure, symptoms with regadenoson injection.  Patient tolerated procedure well.  Complications were none.    NUCLEAR IMAGIN.  There was patchy uptake seen in inferior and inferolateral wall probably due to cardiomyopathy, no ischemia seen.  2.  Gated images reveal dilated LV with severe LV dysfunction, LVEF of 35%.    CONCLUSION:  1.  Lexiscan Cardiolite with patchy uptake in inferior and  inferolateral wall due to cardiomyopathy, negative for ischemia.  2.  Severe LV dysfunction, LVEF of 35%.    RECOMMENDATIONS:    Clinical correlation recommended.      Roaul Tirado MD  23  18:04 EDT      Most recent cardiac catheterization results:  Results for orders placed during the hospital encounter of 22    Cardiac Catheterization/Vascular Study    Narrative  Table formatting from the original result was not included.  2022      Heart Cath Report    NAME:              Sorin Allen  :                1960  AGE/SEX:        61 y.o. male  MRN:                [id number]        Procedures Performed    1. Left heart catheterization  2. Selective coronary angiography  3. Left ventriculography  4. Mynx closure device    :   Raoul Tirado MD    Vascular Access Site: Femoral    Indication for procedure: Acute HF R EF, hypertension, diabetes, obesity with BMI over 40      Procedure Note    After discussing the risks, benefits, and alternatives of the procedure, informed consent was obtained.  Timeout was done before the procedure.  Moderate conscious sedation was given utilizing IV Versed and fentanyl administered by RN with continuous EKG oximetry and hemodynamic monitoring supervised by me throughout the entire case, conscious sedation time was 30 minutes.  I was present with the patient for the duration of moderate sedation and supervised staff who had no other duties and monitored the patient for the entire procedure patient had [place] 2-3 sedation scale. the vascular access site was prepped and draped in the usual sterile fashion.  2% lidocaine was used for local anesthesia. Appropriate landmarks were assessed.  A 6 Moldovan short sheath was inserted in the artery using the modified Seldinger technique.    Selective coronary angiography was performed with JL4 and JR4 diagnostic catheters. Left ventriculogram  was performed with an angled pigtail  catheter.  All exchanges were performed over the wire.  No specimens were removed.  There were no apparent acute or early complications.  The patient tolerated the procedure well and was transferred to the recovery area in stable condition.    Closure device: Mynx device was deployed successfully after right iliofemoral angiogram was performed. Good hemostasis was achieved.    Complications:  None  Blood Loss: minimal    Hemodynamics    Pressures    Ao:    107/64 mmHg  LV:    107/9 mmHg  End-diastolic pressure:  9  mmHg  No significant aortic valvular gradient on pullback    Coronary Angiography    Left Main :  The left main   is without disease    Left Anterior Descending : Is without disease    Ramus intermedius : Is without disease    Left Circumflex : Without disease    Right Coronary Artery :  The right coronary artery   is dominant vessel without disease    Dominance:  []  Left  []  Right  []  Co-Dominant      Left Ventriculography:    Estimated Ejection Fraction: 15 %  Wall motion abnormalities: LVE with severe global left ventricular hypokinesis  Mitral Regurgitation:  None    Impression:    1. Dilated cardiomyopathy with no obstructive CAD    Recommendations:    1. Medical management and risk factor modification.  2. If patient continues to have CHF on optimal medical management consider ICD.        I sincerely appreciate the opportunity to participate in your patient's care. Please feel free to contact me anytime if I can be of assistance in this or any other way.      Pertinent History    Past Medical History:  Diagnosis Date   Arthritis   Cardiac disease   Carpal tunnel syndrome   CPAP (continuous positive airway pressure) dependence   Diabetes mellitus (HCC)   Diverticulitis of colon   Erectile dysfunction   Heart disease   Hyperlipidemia   Hypertension   Neuromuscular disorder (HCC)   ESAU (obstructive sleep apnea)   Osteoarthritis    Past Surgical History:  Procedure Laterality Date   HERNIA  REPAIR    Prior to Admission medications  Medication Sig Start Date End Date Taking? Authorizing Provider  aspirin (aspirin) 81 MG EC tablet Take 1 tablet by mouth Daily. 2/7/22  Yes Raoul Tirado MD  furosemide (LASIX) 80 MG tablet Take 1 tablet by mouth 3 (Three) Times a Day. 12/27/21  Yes Natalie Steward MD  gabapentin (NEURONTIN) 100 MG capsule TAKE 1 CAPSULE BY MOUTH THREE TIMES A DAY  Patient taking differently: Take 100 mg by mouth 3 (Three) Times a Day As Needed. 6/27/21  Yes Erick Cadet MD  Insulin Glargine (Lantus SoloStar) 100 UNIT/ML injection pen Inject 60 Units under the skin into the appropriate area as directed Daily. 2/10/22  Yes Natalie Steward MD  meloxicam (MOBIC) 15 MG tablet TAKE 1 TABLET BY MOUTH DAILY AS NEEDED FOR MODERATE PAIN . TAKE WITH FOOD! 2/10/22  Yes Natalie Steward MD  metoprolol succinate XL (TOPROL-XL) 25 MG 24 hr tablet Take 1 tablet by mouth Daily. 2/7/22  Yes Raoul Tirado MD  potassium chloride ER (K-TAB) 20 MEQ tablet controlled-release ER tablet Take 2 tablets by mouth Daily. 12/3/21  Yes Natalie Steward MD  sacubitril-valsartan (ENTRESTO) 24-26 MG tablet Take 1 tablet by mouth 2 (Two) Times a Day. 2/7/22  Yes Raoul Tirado MD  ACCU-CHEK SOFTCLIX LANCETS lancets by Other route. Use as instructed    Gaurav Monroy MD  acetaminophen (TYLENOL) 500 MG tablet Take 2 tablets by mouth Every 8 (Eight) Hours As Needed for Moderate Pain  (arthritic pain). 3/6/20   Natalie Steward MD  Blood Glucose Calibration liquid by In Vitro route.    Gaurav Monroy MD  Blood Glucose Monitoring Suppl (ACCU-CHEK ALEKS PLUS) w/Device kit Use to test blood sugar twice a day    Gaurav Monroy MD  Blood Glucose Monitoring Suppl (Accu-Chek Guide) w/Device kit 1 kit 3 (Three) Times a Day. Use glucometer to check blood sugars 3 times a day. Dx. E11.42 2/26/21   Natalie Steward MD  Continuous Blood Gluc   (FreeStyle Torie 14 Day [place]) device 1 Units 3 (Three) Times a Day Before Meals. Use continuous glucose monitor to monitor glucose regularly. 9/29/21   Natalie Steward MD  Continuous Blood Gluc Sensor (FreeStyle Torie 14 Day Sensor) misc 1 Units Every 14 (Fourteen) Days. Use continuous glucose monitor to monitor glucose regularly. 2/10/22   Natalie Steward MD  glucose blood test strip Use the test glucose up to 4 times a day. Dx: E11.42 11/18/21   Natalie Steward MD  Insulin Pen Needle 32G X 4 MM misc Use pen needle to deliver insulin subcutaneously daily.  Dx. E11.42 2/11/21   Natalie Steward MD      Pre-Procedure Notes  H&P Performed  [x]  Yes []  No       []  N/A    Indications:  []  ACS <= 24 HRS  []  ACS >24 HRS  []  New Onset Angina <= 2 mos  []  Worsening Angina  []  Resuscitated Cardiac Arrest  []  Angina on Exertion:  []  Suspected CAD  []  Valvular Disease  []  Pericardial Disease  []  Cardiac Arrythmia  []  Cardiomyopathy  []  LV Dysfunction  []  Syncope  []  Post Cardiac Transplant  []  Eval. For Exercise Clearance  []  Other  []  Pre-Operative Evaluation  If Pre-Op Eval:  Evaluation for Surgery Type:  []  Cardiac Surgery   []  Non-Cardiac Surgery  Functional Capacity:  []  <4 METS  []  >=4 METS w/o symptoms  []  >= 4 METS with symptoms  []  Unknown  Surgical Risk:  []  Low  []  Intermediate  []  High Risk: Vascular  []  High Risk Non-Vascular    Risks, Benefits, & Complications Discussed:  [x]  Yes  []  No  []  N/A    Questions Answered:  [x]  Yes  []  No  []  N/A    Consent Obtained:  [x]  Yes  []  No  []  N/A    CHF: [x]  Yes  []  No  If Yes:  Newly Diagnosed?  [x]  Yes  []  No  If Yes:  HF Type:  []  Diastolic  []  Systolic  []  Unknown      Raoul Tirado MD  2/16/2022  17:13 EST  Electronically signed by Raoul Tirado MD, 02/16/22, 5:13 PM EST.        Imaging:    Results for orders placed during the hospital encounter of 01/28/24    XR Chest 1  View    Narrative  XR CHEST 1 VW    Date of Exam: 1/28/2024 1:48 PM EST    Indication: soa    Comparison: 5/15/2023    Findings:  Left chest wall cardiac device in unchanged position. Unchanged enlarged cardiomediastinal silhouette. No new focal airspace opacity. No pleural effusion or pneumothorax. No acute osseous abnormality.    Impression  Impression:  Unchanged enlarged cardiac silhouette. No new focal airspace consolidation.      Electronically Signed: Chucho Wheeler MD  1/28/2024 1:51 PM EST  Workstation ID: [id number]       Results for orders placed during the hospital encounter of 01/28/24    CT Angiogram Chest Pulmonary Embolism    Narrative  CT ANGIOGRAM CHEST PULMONARY EMBOLISM    Date of Exam: 1/28/2024 4:36 PM EST    Indication: Pulmonary embolism (PE) suspected, positive D-dimer.    Comparison: None available.    Technique: Axial CT images were obtained of the chest after the uneventful intravenous administration of iodinated contrast utilizing pulmonary embolism protocol.  Sagittal and coronal reconstructions were performed.  Automated exposure control and  iterative reconstruction methods were used.    FINDINGS:    Thoracic inlet: Mild prominence/nodularity of the right thyroid lobe.    Pulmonary arteries: No filling defects are identified within the pulmonary arteries to suggest acute pulmonary embolism.    Great vessels: Mild atherosclerotic plaque within the aortic arch. Otherwise, the thoracic aorta and proximal arch vessels appear unremarkable.    Mediastinum/Keiry: No pathologically enlarged mediastinal lymph nodes are seen. The esophagus is unremarkable.    Lung parenchyma: Paraseptal emphysematous changes within the right lung. No acute infiltrate is identified. There is mild left basilar atelectasis. There is a 3 mm subpleural nodule within the right upper lobe, too small to warrant imaging follow-up by  Fleischner Society guidelines (series 4, image 57). Right lower lobe granuloma is  seen.    Trachea and airways: The trachea and central airways appear unremarkable.    Pleural space: No significant pleural effusion or pneumothorax.    Heart and pericardium: Cardiac pacemaker leads are noted. Otherwise, the heart and pericardium appear unremarkable.    Chest wall: No acute or suspicious osseous or soft tissue lesion is identified. Left chest wall pacemaker is present.    Upper abdomen: No acute abnormality is identified within the visualized upper abdomen.    Impression  1.No acute abnormality is identified within the thorax. Specifically, there is no evidence of acute pulmonary embolism.  2.There is a 3 mm subpleural nodule within the right upper lobe, too small to warrant imaging follow-up by Fleischner Society guidelines (series 4, image 57). Optional follow-up chest CT in 1 year may be considered.  3.Paraseptal emphysematous changes within the right lung. Please correlate with patient's smoking history/risk factors to determine whether the patient meets criteria for routine lung cancer screening with low dose chest CT.      Electronically Signed: Erasto Moore MD  1/28/2024 4:47 PM EST  Workstation ID: [id number]      Results for orders placed during the hospital encounter of 01/28/24    CT Angiogram Chest Pulmonary Embolism    Narrative  CT ANGIOGRAM CHEST PULMONARY EMBOLISM    Date of Exam: 1/28/2024 4:36 PM EST    Indication: Pulmonary embolism (PE) suspected, positive D-dimer.    Comparison: None available.    Technique: Axial CT images were obtained of the chest after the uneventful intravenous administration of iodinated contrast utilizing pulmonary embolism protocol.  Sagittal and coronal reconstructions were performed.  Automated exposure control and  iterative reconstruction methods were used.    FINDINGS:    Thoracic inlet: Mild prominence/nodularity of the right thyroid lobe.    Pulmonary arteries: No filling defects are identified within the pulmonary arteries to suggest acute  pulmonary embolism.    Great vessels: Mild atherosclerotic plaque within the aortic arch. Otherwise, the thoracic aorta and proximal arch vessels appear unremarkable.    Mediastinum/Keiry: No pathologically enlarged mediastinal lymph nodes are seen. The esophagus is unremarkable.    Lung parenchyma: Paraseptal emphysematous changes within the right lung. No acute infiltrate is identified. There is mild left basilar atelectasis. There is a 3 mm subpleural nodule within the right upper lobe, too small to warrant imaging follow-up by  Fleischner Society guidelines (series 4, image 57). Right lower lobe granuloma is seen.    Trachea and airways: The trachea and central airways appear unremarkable.    Pleural space: No significant pleural effusion or pneumothorax.    Heart and pericardium: Cardiac pacemaker leads are noted. Otherwise, the heart and pericardium appear unremarkable.    Chest wall: No acute or suspicious osseous or soft tissue lesion is identified. Left chest wall pacemaker is present.    Upper abdomen: No acute abnormality is identified within the visualized upper abdomen.    Impression  1.No acute abnormality is identified within the thorax. Specifically, there is no evidence of acute pulmonary embolism.  2.There is a 3 mm subpleural nodule within the right upper lobe, too small to warrant imaging follow-up by Fleischner Society guidelines (series 4, image 57). Optional follow-up chest CT in 1 year may be considered.  3.Paraseptal emphysematous changes within the right lung. Please correlate with patient's smoking history/risk factors to determine whether the patient meets criteria for routine lung cancer screening with low dose chest CT.      Electronically Signed: Erasto Moore MD  1/28/2024 4:47 PM EST  Workstation ID: [id number]      Historical data copied forward from previous encounters in EMR including the history, exam, and assessment/plan has been reviewed and is unchanged except as I have noted  and otherwise indicated.      Objective:         /82 (BP Location: Right arm)   Pulse 77   Resp 20   Wt (!) 146 kg (321 lb 6.4 oz)   SpO2 96%   BMI 42.40 kg/m²     Physical Examination    General:  Well-developed, significantly overly well-nourished, cooperative, no distress, appears stated age if not slightly older    Neuro:  A&O x3. No significantly obvious focal neuro deficet    Psych:  Pleasant - affect    HENT:  Normocephalic, atraumatic, moist mucous membranes , Normal ear placement,Throat not injected neck is enlarged   Eyes:  PERRLA, Conjunctivae not injected, EOM's intact, conjunctiva does not appear significantly injected   Neck:  Supple, Mildly thickened, no lymph adenopathy nor thyromegaly no JVD or bruit    Lungs:    Symmetrical rise & fall of chest with baseline respiratory pattern. To auscultation lungs are Clear bilaterally, faint late phase expiratory wheezes, no rhonchi or rales are noted    Chest wall:  No significant tenderness when palpated. PMI very difficult to palpate given his body habitus   Heart:  Regular rate and rhythm, S1 and S2 normal, no S3 I could not hear and S4, Gr I-II/VI systolic ejection murmur best heard at the apex , no obvious rub, click or gallop.    Abdomen:  non-distended, obese non-tender, bowel sounds noted in the 4 quadrants of the abdomen, significant adipose tissue identified    Extremities:  Equal color motion temperature and sensitivity, Rapid capillary refill noted within the nailbeds of fingers and toes bilaterally trace left no right lower extremity edema.    Pulses:  2+ and symmetric all extremities, rapid capillary refill    Skin:  No obvious rashes, significant lesions identified, warm dry and of normal turgor      In-Office Procedure(s):  ECG 12 Lead Dyspnea    (Results Pending)        Lab Review:   Hospital Outpatient Visit on 02/06/2024   Component Date Value    Glucose 02/06/2024 116 (H)     BUN 02/06/2024 26 (H)     Creatinine 02/06/2024 1.19   "   Sodium 02/06/2024 141     Potassium 02/06/2024 4.2     Chloride 02/06/2024 97 (L)     CO2 02/06/2024 32.0 (H)     Calcium 02/06/2024 9.4     BUN/Creatinine Ratio 02/06/2024 21.8     Anion Gap 02/06/2024 12.0     eGFR 02/06/2024 68.2     Magnesium 02/06/2024 2.3     proBNP 02/06/2024 346.7    No results displayed because visit has over 200 results.      Lab on 08/23/2023   Component Date Value    Microalbumin/Creatinine * 08/23/2023 29.5     Creatinine, Urine 08/23/2023 44.0     Microalbumin, Urine 08/23/2023 1.3     Total Cholesterol 08/23/2023 219 (H)     Triglycerides 08/23/2023 125     HDL Cholesterol 08/23/2023 34 (L)     LDL Cholesterol  08/23/2023 162 (H)     VLDL Cholesterol 08/23/2023 23     LDL/HDL Ratio 08/23/2023 4.71     Glucose 08/23/2023 129 (H)     BUN 08/23/2023 26 (H)     Creatinine 08/23/2023 1.29 (H)     Sodium 08/23/2023 140     Potassium 08/23/2023 4.4     Chloride 08/23/2023 98     CO2 08/23/2023 31.0 (H)     Calcium 08/23/2023 10.0     Total Protein 08/23/2023 7.9     Albumin 08/23/2023 4.0     ALT (SGPT) 08/23/2023 12     AST (SGOT) 08/23/2023 14     Alkaline Phosphatase 08/23/2023 68     Total Bilirubin 08/23/2023 0.4     Globulin 08/23/2023 3.9     A/G Ratio 08/23/2023 1.0     BUN/Creatinine Ratio 08/23/2023 20.2     Anion Gap 08/23/2023 11.0     eGFR 08/23/2023 62.3     Hemoglobin A1C 08/23/2023 7.10 (H)      Recent labs reviewed and interpreted for clinical significance and application    I went over each of his labs with the patient while he was still here in the clinic            It is a pleasure to be involved in this patient's cardiovascular care relating to their heart failure.  Please feel free to call me with any questions or concerns.    Loi \"Jose\" Ana FONSECA, [facility]  Heart failure program clinical provider    MARIAN Huggins   02/05/24  .  "

## 2024-02-06 ENCOUNTER — HOSPITAL ENCOUNTER (OUTPATIENT)
Facility: HOSPITAL | Age: 64
Discharge: HOME | End: 2024-02-06
Payer: MEDICARE

## 2024-02-06 ENCOUNTER — DISEASE STATE MANAGEMENT VISIT (OUTPATIENT)
Facility: HOSPITAL | Age: 64
End: 2024-02-06
Payer: MEDICARE

## 2024-02-06 ENCOUNTER — HOSPITAL ENCOUNTER (OUTPATIENT)
Dept: CARDIOLOGY | Facility: HOSPITAL | Age: 64
Discharge: HOME | End: 2024-02-06
Payer: MEDICARE

## 2024-02-06 VITALS
WEIGHT: 315 LBS | OXYGEN SATURATION: 96 % | DIASTOLIC BLOOD PRESSURE: 82 MMHG | HEART RATE: 77 BPM | SYSTOLIC BLOOD PRESSURE: 123 MMHG | BODY MASS INDEX: 41.69 KG/M2 | RESPIRATION RATE: 20 BRPM

## 2024-02-06 DIAGNOSIS — E88.810 METABOLIC SYNDROME X: Chronic | ICD-10-CM

## 2024-02-06 DIAGNOSIS — R60.0 EDEMA OF LOWER EXTREMITY: Chronic | ICD-10-CM

## 2024-02-06 DIAGNOSIS — J96.11 CHRONIC HYPOXEMIC RESPIRATORY FAILURE: ICD-10-CM

## 2024-02-06 DIAGNOSIS — Z91.89 AT RISK FOR FLUID VOLUME OVERLOAD: ICD-10-CM

## 2024-02-06 DIAGNOSIS — I50.32 DIASTOLIC HEART FAILURE, STAGE C, CHRONIC: ICD-10-CM

## 2024-02-06 DIAGNOSIS — M15.9 PRIMARY OSTEOARTHRITIS INVOLVING MULTIPLE JOINTS: ICD-10-CM

## 2024-02-06 DIAGNOSIS — N18.31 STAGE 3A CHRONIC KIDNEY DISEASE: Chronic | ICD-10-CM

## 2024-02-06 DIAGNOSIS — E11.42 TYPE 2 DIABETES MELLITUS WITH DIABETIC POLYNEUROPATHY, WITH LONG-TERM CURRENT USE OF INSULIN: Chronic | ICD-10-CM

## 2024-02-06 DIAGNOSIS — Z53.20 STATIN DECLINED: ICD-10-CM

## 2024-02-06 DIAGNOSIS — J44.9 COPD SUGGESTED BY INITIAL EVALUATION: Chronic | ICD-10-CM

## 2024-02-06 DIAGNOSIS — Z95.810 PRESENCE OF AUTOMATIC CARDIOVERTER/DEFIBRILLATOR (AICD): ICD-10-CM

## 2024-02-06 DIAGNOSIS — I51.89 DEPRESSED RIGHT VENTRICULAR SYSTOLIC FUNCTION: Primary | Chronic | ICD-10-CM

## 2024-02-06 DIAGNOSIS — Z79.4 TYPE 2 DIABETES MELLITUS WITH DIABETIC POLYNEUROPATHY, WITH LONG-TERM CURRENT USE OF INSULIN: Chronic | ICD-10-CM

## 2024-02-06 DIAGNOSIS — R06.09 DYSPNEA ON EXERTION: Chronic | ICD-10-CM

## 2024-02-06 LAB
ANION GAP SERPL CALCULATED.3IONS-SCNC: 12 MMOL/L (ref 5–15)
BUN SERPL-MCNC: 26 MG/DL (ref 8–23)
BUN/CREAT SERPL: 21.8 (ref 7–25)
CALCIUM SPEC-SCNC: 9.4 MG/DL (ref 8.6–10.5)
CHLORIDE SERPL-SCNC: 97 MMOL/L (ref 98–107)
CO2 SERPL-SCNC: 32 MMOL/L (ref 22–29)
CREAT SERPL-MCNC: 1.19 MG/DL (ref 0.76–1.27)
EGFRCR SERPLBLD CKD-EPI 2021: 68.2 ML/MIN/1.73
GLUCOSE SERPL-MCNC: 116 MG/DL (ref 65–99)
MAGNESIUM SERPL-MCNC: 2.3 MG/DL (ref 1.6–2.4)
NT-PROBNP SERPL-MCNC: 346.7 PG/ML (ref 0–900)
POTASSIUM SERPL-SCNC: 4.2 MMOL/L (ref 3.5–5.2)
SODIUM SERPL-SCNC: 141 MMOL/L (ref 136–145)

## 2024-02-06 PROCEDURE — 83880 ASSAY OF NATRIURETIC PEPTIDE: CPT | Performed by: NURSE PRACTITIONER

## 2024-02-06 PROCEDURE — G0463 HOSPITAL OUTPT CLINIC VISIT: HCPCS

## 2024-02-06 PROCEDURE — 93005 ELECTROCARDIOGRAM TRACING: CPT | Performed by: NURSE PRACTITIONER

## 2024-02-06 PROCEDURE — 80048 BASIC METABOLIC PNL TOTAL CA: CPT | Performed by: NURSE PRACTITIONER

## 2024-02-06 PROCEDURE — 83735 ASSAY OF MAGNESIUM: CPT | Performed by: NURSE PRACTITIONER

## 2024-02-06 NOTE — PROGRESS NOTES
HEART FAILURE CLINIC - PHARMACY SERVICE     HFimpEF with EF 51% (Last Echo: 01/30/24).    NYHA Class II C     Previous EF of 20% on 04/07/22    The patient's last EKG was reviewed from 02/06/24 and shows a QTcB of 463 ms.      Cardiologist: Celestino  Nephrologist: N/A  PCP: NICK Perez     -CHF-specific BB:      Pre Visit Dose: Metoprolol succinate XL 25 mg PO daily    Post Visit Dose: Metoprolol succinate XL 25 mg PO daily (HR: 77 bpm, BP: 123/82 mmHg)     - Target Dose: Metoprolol succinate  mg PO daily.     - Goal HR of 50s to 60s.     - Patient should be seen every 10 to 14 days for a pulse check with plans for up-titration until target heart rate is achieved.        -ACE/ARB/ARNI:     Pre Visit Dose: Sacubitril/valsartan 24/26 mg BID    Post Visit Dose: Sacubitril/valsartan 24/26 mg BID (BP: 123/82 mmHg, SCr: 1.19 mg/dL, K: 4.2 mmol/L)    - Target Dose: Sacubitril/valsartan 97/103 mg PO BID    - Patient should have a follow-up appointment every 2 to 4 weeks for a hemodynamic check with possible up-titration to target dose.         -SGLT2 inhibitor therapy:    Pre Visit Dose: Dapagliflozin 10 mg PO daily    Post Visit Dose: Dapagliflozin 10 mg PO daily    - Target Dose: Dapagliflozin 10 mg PO daily : CrCl > 20 mL/min which has shown benefit in patients with HF       -MRA:     Pre Visit Dose: Spironolactone 25 mg daily    Post Visit Dose: Spironolactone 25 mg daily    - Target Dose: Spironolactone 25-50 mg PO daily    - K is < 5 mEq/L and SCr is </= 2.5 mg/dL in male and eGFR > 30 mL/min/1.73m3        -DIURETIC:     Pre Visit Dose: Furosemide 80 mg PO QID (Patient reports frequent missed doses due to forgetting.)    Post Visit Dose: Furosemide 80 mg PO QID      -MAGNESIUM:     Mag is > 2 mg/dL    -If Magnesium 1.6-1.9 mg/dL, Initiate Magnesium 400 mg PO daily  -If Magnesium is less than 1.6 mg/dL, Initiate Magnesium 400 mg PO BID    -ANTICOAGULATION:     None       -OTHER CV MEDS:     Pre Visit Dose: aspirin 81  mg PO daily    Post Visit Dose: aspirin 81 mg PO daily      -Clinic Administered Medications:     None      Vaccines:     Not assessed at this visit       Patient Assistance:      None            PLAN:  Initiation/Discontinuation/Dose Adjustment: N/A  Education provided: N/A  Coordination of Care: Attempted to contact patient's pharmacy to confirm home medication list, was unable to reach them. Patient was able to pull up their medication list on the [facility] and confirm them this way.  Refills: N/A          Alisa Abdi PharmD  2/6/2024  11:11 EST

## 2024-02-06 NOTE — LETTER
"February 6, 2024     Raoul Tirado MD  [address] [facility] [place] [address]    Patient: Sorin Allen   YOB: 1960   Date of Visit: 2/6/2024       Dear Raoul Tirado MD:    Thank you for referring Sorin Allen to me for evaluation. Below are the relevant portions of my assessment and plan of care.    63-year-old obese -American male immigrant from [place] who is followed  by Dr. Tirado's with a PMH of recovered systolic HF(HFrEF) w/ ongoing diastolic heart failure (HFrEF) by definition presenting more as (HFpEF)   hIS (TTE 1/29/24) LVEF of 51% with grade 2 DD. PLWae note previously in Feb 23 EF was only 35% s/p [employer] ICD implant.  Cor pulmonale with moderate RV systolic dysfunction.  Chronic unexplained 5-year history of left greater than right lower extremity edema around the ankle region, presumed COPD with a 40-pack-year smoking history, ceased, chronic hypoxemic respiratory failure.  Metabolic syndrome with all 4 components including hypertension, dyslipidemia (refuses statin) morbid obesity, obstructive sleep apnea, T2DM with polyneuropathy. Comes in to [facility] HF clinic 6 February 24 for his initial visit.  Currently not volume overloaded admits to some degree of medication noncompliance and actual medication) he is unaware of the majority of the medications he takes and/or how often he takes them.  Not had any recent significant lower extremity edema however he does as noted continues to have some chronic trace left lower extremity ankle region edema which has been longstanding.  Prior to admission he does admit to drinking several fruit drinks and \"a lot of water\"      Encounter Diagnosis and Orders:  Diagnoses and all orders for this visit:    1. core Pulmonale (Primary)    2. Diastolic heart failure, stage c, chronic    3. Stage 3a chronic kidney disease    4. At risk for fluid volume overload    5. Chronic hypoxemic respiratory " "failure    6. COPD suggested by initial evaluation    7. Dyspnea on exertion  -     Basic Metabolic Panel; Future  -     Magnesium; Future  -     proBNP; Future  -     Basic Metabolic Panel; Standing  -     Basic Metabolic Panel  -     Magnesium; Standing  -     Magnesium  -     proBNP; Standing  -     proBNP    8. Presence of automatic cardioverter/defibrillator (AICD)    9. Edema of Chronic left > right unexplained lower extremity    10. Metabolic syndrome X    11. Type 2 diabetes mellitus with diabetic polyneuropathy, with long-term current use of insulin    12. Primary osteoarthritis involving multiple joints    13. Statin declined    Other orders  -     ECG 12 Lead Dyspnea; Standing  -     ECG 12 Lead Dyspnea        Beta Blocker: Toprol-XL 25 mg daily  ARNI/ACE/ARB: Entresto 24 to 26 mg BID  SGLT 2 inhibitors: Farxiga 10 mg daily  Diuretics upon release from clinic: Continuing current diuresis  80 mg 4 times daily Lasix  As he has been taking as no evidence of volume overload on today's exam  Furoscix: Currently not a candidate as he has not been volume overloaded  Aldosterone Antagonist: Spironolactone 25 mg daily  Digitalis: N/A  Vasodilators & Nitrates: Currently not indicated    Discussion    Already on heart failure core measures including beta-blocker, ARNI, SGLT2, diuretic,  Aldactone   Patient does inform me that he does not consistently take his diuretic 4 times daily which seems evident given his renal function today which seems somewhat normalized with a creatinine of 1.2  He does have a about a 5-year history of chronic unexplained left greater than right lower extremity edema he says he knows when his right leg started to swell up he is \"beginning to get into trouble\"  Made no real med changes today  On physical exam he is not significantly volume overloaded  He defers the idea of seeing dietitian as his [place] culture is so different from the [place] he has trouble understanding them when " he is previously spoken with dietitians  I am going to see back in approximately 1 month.    Patient was strongly encouraged to should he needed to come in if he becomes volume overloaded or has significant lower extremity edema  Would add typical heart failure nursing interventions including:        A. Fluid restriction at less than 2000 cc daily                                      I. Seemingly taking excessive fluid presently       B. Daily weights        C.  1 g low-sodium diet      D. Consideration for cardiac rehabilitation          If you have questions, please do not hesitate to call me. I look forward to following Sorin along with you.         Sincerely,    MARIAN Sands        CC: NIKC Pettit

## 2024-02-07 PROCEDURE — 93005 ELECTROCARDIOGRAM TRACING: CPT | Performed by: NURSE PRACTITIONER

## 2024-02-12 LAB
QT INTERVAL: 410 MS
QTC INTERVAL: 463 MS

## 2024-02-15 RX ORDER — DAPAGLIFLOZIN 10 MG/1
TABLET, FILM COATED ORAL
Qty: 90 TABLET | Refills: 1 | Status: SHIPPED | OUTPATIENT
Start: 2024-02-15

## 2024-03-11 DIAGNOSIS — Z79.4 TYPE 2 DIABETES MELLITUS WITH DIABETIC POLYNEUROPATHY, WITH LONG-TERM CURRENT USE OF INSULIN: ICD-10-CM

## 2024-03-11 DIAGNOSIS — E11.42 TYPE 2 DIABETES MELLITUS WITH DIABETIC POLYNEUROPATHY, WITH LONG-TERM CURRENT USE OF INSULIN: ICD-10-CM

## 2024-03-11 RX ORDER — DAPAGLIFLOZIN 10 MG/1
1 TABLET, FILM COATED ORAL DAILY
Qty: 90 TABLET | Refills: 1 | Status: SHIPPED | OUTPATIENT
Start: 2024-03-11

## 2024-03-11 RX ORDER — BLOOD SUGAR DIAGNOSTIC
STRIP MISCELLANEOUS
Qty: 100 EACH | Refills: 11 | Status: SHIPPED | OUTPATIENT
Start: 2024-03-11

## 2024-03-11 NOTE — TELEPHONE ENCOUNTER
Rx Refill Note  Requested Prescriptions     Pending Prescriptions Disp Refills    dapagliflozin Propanediol (Farxiga) 10 MG tablet 90 tablet 1     Sig: Take 10 mg by mouth Daily.      Last office visit with prescribing clinician: 9/20/2023   Last telemedicine visit with prescribing clinician: Visit date not found   Next office visit with prescribing clinician: 9/23/2024                         Would you like a call back once the refill request has been completed: [] Yes [] No    If the office needs to give you a call back, can they leave a voicemail: [] Yes [] No    Jessie Cunningham MA  03/11/24, 15:27 EDT

## 2024-03-27 NOTE — PROGRESS NOTES
"Cardiology Heart Failure Clinic Note  Loi \"Jose\" Ana FONSECA RUST    Patient ID: Sorin Allen  is a 63 y.o. male.    Encounter Date:03/28/2024     Assessment:   Diagnoses and all orders for this visit:    1. core Pulmonale (Primary)  Overview:  Depressed right ventricular systolic function     Orders:  -     Ambulatory Referral to Bariatric Surgery    2. Diastolic heart failure, stage c, chronic  Overview:  A. Diastolic HF w/ recovered and now preserved LV systolic function   b.  EF 51% with grade 2 diastolic dysfunction (HFrEF)         I. H/o reduced systolic LV dysfunction               1. EF 35% (Feb23 TTE)  C.  s/p [employer] ICD    Orders:  -     Ambulatory Referral to Bariatric Surgery    3. Stage 3a chronic kidney disease    4. At risk for fluid volume overload  Overview:  A. Cor Pulmonale  B. Chronic diastolic HF ( HFpEF) w/ h/o (HFrEF)           I. EF 51% GR 2 dd ( tte 1/29/24)  C. Stage 3a CKD    Orders:  -     Ambulatory Referral to Bariatric Surgery    5. Presence of automatic cardioverter/defibrillator (AICD)    6. Edema of Chronic left > right unexplained lower extremity  Overview:  Unexplained, longstanding,  chronic left greater than right 5-year history of lower extremity edema      7. COPD suggested by initial evaluation  Overview:  A. >  40-pack-year smoking history      8. Chronic hypoxemic respiratory failure  Overview:  A. Presumed COPD         I. > 40 pack year smoking Hx  B. Cor Pulmonale         I. RV systolic dysfunction  C. ESAU      9. Dyspnea on exertion  -     Basic Metabolic Panel; Future  -     proBNP; Future  -     Basic Metabolic Panel; Standing  -     Basic Metabolic Panel  -     proBNP; Standing  -     proBNP  -     Magnesium; Standing  -     Magnesium  -     Cancel: Basic Metabolic Panel; Standing  -     Cancel: Basic Metabolic Panel  -     Cancel: proBNP; Standing  -     Cancel: proBNP  -     Ambulatory Referral to Bariatric Surgery    10. Metabolic syndrome X  Overview:  A.  " Benign essential hypertension  B.  Mixed dyslipidemia         I. Refuses statin  C.  Obesity          I. ESAU  D.  T2DM         I. HbA1c at goal, <7%.       11. Primary osteoarthritis involving multiple joints  Overview:  Advised < 3g acetaminophen/day hours PRN.  Encouraged regular exercise and stretching.  Discussed risk of worsen heart disease and kidney disease with NSAID use.      12. Statin declined    13. Type 2 diabetes mellitus with diabetic polyneuropathy, with long-term current use of insulin  Overview:  HbA1c at goal, <7%.  Monitoring regularly.  Monitoring renal function.  Continue Entresto and Farxiga for renal protection.  Decreased Basaglar to 45 units daily and Farxiga 10 mg daily.  Continue gabapentin PRN for pain.  Eye exam due. Report requested.    Orders:  -     Hemoglobin A1c; Standing  -     Hemoglobin A1c    14. Primary osteoarthritis of left knee        Plan/discussion    Volume overload    Heart Failure Core Measures    Type of Overload :  multifactorial  Chronic diastolic heart failure (HFpEF)  H/o systolic heart failure  Cor pulmonale  Stage IIIa CKD     Most recent EF & Diastolic dysfunction if available: (TTE 1/2924)   51% Gr 2 DD     [place] Heart association Class & Stage : IIC     HF Meds       Beta Blocker: Toprol-XL 25 mg daily  ARNI/ACE/ARB: Entresto 24 to 26 mg BID increasing to mid dose 28 March 24  SGLT 2 inhibitors: Farxiga 10 mg daily  Diuretics upon release from clinic: Continuing current diuresis  80 mg 4 times daily Lasix  As he has been taking as no convincing evidence of volume overload on today's exam  Furoscix: Currently not a candidate as he has not been volume overloaded  Aldosterone Antagonist: Spironolactone 25 mg daily  Digitalis: N/A  Vasodilators & Nitrates: Currently not indicated        Cardiac medicines reviewed with risk, benefits, and necessity of each discussed with myself & a RP.      ____________________________________________________________    "  Discussion     Already on heart failure core measures including beta-blocker, ARNI, SGLT2, diuretic,  Aldactone   I am going to go ahead and increase him to mid dose Entresto today  Patient does inform me that he does fairly consistently take his diuretic 3 or 4 times daily which seems evident given his renal function today which seems somewhat normalized with a creatinine of 1.2 again  He does have a about a 5-year history of chronic unexplained left greater than right lower extremity edema he says he knows when his right leg started to swell up he is \"beginning to get into trouble\"  Made no real med changes today  On physical exam he is not significantly volume overloaded  He is agreeable once again be set up to see a dietitian as his [place] [facility] culture is so different from the [place] he has trouble understanding them when he is previously spoken with dietitians  Would add typical heart failure nursing interventions including:                          A. Fluid restriction at less than 2000 cc daily                          B. Daily weights                          C.  1 g low-sodium diet    I did the following activities preparing for the visit with Sorin Allen  including: reviewing tests, once pt arrived in clinic I also performed a medically appropriate examination and/or evaluation, I personally spent considerable time counseling and educating the patient/family/caregiver, ordering medications, tests, or procedures, referring and communicating with other health care professionals, and documenting information in the medical record. I estimate including preparation 20 minutes            Subjective:     No chief complaint on file.      HPI:  63-year-old obese -American male immigrant from [place] who is followed  by Dr. Tirado's with a PMH of recovered systolic HF(HFrEF) w/ ongoing diastolic heart failure (HFrEF) by definition presenting more as (HFpEF)   hIS (TTE 1/29/24) LVEF of 51% " "with grade 2 DD. Agustin note previously in Feb 23 EF was only 35% s/p [employer] ICD implant.  Cor pulmonale with moderate RV systolic dysfunction.  Chronic unexplained 5-year history of left greater than right lower extremity edema around the ankle region, presumed COPD with a 40-pack-year smoking history, ceased, chronic hypoxemic respiratory failure.  Metabolic syndrome with all 4 components including hypertension, dyslipidemia (refuses statin) morbid obesity, obstructive sleep apnea, T2DM with polyneuropathy. Comes in to [facility] HF clinic 6 February 24 for his initial visit.  Currently not volume overloaded admits to some degree of medication noncompliance and actual medication) he is unaware of the majority of the medications he takes and/or how often he takes them.  Not had any recent significant lower extremity edema however he does as noted continues to have some chronic trace left lower extremity ankle region edema which has been longstanding.  Prior to admission he does admit to drinking several fruit drinks and \"a lot of water\" back for his second visit and reevaluation on 28 March 2024 tells me he can walk all the way through [employer] without getting particularly dyspneic however he does tend to go to a very slow pace.  He remains on an inordinate amount of diuretics.  He has not had any real issues.  There are still some communication barriers.  He continues to eat a high sodium diet with considerable consumption of fluids    ROS:    Constitutional: Mild chronic weakness,  fatigue about the same as was his last visit, No fever, rigors, chills   Eyes: No recent vision changes, eye pain   ENT/oropharynx: No recent difficulty swallowing, sore throat, epistaxis, changes in hearing   Cardiovascular: No recent chest pain, chest tightness, palpitations except as noted in HPI, paroxysmal nocturnal dyspnea, orthopnea, diaphoresis, dizziness & no pre or ana maría syncopal episodes   Respiratory: No significant episodic " at rest shortness of breath, + dyspnea on exertion at somewhere > 1 block, no significant productive cough, no active wheezing and no hemoptysis   Gastrointestinal: No abdominal pain, nausea, vomiting, diarrhea, bloody stools   Genitourinary: No hematuria, dysuria other than increased frequency   Neurological: No obvious apparent significant headache, tremors, numbness,  or hemiparesis    Musculoskeletal: No change in his typical cramps, myalgias,  joint pain, no significant joint swelling   Integument: No recent rash, chronic left greater than right lower extremity edema      Patient Active Problem List   Diagnosis    Morbid (severe) obesity due to excess calories    Type 2 diabetes mellitus with diabetic polyneuropathy, with long-term current use of insulin    Primary osteoarthritis involving multiple joints    Primary osteoarthritis of left knee    Statin declined    Mixed diabetic hyperlipidemia associated with type 2 diabetes mellitus    Chest pain    ESAU on CPAP    Essential hypertension    Presence of automatic cardioverter/defibrillator (AICD)    Flu    Dyspnea on exertion    Metabolic syndrome X    core Pulmonale    Diastolic heart failure, stage c, chronic    CKD (chronic kidney disease) stage 3a, GFR 55 ml/min    COPD suggested by initial evaluation    Chronic hypoxemic respiratory failure    At risk for fluid volume overload    Edema of Chronic left > right unexplained lower extremity       Past Medical History:   Diagnosis Date    Arthritis     Asthma     Cardiac disease     Carpal tunnel syndrome     CKD (chronic kidney disease) stage 3a, GFR 55 ml/min 2/5/2024    Congenital heart disease     CPAP (continuous positive airway pressure) dependence     Diabetes mellitus     Diverticulitis of colon     Erectile dysfunction     Heart disease     Heart valve disease     Hyperlipidemia     Hypertension     Neuromuscular disorder     ESAU (obstructive sleep apnea)     Osteoarthritis        Past Surgical  History:   Procedure Laterality Date    CARDIAC CATHETERIZATION N/A 2022    Procedure: Left Heart Cath;  Surgeon: Raoul Tirado MD;  Location: [facility] CATH INVASIVE LOCATION;  Service: Cardiovascular;  Laterality: N/A;    CARDIAC ELECTROPHYSIOLOGY PROCEDURE N/A 2022    Procedure: ICD single-chamber, new [employer] aware;  Surgeon: Raoul Tirado MD;  Location: [facility] CATH INVASIVE LOCATION;  Service: Cardiovascular;  Laterality: N/A;    HERNIA REPAIR         Social History     Socioeconomic History    Marital status:    Tobacco Use    Smoking status: Former     Current packs/day: 0.00     Average packs/day: 2.0 packs/day for 20.0 years (40.0 ttl pk-yrs)     Types: Cigarettes     Start date: 3/6/1980     Quit date: 3/6/2000     Years since quittin.0    Smokeless tobacco: Never   Vaping Use    Vaping status: Never Used   Substance and Sexual Activity    Alcohol use: Not Currently     Comment: rare    Drug use: Never    Sexual activity: Defer       No Known Allergies      Current Outpatient Medications:     ACCU-CHEK SOFTCLIX LANCETS lancets, by Other route. Use as instructed, Disp: , Rfl:     acetaminophen (TYLENOL) 500 MG tablet, Take 2 tablets by mouth Every 8 (Eight) Hours As Needed for Moderate Pain  (arthritic pain)., Disp: 180 tablet, Rfl: 5    aspirin (aspirin) 81 MG EC tablet, Take 1 tablet by mouth Daily., Disp: 90 tablet, Rfl: 3    Blood Glucose Calibration liquid, by In Vitro route., Disp: , Rfl:     Blood Glucose Monitoring Suppl (ACCU-CHEK ALEKS PLUS) w/Device kit, Use to test blood sugar twice a day, Disp: , Rfl:     Blood Glucose Monitoring Suppl (Accu-Chek Guide) w/Device kit, 1 kit 3 (Three) Times a Day. Use glucometer to check blood sugars 3 times a day. Dx. E11.42, Disp: 1 kit, Rfl: 0    Continuous Blood Gluc  (FreeStyle Torie 14 Day [place]) device, 1 Units 3 (Three) Times a Day Before Meals. Use continuous glucose monitor to monitor glucose  regularly., Disp: 1 each, Rfl: 0    Continuous Blood Gluc Sensor (FreeStyle Torie 2 Sensor) [facility], Apply 1 each topically Every 14 (Fourteen) Days. Dispense 2 per every 28 days. DX E11.42, Disp: 2 each, Rfl: 5    dapagliflozin Propanediol (Farxiga) 10 MG tablet, Take 10 mg by mouth Daily., Disp: 90 tablet, Rfl: 1    furosemide (LASIX) 80 MG tablet, TAKE 1 TABLET BY MOUTH 4 TIMES A DAY., Disp: 360 tablet, Rfl: 3    gabapentin (NEURONTIN) 100 MG capsule, TAKE 1 CAPSULE BY MOUTH THREE TIMES A DAY (Patient taking differently: Take 1 capsule by mouth 3 (Three) Times a Day As Needed.), Disp: 90 capsule, Rfl: 2    glucose blood (Accu-Chek Sigrid Plus) test strip, USE TESTING STRIP TO CHECK BLOOD SUGARS 3 TIMES A DAY. DX. E11.42, Disp: 100 each, Rfl: 11    Insulin Glargine (Lantus SoloStar) 100 UNIT/ML injection pen, Inject 45 Units under the skin into the appropriate area as directed Daily. (Patient taking differently: Inject 60 Units under the skin into the appropriate area as directed Daily.), Disp: 45 mL, Rfl: 3    Insulin Pen Needle 32G X 4 MM misc, Use pen needle to deliver insulin subcutaneously daily.  Dx. E11.42, Disp: 30 each, Rfl: 5    metoprolol succinate XL (TOPROL-XL) 25 MG 24 hr tablet, Take 1 tablet by mouth Daily., Disp: 90 tablet, Rfl: 3    sacubitril-valsartan (Entresto) 24-26 MG tablet, Take 1 tablet by mouth 2 (Two) Times a Day., Disp: 180 tablet, Rfl: 2    spironolactone (ALDACTONE) 25 MG tablet, TAKE 1 TABLET BY MOUTH EVERY DAY, Disp: 90 tablet, Rfl: 3    Immunization History   Administered Date(s) Administered    COVID-19 (PFIZER) Purple Cap Monovalent 03/11/2021, 04/01/2021, 12/02/2021    Pneumococcal Polysaccharide (PPSV23) 01/04/2022         Most recent EKG as reviewed:  Procedures     Most recent echo as reviewed:  Results for orders placed during the hospital encounter of 01/28/24    Adult Transthoracic Echo Complete W/ Cont if Necessary Per Protocol    Interpretation Summary    Left ventricular  systolic function is low normal. Calculated left ventricular EF = 51%    The left ventricular cavity is mildly dilated.    Left ventricular wall thickness is consistent with mild concentric hypertrophy.    Left ventricular diastolic function is consistent with (grade II w/high LAP) pseudonormalization.    Moderately reduced right ventricular systolic function noted.    The right ventricular cavity is dilated.    The left atrial cavity is moderate to severely dilated.    Left atrial volume is severely increased.    The right atrial cavity is moderate to severely  dilated.    There is mild calcification of the aortic valve.    Estimated right ventricular systolic pressure from tricuspid regurgitation is normal (<35 mmHg).    Conclusion      Lumason contrast was given to better evaluate LV function  LVE with mild concentric LVH and lower limits of normal contractility.  Estimated LV ejection fraction of 50%.  Severe RV enlargement seen.  Right ventricular systolic dysfunction seen.  Pacer lead seen in RV.  Severe left atrial enlargement by volume.  Moderate to severe right atrial enlargement.  Pulmonic valve is not well visualized.  Aortic valve appears calcified and sclerosed.  No significant stenosis seen.  Mitral valve, tricuspid valve appears structurally normal, mild tricuspid regurgitation seen.  Calculated RV systolic pressure of 34 mmHg  No pericardial effusion seen.  Proximal aorta appears normal in size.      Most recent stress test results:  Results for orders placed during the hospital encounter of 04/25/23    Stress Test With Myocardial Perfusion One Day    Interpretation Summary  LEXISCAN CARDIOLITE REPORT    DATE OF PROCEDURE:  04/25/23    INDICATION FOR PROCEDURE:  Chest pain, chronic HFrEF, dilated cardiomyopathy, hypertension, ICD, diabetes, dyslipidemia, obesity with BMI of 40    PROCEDURE PERFORMED: Lexiscan Cardiolite    PROCEDURE COMMENTS:    After informed consent was obtained.  Patient's resting  heart rate was 74 bpm, resting blood pressure was 142/84, resting EKG revealed sinus rhythm.  Patient was given 0.4 mg of regadenosine for stress testing.  There was no significant change in heart rate, blood pressure, symptoms with regadenoson injection.  Patient tolerated procedure well.  Complications were none.    NUCLEAR IMAGIN.  There was patchy uptake seen in inferior and inferolateral wall probably due to cardiomyopathy, no ischemia seen.  2.  Gated images reveal dilated LV with severe LV dysfunction, LVEF of 35%.    CONCLUSION:  1.  Lexiscan Cardiolite with patchy uptake in inferior and inferolateral wall due to cardiomyopathy, negative for ischemia.  2.  Severe LV dysfunction, LVEF of 35%.    RECOMMENDATIONS:    Clinical correlation recommended.      Raoul Tirado MD  23  18:04 EDT      Most recent cardiac catheterization results:  Results for orders placed during the hospital encounter of 22    Cardiac Catheterization/Vascular Study    Narrative  Table formatting from the original result was not included.  2022      Heart Cath Report    NAME:              Sorin Allen  :                1960  AGE/SEX:        61 y.o. male  MRN:                [id number]        Procedures Performed    1. Left heart catheterization  2. Selective coronary angiography  3. Left ventriculography  4. Mynx closure device    :   Raoul Tirado MD    Vascular Access Site: Femoral    Indication for procedure: Acute HF R EF, hypertension, diabetes, obesity with BMI over 40      Procedure Note    After discussing the risks, benefits, and alternatives of the procedure, informed consent was obtained.  Timeout was done before the procedure.  Moderate conscious sedation was given utilizing IV Versed and fentanyl administered by RN with continuous EKG oximetry and hemodynamic monitoring supervised by me throughout the entire case, conscious sedation time was 30 minutes.   I was present with the patient for the duration of moderate sedation and supervised staff who had no other duties and monitored the patient for the entire procedure patient had [place] 2-3 sedation scale. the vascular access site was prepped and draped in the usual sterile fashion.  2% lidocaine was used for local anesthesia. Appropriate landmarks were assessed.  A 6 [place] short sheath was inserted in the artery using the modified Seldinger technique.    Selective coronary angiography was performed with JL4 and JR4 diagnostic catheters. Left ventriculogram  was performed with an angled pigtail catheter.  All exchanges were performed over the wire.  No specimens were removed.  There were no apparent acute or early complications.  The patient tolerated the procedure well and was transferred to the recovery area in stable condition.    Closure device: Mynx device was deployed successfully after right iliofemoral angiogram was performed. Good hemostasis was achieved.    Complications:  None  Blood Loss: minimal    Hemodynamics    Pressures    Ao:    107/64 mmHg  LV:    107/9 mmHg  End-diastolic pressure:  9  mmHg  No significant aortic valvular gradient on pullback    Coronary Angiography    Left Main :  The left main   is without disease    Left Anterior Descending : Is without disease    Ramus intermedius : Is without disease    Left Circumflex : Without disease    Right Coronary Artery :  The right coronary artery   is dominant vessel without disease    Dominance:  []  Left  []  Right  []  Co-Dominant      Left Ventriculography:    Estimated Ejection Fraction: 15 %  Wall motion abnormalities: LVE with severe global left ventricular hypokinesis  Mitral Regurgitation:  None    Impression:    1. Dilated cardiomyopathy with no obstructive CAD    Recommendations:    1. Medical management and risk factor modification.  2. If patient continues to have CHF on optimal medical management consider ICD.        I sincerely  appreciate the opportunity to participate in your patient's care. Please feel free to contact me anytime if I can be of assistance in this or any other way.      Pertinent History    Past Medical History:  Diagnosis Date   Arthritis   Cardiac disease   Carpal tunnel syndrome   CPAP (continuous positive airway pressure) dependence   Diabetes mellitus (HCC)   Diverticulitis of colon   Erectile dysfunction   Heart disease   Hyperlipidemia   Hypertension   Neuromuscular disorder (HCC)   ESAU (obstructive sleep apnea)   Osteoarthritis    Past Surgical History:  Procedure Laterality Date   HERNIA REPAIR    Prior to Admission medications  Medication Sig Start Date End Date Taking? Authorizing Provider  aspirin (aspirin) 81 MG EC tablet Take 1 tablet by mouth Daily. 2/7/22  Yes Raoul Tirado MD  furosemide (LASIX) 80 MG tablet Take 1 tablet by mouth 3 (Three) Times a Day. 12/27/21  Yes Natalie Steward MD  gabapentin (NEURONTIN) 100 MG capsule TAKE 1 CAPSULE BY MOUTH THREE TIMES A DAY  Patient taking differently: Take 100 mg by mouth 3 (Three) Times a Day As Needed. 6/27/21  Yes Erick Cadet MD  Insulin Glargine (Lantus SoloStar) 100 UNIT/ML injection pen Inject 60 Units under the skin into the appropriate area as directed Daily. 2/10/22  Yes Natalie Steward MD  meloxicam (MOBIC) 15 MG tablet TAKE 1 TABLET BY MOUTH DAILY AS NEEDED FOR MODERATE PAIN . TAKE WITH FOOD! 2/10/22  Yes Natalie Steward MD  metoprolol succinate XL (TOPROL-XL) 25 MG 24 hr tablet Take 1 tablet by mouth Daily. 2/7/22  Yes Raoul Tirado MD  potassium chloride ER (K-TAB) 20 MEQ tablet controlled-release ER tablet Take 2 tablets by mouth Daily. 12/3/21  Yes Natalie Steward MD  sacubitril-valsartan (ENTRESTO) 24-26 MG tablet Take 1 tablet by mouth 2 (Two) Times a Day. 2/7/22  Yes Raoul Tirado MD  ACCU-CHEK SOFTCLIX LANCETS lancets by Other route. Use as instructed    Provider,  MD Gaurav  acetaminophen (TYLENOL) 500 MG tablet Take 2 tablets by mouth Every 8 (Eight) Hours As Needed for Moderate Pain  (arthritic pain). 3/6/20   Natalie Steward MD  Blood Glucose Calibration liquid by In Vitro route.    ProviderGaurav MD  Blood Glucose Monitoring Suppl (ACCU-CHEK ALEKS PLUS) w/Device kit Use to test blood sugar twice a day    ProviderGaurav MD  Blood Glucose Monitoring Suppl (Accu-Chek Guide) w/Device kit 1 kit 3 (Three) Times a Day. Use glucometer to check blood sugars 3 times a day. Dx. E11.42 2/26/21   Natalie Steward MD  Continuous Blood Gluc  (FreeStyle Torie 14 Day [place]) device 1 Units 3 (Three) Times a Day Before Meals. Use continuous glucose monitor to monitor glucose regularly. 9/29/21   Natalie Steward MD  Continuous Blood Gluc Sensor (FreeStyle Torie 14 Day Sensor) misc 1 Units Every 14 (Fourteen) Days. Use continuous glucose monitor to monitor glucose regularly. 2/10/22   Natalie Steward MD  glucose blood test strip Use the test glucose up to 4 times a day. Dx: E11.42 11/18/21   Natalie Steward MD  Insulin Pen Needle 32G X 4 MM misc Use pen needle to deliver insulin subcutaneously daily.  Dx. E11.42 2/11/21   Natalie Steward MD      Pre-Procedure Notes  H&P Performed  [x]  Yes []  No       []  N/A    Indications:  []  ACS <= 24 HRS  []  ACS >24 HRS  []  New Onset Angina <= 2 mos  []  Worsening Angina  []  Resuscitated Cardiac Arrest  []  Angina on Exertion:  []  Suspected CAD  []  Valvular Disease  []  Pericardial Disease  []  Cardiac Arrythmia  []  Cardiomyopathy  []  LV Dysfunction  []  Syncope  []  Post Cardiac Transplant  []  Eval. For Exercise Clearance  []  Other  []  Pre-Operative Evaluation  If Pre-Op Eval:  Evaluation for Surgery Type:  []  Cardiac Surgery   []  Non-Cardiac Surgery  Functional Capacity:  []  <4 METS  []  >=4 METS w/o symptoms  []  >= 4 METS with symptoms  []  Unknown  Surgical Risk:  []  Low   []  Intermediate  []  High Risk: Vascular  []  High Risk Non-Vascular    Risks, Benefits, & Complications Discussed:  [x]  Yes  []  No  []  N/A    Questions Answered:  [x]  Yes  []  No  []  N/A    Consent Obtained:  [x]  Yes  []  No  []  N/A    CHF: [x]  Yes  []  No  If Yes:  Newly Diagnosed?  [x]  Yes  []  No  If Yes:  HF Type:  []  Diastolic  []  Systolic  []  Unknown      Raoul Tirado MD  2/16/2022  17:13 EST  Electronically signed by Raoul Tirado MD, 02/16/22, 5:13 PM EST.        Historical data copied forward from previous encounters in EMR including the history, exam, and assessment/plan has been reviewed and is unchanged except as I have noted and otherwise indicated.      Objective:         /88 (BP Location: Right arm)   Pulse 73   Resp 18   Wt (!) 147 kg (324 lb 12.8 oz)   SpO2 94%   BMI 42.85 kg/m²     General:  Well-developed, significantly overly well-nourished, cooperative, no distress, appears stated age if not slightly older    Neuro:  A&O x3. No significantly obvious focal neuro deficet    Psych:  Pleasant - affect    HENT:  Normocephalic, atraumatic, moist mucous membranes , Normal ear placement,Throat not injected neck is enlarged   Eyes:  PERRLA, Conjunctivae not injected, EOM's intact, conjunctiva does not appear significantly injected   Neck:  Supple, Mildly thickened, no lymph adenopathy nor thyromegaly no JVD or bruit    Lungs:    Symmetrical rise & fall of chest with baseline respiratory pattern. To auscultation lungs are Clear bilaterally, faint late phase expiratory wheezes, no rhonchi or rales are noted bases are questionably mildly reduced however difficult to assess due to his body habitus   Chest wall:  No significant tenderness when palpated. PMI very difficult to palpate given his body habitus   Heart:  Regular rate and rhythm, S1 and S2 normal, no S3 I could not hear and S4, Gr I-II/VI systolic ejection murmur best heard at the apex , no obvious  rub, click or gallop.    Abdomen:  non-distended, obese non-tender, bowel sounds noted in the 4 quadrants of the abdomen, significant adipose tissue identified    Extremities:  Equal color motion temperature and sensitivity, Rapid capillary refill noted within the nailbeds of fingers and toes bilaterally trace left no right lower extremity edema.    Pulses:  2+ and symmetric all extremities, rapid capillary refill    Skin:  No obvious rashes, significant lesions identified, warm dry and of normal turgor      In-Office Procedure(s):  No orders to display        Imaging:    Results for orders placed during the hospital encounter of 01/28/24    XR Chest 1 View    Narrative  XR CHEST 1 VW    Date of Exam: 1/28/2024 1:48 PM EST    Indication: soa    Comparison: 5/15/2023    Findings:  Left chest wall cardiac device in unchanged position. Unchanged enlarged cardiomediastinal silhouette. No new focal airspace opacity. No pleural effusion or pneumothorax. No acute osseous abnormality.    Impression  Impression:  Unchanged enlarged cardiac silhouette. No new focal airspace consolidation.      Electronically Signed: Chucho Wheeler MD  1/28/2024 1:51 PM EST  Workstation ID: [id number]       Results for orders placed during the hospital encounter of 01/28/24    CT Angiogram Chest Pulmonary Embolism    Narrative  CT ANGIOGRAM CHEST PULMONARY EMBOLISM    Date of Exam: 1/28/2024 4:36 PM EST    Indication: Pulmonary embolism (PE) suspected, positive D-dimer.    Comparison: None available.    Technique: Axial CT images were obtained of the chest after the uneventful intravenous administration of iodinated contrast utilizing pulmonary embolism protocol.  Sagittal and coronal reconstructions were performed.  Automated exposure control and  iterative reconstruction methods were used.    FINDINGS:    Thoracic inlet: Mild prominence/nodularity of the right thyroid lobe.    Pulmonary arteries: No filling defects are identified within the  pulmonary arteries to suggest acute pulmonary embolism.    Great vessels: Mild atherosclerotic plaque within the aortic arch. Otherwise, the thoracic aorta and proximal arch vessels appear unremarkable.    Mediastinum/Keiry: No pathologically enlarged mediastinal lymph nodes are seen. The esophagus is unremarkable.    Lung parenchyma: Paraseptal emphysematous changes within the right lung. No acute infiltrate is identified. There is mild left basilar atelectasis. There is a 3 mm subpleural nodule within the right upper lobe, too small to warrant imaging follow-up by  Fleischner Society guidelines (series 4, image 57). Right lower lobe granuloma is seen.    Trachea and airways: The trachea and central airways appear unremarkable.    Pleural space: No significant pleural effusion or pneumothorax.    Heart and pericardium: Cardiac pacemaker leads are noted. Otherwise, the heart and pericardium appear unremarkable.    Chest wall: No acute or suspicious osseous or soft tissue lesion is identified. Left chest wall pacemaker is present.    Upper abdomen: No acute abnormality is identified within the visualized upper abdomen.    Impression  1.No acute abnormality is identified within the thorax. Specifically, there is no evidence of acute pulmonary embolism.  2.There is a 3 mm subpleural nodule within the right upper lobe, too small to warrant imaging follow-up by Fleischner Society guidelines (series 4, image 57). Optional follow-up chest CT in 1 year may be considered.  3.Paraseptal emphysematous changes within the right lung. Please correlate with patient's smoking history/risk factors to determine whether the patient meets criteria for routine lung cancer screening with low dose chest CT.      Electronically Signed: Erasto Moore MD  1/28/2024 4:47 PM EST  Workstation ID: [id number]      Results for orders placed during the hospital encounter of 01/28/24    CT Angiogram Chest Pulmonary Embolism    Narrative  CT ANGIOGRAM  CHEST PULMONARY EMBOLISM    Date of Exam: 1/28/2024 4:36 PM EST    Indication: Pulmonary embolism (PE) suspected, positive D-dimer.    Comparison: None available.    Technique: Axial CT images were obtained of the chest after the uneventful intravenous administration of iodinated contrast utilizing pulmonary embolism protocol.  Sagittal and coronal reconstructions were performed.  Automated exposure control and  iterative reconstruction methods were used.    FINDINGS:    Thoracic inlet: Mild prominence/nodularity of the right thyroid lobe.    Pulmonary arteries: No filling defects are identified within the pulmonary arteries to suggest acute pulmonary embolism.    Great vessels: Mild atherosclerotic plaque within the aortic arch. Otherwise, the thoracic aorta and proximal arch vessels appear unremarkable.    Mediastinum/Keiry: No pathologically enlarged mediastinal lymph nodes are seen. The esophagus is unremarkable.    Lung parenchyma: Paraseptal emphysematous changes within the right lung. No acute infiltrate is identified. There is mild left basilar atelectasis. There is a 3 mm subpleural nodule within the right upper lobe, too small to warrant imaging follow-up by  Fleischner Society guidelines (series 4, image 57). Right lower lobe granuloma is seen.    Trachea and airways: The trachea and central airways appear unremarkable.    Pleural space: No significant pleural effusion or pneumothorax.    Heart and pericardium: Cardiac pacemaker leads are noted. Otherwise, the heart and pericardium appear unremarkable.    Chest wall: No acute or suspicious osseous or soft tissue lesion is identified. Left chest wall pacemaker is present.    Upper abdomen: No acute abnormality is identified within the visualized upper abdomen.    Impression  1.No acute abnormality is identified within the thorax. Specifically, there is no evidence of acute pulmonary embolism.  2.There is a 3 mm subpleural nodule within the right upper lobe,  "too small to warrant imaging follow-up by Fleischner Society guidelines (series 4, image 57). Optional follow-up chest CT in 1 year may be considered.  3.Paraseptal emphysematous changes within the right lung. Please correlate with patient's smoking history/risk factors to determine whether the patient meets criteria for routine lung cancer screening with low dose chest CT.      Electronically Signed: Erasto Moore MD  1/28/2024 4:47 PM EST  Workstation ID: [id number]      Lab Review:   Hospital Outpatient Visit on 03/28/2024   Component Date Value    Glucose 03/28/2024 136 (H)     BUN 03/28/2024 26 (H)     Creatinine 03/28/2024 1.24     Sodium 03/28/2024 141     Potassium 03/28/2024 4.2     Chloride 03/28/2024 99     CO2 03/28/2024 32.0 (H)     Calcium 03/28/2024 9.3     BUN/Creatinine Ratio 03/28/2024 21.0     Anion Gap 03/28/2024 10.0     eGFR 03/28/2024 64.9     Hemoglobin A1C 03/28/2024 7.10 (H)     Magnesium 03/28/2024 2.2    Hospital Outpatient Visit on 02/06/2024   Component Date Value    Glucose 02/06/2024 116 (H)     BUN 02/06/2024 26 (H)     Creatinine 02/06/2024 1.19     Sodium 02/06/2024 141     Potassium 02/06/2024 4.2     Chloride 02/06/2024 97 (L)     CO2 02/06/2024 32.0 (H)     Calcium 02/06/2024 9.4     BUN/Creatinine Ratio 02/06/2024 21.8     Anion Gap 02/06/2024 12.0     eGFR 02/06/2024 68.2     Magnesium 02/06/2024 2.3     proBNP 02/06/2024 346.7     QT Interval 02/06/2024 410     QTC Interval 02/06/2024 463    No results displayed because visit has over 200 results.        Recent labs reviewed and interpreted for clinical significance and application    Went over the labs individually with the patient while he was still in the clinic he does seem somewhat concerned regarding his CKD          It is a pleasure to be involved in this patient's cardiovascular care relating to their heart failure.  Please feel free to call me with any questions or concerns.    Loi \"Jose\" Ana FONSECA, [facility] " [facility]  Heart failure program clinical provider    Loi Perez, APRN   03/27/24  .

## 2024-03-28 ENCOUNTER — HOSPITAL ENCOUNTER (OUTPATIENT)
Facility: HOSPITAL | Age: 64
Discharge: HOME | End: 2024-03-28
Payer: MEDICARE

## 2024-03-28 ENCOUNTER — DISEASE STATE MANAGEMENT VISIT (OUTPATIENT)
Facility: HOSPITAL | Age: 64
End: 2024-03-28
Payer: MEDICARE

## 2024-03-28 VITALS
OXYGEN SATURATION: 94 % | WEIGHT: 315 LBS | SYSTOLIC BLOOD PRESSURE: 137 MMHG | HEART RATE: 73 BPM | DIASTOLIC BLOOD PRESSURE: 88 MMHG | RESPIRATION RATE: 18 BRPM | BODY MASS INDEX: 42.85 KG/M2

## 2024-03-28 DIAGNOSIS — E11.42 TYPE 2 DIABETES MELLITUS WITH DIABETIC POLYNEUROPATHY, WITH LONG-TERM CURRENT USE OF INSULIN: Chronic | ICD-10-CM

## 2024-03-28 DIAGNOSIS — J96.11 CHRONIC HYPOXEMIC RESPIRATORY FAILURE: ICD-10-CM

## 2024-03-28 DIAGNOSIS — Z91.89 AT RISK FOR FLUID VOLUME OVERLOAD: ICD-10-CM

## 2024-03-28 DIAGNOSIS — R06.09 DYSPNEA ON EXERTION: Chronic | ICD-10-CM

## 2024-03-28 DIAGNOSIS — J44.9 COPD SUGGESTED BY INITIAL EVALUATION: Chronic | ICD-10-CM

## 2024-03-28 DIAGNOSIS — Z79.4 TYPE 2 DIABETES MELLITUS WITH DIABETIC POLYNEUROPATHY, WITH LONG-TERM CURRENT USE OF INSULIN: Chronic | ICD-10-CM

## 2024-03-28 DIAGNOSIS — I51.89 DEPRESSED RIGHT VENTRICULAR SYSTOLIC FUNCTION: Primary | Chronic | ICD-10-CM

## 2024-03-28 DIAGNOSIS — E88.810 METABOLIC SYNDROME X: Chronic | ICD-10-CM

## 2024-03-28 DIAGNOSIS — M15.9 PRIMARY OSTEOARTHRITIS INVOLVING MULTIPLE JOINTS: ICD-10-CM

## 2024-03-28 DIAGNOSIS — Z53.20 STATIN DECLINED: ICD-10-CM

## 2024-03-28 DIAGNOSIS — N18.31 STAGE 3A CHRONIC KIDNEY DISEASE: Chronic | ICD-10-CM

## 2024-03-28 DIAGNOSIS — Z95.810 PRESENCE OF AUTOMATIC CARDIOVERTER/DEFIBRILLATOR (AICD): ICD-10-CM

## 2024-03-28 DIAGNOSIS — R60.0 EDEMA OF LOWER EXTREMITY: Chronic | ICD-10-CM

## 2024-03-28 DIAGNOSIS — M17.12 PRIMARY OSTEOARTHRITIS OF LEFT KNEE: ICD-10-CM

## 2024-03-28 DIAGNOSIS — I50.32 DIASTOLIC HEART FAILURE, STAGE C, CHRONIC: ICD-10-CM

## 2024-03-28 LAB
ANION GAP SERPL CALCULATED.3IONS-SCNC: 10 MMOL/L (ref 5–15)
BUN SERPL-MCNC: 26 MG/DL (ref 8–23)
BUN/CREAT SERPL: 21 (ref 7–25)
CALCIUM SPEC-SCNC: 9.3 MG/DL (ref 8.6–10.5)
CHLORIDE SERPL-SCNC: 99 MMOL/L (ref 98–107)
CO2 SERPL-SCNC: 32 MMOL/L (ref 22–29)
CREAT SERPL-MCNC: 1.24 MG/DL (ref 0.76–1.27)
EGFRCR SERPLBLD CKD-EPI 2021: 64.9 ML/MIN/1.73
GLUCOSE SERPL-MCNC: 136 MG/DL (ref 65–99)
HBA1C MFR BLD: 7.1 % (ref 4.8–5.6)
MAGNESIUM SERPL-MCNC: 2.2 MG/DL (ref 1.6–2.4)
NT-PROBNP SERPL-MCNC: 335.7 PG/ML (ref 0–900)
POTASSIUM SERPL-SCNC: 4.2 MMOL/L (ref 3.5–5.2)
SODIUM SERPL-SCNC: 141 MMOL/L (ref 136–145)

## 2024-03-28 PROCEDURE — 80048 BASIC METABOLIC PNL TOTAL CA: CPT | Performed by: NURSE PRACTITIONER

## 2024-03-28 PROCEDURE — 83880 ASSAY OF NATRIURETIC PEPTIDE: CPT | Performed by: NURSE PRACTITIONER

## 2024-03-28 PROCEDURE — 83735 ASSAY OF MAGNESIUM: CPT | Performed by: NURSE PRACTITIONER

## 2024-03-28 PROCEDURE — 83036 HEMOGLOBIN GLYCOSYLATED A1C: CPT | Performed by: NURSE PRACTITIONER

## 2024-03-28 PROCEDURE — G0463 HOSPITAL OUTPT CLINIC VISIT: HCPCS

## 2024-03-28 RX ORDER — SACUBITRIL AND VALSARTAN 49; 51 MG/1; MG/1
1 TABLET, FILM COATED ORAL 2 TIMES DAILY
Qty: 180 TABLET | Refills: 0 | Status: SHIPPED | OUTPATIENT
Start: 2024-03-28

## 2024-03-28 NOTE — PROGRESS NOTES
HEART FAILURE CLINIC - PHARMACY SERVICE     HFimpEF with EF 51% (Last Echo: 1/30/24).    NYHA Class II C     -Previous EF of 20% on 4/7/22       Cardiologist: Celestino  Nephrologist: n/a  PCP: NICK Perez     -CHF-specific BB:      Pre Visit Dose: Metoprolol succinate XL 25 mg PO daily    Post Visit Dose: Metoprolol succinate XL 25 mg PO daily (BP: 137/88, HR: 73)     - Target Dose: Metoprolol succinate  mg PO daily.     - Goal HR of 50s to 60s.     - Patient should be seen every 10 to 14 days for a pulse check with plans for up-titration until target heart rate is achieved.        -ACE/ARB/ARNI:     Pre Visit Dose: Sacubitril/valsartan 24/26 mg BID    Post Visit Dose: Sacubitril/valsartan 49/51 mg BID (BP: 137/88, HR: 73, SCR = 1.24)    - Target Dose: Sacubitril/valsartan 97/103 mg PO BID    - Patient should have a follow-up appointment every 2 to 4 weeks for a hemodynamic check with possible up-titration to target dose.         -SGLT2 inhibitor therapy:    Pre Visit Dose: Dapagliflozin 10 mg PO daily    Post Visit Dose: Dapagliflozin 10 mg PO daily    - Target Dose: Dapagliflozin 10 mg PO daily : CrCl > 20 mL/min which has shown benefit in patients with HF       -MRA:     Pre Visit Dose: Spironolactone 25 mg daily    Post Visit Dose: Spironolactone 25 mg daily    - Target Dose: Spironolactone 25-50 mg PO daily    - K is < 5 mEq/L and SCr is </= 2.5 mg/dL in male and eGFR > 30 mL/min/1.73m3      -DIURETIC:     Pre Visit Dose: Furosemide 80 mg QID    Post Visit Dose: Furosemide 80 mg QID      -MAGNESIUM:     Mag is > 2 mg/dL    -ANTICOAGULATION:     None       -OTHER CV MEDS:     Pre Visit Dose: aspirin 81 mg daily    Post Visit Dose: aspirin 81 mg daily      -Clinic Administered Medications:     None      Vaccines:     Not assessed at this visit       Patient Assistance:      None            PLAN:  Initiation/Discontinuation/Dose Adjustment: Increased Sacubitril/valsartan   Education provided: none  Coordination  of Care: none  Refills: none          Page Rosenbaum, PharmD  3/28/2024  10:22 EDT

## 2024-03-28 NOTE — ADDENDUM NOTE
Encounter addended by: Terra Hudson, PCT on: 3/28/2024 1:43 PM   Actions taken: Charge Capture section accepted

## 2024-04-03 ENCOUNTER — CLINICAL SUPPORT NO REQUIREMENTS (OUTPATIENT)
Dept: CARDIOLOGY | Facility: CLINIC | Age: 64
End: 2024-04-03
Payer: MEDICARE

## 2024-04-03 ENCOUNTER — OFFICE VISIT (OUTPATIENT)
Dept: CARDIOLOGY | Facility: CLINIC | Age: 64
End: 2024-04-03
Payer: MEDICARE

## 2024-04-03 VITALS
HEART RATE: 69 BPM | WEIGHT: 315 LBS | BODY MASS INDEX: 41.75 KG/M2 | OXYGEN SATURATION: 93 % | DIASTOLIC BLOOD PRESSURE: 80 MMHG | SYSTOLIC BLOOD PRESSURE: 116 MMHG | HEIGHT: 73 IN

## 2024-04-03 DIAGNOSIS — I50.32 HYPERTENSIVE HEART DISEASE WITH CHRONIC DIASTOLIC CONGESTIVE HEART FAILURE: Primary | ICD-10-CM

## 2024-04-03 DIAGNOSIS — Z79.4 TYPE 2 DIABETES MELLITUS WITH DIABETIC POLYNEUROPATHY, WITH LONG-TERM CURRENT USE OF INSULIN: Chronic | ICD-10-CM

## 2024-04-03 DIAGNOSIS — I11.0 HYPERTENSIVE HEART DISEASE WITH CHRONIC DIASTOLIC CONGESTIVE HEART FAILURE: Primary | ICD-10-CM

## 2024-04-03 DIAGNOSIS — E66.01 MORBID OBESITY WITH BMI OF 40.0-44.9, ADULT: ICD-10-CM

## 2024-04-03 DIAGNOSIS — N18.31 STAGE 3A CHRONIC KIDNEY DISEASE: Chronic | ICD-10-CM

## 2024-04-03 DIAGNOSIS — I50.32 DIASTOLIC HEART FAILURE, STAGE C, CHRONIC: Primary | ICD-10-CM

## 2024-04-03 DIAGNOSIS — Z95.810 PRESENCE OF AUTOMATIC CARDIOVERTER/DEFIBRILLATOR (AICD): ICD-10-CM

## 2024-04-03 DIAGNOSIS — I10 ESSENTIAL HYPERTENSION: Chronic | ICD-10-CM

## 2024-04-03 DIAGNOSIS — E11.42 TYPE 2 DIABETES MELLITUS WITH DIABETIC POLYNEUROPATHY, WITH LONG-TERM CURRENT USE OF INSULIN: Chronic | ICD-10-CM

## 2024-04-03 DIAGNOSIS — G47.33 OSA ON CPAP: ICD-10-CM

## 2024-04-03 NOTE — PROGRESS NOTES
Subjective:     Encounter Date:04/03/2024      Patient ID: Sorin Allen is a 63 y.o. male.    Chief Complaint and history of present illness:     Follow-up for hypertensive cardiovascular disease, CHF, cardiomyopathy, ICD, dyslipidemia    History of present illness:    Mr. Sorin Allen has PMH of     Hypertensive cardiovascular disease with chronic diastolic CHF  Chronic HFrEF due to systolic dysfunction from dilated cardiomyopathy  [place] ICD 4/7/2022  Cardiac cath 2/16/2022 EF of 15% no obstructive CAD  Dyslipidemia  Hypertension  Obesity with BMI over 40  ESAU/CPAP  ED  Rheumatic fever as a child  Former smoker quit 3/6/2000  Family history mother has leukemia     Here for follow-up.  Patient is feeling better since discharge.    Patient's arterial blood pressure is 116/80, heart rate 69, O2 sat of 93% on room air.    Patient was recently in the hospital 1/28/2024 with low-grade fever and bodyaches, hypoxic respiratory failure was positive for influenza A.  Labs revealed HS troponin 43--37--36,  pro bnp normal at 574  BUN 26 creatinine 1.50   D-dimer mildly elevated however CT was negative for PE but does show a 3mm subpleural nodule within the right upper lobe and paraseptal emphysematous changes in the right lung.      Chart review:  Patient reportedly had cardiac cath 8 years ago was told he has cardiomyopathy and CHF.  Underwent repeat cath 2/16/2022 which revealed EF of 15% no obstructive CAD..  Patient had repeat echocardiogram 4/6/2022 which is revealing persistent LV dysfunction with LVEF of 20% with severe pulmonary hypertension PA systolic pressure of 60 mmHg.        Echocardiogram 5/1/2020 revealed normal LV systolic function EF 60% with diastolic dysfunction and RV enlargement and mild aortic stenosis  Echocardiogram 1/21/2022 revealed EF of 26 to 30%  Repeat echo 4/6/2022 is revealing EF of 20% with severe pulmonary hypertension PA systolic pressure of 60 mmHg    Labs from 3/28/2024 reveal  proBNP of 335.  A1c of 7.1.  Creatinine of 1.24     Assessment:  :        Chronic HFrEF due to combined hypertensive cardiovascular disease and systolic dysfunction from dilated cardiomyopathy EF of 15-20%  Dilated cardiomyopathy  Hypertension  Diabetes  Obesity with BMI over 40        Recommendations / Plan:         Continue medical management with aspirin, Farxiga, furosemide, metoprolol and Entresto, Aldactone as tolerated.  Monitor renal function and electrolytes and follow-up in heart failure clinic.  Patient previously refused statins.  And is still refusing statins.  Follow-up with PMD for diabetes care.    Procedures:  Cardiac cath 2/16/2022 performed and interpreted by me reveals dilated cardiomyopathy EF of 15%  Lexiscan Cardiolite performed 4/25/2023 revealed patchy uptake EF of 35%.  Echocardiogram 1/30/2024 reveals EF of 51% with mild LVE, mild concentric LVH, grade 2 diastolic dysfunction, moderate RV enlargement and RV dysfunction. Severe left atrial enlargement.  Patient had improvement of EF after GDMT.          Procedures    Copied text in this portion of the note has been reviewed and is accurate as of 4/6/2024  The following portions of the patient's history were reviewed and updated as appropriate: allergies, current medications, past family history, past medical history, past social history, past surgical history and problem list.    Assessment:         MDM       Diagnosis Plan   1. Hypertensive heart disease with chronic diastolic congestive heart failure        2. Stage 3a chronic kidney disease        3. Essential hypertension        4. ESAU on CPAP        5. Presence of automatic cardioverter/defibrillator (AICD)        6. Type 2 diabetes mellitus with diabetic polyneuropathy, with long-term current use of insulin        7. Morbid obesity with BMI of 40.0-44.9, adult               Plan:               Past Medical History:  Past Medical History:   Diagnosis Date    Arthritis     Asthma      Cardiac disease     Carpal tunnel syndrome     CKD (chronic kidney disease) stage 3a, GFR 55 ml/min 2/5/2024    Congenital heart disease     CPAP (continuous positive airway pressure) dependence     Diabetes mellitus     Diverticulitis of colon     Erectile dysfunction     Heart disease     Heart valve disease     Hyperlipidemia     Hypertension     Neuromuscular disorder     ESAU (obstructive sleep apnea)     Osteoarthritis      Past Surgical History:  Past Surgical History:   Procedure Laterality Date    CARDIAC CATHETERIZATION N/A 2/16/2022    Procedure: Left Heart Cath;  Surgeon: Raoul Tirado MD;  Location:  [facility];  Service: Cardiovascular;  Laterality: N/A;    CARDIAC ELECTROPHYSIOLOGY PROCEDURE N/A 6/7/2022    Procedure: ICD single-chamber, new [place] aware;  Surgeon: Raoul Tirdao MD;  Location:  [facility];  Service: Cardiovascular;  Laterality: N/A;    HERNIA REPAIR        Allergies:  No Known Allergies  Home Meds:  Current Meds:     Current Outpatient Medications:     ACCU-CHEK SOFTCLIX LANCETS lancets, by Other route. Use as instructed, Disp: , Rfl:     acetaminophen (TYLENOL) 500 MG tablet, Take 2 tablets by mouth Every 8 (Eight) Hours As Needed for Moderate Pain  (arthritic pain)., Disp: 180 tablet, Rfl: 5    aspirin (aspirin) 81 MG EC tablet, Take 1 tablet by mouth Daily., Disp: 90 tablet, Rfl: 3    Blood Glucose Calibration liquid, by In Vitro route., Disp: , Rfl:     Blood Glucose Monitoring Suppl (ACCU-CHEK ALEKS PLUS) w/Device kit, Use to test blood sugar twice a day, Disp: , Rfl:     Blood Glucose Monitoring Suppl (Accu-Chek Guide) w/Device kit, 1 kit 3 (Three) Times a Day. Use glucometer to check blood sugars 3 times a day. Dx. E11.42, Disp: 1 kit, Rfl: 0    Continuous Blood Gluc  (FreeStyle Torie 14 Day [place]) device, 1 Units 3 (Three) Times a Day Before Meals. Use continuous glucose monitor to monitor glucose  regularly., Disp: 1 each, Rfl: 0    Continuous Blood Gluc Sensor (FreeStyle Torie 2 Sensor) [facility], Apply 1 each topically Every 14 (Fourteen) Days. Dispense 2 per every 28 days. DX E11.42, Disp: 2 each, Rfl: 5    dapagliflozin Propanediol (Farxiga) 10 MG tablet, Take 10 mg by mouth Daily., Disp: 90 tablet, Rfl: 1    furosemide (LASIX) 80 MG tablet, TAKE 1 TABLET BY MOUTH 4 TIMES A DAY., Disp: 360 tablet, Rfl: 3    gabapentin (NEURONTIN) 100 MG capsule, TAKE 1 CAPSULE BY MOUTH THREE TIMES A DAY (Patient taking differently: Take 1 capsule by mouth 3 (Three) Times a Day As Needed.), Disp: 90 capsule, Rfl: 2    glucose blood (Accu-Chek Sigrid Plus) test strip, USE TESTING STRIP TO CHECK BLOOD SUGARS 3 TIMES A DAY. DX. E11.42, Disp: 100 each, Rfl: 11    Insulin Glargine (Lantus SoloStar) 100 UNIT/ML injection pen, Inject 45 Units under the skin into the appropriate area as directed Daily. (Patient taking differently: Inject 60 Units under the skin into the appropriate area as directed Daily.), Disp: 45 mL, Rfl: 3    Insulin Pen Needle 32G X 4 MM misc, Use pen needle to deliver insulin subcutaneously daily.  Dx. E11.42, Disp: 30 each, Rfl: 5    metoprolol succinate XL (TOPROL-XL) 25 MG 24 hr tablet, Take 1 tablet by mouth Daily., Disp: 90 tablet, Rfl: 3    sacubitril-valsartan (Entresto) 49-51 MG tablet, Take 1 tablet by mouth 2 (Two) Times a Day., Disp: 180 tablet, Rfl: 0    spironolactone (ALDACTONE) 25 MG tablet, TAKE 1 TABLET BY MOUTH EVERY DAY, Disp: 90 tablet, Rfl: 3  Social History:   Social History     Tobacco Use    Smoking status: Former     Current packs/day: 0.00     Average packs/day: 2.0 packs/day for 20.0 years (40.0 ttl pk-yrs)     Types: Cigarettes     Start date: 3/6/1980     Quit date: 3/6/2000     Years since quittin.1    Smokeless tobacco: Never   Substance Use Topics    Alcohol use: Not Currently     Comment: rare      Family History:  Family History   Problem Relation Age of Onset    Leukemia  "Mother     Cancer Sister     Cancer Maternal Grandmother               Review of Systems   Cardiovascular:  Negative for chest pain, leg swelling and palpitations.   Respiratory:  Negative for shortness of breath.    Neurological:  Negative for dizziness and numbness.     All other systems are negative         Objective:     Physical Exam  /80 (BP Location: Right arm, Patient Position: Sitting, Cuff Size: Large Adult)   Pulse 69   Ht 185.4 cm (73\")   Wt (!) 147 kg (325 lb)   SpO2 93%   BMI 42.88 kg/m²   General:  Appears in no acute distress  Eyes: Sclera is anicteric,  conjunctiva is clear   HEENT:  No JVD.  No carotid bruits  Respiratory: Respirations regular and unlabored at rest.  Clear to auscultation  Cardiovascular: S1,S2 Regular rate and rhythm. .   Extremities: No digital clubbing or cyanosis, no edema  Skin: Color pink. Skin warm and dry to touch. No rashes  No xanthoma  Neuro: Alert and awake.    Lab Reviewed:         Raoul Tirado MD  4/6/2024 15:58 EDT      EMR Dragon/Transcription:   \"Dictated utilizing Dragon dictation\".        "

## 2024-04-18 DIAGNOSIS — I50.42 HYPERTENSIVE HEART DISEASE WITH CHRONIC COMBINED SYSTOLIC AND DIASTOLIC CONGESTIVE HEART FAILURE: ICD-10-CM

## 2024-04-18 DIAGNOSIS — I42.0 DILATED CARDIOMYOPATHY: ICD-10-CM

## 2024-04-18 DIAGNOSIS — I50.22 CHRONIC HFREF (HEART FAILURE WITH REDUCED EJECTION FRACTION): ICD-10-CM

## 2024-04-18 DIAGNOSIS — I50.22 CHRONIC SYSTOLIC HEART FAILURE: ICD-10-CM

## 2024-04-18 DIAGNOSIS — I11.0 HYPERTENSIVE HEART DISEASE WITH CHRONIC COMBINED SYSTOLIC AND DIASTOLIC CONGESTIVE HEART FAILURE: ICD-10-CM

## 2024-04-18 RX ORDER — FUROSEMIDE 80 MG
80 TABLET ORAL 4 TIMES DAILY
Qty: 360 TABLET | Refills: 3 | Status: SHIPPED | OUTPATIENT
Start: 2024-04-18

## 2024-04-18 NOTE — TELEPHONE ENCOUNTER
Rx Refill Note  Requested Prescriptions     Pending Prescriptions Disp Refills    furosemide (LASIX) 80 MG tablet [Pharmacy Med Name: FUROSEMIDE 80 MG TABLET] 360 tablet 3     Sig: TAKE 1 TABLET BY MOUTH FOUR TIMES A DAY      Last office visit with prescribing clinician: 4/3/2024   Last telemedicine visit with prescribing clinician: Visit date not found   Next office visit with prescribing clinician: 10/14/2024                         Would you like a call back once the refill request has been completed: [] Yes [] No    If the office needs to give you a call back, can they leave a voicemail: [] Yes [] No    Niki Zamarripa MA  04/18/24, 11:21 EDT

## 2024-04-29 NOTE — PROGRESS NOTES
Subjective:     Encounter Date: 05/03/2024     Patient ID: Sorin Allen is a 63 y.o. male.    Chief Complaint :one month Follow-up for Chronic heart failure; cardiomyopathy, hypertensive cardiovascular disease, obesity, dyslipidemia    History of Present Illness        Mr. Sorin Allen has PMH of    Hypertensive cardiovascular disease with chronic sysolic CHF  Chronic HFrEF due to systolic dysfunction from dilated cardiomyopathy improved to 51%  [place] ICD 6/7/2022  Cardiac cath 2/16/2022 EF of 15% no obstructive CAD  Dyslipidemia  Hypertension  Obesity with BMI over 40  ESAU/CPAP  ED  Rheumatic fever as a child  Former smoker quit 3/6/2000  Family history mother has leukemia      recently in the hospital 1/28/2024 with low-grade fever and bodyaches, hypoxic respiratory failure was positive for influenza A.         Is here for one month follow up for HFrEF, patient's echocardiogram in hospital showed improved EF to 51% with grade II diastolic dysfunction.  He is currently NYHA Class II-III.  He does have some mild ankle edema, which he reports is chronic, comes and goes depending if he feet are in dependent position or not.  Or if he eats a lot of salt.  Patient denies   any current shortness of breath chest pain palpitations.  He does report a cough that has been kind of chronic.  It was present after he was sick for a while then it improved but came back after a week or so.  He reports that it sort of like a tickle in his throat makes him cough.   On examination he does have to clear his throat several times we discussed trialing Mucinex or allergy medication as it may be from drainage, however he does not feel like this is the case.  He thinks that it is from one of his medications which is quite possibly true.      Patient does report that he usually takes his Lasix 3 times a day if worsening edema or weight gain he does increase it to 4 times a day if needed he reports his weight has been stable it  was 318 at home this morning      Blood pressure today 135/85 left arm 142/91 right arm heart rate 68 oxygen 99% on room air weight 326 BMI 43      Recent labs  2/6/2024 proBNP 346 potassium 4.2 CO2 32 creatinine then 1.19 BUN 26 glucose 116  3/28/2024 proBNP 335 potassium 4.2 CO2 32 BUN 26 creatinine 1.24 glucose 136 hemoglobin A1c 7.1      Procedures:  Echocardiogram 5/1/2020 revealed normal LV systolic function EF 60% with diastolic dysfunction and RV enlargement and mild aortic stenosis  Echocardiogram 1/21/2022 revealed EF of 26 to 30%  Repeat echo 4/6/2022 is revealing EF of 20% with severe pulmonary hypertension PA systolic pressure of 60 mmHgCardiac cath 2/16/2022 performed and interpreted by me reveals dilated cardiomyopathy EF of 15%  Lexiscan Cardiolite performed 4/25/2023 revealed patchy uptake EF of 35%.  Echocardiogram 1/30/2024 reveals EF of 51% with mild LVE, mild concentric LVH, grade 2 diastolic dysfunction, moderate RV enlargement and RV dysfunction. Severe left atrial enlargement.  Patient had improvement of EF after GDMT.          The following portions of the patient's history were reviewed and updated as appropriate: allergies, current medications, past family history, past medical history, past social history, past surgical history and problem list.    Assessment:         MDM       Diagnosis Plan   1. Heart failure with recovered ejection fraction (HFrecEF)        2. Diastolic heart failure, stage c, chronic        3. Presence of automatic cardioverter/defibrillator (AICD)        4. Mixed diabetic hyperlipidemia associated with type 2 diabetes mellitus        5. Morbid (severe) obesity due to excess calories        6. Chronic cough        7. Cough syncope          Chronic HFrEF due to combined hypertensive cardiovascular disease and systolic dysfunction from dilated cardiomyopathy EF of 15-20%   Obesity with BMI over 40       Plan:     \    Chart reviewed  Labs reviewed  Discussed  echocardiogram from hospital that showed improved heart function with medical management.  Patient thinks that one of his medications are causing cough which causes him to have near syncopal episodes    Patient is on GDMT with Entresto metoprolol Farxiga and spironolactone  He was seen at the heart failure clinic in the hospital and Entresto was supposed to be increased after he finished his current bottle of the low-dose.  He has still not increased to the next dose    I advised that his cough may be due to the medication or it could be because he is on hold a little bit of fluid  He does take his Lasix normally 3 times a day sometimes he will take it 4 times a day depending on his fluid status.  He reports his weight is stable he is not short of breath just has a cough        Discussed heart failure symptoms with patient   Discussed medications with patient     Discussed recent labs with patient- discussed high LDL and need for statin.  He has declined treatment         Patient also asking about CPAP recall causing his worsened heart failure.            Follow-up with Dr. Tirado in 6 months with device check  Discussed with patient if he has any acute symptoms of heart failure to schedule an appointment with me or the heart failure clinic    Past Medical History:  Past Medical History:   Diagnosis Date    Arthritis     Asthma     Cardiac disease     Carpal tunnel syndrome     CKD (chronic kidney disease) stage 3a, GFR 55 ml/min 2/5/2024    Congenital heart disease     CPAP (continuous positive airway pressure) dependence     Diabetes mellitus     Diverticulitis of colon     Erectile dysfunction     Heart disease     Heart valve disease     Hyperlipidemia     Hypertension     Neuromuscular disorder     ESAU (obstructive sleep apnea)     Osteoarthritis      Past Surgical History:  Past Surgical History:   Procedure Laterality Date    CARDIAC CATHETERIZATION N/A 2/16/2022    Procedure: Left Heart Cath;  Surgeon:  Raoul Tirado MD;  Location: [facility] CATH INVASIVE LOCATION;  Service: Cardiovascular;  Laterality: N/A;    CARDIAC ELECTROPHYSIOLOGY PROCEDURE N/A 6/7/2022    Procedure: ICD single-chamber, new [place] aware;  Surgeon: Raoul Tirado MD;  Location: [facility] CATH INVASIVE LOCATION;  Service: Cardiovascular;  Laterality: N/A;    HERNIA REPAIR        Allergies:  No Known Allergies  Home Meds:  Current Meds:     Current Outpatient Medications:     ACCU-CHEK SOFTCLIX LANCETS lancets, by Other route. Use as instructed, Disp: , Rfl:     acetaminophen (TYLENOL) 500 MG tablet, Take 2 tablets by mouth Every 8 (Eight) Hours As Needed for Moderate Pain  (arthritic pain)., Disp: 180 tablet, Rfl: 5    aspirin (aspirin) 81 MG EC tablet, Take 1 tablet by mouth Daily., Disp: 90 tablet, Rfl: 3    Blood Glucose Calibration liquid, by In Vitro route., Disp: , Rfl:     Blood Glucose Monitoring Suppl (ACCU-CHEK ALEKS PLUS) w/Device kit, Use to test blood sugar twice a day, Disp: , Rfl:     Blood Glucose Monitoring Suppl (Accu-Chek Guide) w/Device kit, 1 kit 3 (Three) Times a Day. Use glucometer to check blood sugars 3 times a day. Dx. E11.42, Disp: 1 kit, Rfl: 0    Continuous Blood Gluc  (FreeStyle Torie 14 Day [place]) device, 1 Units 3 (Three) Times a Day Before Meals. Use continuous glucose monitor to monitor glucose regularly., Disp: 1 each, Rfl: 0    Continuous Blood Gluc Sensor (FreeStyle Torie 2 Sensor) [facility], Apply 1 each topically Every 14 (Fourteen) Days. Dispense 2 per every 28 days. DX E11.42, Disp: 2 each, Rfl: 5    dapagliflozin Propanediol (Farxiga) 10 MG tablet, Take 10 mg by mouth Daily., Disp: 90 tablet, Rfl: 1    furosemide (LASIX) 80 MG tablet, TAKE 1 TABLET BY MOUTH FOUR TIMES A DAY, Disp: 360 tablet, Rfl: 3    gabapentin (NEURONTIN) 100 MG capsule, TAKE 1 CAPSULE BY MOUTH THREE TIMES A DAY (Patient taking differently: Take 1 capsule by mouth 3 (Three) Times a Day As Needed.), Disp: 90  capsule, Rfl: 2    glucose blood (Accu-Chek Sigrid Plus) test strip, USE TESTING STRIP TO CHECK BLOOD SUGARS 3 TIMES A DAY. DX. E11.42, Disp: 100 each, Rfl: 11    Insulin Glargine (Lantus SoloStar) 100 UNIT/ML injection pen, Inject 45 Units under the skin into the appropriate area as directed Daily. (Patient taking differently: Inject 60 Units under the skin into the appropriate area as directed Daily.), Disp: 45 mL, Rfl: 3    Insulin Pen Needle 32G X 4 MM misc, Use pen needle to deliver insulin subcutaneously daily.  Dx. E11.42, Disp: 30 each, Rfl: 5    metoprolol succinate XL (TOPROL-XL) 25 MG 24 hr tablet, Take 1 tablet by mouth Daily., Disp: 90 tablet, Rfl: 3    sacubitril-valsartan (Entresto) 49-51 MG tablet, Take 1 tablet by mouth 2 (Two) Times a Day., Disp: 180 tablet, Rfl: 0    spironolactone (ALDACTONE) 25 MG tablet, TAKE 1 TABLET BY MOUTH EVERY DAY, Disp: 90 tablet, Rfl: 3  Social History:   Social History     Tobacco Use    Smoking status: Former     Current packs/day: 0.00     Average packs/day: 2.0 packs/day for 20.0 years (40.0 ttl pk-yrs)     Types: Cigarettes     Start date: 3/6/1980     Quit date: 3/6/2000     Years since quittin.1     Passive exposure: Past    Smokeless tobacco: Never   Substance Use Topics    Alcohol use: Not Currently     Comment: rare      Family History:  Family History   Problem Relation Age of Onset    Leukemia Mother     Cancer Sister     Cancer Maternal Grandmother               Review of Systems   Constitutional: Positive for malaise/fatigue.   Cardiovascular:  Positive for leg swelling (left ankle worse than right). Negative for chest pain (occasional pain at ICD site ), dyspnea on exertion and palpitations.   Respiratory:  Negative for cough and shortness of breath.    Skin:  Negative for rash.   Gastrointestinal:  Negative for abdominal pain, nausea and vomiting.   Neurological:  Negative for dizziness, focal weakness, headaches, light-headedness and numbness.   All  "other systems reviewed and are negative.    All other systems are negative         Objective:     Vitals reviewed.   Constitutional:       Appearance: Healthy appearance. Not in distress.   Neck:      Vascular: No JVR. JVD normal.   Pulmonary:      Effort: Pulmonary effort is normal.      Breath sounds: Normal breath sounds. No wheezing. No rhonchi. No rales.   Chest:      Chest wall: Not tender to palpatation.   Cardiovascular:      PMI at left midclavicular line. Normal rate. Regular rhythm. Normal S1. Normal S2.       Murmurs: There is no murmur.      No gallop.  No click. No rub.   Pulses:     Intact distal pulses.   Edema:     Peripheral edema (left ankle trace - 1+) present.     Ankle: 1+ edema of the left ankle and trace edema of the right ankle.  Abdominal:      General: Bowel sounds are normal.      Palpations: Abdomen is soft.      Tenderness: There is no abdominal tenderness.   Musculoskeletal: Normal range of motion.         General: No tenderness. Skin:     General: Skin is warm and dry.   Neurological:      General: No focal deficit present.      Mental Status: Alert and oriented to person, place and time.       /85 (BP Location: Left arm, Patient Position: Sitting, Cuff Size: Adult)   Pulse 68   Ht 185.4 cm (73\")   Wt (!) 148 kg (326 lb)   SpO2 98%   BMI 43.01 kg/m²       Lab Reviewed: as above         MARIAN Scott  5/3/2024 11:10 EDT      Electronically signed by MARIAN Scott, 05/03/24, 11:08 AM EDT.        EMR Dragon/Transcription:   \"Dictated utilizing Dragon dictation\".           "

## 2024-05-03 ENCOUNTER — OFFICE VISIT (OUTPATIENT)
Dept: CARDIOLOGY | Facility: CLINIC | Age: 64
End: 2024-05-03
Payer: MEDICARE

## 2024-05-03 VITALS
SYSTOLIC BLOOD PRESSURE: 135 MMHG | DIASTOLIC BLOOD PRESSURE: 85 MMHG | HEIGHT: 73 IN | BODY MASS INDEX: 41.75 KG/M2 | HEART RATE: 68 BPM | OXYGEN SATURATION: 98 % | WEIGHT: 315 LBS

## 2024-05-03 DIAGNOSIS — E78.2 MIXED DIABETIC HYPERLIPIDEMIA ASSOCIATED WITH TYPE 2 DIABETES MELLITUS: ICD-10-CM

## 2024-05-03 DIAGNOSIS — E66.01 MORBID (SEVERE) OBESITY DUE TO EXCESS CALORIES: Chronic | ICD-10-CM

## 2024-05-03 DIAGNOSIS — R55 COUGH SYNCOPE: ICD-10-CM

## 2024-05-03 DIAGNOSIS — R05.4 COUGH SYNCOPE: ICD-10-CM

## 2024-05-03 DIAGNOSIS — Z95.810 PRESENCE OF AUTOMATIC CARDIOVERTER/DEFIBRILLATOR (AICD): ICD-10-CM

## 2024-05-03 DIAGNOSIS — I50.32 HEART FAILURE WITH RECOVERED EJECTION FRACTION (HFRECEF): Primary | ICD-10-CM

## 2024-05-03 DIAGNOSIS — E11.69 MIXED DIABETIC HYPERLIPIDEMIA ASSOCIATED WITH TYPE 2 DIABETES MELLITUS: ICD-10-CM

## 2024-05-03 DIAGNOSIS — R05.3 CHRONIC COUGH: ICD-10-CM

## 2024-05-03 DIAGNOSIS — I50.32 DIASTOLIC HEART FAILURE, STAGE C, CHRONIC: ICD-10-CM

## 2024-05-20 DIAGNOSIS — Z79.4 TYPE 2 DIABETES MELLITUS WITH DIABETIC POLYNEUROPATHY, WITH LONG-TERM CURRENT USE OF INSULIN: Chronic | ICD-10-CM

## 2024-05-20 DIAGNOSIS — E11.42 TYPE 2 DIABETES MELLITUS WITH DIABETIC POLYNEUROPATHY, WITH LONG-TERM CURRENT USE OF INSULIN: Chronic | ICD-10-CM

## 2024-05-28 ENCOUNTER — LAB (OUTPATIENT)
Dept: FAMILY MEDICINE CLINIC | Facility: CLINIC | Age: 64
End: 2024-05-28
Payer: MEDICARE

## 2024-05-28 ENCOUNTER — OFFICE VISIT (OUTPATIENT)
Dept: FAMILY MEDICINE CLINIC | Facility: CLINIC | Age: 64
End: 2024-05-28
Payer: MEDICARE

## 2024-05-28 VITALS
OXYGEN SATURATION: 98 % | HEART RATE: 68 BPM | HEIGHT: 73 IN | RESPIRATION RATE: 18 BRPM | DIASTOLIC BLOOD PRESSURE: 84 MMHG | SYSTOLIC BLOOD PRESSURE: 122 MMHG | TEMPERATURE: 97.3 F | BODY MASS INDEX: 41.75 KG/M2 | WEIGHT: 315 LBS

## 2024-05-28 DIAGNOSIS — Z23 NEED FOR STREPTOCOCCUS PNEUMONIAE VACCINATION: ICD-10-CM

## 2024-05-28 DIAGNOSIS — R07.0 THROAT DISCOMFORT: ICD-10-CM

## 2024-05-28 DIAGNOSIS — I50.32 DIASTOLIC HEART FAILURE, STAGE C, CHRONIC: ICD-10-CM

## 2024-05-28 DIAGNOSIS — Z79.4 TYPE 2 DIABETES MELLITUS WITH DIABETIC POLYNEUROPATHY, WITH LONG-TERM CURRENT USE OF INSULIN: Primary | ICD-10-CM

## 2024-05-28 DIAGNOSIS — E11.69 MIXED DIABETIC HYPERLIPIDEMIA ASSOCIATED WITH TYPE 2 DIABETES MELLITUS: ICD-10-CM

## 2024-05-28 DIAGNOSIS — E78.2 MIXED DIABETIC HYPERLIPIDEMIA ASSOCIATED WITH TYPE 2 DIABETES MELLITUS: ICD-10-CM

## 2024-05-28 DIAGNOSIS — R05.3 CHRONIC COUGH: ICD-10-CM

## 2024-05-28 DIAGNOSIS — E11.42 TYPE 2 DIABETES MELLITUS WITH DIABETIC POLYNEUROPATHY, WITH LONG-TERM CURRENT USE OF INSULIN: Primary | ICD-10-CM

## 2024-05-28 LAB
ALBUMIN SERPL-MCNC: 4.5 G/DL (ref 3.5–5.2)
ALBUMIN/GLOB SERPL: 1 G/DL
ALP SERPL-CCNC: 79 U/L (ref 39–117)
ALT SERPL W P-5'-P-CCNC: 12 U/L (ref 1–41)
ANION GAP SERPL CALCULATED.3IONS-SCNC: 12 MMOL/L (ref 5–15)
AST SERPL-CCNC: 9 U/L (ref 1–40)
BILIRUB SERPL-MCNC: 0.3 MG/DL (ref 0–1.2)
BUN SERPL-MCNC: 29 MG/DL (ref 8–23)
BUN/CREAT SERPL: 22.7 (ref 7–25)
CALCIUM SPEC-SCNC: 9.4 MG/DL (ref 8.6–10.5)
CHLORIDE SERPL-SCNC: 100 MMOL/L (ref 98–107)
CHOLEST SERPL-MCNC: 241 MG/DL (ref 0–200)
CO2 SERPL-SCNC: 29 MMOL/L (ref 22–29)
CREAT SERPL-MCNC: 1.28 MG/DL (ref 0.76–1.27)
EGFRCR SERPLBLD CKD-EPI 2021: 62.5 ML/MIN/1.73
GLOBULIN UR ELPH-MCNC: 4.3 GM/DL
GLUCOSE SERPL-MCNC: 119 MG/DL (ref 65–99)
HDLC SERPL-MCNC: 41 MG/DL (ref 40–60)
LDLC SERPL CALC-MCNC: 176 MG/DL (ref 0–100)
LDLC/HDLC SERPL: 4.23 {RATIO}
POTASSIUM SERPL-SCNC: 4.1 MMOL/L (ref 3.5–5.2)
PROT SERPL-MCNC: 8.8 G/DL (ref 6–8.5)
SODIUM SERPL-SCNC: 141 MMOL/L (ref 136–145)
TRIGL SERPL-MCNC: 133 MG/DL (ref 0–150)
VLDLC SERPL-MCNC: 24 MG/DL (ref 5–40)

## 2024-05-28 PROCEDURE — 1159F MED LIST DOCD IN RCRD: CPT | Performed by: PHYSICIAN ASSISTANT

## 2024-05-28 PROCEDURE — 3051F HG A1C>EQUAL 7.0%<8.0%: CPT | Performed by: PHYSICIAN ASSISTANT

## 2024-05-28 PROCEDURE — 3074F SYST BP LT 130 MM HG: CPT | Performed by: PHYSICIAN ASSISTANT

## 2024-05-28 PROCEDURE — 80053 COMPREHEN METABOLIC PANEL: CPT | Performed by: PHYSICIAN ASSISTANT

## 2024-05-28 PROCEDURE — 90677 PCV20 VACCINE IM: CPT | Performed by: PHYSICIAN ASSISTANT

## 2024-05-28 PROCEDURE — 80061 LIPID PANEL: CPT | Performed by: PHYSICIAN ASSISTANT

## 2024-05-28 PROCEDURE — 1160F RVW MEDS BY RX/DR IN RCRD: CPT | Performed by: PHYSICIAN ASSISTANT

## 2024-05-28 PROCEDURE — G0009 ADMIN PNEUMOCOCCAL VACCINE: HCPCS | Performed by: PHYSICIAN ASSISTANT

## 2024-05-28 PROCEDURE — 36415 COLL VENOUS BLD VENIPUNCTURE: CPT | Performed by: PHYSICIAN ASSISTANT

## 2024-05-28 PROCEDURE — 99213 OFFICE O/P EST LOW 20 MIN: CPT | Performed by: PHYSICIAN ASSISTANT

## 2024-05-28 PROCEDURE — 3079F DIAST BP 80-89 MM HG: CPT | Performed by: PHYSICIAN ASSISTANT

## 2024-05-28 PROCEDURE — 1126F AMNT PAIN NOTED NONE PRSNT: CPT | Performed by: PHYSICIAN ASSISTANT

## 2024-05-28 NOTE — PROGRESS NOTES
Subjective   Sorin Allen is a 63 y.o. male.     History of Present Illness  Pt presents to follow up on his type 2 diabetes and medication refill.  Last eye exam-  Not taking gabapentin regularly.  Only taking it when foot pain gets bad. Worse at night.  He is using his CPAP machine nightly.  He does feel like he always feels something on the right side of his throat.         Past Medical History:   Diagnosis Date    Arthritis     Asthma     Cardiac disease     Carpal tunnel syndrome     CKD (chronic kidney disease) stage 3a, GFR 55 ml/min 2024    Congenital heart disease     CPAP (continuous positive airway pressure) dependence     Diabetes mellitus     Diverticulitis of colon     Erectile dysfunction     Heart disease     Heart valve disease     Hyperlipidemia     Hypertension     Neuromuscular disorder     ESAU (obstructive sleep apnea)     Osteoarthritis      Past Surgical History:   Procedure Laterality Date    CARDIAC CATHETERIZATION N/A 2022    Procedure: Left Heart Cath;  Surgeon: Raoul Tirado MD;  Location: [facility] CATH INVASIVE LOCATION;  Service: Cardiovascular;  Laterality: N/A;    CARDIAC ELECTROPHYSIOLOGY PROCEDURE N/A 2022    Procedure: ICD single-chamber, new [place] aware;  Surgeon: Raoul Tirado MD;  Location: [facility] CATH INVASIVE LOCATION;  Service: Cardiovascular;  Laterality: N/A;    HERNIA REPAIR       Family History   Problem Relation Age of Onset    Leukemia Mother     Cancer Sister     Cancer Maternal Grandmother      Social History     Socioeconomic History    Marital status:    Tobacco Use    Smoking status: Former     Current packs/day: 0.00     Average packs/day: 2.0 packs/day for 20.0 years (40.0 ttl pk-yrs)     Types: Cigarettes     Start date: 3/6/1980     Quit date: 3/6/2000     Years since quittin.2     Passive exposure: Past    Smokeless tobacco: Never   Vaping Use    Vaping status: Never Used   Substance and Sexual Activity     Alcohol use: Not Currently     Comment: rare    Drug use: Never    Sexual activity: Defer         Current Outpatient Medications:     ACCU-CHEK SOFTCLIX LANCETS lancets, by Other route. Use as instructed, Disp: , Rfl:     acetaminophen (TYLENOL) 500 MG tablet, Take 2 tablets by mouth Every 8 (Eight) Hours As Needed for Moderate Pain  (arthritic pain)., Disp: 180 tablet, Rfl: 5    aspirin (aspirin) 81 MG EC tablet, Take 1 tablet by mouth Daily., Disp: 90 tablet, Rfl: 3    Blood Glucose Calibration liquid, by In Vitro route., Disp: , Rfl:     Blood Glucose Monitoring Suppl (ACCU-CHEK ALEKS PLUS) w/Device kit, Use to test blood sugar twice a day, Disp: , Rfl:     Blood Glucose Monitoring Suppl (Accu-Chek Guide) w/Device kit, 1 kit 3 (Three) Times a Day. Use glucometer to check blood sugars 3 times a day. Dx. E11.42, Disp: 1 kit, Rfl: 0    Continuous Blood Gluc  (FreeStyle Torie 14 Day [place]) device, 1 Units 3 (Three) Times a Day Before Meals. Use continuous glucose monitor to monitor glucose regularly., Disp: 1 each, Rfl: 0    Continuous Glucose Sensor (FreeStyle Torie 2 Sensor) [facility] [place], Apply 1 each topically Every 14 (Fourteen) Days. Dispense 2 per every 28 days. DX E11.42, Disp: 2 each, Rfl: 5    dapagliflozin Propanediol (Farxiga) 10 MG tablet, Take 10 mg by mouth Daily., Disp: 90 tablet, Rfl: 1    furosemide (LASIX) 80 MG tablet, TAKE 1 TABLET BY MOUTH FOUR TIMES A DAY, Disp: 360 tablet, Rfl: 3    gabapentin (NEURONTIN) 100 MG capsule, TAKE 1 CAPSULE BY MOUTH THREE TIMES A DAY (Patient taking differently: Take 1 capsule by mouth 3 (Three) Times a Day As Needed.), Disp: 90 capsule, Rfl: 2    glucose blood (Accu-Chek Aleks Plus) test strip, USE TESTING STRIP TO CHECK BLOOD SUGARS 3 TIMES A DAY. DX. E11.42, Disp: 100 each, Rfl: 11    Insulin Glargine (Lantus SoloStar) 100 UNIT/ML injection pen, Inject 45 Units under the skin into the appropriate area as directed Daily. (Patient taking differently: Inject 60  "Units under the skin into the appropriate area as directed Daily.), Disp: 45 mL, Rfl: 3    Insulin Pen Needle 32G X 4 MM misc, Use pen needle to deliver insulin subcutaneously daily.  Dx. E11.42, Disp: 30 each, Rfl: 5    metoprolol succinate XL (TOPROL-XL) 25 MG 24 hr tablet, Take 1 tablet by mouth Daily., Disp: 90 tablet, Rfl: 3    sacubitril-valsartan (Entresto) 49-51 MG tablet, Take 1 tablet by mouth 2 (Two) Times a Day., Disp: 180 tablet, Rfl: 0    spironolactone (ALDACTONE) 25 MG tablet, TAKE 1 TABLET BY MOUTH EVERY DAY, Disp: 90 tablet, Rfl: 3    Review of Systems   Constitutional:  Negative for activity change, appetite change, chills, diaphoresis, fatigue, fever, unexpected weight gain and unexpected weight loss.   HENT:  Positive for sore throat. Negative for trouble swallowing.    Respiratory:  Positive for cough, shortness of breath and wheezing. Negative for chest tightness.    Cardiovascular:  Negative for chest pain, palpitations and leg swelling.   Gastrointestinal:  Negative for abdominal pain.   Musculoskeletal:  Negative for gait problem.   Neurological:  Negative for weakness.     /84 (BP Location: Right arm, Patient Position: Sitting, Cuff Size: Large Adult)   Pulse 68   Temp 97.3 °F (36.3 °C) (Temporal)   Resp 18   Ht 185.3 cm (72.97\")   Wt (!) 147 kg (323 lb)   SpO2 98%   BMI 42.65 kg/m²       Objective   Physical Exam  Vitals and nursing note reviewed.   Constitutional:       Appearance: Normal appearance. He is obese.   HENT:      Head: Normocephalic and atraumatic.   Neck:      Thyroid: No thyroid mass, thyromegaly or thyroid tenderness.      Vascular: No carotid bruit.   Cardiovascular:      Rate and Rhythm: Normal rate and regular rhythm.      Heart sounds: Normal heart sounds.   Pulmonary:      Effort: Pulmonary effort is normal.      Breath sounds: Normal breath sounds.   Musculoskeletal:         General: Normal range of motion.      Cervical back: Normal range of motion and " neck supple.      Right lower leg: No edema.      Left lower leg: No edema.   Skin:     General: Skin is warm and dry.   Neurological:      General: No focal deficit present.      Mental Status: He is alert and oriented to person, place, and time.   Psychiatric:         Mood and Affect: Mood normal.         Behavior: Behavior normal.         Thought Content: Thought content normal.         Procedures     Assessment    Diagnoses and all orders for this visit:    1. Type 2 diabetes mellitus with diabetic polyneuropathy, with long-term current use of insulin (Primary)  Comments:  Please make appointment with eye doctor.  Continue to monitor blood sugars at home.  Orders:  -     Comprehensive Metabolic Panel    2. Mixed diabetic hyperlipidemia associated with type 2 diabetes mellitus  Comments:  Will recheck labs today. Has declined statin in the past.  Orders:  -     Comprehensive Metabolic Panel  -     Lipid Panel    3. Chronic cough  Comments:  CT chest in January showed possible emphysema changes.  Will check PFT. Hospital will contact to schedule.  Orders:  -     Complete PFT - Pre & Post Bronchodilator; Future    4. Throat discomfort  Comments:  Referral entered to ENT. They will contact to schedule.  Orders:  -     Ambulatory Referral to ENT (Otolaryngology)    5. Need for Streptococcus pneumoniae vaccination  Comments:  updated today.  Orders:  -     Pneumococcal Conjugate Vaccine 20-Valent All    6. Diastolic heart failure, stage c, chronic  Comments:  Continue current medications and make follow up appointment with both cardiology and heart failure clinic.

## 2024-06-03 DIAGNOSIS — E11.42 TYPE 2 DIABETES MELLITUS WITH DIABETIC POLYNEUROPATHY, WITH LONG-TERM CURRENT USE OF INSULIN: ICD-10-CM

## 2024-06-03 DIAGNOSIS — Z79.4 TYPE 2 DIABETES MELLITUS WITH DIABETIC POLYNEUROPATHY, WITH LONG-TERM CURRENT USE OF INSULIN: ICD-10-CM

## 2024-06-12 ENCOUNTER — HOSPITAL ENCOUNTER (OUTPATIENT)
Dept: RESPIRATORY THERAPY | Facility: HOSPITAL | Age: 64
Discharge: HOME | End: 2024-06-12
Admitting: PHYSICIAN ASSISTANT
Payer: MEDICARE

## 2024-06-12 VITALS — RESPIRATION RATE: 16 BRPM | HEART RATE: 71 BPM | OXYGEN SATURATION: 95 %

## 2024-06-12 DIAGNOSIS — R05.3 CHRONIC COUGH: ICD-10-CM

## 2024-06-12 PROCEDURE — 94799 UNLISTED PULMONARY SVC/PX: CPT

## 2024-06-12 PROCEDURE — 94727 GAS DIL/WSHOT DETER LNG VOL: CPT

## 2024-06-12 PROCEDURE — 94664 DEMO&/EVAL PT USE INHALER: CPT

## 2024-06-12 PROCEDURE — 94060 EVALUATION OF WHEEZING: CPT

## 2024-06-12 PROCEDURE — 94729 DIFFUSING CAPACITY: CPT

## 2024-06-12 RX ORDER — ALBUTEROL SULFATE 90 UG/1
2 AEROSOL, METERED RESPIRATORY (INHALATION) ONCE
Status: COMPLETED | OUTPATIENT
Start: 2024-06-12 | End: 2024-06-12

## 2024-06-12 RX ADMIN — ALBUTEROL SULFATE 2 PUFF: 108 AEROSOL, METERED RESPIRATORY (INHALATION) at 07:24

## 2024-06-17 DIAGNOSIS — J98.4 MIXED RESTRICTIVE AND OBSTRUCTIVE LUNG DISEASE: ICD-10-CM

## 2024-06-17 DIAGNOSIS — R05.3 CHRONIC COUGH: Primary | ICD-10-CM

## 2024-06-17 DIAGNOSIS — J43.9 MIXED RESTRICTIVE AND OBSTRUCTIVE LUNG DISEASE: ICD-10-CM

## 2024-06-24 RX ORDER — SPIRONOLACTONE 25 MG/1
TABLET ORAL
Qty: 90 TABLET | Refills: 3 | Status: SHIPPED | OUTPATIENT
Start: 2024-06-24

## 2024-06-24 NOTE — TELEPHONE ENCOUNTER
Rx Refill Note  Requested Prescriptions     Pending Prescriptions Disp Refills    spironolactone (ALDACTONE) 25 MG tablet [Pharmacy Med Name: SPIRONOLACTONE 25 MG TABLET] 90 tablet 3     Sig: TAKE 1 TABLET BY MOUTH EVERY DAY      Last office visit with prescribing clinician: 4/3/2024   Last telemedicine visit with prescribing clinician: Visit date not found   Next office visit with prescribing clinician: 10/15/2024                         Would you like a call back once the refill request has been completed: [] Yes [] No    If the office needs to give you a call back, can they leave a voicemail: [] Yes [] No    Jessie Cunningham MA  06/24/24, 10:07 EDT

## 2024-07-08 RX ORDER — SACUBITRIL AND VALSARTAN 49; 51 MG/1; MG/1
1 TABLET, FILM COATED ORAL 2 TIMES DAILY
Qty: 180 TABLET | Refills: 2 | Status: SHIPPED | OUTPATIENT
Start: 2024-07-08

## 2024-07-08 NOTE — TELEPHONE ENCOUNTER
Rx Refill Note  Requested Prescriptions      No prescriptions requested or ordered in this encounter      Last office visit with prescribing clinician: 4/3/2024   Last telemedicine visit with prescribing clinician: Visit date not found   Next office visit with prescribing clinician: 10/15/2024                         Would you like a call back once the refill request has been completed: [] Yes [] No    If the office needs to give you a call back, can they leave a voicemail: [] Yes [] No    Jessie Cunningham MA  07/08/24, 10:13 EDT

## 2024-07-31 ENCOUNTER — TELEPHONE (OUTPATIENT)
Dept: CARDIOLOGY | Facility: CLINIC | Age: 64
End: 2024-07-31
Payer: MEDICARE

## 2024-07-31 NOTE — TELEPHONE ENCOUNTER
Gem with [facility]  Phone [phone]    Patient is diabetic and not on a statin. Guidelines recommend a moderate to high intensity statin.   She sent fax last week, but has not heard back. She was also going to refax form. She wants call back.

## 2024-08-05 RX ORDER — METOPROLOL SUCCINATE 25 MG/1
25 TABLET, EXTENDED RELEASE ORAL DAILY
Qty: 90 TABLET | Refills: 2 | Status: SHIPPED | OUTPATIENT
Start: 2024-08-05

## 2024-08-05 NOTE — TELEPHONE ENCOUNTER
Rx Refill Note  Requested Prescriptions     Pending Prescriptions Disp Refills    metoprolol succinate XL (TOPROL-XL) 25 MG 24 hr tablet [Pharmacy Med Name: METOPROLOL SUCC ER 25 MG TAB] 90 tablet 3     Sig: TAKE 1 TABLET BY MOUTH EVERY DAY      Last office visit with prescribing clinician: 4/3/2024   Last telemedicine visit with prescribing clinician: Visit date not found   Next office visit with prescribing clinician: 10/15/2024                         Would you like a call back once the refill request has been completed: [] Yes [] No    If the office needs to give you a call back, can they leave a voicemail: [] Yes [] No    Jessie Cunningham MA  08/05/24, 13:47 EDT

## 2024-08-12 RX ORDER — SACUBITRIL AND VALSARTAN 24; 26 MG/1; MG/1
1 TABLET, FILM COATED ORAL 2 TIMES DAILY
Qty: 180 TABLET | Refills: 2 | OUTPATIENT
Start: 2024-08-12

## 2024-08-12 NOTE — TELEPHONE ENCOUNTER
Rx Refill Note  Requested Prescriptions     Pending Prescriptions Disp Refills    Entresto 24-26 MG tablet [Pharmacy Med Name: ENTRESTO 24 MG-26 MG TABLET] 180 tablet 2     Sig: TAKE 1 TABLET BY MOUTH TWICE A DAY      Last office visit with prescribing clinician: 4/3/2024   Last telemedicine visit with prescribing clinician: Visit date not found   Next office visit with prescribing clinician: 10/15/2024                         Would you like a call back once the refill request has been completed: [] Yes [] No    If the office needs to give you a call back, can they leave a voicemail: [] Yes [] No    Jessie Cunningham MA  08/12/24, 11:13 EDT

## 2024-08-15 ENCOUNTER — OFFICE VISIT (OUTPATIENT)
Dept: PULMONOLOGY | Facility: HOSPITAL | Age: 64
End: 2024-08-15
Payer: MEDICARE

## 2024-08-15 VITALS
RESPIRATION RATE: 17 BRPM | HEART RATE: 71 BPM | DIASTOLIC BLOOD PRESSURE: 79 MMHG | HEIGHT: 73 IN | SYSTOLIC BLOOD PRESSURE: 125 MMHG | OXYGEN SATURATION: 94 % | BODY MASS INDEX: 41.75 KG/M2 | WEIGHT: 315 LBS

## 2024-08-15 DIAGNOSIS — G47.33 OSA (OBSTRUCTIVE SLEEP APNEA): Primary | ICD-10-CM

## 2024-08-15 PROCEDURE — G0463 HOSPITAL OUTPT CLINIC VISIT: HCPCS

## 2024-08-15 RX ORDER — PANTOPRAZOLE SODIUM 40 MG/1
1 TABLET, DELAYED RELEASE ORAL DAILY
COMMUNITY
Start: 2024-07-10

## 2024-08-15 NOTE — PROGRESS NOTES
PULMONARY/ CRITICAL CARE/ SLEEP MEDICINE OUTPATIENT CONSULT/ FOLLOW UP NOTE        Patient Name:  Sorin Allen    :  1960    Medical Record:  [id number]    PRIMARY CARE PHYSICIAN     Stacy Perez PA    REASON FOR CONSULTATION    Sorin Allen is a 63 y.o. male who is referred for consultation for ESAU  REVIEW OF SYSTEMS    Constitutional:  Denies fever or chills   Eyes:  Denies change in visual acuity   HENT:  Denies nasal congestion or sore throat   Respiratory:  Denies cough or shortness of breath   Cardiovascular:  Denies chest pain or edema   GI:  Denies abdominal pain, nausea, vomiting, bloody stools or diarrhea   :  Denies dysuria   Musculoskeletal:  Denies back pain or joint pain   Integument:  Denies rash   Neurologic:  Denies headache, focal weakness or sensory changes   Endocrine:  Denies polyuria or polydipsia   Lymphatic:  Denies swollen glands   Psychiatric:  Denies depression or anxiety     MEDICAL HISTORY    Past Medical History:   Diagnosis Date    Arthritis     Asthma     Cardiac disease     Carpal tunnel syndrome     CKD (chronic kidney disease) stage 3a, GFR 55 ml/min 2024    Congenital heart disease     CPAP (continuous positive airway pressure) dependence     Diabetes mellitus     Diverticulitis of colon     Erectile dysfunction     Heart disease     Heart valve disease     Hyperlipidemia     Hypertension     Neuromuscular disorder     ESAU (obstructive sleep apnea)     Osteoarthritis         SURGICAL HISTORY    Past Surgical History:   Procedure Laterality Date    CARDIAC CATHETERIZATION N/A 2022    Procedure: Left Heart Cath;  Surgeon: Raoul Tirado MD;  Location: [facility] INVASIVE LOCATION;  Service: Cardiovascular;  Laterality: N/A;    CARDIAC ELECTROPHYSIOLOGY PROCEDURE N/A 2022    Procedure: ICD single-chamber, new [place] aware;  Surgeon: Raoul Tirado MD;  Location: [facility] CATH INVASIVE LOCATION;  Service: Cardiovascular;   Laterality: N/A;    HERNIA REPAIR          FAMILY HISTORY    Family History   Problem Relation Age of Onset    Leukemia Mother     Cancer Sister     Cancer Maternal Grandmother        SOCIAL HISTORY    Social History     Tobacco Use    Smoking status: Former     Current packs/day: 0.00     Average packs/day: 2.0 packs/day for 20.0 years (40.0 ttl pk-yrs)     Types: Cigarettes     Start date: 3/6/1980     Quit date: 3/6/2000     Years since quittin.4     Passive exposure: Past    Smokeless tobacco: Never   Substance Use Topics    Alcohol use: Not Currently     Comment: rare        ALLERGIES    No Known Allergies      MEDICATIONS    Current Outpatient Medications on File Prior to Visit   Medication Sig Dispense Refill    ACCU-CHEK SOFTCLIX LANCETS lancets by Other route. Use as instructed      acetaminophen (TYLENOL) 500 MG tablet Take 2 tablets by mouth Every 8 (Eight) Hours As Needed for Moderate Pain  (arthritic pain). 180 tablet 5    aspirin (aspirin) 81 MG EC tablet Take 1 tablet by mouth Daily. 90 tablet 3    Blood Glucose Calibration liquid by In Vitro route.      Blood Glucose Monitoring Suppl (ACCU-CHEK ALEKS PLUS) w/Device kit Use to test blood sugar twice a day      Blood Glucose Monitoring Suppl (Accu-Chek Guide) w/Device kit 1 kit 3 (Three) Times a Day. Use glucometer to check blood sugars 3 times a day. Dx. E11.42 1 kit 0    Continuous Blood Gluc  (FreeStyle Torie 14 Day [place]) device 1 Units 3 (Three) Times a Day Before Meals. Use continuous glucose monitor to monitor glucose regularly. 1 each 0    Continuous Glucose Sensor (FreeStyle Torie 2 Sensor) [facility] [place] Apply 1 each topically Every 14 (Fourteen) Days. Dispense 2 per every 28 days. DX E11.42 2 each 5    dapagliflozin Propanediol (Farxiga) 10 MG tablet Take 10 mg by mouth Daily. 90 tablet 1    furosemide (LASIX) 80 MG tablet TAKE 1 TABLET BY MOUTH FOUR TIMES A  tablet 3    gabapentin (NEURONTIN) 100 MG capsule TAKE 1 CAPSULE BY  "MOUTH THREE TIMES A DAY (Patient taking differently: Take 1 capsule by mouth 3 (Three) Times a Day As Needed.) 90 capsule 2    glucose blood (Accu-Chek Sigrid Plus) test strip USE TESTING STRIP TO CHECK BLOOD SUGARS 3 TIMES A DAY. DX. E11.42 100 each 11    Insulin Glargine (Lantus SoloStar) 100 UNIT/ML injection pen Inject 45 Units under the skin into the appropriate area as directed Daily. (Patient taking differently: Inject 60 Units under the skin into the appropriate area as directed Daily.) 45 mL 3    Insulin Pen Needle 32G X 4 MM misc Use pen needle to deliver insulin subcutaneously daily.  Dx. E11.42 30 each 5    metoprolol succinate XL (TOPROL-XL) 25 MG 24 hr tablet TAKE 1 TABLET BY MOUTH EVERY DAY 90 tablet 2    pantoprazole (PROTONIX) 40 MG EC tablet Take 1 tablet by mouth Daily.      sacubitril-valsartan (Entresto) 49-51 MG tablet Take 1 tablet by mouth 2 (Two) Times a Day. 180 tablet 2    spironolactone (ALDACTONE) 25 MG tablet TAKE 1 TABLET BY MOUTH EVERY DAY 90 tablet 3     No current facility-administered medications on file prior to visit.       PHYSICAL EXAM    Vitals:    08/15/24 1205   BP: 125/79   BP Location: Right arm   Patient Position: Sitting   Cuff Size: Adult   Pulse: 71   Resp: 17   SpO2: 94%   Weight: (!) 147 kg (324 lb)   Height: 185.3 cm (72.97\")        Constitutional:  Well developed, well nourished, no acute distress, non-toxic appearance   Eyes:  PERRL, conjunctiva normal   HENT:  Atraumatic, external ears normal, nose normal, oropharynx moist, no pharyngeal exudates. mallampatti   Neck- normal range of motion, no tenderness, supple   Respiratory:  No respiratory distress, normal breath sounds, no rales, no wheezing   Cardiovascular:  Normal rate, normal rhythm, no murmurs, no gallops, no rubs   GI:  Soft, nondistended, normal bowel sounds, nontender, no organomegaly, no mass, no rebound, no guarding   :  No costovertebral angle tenderness   Musculoskeletal:  No edema, no " tenderness, no deformities. Back- no tenderness  Integument:  Well hydrated, no rash   Lymphatic:  No lymphadenopathy noted   Neurologic:  Alert & oriented x 3, CN 2-12 normal, normal motor function, normal sensory function, no focal deficits noted   Psychiatric:  Speech and behavior appropriate     No radiology results for the last 90 days.   Results for orders placed during the hospital encounter of 01/28/24    Adult Transthoracic Echo Complete W/ Cont if Necessary Per Protocol    Interpretation Summary    Left ventricular systolic function is low normal. Calculated left ventricular EF = 51%    The left ventricular cavity is mildly dilated.    Left ventricular wall thickness is consistent with mild concentric hypertrophy.    Left ventricular diastolic function is consistent with (grade II w/high LAP) pseudonormalization.    Moderately reduced right ventricular systolic function noted.    The right ventricular cavity is dilated.    The left atrial cavity is moderate to severely dilated.    Left atrial volume is severely increased.    The right atrial cavity is moderate to severely  dilated.    There is mild calcification of the aortic valve.    Estimated right ventricular systolic pressure from tricuspid regurgitation is normal (<35 mmHg).    Conclusion      Lumason contrast was given to better evaluate LV function  LVE with mild concentric LVH and lower limits of normal contractility.  Estimated LV ejection fraction of 50%.  Severe RV enlargement seen.  Right ventricular systolic dysfunction seen.  Pacer lead seen in RV.  Severe left atrial enlargement by volume.  Moderate to severe right atrial enlargement.  Pulmonic valve is not well visualized.  Aortic valve appears calcified and sclerosed.  No significant stenosis seen.  Mitral valve, tricuspid valve appears structurally normal, mild tricuspid regurgitation seen.  Calculated RV systolic pressure of 34 mmHg  No pericardial effusion seen.  Proximal aorta appears  normal in size.      ASSESSMENT & PLAN:      63-year-old male with 40-pack-year history of smoking,  Occasional dyspnea on exertion  Pulmonary function test 6/12/2024.  FVC 2.1 L which is 49%, improved to 57% postbronchodilator  FEV1 1.26 L which is 37%  FEV1/FVC ratio 58  Total lung capacity 52%  Residual volume 110%  Diffusion capacity 58%.    Obstructive sleep apnea currently has recall machine I offered to have a download however unable to access his machine    CT chest in January 2024 showed 3 mm nodule    Plan  CT chest no contrast January 2025 follow-up on lung nodule  Patient apparently able to adjust his CPAP settings    Sample of Breztri inhaler will be given    This document has been electronically signed by  Oscar Alvarez MD  12:20 EDT

## 2024-08-16 RX ORDER — BUDESONIDE, GLYCOPYRROLATE, AND FORMOTEROL FUMARATE 160; 9; 4.8 UG/1; UG/1; UG/1
2 AEROSOL, METERED RESPIRATORY (INHALATION) 2 TIMES DAILY
Qty: 1 EACH | Refills: 0 | COMMUNITY
Start: 2024-08-16

## 2024-08-29 ENCOUNTER — OFFICE VISIT (OUTPATIENT)
Dept: FAMILY MEDICINE CLINIC | Facility: CLINIC | Age: 64
End: 2024-08-29
Payer: MEDICARE

## 2024-08-29 ENCOUNTER — LAB (OUTPATIENT)
Dept: FAMILY MEDICINE CLINIC | Facility: CLINIC | Age: 64
End: 2024-08-29
Payer: MEDICARE

## 2024-08-29 VITALS
WEIGHT: 315 LBS | HEART RATE: 60 BPM | TEMPERATURE: 98.4 F | DIASTOLIC BLOOD PRESSURE: 76 MMHG | BODY MASS INDEX: 41.75 KG/M2 | RESPIRATION RATE: 18 BRPM | SYSTOLIC BLOOD PRESSURE: 113 MMHG | HEIGHT: 73 IN | OXYGEN SATURATION: 97 %

## 2024-08-29 DIAGNOSIS — E11.69 MIXED DIABETIC HYPERLIPIDEMIA ASSOCIATED WITH TYPE 2 DIABETES MELLITUS: ICD-10-CM

## 2024-08-29 DIAGNOSIS — R53.83 FATIGUE, UNSPECIFIED TYPE: ICD-10-CM

## 2024-08-29 DIAGNOSIS — Z00.00 MEDICARE ANNUAL WELLNESS VISIT, SUBSEQUENT: Primary | ICD-10-CM

## 2024-08-29 DIAGNOSIS — I50.32 DIASTOLIC HEART FAILURE, STAGE C, CHRONIC: ICD-10-CM

## 2024-08-29 DIAGNOSIS — E78.2 MIXED DIABETIC HYPERLIPIDEMIA ASSOCIATED WITH TYPE 2 DIABETES MELLITUS: ICD-10-CM

## 2024-08-29 DIAGNOSIS — Z79.4 TYPE 2 DIABETES MELLITUS WITH DIABETIC POLYNEUROPATHY, WITH LONG-TERM CURRENT USE OF INSULIN: ICD-10-CM

## 2024-08-29 DIAGNOSIS — Z12.5 PROSTATE CANCER SCREENING: ICD-10-CM

## 2024-08-29 DIAGNOSIS — E11.42 TYPE 2 DIABETES MELLITUS WITH DIABETIC POLYNEUROPATHY, WITH LONG-TERM CURRENT USE OF INSULIN: ICD-10-CM

## 2024-08-29 LAB
ALBUMIN SERPL-MCNC: 4.2 G/DL (ref 3.5–5.2)
ALBUMIN UR-MCNC: 2.9 MG/DL
ALBUMIN/GLOB SERPL: 1 G/DL
ALP SERPL-CCNC: 64 U/L (ref 39–117)
ALT SERPL W P-5'-P-CCNC: 12 U/L (ref 1–41)
ANION GAP SERPL CALCULATED.3IONS-SCNC: 11 MMOL/L (ref 5–15)
AST SERPL-CCNC: 16 U/L (ref 1–40)
BILIRUB SERPL-MCNC: 0.4 MG/DL (ref 0–1.2)
BUN SERPL-MCNC: 27 MG/DL (ref 8–23)
BUN/CREAT SERPL: 19.9 (ref 7–25)
CALCIUM SPEC-SCNC: 9.7 MG/DL (ref 8.6–10.5)
CHLORIDE SERPL-SCNC: 102 MMOL/L (ref 98–107)
CHOLEST SERPL-MCNC: 199 MG/DL (ref 0–200)
CO2 SERPL-SCNC: 28 MMOL/L (ref 22–29)
CREAT SERPL-MCNC: 1.36 MG/DL (ref 0.76–1.27)
DEPRECATED RDW RBC AUTO: 44.8 FL (ref 37–54)
EGFRCR SERPLBLD CKD-EPI 2021: 58.1 ML/MIN/1.73
ERYTHROCYTE [DISTWIDTH] IN BLOOD BY AUTOMATED COUNT: 14.1 % (ref 12.3–15.4)
GLOBULIN UR ELPH-MCNC: 4.1 GM/DL
GLUCOSE SERPL-MCNC: 117 MG/DL (ref 65–99)
HBA1C MFR BLD: 6.9 % (ref 4.8–5.6)
HCT VFR BLD AUTO: 39.9 % (ref 37.5–51)
HDLC SERPL-MCNC: 34 MG/DL (ref 40–60)
HGB BLD-MCNC: 13.3 G/DL (ref 13–17.7)
LDLC SERPL CALC-MCNC: 143 MG/DL (ref 0–100)
LDLC/HDLC SERPL: 4.15 {RATIO}
MCH RBC QN AUTO: 29.2 PG (ref 26.6–33)
MCHC RBC AUTO-ENTMCNC: 33.3 G/DL (ref 31.5–35.7)
MCV RBC AUTO: 87.5 FL (ref 79–97)
PLATELET # BLD AUTO: 273 10*3/MM3 (ref 140–450)
PMV BLD AUTO: 9.7 FL (ref 6–12)
POTASSIUM SERPL-SCNC: 4.3 MMOL/L (ref 3.5–5.2)
PROT SERPL-MCNC: 8.3 G/DL (ref 6–8.5)
PSA SERPL-MCNC: 0.95 NG/ML (ref 0–4)
RBC # BLD AUTO: 4.56 10*6/MM3 (ref 4.14–5.8)
SODIUM SERPL-SCNC: 141 MMOL/L (ref 136–145)
TRIGL SERPL-MCNC: 120 MG/DL (ref 0–150)
TSH SERPL DL<=0.05 MIU/L-ACNC: 0.85 UIU/ML (ref 0.27–4.2)
VLDLC SERPL-MCNC: 22 MG/DL (ref 5–40)
WBC NRBC COR # BLD AUTO: 7.41 10*3/MM3 (ref 3.4–10.8)

## 2024-08-29 PROCEDURE — 3078F DIAST BP <80 MM HG: CPT | Performed by: PHYSICIAN ASSISTANT

## 2024-08-29 PROCEDURE — 83036 HEMOGLOBIN GLYCOSYLATED A1C: CPT | Performed by: PHYSICIAN ASSISTANT

## 2024-08-29 PROCEDURE — 80053 COMPREHEN METABOLIC PANEL: CPT | Performed by: PHYSICIAN ASSISTANT

## 2024-08-29 PROCEDURE — G0439 PPPS, SUBSEQ VISIT: HCPCS | Performed by: PHYSICIAN ASSISTANT

## 2024-08-29 PROCEDURE — 36415 COLL VENOUS BLD VENIPUNCTURE: CPT | Performed by: PHYSICIAN ASSISTANT

## 2024-08-29 PROCEDURE — 80061 LIPID PANEL: CPT | Performed by: PHYSICIAN ASSISTANT

## 2024-08-29 PROCEDURE — 85027 COMPLETE CBC AUTOMATED: CPT | Performed by: PHYSICIAN ASSISTANT

## 2024-08-29 PROCEDURE — 82043 UR ALBUMIN QUANTITATIVE: CPT | Performed by: PHYSICIAN ASSISTANT

## 2024-08-29 PROCEDURE — 1125F AMNT PAIN NOTED PAIN PRSNT: CPT | Performed by: PHYSICIAN ASSISTANT

## 2024-08-29 PROCEDURE — 84443 ASSAY THYROID STIM HORMONE: CPT | Performed by: PHYSICIAN ASSISTANT

## 2024-08-29 PROCEDURE — 1170F FXNL STATUS ASSESSED: CPT | Performed by: PHYSICIAN ASSISTANT

## 2024-08-29 PROCEDURE — G0103 PSA SCREENING: HCPCS | Performed by: PHYSICIAN ASSISTANT

## 2024-08-29 PROCEDURE — 3074F SYST BP LT 130 MM HG: CPT | Performed by: PHYSICIAN ASSISTANT

## 2024-08-29 PROCEDURE — 3051F HG A1C>EQUAL 7.0%<8.0%: CPT | Performed by: PHYSICIAN ASSISTANT

## 2024-08-29 NOTE — PROGRESS NOTES
Subjective   The ABCs of the Annual Wellness Visit  Medicare Wellness Visit      Sorin Allen is a 63 y.o. patient who presents for a Medicare Wellness Visit.    The following portions of the patient's history were reviewed and   updated as appropriate: allergies, current medications, past family history, past medical history, past social history, past surgical history, and problem list.    Compared to one year ago, the patient's physical   health is worse.  Compared to one year ago, the patient's mental   health is the same.    Recent Hospitalizations:  This patient has had a [facility] admission record on file within the last 365 days.  Current Medical Providers:  Patient Care Team:  Stacy Perez PA as PCP - General (Physician Assistant)  Raoul Tirado MD as Consulting Physician (Cardiology)  Macrina Castro APRN as Nurse Practitioner (Nurse Practitioner)  Oscar Alvarez MD as Consulting Physician (Pulmonary Disease)    Outpatient Medications Prior to Visit   Medication Sig Dispense Refill    ACCU-CHEK SOFTCLIX LANCETS lancets by Other route. Use as instructed      acetaminophen (TYLENOL) 500 MG tablet Take 2 tablets by mouth Every 8 (Eight) Hours As Needed for Moderate Pain  (arthritic pain). 180 tablet 5    aspirin (aspirin) 81 MG EC tablet Take 1 tablet by mouth Daily. 90 tablet 3    Blood Glucose Calibration liquid by In Vitro route.      Blood Glucose Monitoring Suppl (ACCU-CHEK ALEKS PLUS) w/Device kit Use to test blood sugar twice a day      Blood Glucose Monitoring Suppl (Accu-Chek Guide) w/Device kit 1 kit 3 (Three) Times a Day. Use glucometer to check blood sugars 3 times a day. Dx. E11.42 1 kit 0    Budeson-Glycopyrrol-Formoterol (Breztri Aerosphere) 160-9-4.8 MCG/ACT aerosol inhaler Inhale 2 puffs 2 (Two) Times a Day. 1 each 0    Continuous Blood Gluc  (FreeStyle Torie 14 Day [place]) device 1 Units 3 (Three) Times a Day Before Meals. Use continuous glucose monitor to monitor  glucose regularly. 1 each 0    dapagliflozin Propanediol (Farxiga) 10 MG tablet Take 10 mg by mouth Daily. 90 tablet 1    furosemide (LASIX) 80 MG tablet TAKE 1 TABLET BY MOUTH FOUR TIMES A  tablet 3    gabapentin (NEURONTIN) 100 MG capsule TAKE 1 CAPSULE BY MOUTH THREE TIMES A DAY (Patient taking differently: Take 1 capsule by mouth 3 (Three) Times a Day As Needed.) 90 capsule 2    glucose blood (Accu-Chek Sigrid Plus) test strip USE TESTING STRIP TO CHECK BLOOD SUGARS 3 TIMES A DAY. DX. E11.42 100 each 11    Insulin Pen Needle 32G X 4 MM misc Use pen needle to deliver insulin subcutaneously daily.  Dx. E11.42 30 each 5    metoprolol succinate XL (TOPROL-XL) 25 MG 24 hr tablet TAKE 1 TABLET BY MOUTH EVERY DAY 90 tablet 2    pantoprazole (PROTONIX) 40 MG EC tablet Take 1 tablet by mouth Daily.      sacubitril-valsartan (Entresto) 49-51 MG tablet Take 1 tablet by mouth 2 (Two) Times a Day. (Patient taking differently: Take 1 tablet by mouth 2 (Two) Times a Day. Pt taking once a day) 180 tablet 2    spironolactone (ALDACTONE) 25 MG tablet TAKE 1 TABLET BY MOUTH EVERY DAY 90 tablet 3    Continuous Glucose Sensor (FreeStyle Torie 2 Sensor) [facility] [place] Apply 1 each topically Every 14 (Fourteen) Days. Dispense 2 per every 28 days. DX E11.42 2 each 5    Insulin Glargine (Lantus SoloStar) 100 UNIT/ML injection pen Inject 45 Units under the skin into the appropriate area as directed Daily. (Patient taking differently: Inject 60 Units under the skin into the appropriate area as directed Daily.) 45 mL 3     No facility-administered medications prior to visit.     No opioid medication identified on active medication list. I have reviewed chart for other potential  high risk medication/s and harmful drug interactions in the elderly.      Aspirin is on active medication list. Aspirin use is indicated based on review of current medical condition/s. Pros and cons of this therapy have been discussed today. Benefits of this medication  "outweigh potential harm.  Patient has been encouraged to continue taking this medication.  .      Patient Active Problem List   Diagnosis    Morbid (severe) obesity due to excess calories    Type 2 diabetes mellitus with diabetic polyneuropathy, with long-term current use of insulin    Primary osteoarthritis involving multiple joints    Primary osteoarthritis of left knee    Statin declined    Mixed diabetic hyperlipidemia associated with type 2 diabetes mellitus    Chest pain    ESAU on CPAP    Essential hypertension    Presence of automatic cardioverter/defibrillator (AICD)    Flu    Dyspnea on exertion    Metabolic syndrome X    core Pulmonale    Diastolic heart failure, stage c, chronic    CKD (chronic kidney disease) stage 3a, GFR 55 ml/min    COPD suggested by initial evaluation    Chronic hypoxemic respiratory failure    At risk for fluid volume overload    Edema of Chronic left > right unexplained lower extremity    Heart failure with recovered ejection fraction (HFrecEF)     Advance Care Planning Advance Directive is not on file.  ACP discussion was held with the patient during this visit. Patient does not have an advance directive, information provided.            Objective   Vitals:    08/29/24 1045   BP: 113/76   BP Location: Left arm   Patient Position: Sitting   Cuff Size: Large Adult   Pulse: 60   Resp: 18   Temp: 98.4 °F (36.9 °C)   TempSrc: Temporal   SpO2: 97%   Weight: (!) 146 kg (322 lb)   Height: 185.3 cm (72.95\")   PainSc:   4   PainLoc: Shoulder       Estimated body mass index is 42.54 kg/m² as calculated from the following:    Height as of this encounter: 185.3 cm (72.95\").    Weight as of this encounter: 146 kg (322 lb).            Does the patient have evidence of cognitive impairment? No                                                                                               Health  Risk Assessment    Smoking Status:  Social History     Tobacco Use   Smoking Status Former    Current " packs/day: 0.00    Average packs/day: 2.0 packs/day for 20.0 years (40.0 ttl pk-yrs)    Types: Cigarettes    Start date: 3/6/1980    Quit date: 3/6/2000    Years since quittin.4    Passive exposure: Past   Smokeless Tobacco Never     Alcohol Consumption:  Social History     Substance and Sexual Activity   Alcohol Use Not Currently    Comment: rare       Fall Risk Screen  STEADI Fall Risk Assessment was completed, and patient is at LOW risk for falls.Assessment completed on:2024    Depression Screenin/29/2024    11:00 AM   PHQ-2/PHQ-9 Depression Screening   Little Interest or Pleasure in Doing Things 0-->not at all   Feeling Down, Depressed or Hopeless 0-->not at all   PHQ-9: Brief Depression Severity Measure Score 0     Health Habits and Functional and Cognitive Screenin/29/2024    10:58 AM   Functional & Cognitive Status   Do you have difficulty preparing food and eating? Yes   Do you have difficulty bathing yourself, getting dressed or grooming yourself? Yes   Do you have difficulty using the toilet? Yes   Do you have difficulty moving around from place to place? Yes   Do you have trouble with steps or getting out of a bed or a chair? Yes   Current Diet Well Balanced Diet   Dental Exam Not up to date   Eye Exam Up to date   Exercise (times per week) 0 times per week   Current Exercises Include No Regular Exercise   Do you need help using the phone?  No   Are you deaf or do you have serious difficulty hearing?  No   Do you need help to go to places out of walking distance? Yes   Do you need help shopping? Yes   Do you need help preparing meals?  Yes   Do you need help with housework?  Yes   Do you need help with laundry? Yes   Do you need help taking your medications? No   Do you need help managing money? No   Do you ever drive or ride in a car without wearing a seat belt? No   Have you felt unusual stress, anger or loneliness in the last month? Yes   Who do you live with? Spouse   If  you need help, do you have trouble finding someone available to you? No   Have you been bothered in the last four weeks by sexual problems? No   Do you have difficulty concentrating, remembering or making decisions? No           Age-appropriate Screening Schedule:  Refer to the list below for future screening recommendations based on patient's age, sex and/or medical conditions. Orders for these recommended tests are listed in the plan section. The patient has been provided with a written plan.    Health Maintenance List  Health Maintenance   Topic Date Due    TDAP/TD VACCINES (1 - Tdap) Never done    ZOSTER VACCINE (1 of 2) Never done    DIABETIC FOOT EXAM  12/16/2021    DIABETIC EYE EXAM  07/05/2023    COVID-19 Vaccine (4 - 2023-24 season) 09/01/2023    URINE MICROALBUMIN  08/23/2024    INFLUENZA VACCINE  08/01/2024    HEMOGLOBIN A1C  09/28/2024    BMI FOLLOWUP  03/28/2025    COLORECTAL CANCER SCREENING  04/30/2025    LIPID PANEL  05/28/2025    ANNUAL WELLNESS VISIT  08/29/2025    HEPATITIS C SCREENING  Completed    Pneumococcal Vaccine 0-64  Completed    LUNG CANCER SCREENING  Discontinued                                                                                                                                                CMS Preventative Services Quick Reference  Risk Factors Identified During Encounter  Chronic Pain: Natural history and expected course discussed. Questions answered.  Immunizations Discussed/Encouraged: Tdap, Influenza, Shingrix, and COVID19  Vision Screening Recommended    The above risks/problems have been discussed with the patient.  Pertinent information has been shared with the patient in the After Visit Summary.  An After Visit Summary and PPPS were made available to the patient.    Follow Up:   Next Medicare Wellness visit to be scheduled in 1 year.     Assessment & Plan  Prostate cancer screening  Will check labs today.  Type 2 diabetes mellitus with diabetic polyneuropathy, with  long-term current use of insulin   Make appointment with eye doctor.  Continue current medications.  Will recheck labs today.  Mixed diabetic hyperlipidemia associated with type 2 diabetes mellitus   Lipid abnormalities are worsening    Plan:  Lipid lowering therapy not prescribed due to patient refusal    Counseled patient on lifestyle modifications to help control hyperlipidemia.   Cholesterol lowering dietary information shared with patient.    Patient Treatment Goals:   LDL goal is less than 70    Followup in 6 months.  Diastolic heart failure, stage c, chronic  Continue follow up with cardiology  Medicare annual wellness visit, subsequent  Work on staying as active as possible and healthy diet.  Fatigue, unspecified type  Will check labs.    Orders Placed This Encounter   Procedures    MicroAlbumin, Urine, Random - Urine, Clean Catch    Comprehensive Metabolic Panel    Hemoglobin A1c    Lipid Panel    PSA SCREENING    TSH    CBC No Differential     New Medications Ordered This Visit   Medications    Continuous Glucose Sensor (FreeStyle Torie 2 Sensor) misc     Sig: Apply 1 each topically Every 14 (Fourteen) Days. Dispense 6 per every 3 months. DX E11.42     Dispense:  6 each     Refill:  3          Follow Up:   No follow-ups on file.

## 2024-08-29 NOTE — ASSESSMENT & PLAN NOTE
Lipid abnormalities are worsening    Plan:  Lipid lowering therapy not prescribed due to patient refusal    Counseled patient on lifestyle modifications to help control hyperlipidemia.   Cholesterol lowering dietary information shared with patient.    Patient Treatment Goals:   LDL goal is less than 70    Followup in 6 months.

## 2024-10-15 ENCOUNTER — OFFICE VISIT (OUTPATIENT)
Dept: CARDIOLOGY | Facility: CLINIC | Age: 64
End: 2024-10-15
Payer: MEDICARE

## 2024-10-15 ENCOUNTER — CLINICAL SUPPORT NO REQUIREMENTS (OUTPATIENT)
Dept: CARDIOLOGY | Facility: CLINIC | Age: 64
End: 2024-10-15
Payer: MEDICARE

## 2024-10-15 VITALS — SYSTOLIC BLOOD PRESSURE: 117 MMHG | OXYGEN SATURATION: 93 % | DIASTOLIC BLOOD PRESSURE: 75 MMHG | HEART RATE: 73 BPM

## 2024-10-15 DIAGNOSIS — I50.32 HEART FAILURE WITH RECOVERED EJECTION FRACTION (HFRECEF): ICD-10-CM

## 2024-10-15 DIAGNOSIS — Z95.810 PRESENCE OF AUTOMATIC CARDIOVERTER/DEFIBRILLATOR (AICD): ICD-10-CM

## 2024-10-15 DIAGNOSIS — R07.9 CHEST PAIN, UNSPECIFIED TYPE: Primary | ICD-10-CM

## 2024-10-15 DIAGNOSIS — I50.32 DIASTOLIC HEART FAILURE, STAGE C, CHRONIC: Primary | ICD-10-CM

## 2024-10-15 DIAGNOSIS — I10 ESSENTIAL HYPERTENSION: ICD-10-CM

## 2024-10-15 DIAGNOSIS — E11.9 TYPE 2 DIABETES MELLITUS WITHOUT COMPLICATION, WITH LONG-TERM CURRENT USE OF INSULIN: ICD-10-CM

## 2024-10-15 DIAGNOSIS — Z79.4 TYPE 2 DIABETES MELLITUS WITHOUT COMPLICATION, WITH LONG-TERM CURRENT USE OF INSULIN: ICD-10-CM

## 2024-10-15 NOTE — PROGRESS NOTES
Subjective:     Encounter Date:10/15/2024      Patient ID: Sorin Allen is a 63 y.o. male.    Chief Complaint and history of present illness:     Follow-up for hypertensive cardiovascular disease, CHF, cardiomyopathy, ICD, dyslipidemia     History of present illness:    Mr. Sorin Allen has PMH of     Hypertensive cardiovascular disease with chronic diastolic CHF  Chronic HFrEF due to systolic dysfunction from dilated cardiomyopathy  [place] ICD 4/7/2022  Cardiac cath 2/16/2022 EF of 15% no obstructive CAD  Dyslipidemia  Hypertension  Obesity with BMI over 40  ESAU/CPAP  ED  Rheumatic fever as a child  Former smoker quit 3/6/2000  Family history mother has leukemia     Here for follow-up.  He is complaining of chest pain, shortness of breath, dyspnea on exertion, fatigue, lightheadedness, edema.  Patient has edema left greater than right.  Gets dizzy when he bends down.  Has tingling and numbness in the feet.  Patient's chest pain is occasional and short-lived and a funny feeling sensation in the chest.  No exertional component.     Patient's arterial blood pressure is 117/75, heart rate 73, O2 sat of 93% on room air.  BMI has been over 40     Patient was recently in the hospital 1/28/2024 with low-grade fever and bodyaches, hypoxic respiratory failure was positive for influenza A.  Labs revealed HS troponin 43--37--36,  pro bnp normal at 574  BUN 26 creatinine 1.50   D-dimer mildly elevated however CT was negative for PE but does show a 3mm subpleural nodule within the right upper lobe and paraseptal emphysematous changes in the right lung.      Chart review:  Patient reportedly had cardiac cath 8 years ago was told he has cardiomyopathy and CHF.  Underwent repeat cath 2/16/2022 which revealed EF of 15% no obstructive CAD..  Patient had repeat echocardiogram 4/6/2022 which is revealing persistent LV dysfunction with LVEF of 20% with severe pulmonary hypertension PA systolic pressure of 60 mmHg.         Echocardiogram 5/1/2020 revealed normal LV systolic function EF 60% with diastolic dysfunction and RV enlargement and mild aortic stenosis  Echocardiogram 1/21/2022 revealed EF of 26 to 30%  Repeat echo 4/6/2022 is revealing EF of 20% with severe pulmonary hypertension PA systolic pressure of 60 mmHg  Echocardiogram 1/30/2024 reveals EF of 51%, mild LVE, concentric LVH, grade 2 diastolic dysfunction, moderately reduced right ventricular systolic function and dilated RV, LA, RA and calcified aorta.     Labs from 3/28/2024 reveal proBNP of 335.  A1c of 7.1.  Creatinine of 1.24.  Labs from 8/29/2024 reveal normal CBC, TSH.  CMP with a glucose of 117, creatinine 1.36, EGFR 58.  A1c of 6.9.     Assessment:  :     Chest pain, atypical  Dyspnea on exertion  Edema  Chronic HFrecEF due to combined hypertensive cardiovascular disease and systolic dysfunction from dilated cardiomyopathy EF of 15-20% improved to 50%  Dilated cardiomyopathy  Hypertension  Diabetes  Obesity with BMI over 40        Recommendations / Plan:        Reviewed EKG results with patient is unchanged.  Patient had device check today which is revealing normal function.  Will follow-up in device clinic.  Continue medical management with aspirin, Farxiga, furosemide, metoprolol, Entresto as tolerated.  Aldactone has been stopped.  Reviewed renal insufficiency with the patient.  Advised him against NSAIDs.  Monitor renal function and electrolytes.  Patient refused statins.  Follow-up in heart failure clinic.  Follow-up with PMD for diabetes care.     Procedures:  Cardiac cath 2/16/2022 performed and interpreted by me reveals dilated cardiomyopathy EF of 15%  Lexiscan Cardiolite performed 4/25/2023 revealed patchy uptake EF of 35%.  Echocardiogram 1/30/2024 reveals EF of 51% with mild LVE, mild concentric LVH, grade 2 diastolic dysfunction, moderate RV enlargement and RV dysfunction. Severe left atrial enlargement.  Patient had improvement of EF after GDMT.               ECG 12 Lead    Date/Time: 10/15/2024 11:26 AM  Performed by: Raoul Tirado MD    Authorized by: Raoul Tirado MD  Comparison: compared with previous ECG from 2/6/2024  Comparison to previous ECG: EKG done today reviewed/interpreted by me revealed sinus rhythm at the rate of 71 bpm, Q waves in V1 V2 suggestive of anteroseptal MI, no significant change compared to EKG from 2/6/2024          Copied text in this portion of the note has been reviewed and is accurate as of 10/15/2024  The following portions of the patient's history were reviewed and updated as appropriate: allergies, current medications, past family history, past medical history, past social history, past surgical history and problem list.    Assessment:         [facility]       Diagnosis Plan   1. Chest pain, unspecified type  BNP    Comprehensive Metabolic Panel      2. Heart failure with recovered ejection fraction (HFrecEF)  BNP    Comprehensive Metabolic Panel      3. Presence of automatic cardioverter/defibrillator (AICD)  BNP    Comprehensive Metabolic Panel      4. Essential hypertension  BNP    Comprehensive Metabolic Panel      5. Type 2 diabetes mellitus without complication, with long-term current use of insulin  BNP    Comprehensive Metabolic Panel             Plan:               Past Medical History:  Past Medical History:   Diagnosis Date    Arthritis     Asthma     Cardiac disease     Carpal tunnel syndrome     CKD (chronic kidney disease) stage 3a, GFR 55 ml/min 2/5/2024    Congenital heart disease     CPAP (continuous positive airway pressure) dependence     Diabetes mellitus     Diverticulitis of colon     Erectile dysfunction     Heart disease     Heart valve disease     Hyperlipidemia     Hypertension     Neuromuscular disorder     ESAU (obstructive sleep apnea)     Osteoarthritis      Past Surgical History:  Past Surgical History:   Procedure Laterality Date    CARDIAC CATHETERIZATION N/A 2/16/2022     Procedure: Left Heart Cath;  Surgeon: Raoul Tirado MD;  Location:  [facility] CATH INVASIVE LOCATION;  Service: Cardiovascular;  Laterality: N/A;    CARDIAC ELECTROPHYSIOLOGY PROCEDURE N/A 6/7/2022    Procedure: ICD single-chamber, new [place] aware;  Surgeon: Raoul Tirado MD;  Location:  [facility] CATH INVASIVE LOC[facility];  Service: Cardiovascular;  Laterality: N/A;    HERNIA REPAIR        Allergies:  No Known Allergies  Home Meds:  Current Meds:     Current Outpatient Medications:     ACCU-CHEK SOFTCLIX LANCETS lancets, by Other route. Use as instructed, Disp: , Rfl:     acetaminophen (TYLENOL) 500 MG tablet, Take 2 tablets by mouth Every 8 (Eight) Hours As Needed for Moderate Pain  (arthritic pain)., Disp: 180 tablet, Rfl: 5    aspirin (aspirin) 81 MG EC tablet, Take 1 tablet by mouth Daily., Disp: 90 tablet, Rfl: 3    Blood Glucose Calibration liquid, by In Vitro route., Disp: , Rfl:     Blood Glucose Monitoring Suppl (ACCU-CHEK ALEKS PLUS) w/Device kit, Use to test blood sugar twice a day, Disp: , Rfl:     Blood Glucose Monitoring Suppl (Accu-Chek Guide) w/Device kit, 1 kit 3 (Three) Times a Day. Use glucometer to check blood sugars 3 times a day. Dx. E11.42, Disp: 1 kit, Rfl: 0    Budeson-Glycopyrrol-Formoterol (Breztri Aerosphere) 160-9-4.8 MCG/ACT aerosol inhaler, Inhale 2 puffs 2 (Two) Times a Day., Disp: 1 each, Rfl: 0    Continuous Blood Gluc  (FreeStyle Torie 14 Day [place]) device, 1 Units 3 (Three) Times a Day Before Meals. Use continuous glucose monitor to monitor glucose regularly., Disp: 1 each, Rfl: 0    Continuous Glucose Sensor (FreeStyle Torie 2 Sensor) [facility], Apply 1 each topically Every 14 (Fourteen) Days. Dispense 6 per every 3 months. DX E11.42, Disp: 6 each, Rfl: 3    dapagliflozin Propanediol (Farxiga) 10 MG tablet, Take 10 mg by mouth Daily., Disp: 90 tablet, Rfl: 1    furosemide (LASIX) 80 MG tablet, TAKE 1 TABLET BY MOUTH FOUR TIMES A DAY, Disp: 360 tablet, Rfl:  3    gabapentin (NEURONTIN) 100 MG capsule, TAKE 1 CAPSULE BY MOUTH THREE TIMES A DAY (Patient taking differently: Take 1 capsule by mouth 3 (Three) Times a Day As Needed.), Disp: 90 capsule, Rfl: 2    glucose blood (Accu-Chek Sigrid Plus) test strip, USE TESTING STRIP TO CHECK BLOOD SUGARS 3 TIMES A DAY. DX. E11.42, Disp: 100 each, Rfl: 11    Insulin Glargine (Lantus SoloStar) 100 UNIT/ML injection pen, Inject 45 Units under the skin into the appropriate area as directed Daily. (Patient taking differently: Inject 60 Units under the skin into the appropriate area as directed Daily.), Disp: 45 mL, Rfl: 3    Insulin Pen Needle 32G X 4 MM misc, Use pen needle to deliver insulin subcutaneously daily.  Dx. E11.42, Disp: 30 each, Rfl: 5    metoprolol succinate XL (TOPROL-XL) 25 MG 24 hr tablet, TAKE 1 TABLET BY MOUTH EVERY DAY, Disp: 90 tablet, Rfl: 2    sacubitril-valsartan (Entresto) 49-51 MG tablet, Take 1 tablet by mouth 2 (Two) Times a Day. (Patient taking differently: Take 1 tablet by mouth 2 (Two) Times a Day. Pt taking once a day), Disp: 180 tablet, Rfl: 2    pantoprazole (PROTONIX) 40 MG EC tablet, Take 1 tablet by mouth Daily. (Patient not taking: Reported on 10/15/2024), Disp: , Rfl:   Social History:   Social History     Tobacco Use    Smoking status: Former     Current packs/day: 0.00     Average packs/day: 2.0 packs/day for 20.0 years (40.0 ttl pk-yrs)     Types: Cigarettes     Start date: 3/6/1980     Quit date: 3/6/2000     Years since quittin.6     Passive exposure: Past    Smokeless tobacco: Never   Substance Use Topics    Alcohol use: Not Currently     Comment: rare      Family History:  Family History   Problem Relation Age of Onset    Leukemia Mother     Cancer Sister     Cancer Maternal Grandmother               Review of Systems   Cardiovascular:  Positive for chest pain and leg swelling. Negative for palpitations.   Respiratory:  Positive for shortness of breath.    Neurological:  Positive for  "dizziness and numbness.     All other systems are negative         Objective:     Physical Exam  /75 (BP Location: Right arm, Patient Position: Sitting, Cuff Size: Large Adult)   Pulse 73   SpO2 93%   General:  Appears in no acute distress  Eyes: Sclera is anicteric,  conjunctiva is clear   HEENT:  No JVD.  No carotid bruits  Respiratory: Respirations regular and unlabored at rest.  Clear to auscultation  Cardiovascular: S1,S2 Regular rate and rhythm. .   Extremities: No digital clubbing or cyanosis, no edema  Skin: Color pink. Skin warm and dry to touch. No rashes  No xanthoma  Neuro: Alert and awake.    Lab Reviewed:         Raoul Tirado MD  10/15/2024 11:39 EDT      EMR Dragon/Transcription:   \"Dictated utilizing Dragon dictation\".        "

## 2024-11-11 RX ORDER — DAPAGLIFLOZIN 10 MG/1
10 TABLET, FILM COATED ORAL DAILY
Qty: 90 TABLET | Refills: 3 | Status: SHIPPED | OUTPATIENT
Start: 2024-11-11

## 2024-11-11 NOTE — TELEPHONE ENCOUNTER
Rx Refill Note  Requested Prescriptions     Pending Prescriptions Disp Refills    Farxiga 10 MG tablet [Pharmacy Med Name: FARXIGA 10 MG TABLET] 90 tablet 1     Sig: TAKE 10 MG BY MOUTH DAILY.      Last office visit with prescribing clinician: 10/15/2024   Last telemedicine visit with prescribing clinician: Visit date not found   Next office visit with prescribing clinician: 4/29/2025                         Would you like a call back once the refill request has been completed: [] Yes [] No    If the office needs to give you a call back, can they leave a voicemail: [] Yes [] No    Jessie Cunningham MA  11/11/24, 10:11 EST

## 2024-12-02 ENCOUNTER — OFFICE VISIT (OUTPATIENT)
Dept: FAMILY MEDICINE CLINIC | Facility: CLINIC | Age: 64
End: 2024-12-02
Payer: MEDICARE

## 2024-12-02 ENCOUNTER — LAB (OUTPATIENT)
Dept: FAMILY MEDICINE CLINIC | Facility: CLINIC | Age: 64
End: 2024-12-02
Payer: MEDICARE

## 2024-12-02 VITALS
RESPIRATION RATE: 18 BRPM | DIASTOLIC BLOOD PRESSURE: 81 MMHG | HEIGHT: 73 IN | SYSTOLIC BLOOD PRESSURE: 125 MMHG | TEMPERATURE: 97.9 F | HEART RATE: 66 BPM | BODY MASS INDEX: 41.75 KG/M2 | OXYGEN SATURATION: 98 % | WEIGHT: 315 LBS

## 2024-12-02 DIAGNOSIS — Z13.6 SCREENING FOR AAA (ABDOMINAL AORTIC ANEURYSM): ICD-10-CM

## 2024-12-02 DIAGNOSIS — E11.69 MIXED DIABETIC HYPERLIPIDEMIA ASSOCIATED WITH TYPE 2 DIABETES MELLITUS: ICD-10-CM

## 2024-12-02 DIAGNOSIS — Z79.4 TYPE 2 DIABETES MELLITUS WITH DIABETIC POLYNEUROPATHY, WITH LONG-TERM CURRENT USE OF INSULIN: Primary | ICD-10-CM

## 2024-12-02 DIAGNOSIS — E78.2 MIXED DIABETIC HYPERLIPIDEMIA ASSOCIATED WITH TYPE 2 DIABETES MELLITUS: ICD-10-CM

## 2024-12-02 DIAGNOSIS — E11.42 TYPE 2 DIABETES MELLITUS WITH DIABETIC POLYNEUROPATHY, WITH LONG-TERM CURRENT USE OF INSULIN: Primary | ICD-10-CM

## 2024-12-02 LAB
ALBUMIN SERPL-MCNC: 4.4 G/DL (ref 3.5–5.2)
ALBUMIN/GLOB SERPL: 1.1 G/DL
ALP SERPL-CCNC: 78 U/L (ref 39–117)
ALT SERPL W P-5'-P-CCNC: 14 U/L (ref 1–41)
ANION GAP SERPL CALCULATED.3IONS-SCNC: 12.9 MMOL/L (ref 5–15)
AST SERPL-CCNC: 14 U/L (ref 1–40)
BILIRUB SERPL-MCNC: 0.3 MG/DL (ref 0–1.2)
BUN SERPL-MCNC: 33 MG/DL (ref 8–23)
BUN/CREAT SERPL: 22.9 (ref 7–25)
CALCIUM SPEC-SCNC: 9.6 MG/DL (ref 8.6–10.5)
CHLORIDE SERPL-SCNC: 99 MMOL/L (ref 98–107)
CHOLEST SERPL-MCNC: 227 MG/DL (ref 0–200)
CO2 SERPL-SCNC: 29.1 MMOL/L (ref 22–29)
CREAT SERPL-MCNC: 1.44 MG/DL (ref 0.76–1.27)
EGFRCR SERPLBLD CKD-EPI 2021: 53.9 ML/MIN/1.73
GLOBULIN UR ELPH-MCNC: 4.1 GM/DL
GLUCOSE SERPL-MCNC: 117 MG/DL (ref 65–99)
HBA1C MFR BLD: 7.3 % (ref 4.8–5.6)
HDLC SERPL-MCNC: 40 MG/DL (ref 40–60)
LDLC SERPL CALC-MCNC: 162 MG/DL (ref 0–100)
LDLC/HDLC SERPL: 3.99 {RATIO}
POTASSIUM SERPL-SCNC: 4.1 MMOL/L (ref 3.5–5.2)
PROT SERPL-MCNC: 8.5 G/DL (ref 6–8.5)
SODIUM SERPL-SCNC: 141 MMOL/L (ref 136–145)
TRIGL SERPL-MCNC: 138 MG/DL (ref 0–150)
VLDLC SERPL-MCNC: 25 MG/DL (ref 5–40)

## 2024-12-02 PROCEDURE — 99214 OFFICE O/P EST MOD 30 MIN: CPT | Performed by: PHYSICIAN ASSISTANT

## 2024-12-02 PROCEDURE — 3074F SYST BP LT 130 MM HG: CPT | Performed by: PHYSICIAN ASSISTANT

## 2024-12-02 PROCEDURE — 3079F DIAST BP 80-89 MM HG: CPT | Performed by: PHYSICIAN ASSISTANT

## 2024-12-02 PROCEDURE — 36415 COLL VENOUS BLD VENIPUNCTURE: CPT | Performed by: PHYSICIAN ASSISTANT

## 2024-12-02 PROCEDURE — 80053 COMPREHEN METABOLIC PANEL: CPT | Performed by: PHYSICIAN ASSISTANT

## 2024-12-02 PROCEDURE — 1160F RVW MEDS BY RX/DR IN RCRD: CPT | Performed by: PHYSICIAN ASSISTANT

## 2024-12-02 PROCEDURE — 1125F AMNT PAIN NOTED PAIN PRSNT: CPT | Performed by: PHYSICIAN ASSISTANT

## 2024-12-02 PROCEDURE — G2211 COMPLEX E/M VISIT ADD ON: HCPCS | Performed by: PHYSICIAN ASSISTANT

## 2024-12-02 PROCEDURE — 1159F MED LIST DOCD IN RCRD: CPT | Performed by: PHYSICIAN ASSISTANT

## 2024-12-02 PROCEDURE — 3044F HG A1C LEVEL LT 7.0%: CPT | Performed by: PHYSICIAN ASSISTANT

## 2024-12-02 PROCEDURE — 80061 LIPID PANEL: CPT | Performed by: PHYSICIAN ASSISTANT

## 2024-12-02 PROCEDURE — 83036 HEMOGLOBIN GLYCOSYLATED A1C: CPT | Performed by: PHYSICIAN ASSISTANT

## 2024-12-02 RX ORDER — INSULIN GLARGINE 100 [IU]/ML
60 INJECTION, SOLUTION SUBCUTANEOUS DAILY
Qty: 15 ML | Refills: 12 | Status: SHIPPED | OUTPATIENT
Start: 2024-12-02

## 2024-12-02 NOTE — PROGRESS NOTES
Subjective   Sorin Allen is a 64 y.o. male.     History of Present Illness  Pt presents to follow up on chronic conditions. He is currently taking 60 units of his lantus daily.  He has had some low levels on occasion and has some sweet tea he drinks if that happens.  He doesn't want a glucagon prescription.  He recently had follow up with Dr. Tirado.  He continues to use his CPAP.  He has follow up with Dr. Alvarez in February.    He declines wanting to try alternative diabetes medication like Ozempic or Mounjaro.  Up to date on eye exam.  Primary Care Follow-Up  Conditions present:  Diabetes, Hypertension, Hyperlipidemia and Asthma  Pertinent negatives include no chest pain, no confusion, no dizziness, no palpitations, no shortness of breath, no fatigue, no weight gain, no weight loss, no weakness, no abdominal pain, no diaphoresis and no tremors.          Past Medical History:   Diagnosis Date    Arthritis     Asthma     Cardiac disease     Carpal tunnel syndrome     CKD (chronic kidney disease) stage 3a, GFR 55 ml/min 2/5/2024    Congenital heart disease     CPAP (continuous positive airway pressure) dependence     Diabetes mellitus     Diverticulitis of colon     Erectile dysfunction     Heart disease     Heart valve disease     Hyperlipidemia     Hypertension     Neuromuscular disorder     ESAU (obstructive sleep apnea)     Osteoarthritis      Past Surgical History:   Procedure Laterality Date    CARDIAC CATHETERIZATION N/A 2/16/2022    Procedure: Left Heart Cath;  Surgeon: Raoul Tirado MD;  Location: [facility] CATH INVASIVE LOCATION;  Service: Cardiovascular;  Laterality: N/A;    CARDIAC ELECTROPHYSIOLOGY PROCEDURE N/A 6/7/2022    Procedure: ICD single-chamber, new [place] aware;  Surgeon: Raoul Tirado MD;  Location: [facility] CATH INVASIVE LOCATION;  Service: Cardiovascular;  Laterality: N/A;    HERNIA REPAIR       Family History   Problem Relation Age of Onset    Leukemia Mother      Cancer Sister     Cancer Maternal Grandmother      Social History     Socioeconomic History    Marital status:    Tobacco Use    Smoking status: Former     Current packs/day: 0.00     Average packs/day: 2.0 packs/day for 20.0 years (40.0 ttl pk-yrs)     Types: Cigarettes     Start date: 3/6/1980     Quit date: 3/6/2000     Years since quittin.7     Passive exposure: Past    Smokeless tobacco: Never   Vaping Use    Vaping status: Never Used   Substance and Sexual Activity    Alcohol use: Not Currently     Comment: rare    Drug use: Never    Sexual activity: Defer         Current Outpatient Medications:     ACCU-CHEK SOFTCLIX LANCETS lancets, by Other route. Use as instructed, Disp: , Rfl:     acetaminophen (TYLENOL) 500 MG tablet, Take 2 tablets by mouth Every 8 (Eight) Hours As Needed for Moderate Pain  (arthritic pain)., Disp: 180 tablet, Rfl: 5    aspirin (aspirin) 81 MG EC tablet, Take 1 tablet by mouth Daily., Disp: 90 tablet, Rfl: 3    Blood Glucose Calibration liquid, by In Vitro route., Disp: , Rfl:     Blood Glucose Monitoring Suppl (ACCU-CHEK ALEKS PLUS) w/Device kit, Use to test blood sugar twice a day, Disp: , Rfl:     Blood Glucose Monitoring Suppl (Accu-Chek Guide) w/Device kit, 1 kit 3 (Three) Times a Day. Use glucometer to check blood sugars 3 times a day. Dx. E11.42, Disp: 1 kit, Rfl: 0    Budeson-Glycopyrrol-Formoterol (Breztri Aerosphere) 160-9-4.8 MCG/ACT aerosol inhaler, Inhale 2 puffs 2 (Two) Times a Day., Disp: 1 each, Rfl: 0    Continuous Blood Gluc  (FreeStyle Torie 14 Day [place]) device, 1 Units 3 (Three) Times a Day Before Meals. Use continuous glucose monitor to monitor glucose regularly., Disp: 1 each, Rfl: 0    Continuous Glucose Sensor (FreeStyle Torie 2 Sensor) [facility], Apply 1 each topically Every 14 (Fourteen) Days. Dispense 6 per every 3 months. DX E11.42, Disp: 6 each, Rfl: 3    dapagliflozin Propanediol (Farxiga) 10 MG tablet, TAKE 10 MG BY MOUTH DAILY., Disp: 90  "tablet, Rfl: 3    furosemide (LASIX) 80 MG tablet, TAKE 1 TABLET BY MOUTH FOUR TIMES A DAY, Disp: 360 tablet, Rfl: 3    gabapentin (NEURONTIN) 100 MG capsule, TAKE 1 CAPSULE BY MOUTH THREE TIMES A DAY (Patient taking differently: Take 1 capsule by mouth 3 (Three) Times a Day As Needed.), Disp: 90 capsule, Rfl: 2    glucose blood (Accu-Chek Sigrid Plus) test strip, USE TESTING STRIP TO CHECK BLOOD SUGARS 3 TIMES A DAY. DX. E11.42, Disp: 100 each, Rfl: 11    Insulin Glargine (Lantus SoloStar) 100 UNIT/ML injection pen, Inject 60 Units under the skin into the appropriate area as directed Daily., Disp: 15 mL, Rfl: 12    Insulin Pen Needle 32G X 4 MM misc, Use pen needle to deliver insulin subcutaneously daily.  Dx. E11.42, Disp: 90 each, Rfl: 3    metoprolol succinate XL (TOPROL-XL) 25 MG 24 hr tablet, TAKE 1 TABLET BY MOUTH EVERY DAY, Disp: 90 tablet, Rfl: 2    pantoprazole (PROTONIX) 40 MG EC tablet, Take 1 tablet by mouth Daily., Disp: , Rfl:     sacubitril-valsartan (Entresto) 49-51 MG tablet, Take 1 tablet by mouth 2 (Two) Times a Day. (Patient taking differently: Take 1 tablet by mouth 2 (Two) Times a Day. Pt taking once a day), Disp: 180 tablet, Rfl: 2    Review of Systems   Constitutional:  Negative for activity change, appetite change, chills, diaphoresis, fatigue, fever, unexpected weight gain and unexpected weight loss.   Respiratory:  Negative for chest tightness and shortness of breath.    Cardiovascular:  Negative for chest pain, palpitations and leg swelling.   Gastrointestinal:  Negative for abdominal pain.   Genitourinary:  Negative for difficulty urinating and dysuria.   Musculoskeletal:  Negative for gait problem.   Neurological:  Negative for dizziness, tremors, syncope, weakness, light-headedness, headache and confusion.   Psychiatric/Behavioral:  Negative for sleep disturbance.      /81   Pulse 66   Temp 97.9 °F (36.6 °C) (Temporal)   Resp 18   Ht 185.3 cm (72.95\")   Wt (!) 148 kg (326 " lb)   SpO2 98%   BMI 43.07 kg/m²       Objective   Physical Exam  Vitals and nursing note reviewed.   Constitutional:       Appearance: Normal appearance. He is obese.   HENT:      Head: Normocephalic and atraumatic.   Neck:      Thyroid: No thyroid mass, thyromegaly or thyroid tenderness.      Vascular: No carotid bruit.   Cardiovascular:      Rate and Rhythm: Normal rate and regular rhythm.      Heart sounds: Normal heart sounds.   Pulmonary:      Effort: Pulmonary effort is normal.      Breath sounds: Normal breath sounds.   Musculoskeletal:         General: Normal range of motion.      Cervical back: Normal range of motion and neck supple.      Right lower leg: No edema.      Left lower leg: No edema.   Skin:     General: Skin is warm and dry.   Neurological:      General: No focal deficit present.      Mental Status: He is alert and oriented to person, place, and time.   Psychiatric:         Mood and Affect: Mood normal.         Behavior: Behavior normal.         Thought Content: Thought content normal.         Procedures     Assessment    Diagnoses and all orders for this visit:    1. Type 2 diabetes mellitus with diabetic polyneuropathy, with long-term current use of insulin (Primary)  -     Hemoglobin A1c  -     Insulin Glargine (Lantus SoloStar) 100 UNIT/ML injection pen; Inject 60 Units under the skin into the appropriate area as directed Daily.  Dispense: 15 mL; Refill: 12  -     Insulin Pen Needle 32G X 4 MM misc; Use pen needle to deliver insulin subcutaneously daily.  Dx. E11.42  Dispense: 90 each; Refill: 3    2. Mixed diabetic hyperlipidemia associated with type 2 diabetes mellitus  -     Lipid panel  -     Comprehensive metabolic panel    3. Screening for AAA (abdominal aortic aneurysm)  -     [place] aaa screen limited; Future

## 2024-12-04 DIAGNOSIS — N28.9 KIDNEY FUNCTION ABNORMAL: Primary | ICD-10-CM

## 2024-12-18 ENCOUNTER — HOSPITAL ENCOUNTER (OUTPATIENT)
Dept: ULTRASOUND IMAGING | Facility: HOSPITAL | Age: 64
Discharge: HOME | End: 2024-12-18
Admitting: PHYSICIAN ASSISTANT
Payer: MEDICARE

## 2024-12-18 DIAGNOSIS — Z13.6 SCREENING FOR AAA (ABDOMINAL AORTIC ANEURYSM): ICD-10-CM

## 2024-12-18 PROCEDURE — 76706 US ABDL AORTA SCREEN AAA: CPT

## 2025-01-21 ENCOUNTER — HOSPITAL ENCOUNTER (OUTPATIENT)
Dept: CT IMAGING | Facility: HOSPITAL | Age: 65
Discharge: HOME | End: 2025-01-21
Admitting: INTERNAL MEDICINE
Payer: MEDICARE

## 2025-01-21 DIAGNOSIS — G47.33 OSA (OBSTRUCTIVE SLEEP APNEA): ICD-10-CM

## 2025-01-21 PROCEDURE — 71250 CT THORAX DX C-: CPT

## 2025-02-12 DIAGNOSIS — Z79.4 TYPE 2 DIABETES MELLITUS WITH DIABETIC POLYNEUROPATHY, WITH LONG-TERM CURRENT USE OF INSULIN: ICD-10-CM

## 2025-02-12 DIAGNOSIS — E11.42 TYPE 2 DIABETES MELLITUS WITH DIABETIC POLYNEUROPATHY, WITH LONG-TERM CURRENT USE OF INSULIN: ICD-10-CM

## 2025-02-12 RX ORDER — BLOOD SUGAR DIAGNOSTIC
STRIP MISCELLANEOUS
Qty: 100 EACH | Refills: 11 | Status: SHIPPED | OUTPATIENT
Start: 2025-02-12

## 2025-02-19 ENCOUNTER — OFFICE VISIT (OUTPATIENT)
Dept: PULMONOLOGY | Facility: HOSPITAL | Age: 65
End: 2025-02-19
Payer: MEDICARE

## 2025-02-19 VITALS — BODY MASS INDEX: 43.07 KG/M2 | HEIGHT: 73 IN

## 2025-02-19 DIAGNOSIS — Z53.21 PATIENT LEFT WITHOUT BEING SEEN: Primary | ICD-10-CM

## 2025-02-20 NOTE — PROGRESS NOTES
The patient left the office before care was provided and did not complete the visit  due to having to go  spouse .

## 2025-02-25 ENCOUNTER — TELEPHONE (OUTPATIENT)
Dept: FAMILY MEDICINE CLINIC | Facility: CLINIC | Age: 65
End: 2025-02-25
Payer: MEDICARE

## 2025-02-25 NOTE — TELEPHONE ENCOUNTER
Accu-Chek Sigrid Plus strips  [employer] Electronic PA Form  (Key: [id number])  Member ID:[id number]  Qty:100 per 33 days   Outcome:Available without authorization. The member is able to fill the requested drug at the pharmacy. If coverage is still needed or requesting prior to the expiration of a current authorization, a request can be made by sending a fax or calling the number on the back of the member's ID card.  Faxed to pharmacy

## 2025-03-31 RX ORDER — SACUBITRIL AND VALSARTAN 49; 51 MG/1; MG/1
1 TABLET, FILM COATED ORAL 2 TIMES DAILY
Qty: 180 TABLET | Refills: 1 | Status: SHIPPED | OUTPATIENT
Start: 2025-03-31

## 2025-03-31 NOTE — TELEPHONE ENCOUNTER
Rx Refill Note  Requested Prescriptions     Pending Prescriptions Disp Refills    Entresto 49-51 MG tablet [Pharmacy Med Name: ENTRESTO 49 MG-51 MG TABLET] 180 tablet 2     Sig: TAKE 1 TABLET BY MOUTH TWICE A DAY      Last office visit with prescribing clinician: 10/15/2024   Last telemedicine visit with prescribing clinician: Visit date not found   Next office visit with prescribing clinician: 4/29/2025                         Would you like a call back once the refill request has been completed: [] Yes [] No    If the office needs to give you a call back, can they leave a voicemail: [] Yes [] No    Jessie Cunningham MA  03/31/25, 13:35 EDT

## 2025-04-17 ENCOUNTER — TELEPHONE (OUTPATIENT)
Dept: CASE MANAGEMENT | Facility: CLINIC | Age: 65
End: 2025-04-17
Payer: MEDICARE

## 2025-04-17 NOTE — TELEPHONE ENCOUNTER
[facility] attempted proactive Outreach call. No answer left voicemail for patient to return call at [phone]

## 2025-04-18 ENCOUNTER — APPOINTMENT (OUTPATIENT)
Dept: GENERAL RADIOLOGY | Facility: HOSPITAL | Age: 65
End: 2025-04-18
Payer: MEDICARE

## 2025-04-18 ENCOUNTER — HOSPITAL ENCOUNTER (EMERGENCY)
Facility: HOSPITAL | Age: 65
Discharge: HOME | End: 2025-04-18
Payer: MEDICARE

## 2025-04-18 VITALS
WEIGHT: 315 LBS | RESPIRATION RATE: 19 BRPM | SYSTOLIC BLOOD PRESSURE: 123 MMHG | BODY MASS INDEX: 41.75 KG/M2 | HEART RATE: 99 BPM | HEIGHT: 73 IN | OXYGEN SATURATION: 90 % | TEMPERATURE: 99.3 F | DIASTOLIC BLOOD PRESSURE: 79 MMHG

## 2025-04-18 DIAGNOSIS — I50.32 HEART FAILURE WITH RECOVERED EJECTION FRACTION (HFRECEF): ICD-10-CM

## 2025-04-18 DIAGNOSIS — M79.675 PAIN OF TOE OF LEFT FOOT: ICD-10-CM

## 2025-04-18 DIAGNOSIS — S92.405B OPEN NONDISPLACED FRACTURE OF PHALANX OF LEFT GREAT TOE, UNSPECIFIED PHALANX, INITIAL ENCOUNTER: Primary | ICD-10-CM

## 2025-04-18 PROCEDURE — 25010000002 LIDOCAINE 1 % SOLUTION

## 2025-04-18 PROCEDURE — 25010000002 CEFAZOLIN PER 500 MG

## 2025-04-18 PROCEDURE — 96365 THER/PROPH/DIAG IV INF INIT: CPT

## 2025-04-18 PROCEDURE — 90471 IMMUNIZATION ADMIN: CPT

## 2025-04-18 PROCEDURE — 99283 EMERGENCY DEPT VISIT LOW MDM: CPT

## 2025-04-18 PROCEDURE — 73660 X-RAY EXAM OF TOE(S): CPT

## 2025-04-18 PROCEDURE — 25010000002 TETANUS-DIPHTH-ACELL PERTUSSIS 5-2.5-18.5 LF-MCG/0.5 SUSPENSION PREFILLED SYRINGE

## 2025-04-18 PROCEDURE — 90715 TDAP VACCINE 7 YRS/> IM: CPT

## 2025-04-18 RX ORDER — CEPHALEXIN 500 MG/1
500 CAPSULE ORAL 4 TIMES DAILY
Qty: 28 CAPSULE | Refills: 0 | Status: SHIPPED | OUTPATIENT
Start: 2025-04-18 | End: 2025-04-25

## 2025-04-18 RX ORDER — LIDOCAINE HYDROCHLORIDE 10 MG/ML
10 INJECTION, SOLUTION INFILTRATION; PERINEURAL ONCE
Status: COMPLETED | OUTPATIENT
Start: 2025-04-18 | End: 2025-04-18

## 2025-04-18 RX ORDER — DIAPER,BRIEF,INFANT-TODD,DISP
1 EACH MISCELLANEOUS EVERY 12 HOURS SCHEDULED
Status: DISCONTINUED | OUTPATIENT
Start: 2025-04-18 | End: 2025-04-19 | Stop reason: HOSPADM

## 2025-04-18 RX ORDER — SODIUM CHLORIDE 0.9 % (FLUSH) 0.9 %
10 SYRINGE (ML) INJECTION AS NEEDED
Status: DISCONTINUED | OUTPATIENT
Start: 2025-04-18 | End: 2025-04-19 | Stop reason: HOSPADM

## 2025-04-18 RX ADMIN — BACITRACIN 0.9 G: 500 OINTMENT TOPICAL at 22:50

## 2025-04-18 RX ADMIN — TETANUS TOXOID, REDUCED DIPHTHERIA TOXOID AND ACELLULAR PERTUSSIS VACCINE, ADSORBED 0.5 ML: 5; 2.5; 8; 8; 2.5 SUSPENSION INTRAMUSCULAR at 20:56

## 2025-04-18 RX ADMIN — LIDOCAINE HYDROCHLORIDE 10 ML: 10 INJECTION, SOLUTION INFILTRATION; PERINEURAL at 20:55

## 2025-04-18 RX ADMIN — CEFAZOLIN 1000 MG: 1 INJECTION, POWDER, FOR SOLUTION INTRAMUSCULAR; INTRAVENOUS at 21:33

## 2025-04-19 NOTE — ED PROVIDER NOTES
Subjective   Chief Complaint   Patient presents with    Fall       History of Present Illness  Patient is a 64-year-old gentleman who was walking down his outside stairs wearing sandals slipped on a single step and fell causing a laceration to his left great toe on the underside.  Patient reports he was able to walk after laceration his wife put a bandage on it and he drove himself to the emergency room.  He reports that he has some neuropathy however can feel his feet and did not have a syncopal event or anything similar simply stumbled on a final step wearing sandals.  Review of Systems   Skin:  Positive for wound.   All other systems reviewed and are negative.      Past Medical History:   Diagnosis Date    Arthritis     Asthma     Cardiac disease     Carpal tunnel syndrome     CKD (chronic kidney disease) stage 3a, GFR 55 ml/min 02/05/2024    Congenital heart disease     CPAP (continuous positive airway pressure) dependence     Diabetes mellitus     Diverticulitis of colon     Erectile dysfunction     Heart disease     Heart valve disease     Hyperlipidemia     Hypertension     Neuromuscular disorder     ESAU (obstructive sleep apnea)     CPAP Therapy    Osteoarthritis        No Known Allergies    Past Surgical History:   Procedure Laterality Date    CARDIAC CATHETERIZATION N/A 2/16/2022    Procedure: Left Heart Cath;  Surgeon: Raoul Tirado MD;  Location: [facility] CATH INVASIVE LOCATION;  Service: Cardiovascular;  Laterality: N/A;    CARDIAC ELECTROPHYSIOLOGY PROCEDURE N/A 6/7/2022    Procedure: ICD single-chamber, new [facility] [facility] aware;  Surgeon: Raoul Tirado MD;  Location: [facility] CATH INVASIVE LOCATION;  Service: Cardiovascular;  Laterality: N/A;    HERNIA REPAIR         Family History   Problem Relation Age of Onset    Leukemia Mother     No Known Problems Father     Cancer Sister     Cancer Maternal Grandmother        Social History     Socioeconomic History    Marital status:     Tobacco Use    Smoking status: Former     Current packs/day: 0.00     Average packs/day: 2.0 packs/day for 20.0 years (40.0 ttl pk-yrs)     Types: Cigarettes     Start date: 3/6/1980     Quit date: 3/6/2000     Years since quittin.1     Passive exposure: Past    Smokeless tobacco: Never   Vaping Use    Vaping status: Never Used   Substance and Sexual Activity    Alcohol use: Not Currently     Comment: rare    Drug use: Never    Sexual activity: Defer           Objective   Physical Exam  Vitals and nursing note reviewed.   Constitutional:       General: He is not in acute distress.     Appearance: Normal appearance. He is obese. He is not ill-appearing, toxic-appearing or diaphoretic.   Cardiovascular:      Rate and Rhythm: Normal rate and regular rhythm.      Pulses: Normal pulses.      Heart sounds: Normal heart sounds.   Pulmonary:      Effort: Pulmonary effort is normal.      Breath sounds: Normal breath sounds.   Musculoskeletal:         General: Signs of injury present. Normal range of motion.      Cervical back: Normal range of motion and neck supple.      Right foot: Normal.      Left foot: Laceration present.        Feet:       Comments: 2.5 cm laceration to underside of left great toe at the base of the great toe   Skin:     General: Skin is warm and dry.      Capillary Refill: Capillary refill takes 2 to 3 seconds.   Neurological:      General: No focal deficit present.      Mental Status: He is alert and oriented to person, place, and time.   Psychiatric:         Mood and Affect: Mood normal.         Behavior: Behavior normal.         Thought Content: Thought content normal.         Judgment: Judgment normal.         Laceration Repair    Date/Time: 2025 6:50 PM    Performed by: Candida Armendariz APRN  Authorized by: Candida Armendariz APRN    Consent:     Consent obtained:  Verbal    Consent given by:  Patient    Risks, benefits, and alternatives were discussed: yes      Risks discussed:   Infection, need for additional repair, nerve damage, poor wound healing, poor cosmetic result, pain, retained foreign body, tendon damage and vascular damage    Alternatives discussed:  Referral, observation, delayed treatment and no treatment  Universal protocol:     Procedure explained and questions answered to patient or proxy's satisfaction: yes      Relevant documents present and verified: yes      Test results available: yes      Imaging studies available: yes      Required blood products, implants, devices, and special equipment available: yes      Site/side marked: yes      Immediately prior to procedure, a time out was called: yes      Patient identity confirmed:  Verbally with patient, arm band, provided demographic data and hospital-assigned identification number  Anesthesia:     Anesthesia method:  Local infiltration    Local anesthetic:  Lidocaine 1% w/o epi  Laceration details:     Location:  Toe    Toe location:  L big toe    Length (cm):  2.5  Pre-procedure details:     Preparation:  Patient was prepped and draped in usual sterile fashion and imaging obtained to evaluate for foreign bodies  Exploration:     Hemostasis achieved with:  Direct pressure    Imaging obtained: x-ray      Imaging outcome: foreign body not noted      Wound exploration: wound explored through full range of motion and entire depth of wound visualized    Treatment:     Area cleansed with:  Chlorhexidine, povidone-iodine and saline    Amount of cleaning:  Extensive    Irrigation solution:  Sterile saline    Irrigation method:  Syringe  Skin repair:     Repair method:  Sutures    Suture size:  4-0    Suture material:  Nylon    Suture technique:  Simple interrupted    Number of sutures:  9  Approximation:     Approximation:  Close  Repair type:     Repair type:  Intermediate  Post-procedure details:     Dressing:  Antibiotic ointment, non-adherent dressing and bulky dressing    Procedure completion:  Tolerated             ED  "Course        /79   Pulse 99   Temp 99.3 °F (37.4 °C) (Oral)   Resp 19   Ht 185.4 cm (73\")   Wt (!) 144 kg (316 lb 12.8 oz)   SpO2 90%   BMI 41.80 kg/m²   Labs Reviewed - No data to display  Medications   lidocaine (XYLOCAINE) 1 % injection 10 mL (10 mL Infiltration Given 4/18/25 2055)   Tetanus-Diphth-Acell Pertussis (BOOSTRIX) injection 0.5 mL (0.5 mL Intramuscular Given 4/18/25 2056)   ceFAZolin 1000 mg IVPB in 100 mL NS (MBP) (0 mg Intravenous Stopped 4/18/25 2226)     XR Toe 2+ View Left  Result Date: 4/18/2025  Impression: Nondisplaced fracture of the distal aspect of the great toe proximal phalanx involving the interphalangeal joint. Electronically Signed: Francisco Javier Ortiz MD  4/18/2025 9:24 PM EDT  Workstation ID: [id number]                                                     Medical Decision Making  Problems Addressed:  Heart failure with recovered ejection fraction (HFrecEF): complicated acute illness or injury  Open nondisplaced fracture of phalanx of left great toe, unspecified phalanx, initial encounter: complicated acute illness or injury  Pain of toe of left foot: complicated acute illness or injury    Amount and/or Complexity of Data Reviewed  Radiology: ordered.    Risk  Prescription drug management.    Patient presents to the ED for the above complaint, underwent the above exam and workup.    EKG reviewed: Not clinically indicated    Imaging reviewed: As reviewed interpreted by Dr. Ulloa, ED attending.  Results as above show nondisplaced fracture of the distal aspect of the great toe involving the interphalangeal joint    Patient was treated with the following medications while in the ED;   Medications   lidocaine (XYLOCAINE) 1 % injection 10 mL (10 mL Infiltration Given 4/18/25 2055)   Tetanus-Diphth-Acell Pertussis (BOOSTRIX) injection 0.5 mL (0.5 mL Intramuscular Given 4/18/25 2056)   ceFAZolin 1000 mg IVPB in 100 mL NS (MBP) (0 mg Intravenous Stopped 4/18/25 2226)     Upon evaluation of " patient's imaging was obtained that showed fracture.  Wound was numbed with lidocaine as per procedure note and sutured as per procedure note.  Please see above.  Imaging indicates fracture patient was given 1 g Ancef tetanus update.  Wound was closed covered with bacitracin and nonadherent dressing as per procedure note and placed in a cam walker boot.  Patient given strict instructions to follow-up with podiatry for fracture management.  He reports understanding on care instructions for wound suture removal and follow-up.    Consideration was given for admission, but the patient was stable for outpatient management as patient was ambulatory, nontoxic, stable, and afebrile.  Exam as above.    Disposition: Discussed need to follow-up diagnostics, including incidental findings.  Discharged with instructions to obtain outpatient follow-up with patient's symptoms and findings, with strict return precautions if patient develops new or worsening symptoms.    This document is intended for medical expert use only. Reading of this document by patients and/or patient's family without participating medical staff guidance may result in misinterpretation and unintended morbidity.  Any interpretation of such data is the responsibility of the patient and/or family member responsible for the patient in concert with their primary or specialist providers, not to be left for sources of online searches such as [employer], [employer] or similar queries. Relying on these approaches to knowledge may result in misinterpretation, misguided goals of care and even death should patients or family members try recommendations outside of the realm of professional medical care in a supervised inpatient environment.       Final diagnoses:   Open nondisplaced fracture of phalanx of left great toe, unspecified phalanx, initial encounter   Pain of toe of left foot   Heart failure with recovered ejection fraction (HFrecEF)       ED Disposition  ED Disposition        ED Disposition   Discharge    Condition   Stable    Comment   --               Stacy Perez, PA  [address]  [place] [address] [phone]          JOHAN Gaitan DPM  [address]  [place] [address] [phone]               Medication List        New Prescriptions      cephalexin 500 MG capsule  Commonly known as: KEFLEX  Take 1 capsule by mouth 4 (Four) Times a Day for 7 days.            Changed      gabapentin 100 MG capsule  Commonly known as: NEURONTIN  TAKE 1 CAPSULE BY MOUTH THREE TIMES A DAY  What changed:   when to take this  reasons to take this               Where to Get Your Medications        These medications were sent to [facility]/pharmacy #[id number] - [place], [place] - [address] - [phone]  - [phone]   [address] [place] [address]      Phone: [phone]   cephalexin 500 MG capsule            Candida Armendariz, APRN  04/19/25 [id number]

## 2025-04-19 NOTE — DISCHARGE INSTRUCTIONS
You have been evaluated in the emergency department today for a laceration to your left big toe. Your laceration was repaired in the ED with sutures.  Please keep the area surrounding laceration clean and dry.  Please use bacitracin or Neosporin before putting a dressing on.  You should have the sutures removed in 7 to 10 days by your primary care provider, an urgent care, or by returning to the emergency room.  If you develop redness or swelling at the site of your laceration please come back to the ER for a wound check.    Take antibiotics to completion as directed.  Follow-up with podiatry for the fracture.    You can alternate Tylenol or ibuprofen as needed for pain or discomfort.    Return to the emergency department for experience discharge from your laceration, redness, warmth, fever, vomiting, numbness, tingling, or any other concerning symptom.

## 2025-04-21 ENCOUNTER — PATIENT OUTREACH (OUTPATIENT)
Dept: CASE MANAGEMENT | Facility: CLINIC | Age: 65
End: 2025-04-21
Payer: MEDICARE

## 2025-04-21 DIAGNOSIS — I50.32 HEART FAILURE WITH RECOVERED EJECTION FRACTION (HFRECEF): Primary | ICD-10-CM

## 2025-04-21 NOTE — OUTREACH NOTE
AMBULATORY CASE MANAGEMENT NOTE    Names and Relationships of Patient/Support Persons:  -     ACM called and spoke with patient. Identified self and roll. Explained and offered CCM program and patient declined at this time. Encouraged patient to reach out in future if needs arise.         Nahed DOMINGUEZ  Ambulatory Case Management    4/21/2025, 14:51 EDT

## 2025-04-25 DIAGNOSIS — I50.42 HYPERTENSIVE HEART DISEASE WITH CHRONIC COMBINED SYSTOLIC AND DIASTOLIC CONGESTIVE HEART FAILURE: ICD-10-CM

## 2025-04-25 DIAGNOSIS — I50.22 CHRONIC SYSTOLIC HEART FAILURE: ICD-10-CM

## 2025-04-25 DIAGNOSIS — I50.22 CHRONIC HFREF (HEART FAILURE WITH REDUCED EJECTION FRACTION): ICD-10-CM

## 2025-04-25 DIAGNOSIS — I42.0 DILATED CARDIOMYOPATHY: ICD-10-CM

## 2025-04-25 DIAGNOSIS — I11.0 HYPERTENSIVE HEART DISEASE WITH CHRONIC COMBINED SYSTOLIC AND DIASTOLIC CONGESTIVE HEART FAILURE: ICD-10-CM

## 2025-04-25 RX ORDER — FUROSEMIDE 80 MG/1
80 TABLET ORAL 4 TIMES DAILY
Qty: 360 TABLET | Refills: 1 | Status: SHIPPED | OUTPATIENT
Start: 2025-04-25

## 2025-04-25 NOTE — TELEPHONE ENCOUNTER
Rx Refill Note  Requested Prescriptions     Pending Prescriptions Disp Refills    furosemide (LASIX) 80 MG tablet [Pharmacy Med Name: FUROSEMIDE 80 MG TABLET] 360 tablet 3     Sig: TAKE 1 TABLET BY MOUTH FOUR TIMES A DAY      Last office visit with prescribing clinician: 10/15/2024   Last telemedicine visit with prescribing clinician: Visit date not found   Next office visit with prescribing clinician: 4/29/2025                         Would you like a call back once the refill request has been completed: [] Yes [] No    If the office needs to give you a call back, can they leave a voicemail: [] Yes [] No    Jessie Cunningham MA  04/25/25, 08:19 EDT

## 2025-04-28 ENCOUNTER — OFFICE VISIT (OUTPATIENT)
Age: 65
End: 2025-04-28
Payer: MEDICARE

## 2025-04-28 VITALS — BODY MASS INDEX: 41.38 KG/M2 | HEART RATE: 81 BPM | OXYGEN SATURATION: 100 % | HEIGHT: 73 IN | WEIGHT: 312.2 LBS

## 2025-04-28 DIAGNOSIS — E11.42 TYPE 2 DIABETES MELLITUS WITH PERIPHERAL NEUROPATHY: ICD-10-CM

## 2025-04-28 DIAGNOSIS — R26.81 GAIT INSTABILITY: ICD-10-CM

## 2025-04-28 DIAGNOSIS — E66.01 MORBID OBESITY WITH BMI OF 40.0-44.9, ADULT: ICD-10-CM

## 2025-04-28 DIAGNOSIS — Z79.4 TYPE 2 DIABETES MELLITUS WITH HYPERGLYCEMIA, WITH LONG-TERM CURRENT USE OF INSULIN: ICD-10-CM

## 2025-04-28 DIAGNOSIS — S91.112A LACERATION OF LEFT GREAT TOE WITHOUT FOREIGN BODY WITHOUT DAMAGE TO NAIL, INITIAL ENCOUNTER: ICD-10-CM

## 2025-04-28 DIAGNOSIS — E11.65 TYPE 2 DIABETES MELLITUS WITH HYPERGLYCEMIA, WITH LONG-TERM CURRENT USE OF INSULIN: ICD-10-CM

## 2025-04-28 DIAGNOSIS — M79.672 LEFT FOOT PAIN: Primary | ICD-10-CM

## 2025-04-28 DIAGNOSIS — S92.412A CLOSED DISPLACED FRACTURE OF PROXIMAL PHALANX OF LEFT GREAT TOE, INITIAL ENCOUNTER: ICD-10-CM

## 2025-04-29 ENCOUNTER — CLINICAL SUPPORT NO REQUIREMENTS (OUTPATIENT)
Dept: CARDIOLOGY | Facility: CLINIC | Age: 65
End: 2025-04-29
Payer: MEDICARE

## 2025-04-29 ENCOUNTER — OFFICE VISIT (OUTPATIENT)
Dept: CARDIOLOGY | Facility: CLINIC | Age: 65
End: 2025-04-29
Payer: MEDICARE

## 2025-04-29 VITALS
DIASTOLIC BLOOD PRESSURE: 82 MMHG | WEIGHT: 314 LBS | OXYGEN SATURATION: 93 % | HEART RATE: 81 BPM | HEIGHT: 73 IN | BODY MASS INDEX: 41.62 KG/M2 | SYSTOLIC BLOOD PRESSURE: 119 MMHG

## 2025-04-29 DIAGNOSIS — I50.22 CHRONIC HFREF (HEART FAILURE WITH REDUCED EJECTION FRACTION): ICD-10-CM

## 2025-04-29 DIAGNOSIS — Z95.810 PRESENCE OF AUTOMATIC CARDIOVERTER/DEFIBRILLATOR (AICD): ICD-10-CM

## 2025-04-29 DIAGNOSIS — I50.32 DIASTOLIC HEART FAILURE, STAGE C, CHRONIC: Primary | ICD-10-CM

## 2025-04-29 DIAGNOSIS — I42.0 DILATED CARDIOMYOPATHY: ICD-10-CM

## 2025-04-29 DIAGNOSIS — I10 ESSENTIAL HYPERTENSION: ICD-10-CM

## 2025-04-29 DIAGNOSIS — E11.9 TYPE 2 DIABETES MELLITUS WITHOUT COMPLICATION, WITH LONG-TERM CURRENT USE OF INSULIN: ICD-10-CM

## 2025-04-29 DIAGNOSIS — I50.32 HEART FAILURE WITH RECOVERED EJECTION FRACTION (HFRECEF): ICD-10-CM

## 2025-04-29 DIAGNOSIS — Z79.4 TYPE 2 DIABETES MELLITUS WITHOUT COMPLICATION, WITH LONG-TERM CURRENT USE OF INSULIN: ICD-10-CM

## 2025-04-29 DIAGNOSIS — E78.5 DYSLIPIDEMIA: ICD-10-CM

## 2025-04-29 DIAGNOSIS — N18.31 STAGE 3A CHRONIC KIDNEY DISEASE: Primary | ICD-10-CM

## 2025-04-29 PROCEDURE — 99214 OFFICE O/P EST MOD 30 MIN: CPT | Performed by: INTERNAL MEDICINE

## 2025-04-29 PROCEDURE — 93000 ELECTROCARDIOGRAM COMPLETE: CPT | Performed by: INTERNAL MEDICINE

## 2025-04-29 PROCEDURE — 3079F DIAST BP 80-89 MM HG: CPT | Performed by: INTERNAL MEDICINE

## 2025-04-29 PROCEDURE — 1160F RVW MEDS BY RX/DR IN RCRD: CPT | Performed by: INTERNAL MEDICINE

## 2025-04-29 PROCEDURE — 3074F SYST BP LT 130 MM HG: CPT | Performed by: INTERNAL MEDICINE

## 2025-04-29 PROCEDURE — 1159F MED LIST DOCD IN RCRD: CPT | Performed by: INTERNAL MEDICINE

## 2025-04-29 PROCEDURE — 93282 PRGRMG EVAL IMPLANTABLE DFB: CPT | Performed by: INTERNAL MEDICINE

## 2025-04-29 RX ORDER — PRAVASTATIN SODIUM 20 MG
20 TABLET ORAL DAILY
Qty: 90 TABLET | Refills: 3 | Status: SHIPPED | OUTPATIENT
Start: 2025-04-29 | End: 2025-04-29

## 2025-04-29 NOTE — PROGRESS NOTES
Subjective:     Encounter Date:04/29/2025      Patient ID: Sorin Allen is a 64 y.o. male.    Chief Complaint and history of present illness:       Follow-up for hypertensive cardiovascular disease, CHF, cardiomyopathy, ICD, dyslipidemia     History of present illness:    Mr. Sorin Allen has PMH of     Hypertensive cardiovascular disease with chronic diastolic CHF  Chronic HFrEF due to systolic dysfunction from dilated cardiomyopathy  [place] [facility] ICD 4/7/2022  Cardiac cath 2/16/2022 EF of 15% no obstructive CAD  Dyslipidemia  Hypertension  Obesity with BMI over 40  ESAU/CPAP  ED  Rheumatic fever as a child  Former smoker quit 3/6/2000  Family history mother has leukemia     Here for follow-up.  Patient has occasional fleeting chest pain.  Has dyspnea on exertion relieved with rest.  Has lost some weight because she had some illness in last few months.     Patient's arterial blood pressure is 119/82, heart rate 81 bpm, O2 sat of 93% on room air.  BMI is over 40.     Patient was recently in the hospital 1/28/2024 with low-grade fever and bodyaches, hypoxic respiratory failure was positive for influenza A.  Labs revealed HS troponin 43--37--36,  pro bnp normal at 574  BUN 26 creatinine 1.50   D-dimer mildly elevated however CT was negative for PE but does show a 3mm subpleural nodule within the right upper lobe and paraseptal emphysematous changes in the right lung.      Chart review:  Patient reportedly had cardiac cath 8 years ago was told he has cardiomyopathy and CHF.  Underwent repeat cath 2/16/2022 which revealed EF of 15% no obstructive CAD..  Patient had repeat echocardiogram 4/6/2022 which is revealing persistent LV dysfunction with LVEF of 20% with severe pulmonary hypertension PA systolic pressure of 60 mmHg.        Echocardiogram 5/1/2020 revealed normal LV systolic function EF 60% with diastolic dysfunction and RV enlargement and mild aortic stenosis  Echocardiogram 1/21/2022 revealed EF of 26 to  30%  Repeat echo 4/6/2022 is revealing EF of 20% with severe pulmonary hypertension PA systolic pressure of 60 mmHg  Echocardiogram 1/30/2024 reveals EF of 51%, mild LVE, concentric LVH, grade 2 diastolic dysfunction, moderately reduced right ventricular systolic function and dilated RV, LA, RA and calcified aorta.     Labs from 3/28/2024 reveal proBNP of 335.  A1c of 7.1.  Creatinine of 1.24.  Labs from 8/29/2024 reveal normal CBC, TSH.  CMP with a glucose of 117, creatinine 1.36, EGFR 58.  A1c of 6.9.  Labs from 12/2/2024 reveal CMP with a glucose of 117, creatinine 1.44, EGFR 53, A1c 7.3.  Lipid profile with cholesterol 227, triglycerides 138, HDL 40, .     Assessment:  :     Chest pain, atypical  Dyspnea on exertion  Dyslipidemia  Chronic HFrecEF due to combined hypertensive cardiovascular disease and systolic dysfunction from dilated cardiomyopathy EF of 15-20% improved to 50%  Dilated cardiomyopathy  Hypertension  Diabetes  Obesity with BMI over 40        Recommendations / Plan:         Reviewed EKG results with patient.  Device check is revealing normal function  Follow-up in device clinic.  Patient cholesterol is elevated advise statins.  Patient is refusing.  Patient's creatinine is elevated we will send him to nephrology.  Continue medical management with aspirin, Farxiga, furosemide, metoprolol, Entresto as tolerated  Follow-up in heart failure clinic.  Reviewed diabetes numbers, A1c, BMI over 40, heart disease counseled on walking exercise.  Patient has musculoskeletal limitations.  Discussed about water aerobics.       Procedures:  Cardiac cath 2/16/2022 performed and interpreted by me reveals dilated cardiomyopathy EF of 15%  Lexiscan Cardiolite performed 4/25/2023 revealed patchy uptake EF of 35%.  Echocardiogram 1/30/2024 reveals EF of 51% with mild LVE, mild concentric LVH, grade 2 diastolic dysfunction, moderate RV enlargement and RV dysfunction. Severe left atrial enlargement.  Patient had  improvement of EF after GDMT.                 ECG 12 Lead     Date/Time: 10/15/2024 11:26 AM  Performed by: Raoul Tirado MD     Authorized by: Raoul Tirado MD  Comparison: compared with previous ECG from 2/6/2024  Comparison to previous ECG: EKG done today reviewed/interpreted by me revealed sinus rhythm at the rate of 71 bpm, Q waves in V1 V2 suggestive of anteroseptal MI, no significant change compared to EKG from 2/6/2024                 ECG 12 Lead    Date/Time: 4/29/2025 11:33 AM  Performed by: Raoul Tirado MD    Authorized by: Raoul Tirado MD  Comparison: compared with previous ECG from 10/15/2024  Comparison to previous ECG: EKG done today reviewed/interpreted by me reveals sinus rhythm with rate of 77 bpm, Q waves in V1 V2 and IVCD, no significant change compared to EKG from 10/15/2024          Copied text in this portion of the note has been reviewed and is accurate as of 4/29/2025  The following portions of the patient's history were reviewed and updated as appropriate: allergies, current medications, past family history, past medical history, past social history, past surgical history and problem list.    Assessment:         MDM       Diagnosis Plan   1. Stage 3a chronic kidney disease  Ambulatory Referral to Nephrology      2. Chronic HFrEF (heart failure with reduced ejection fraction)  Ambulatory Referral to Nephrology      3. Dilated cardiomyopathy        4. Presence of automatic cardioverter/defibrillator (AICD)        5. Essential hypertension        6. Type 2 diabetes mellitus without complication, with long-term current use of insulin  Ambulatory Referral to Nephrology      7. Dyslipidemia               Plan:               Past Medical History:  Past Medical History:   Diagnosis Date    Arthritis     Asthma     Cardiac disease     Carpal tunnel syndrome     CKD (chronic kidney disease) stage 3a, GFR 55 ml/min 02/05/2024    Congenital heart  disease     CPAP (continuous positive airway pressure) dependence     Diabetes mellitus     Diverticulitis of colon     Diverticulosis     Erectile dysfunction     Heart disease     Heart valve disease     Hyperlipidemia     Hypertension     Neuromuscular disorder     ESAU (obstructive sleep apnea)     CPAP Therapy    Osteoarthritis      Past Surgical History:  Past Surgical History:   Procedure Laterality Date    CARDIAC CATHETERIZATION N/A 2/16/2022    Procedure: Left Heart Cath;  Surgeon: Raoul Tirado MD;  Location:  [facility] CATH INVASIVE LOCATION;  Service: Cardiovascular;  Laterality: N/A;    CARDIAC ELECTROPHYSIOLOGY PROCEDURE N/A 6/7/2022    Procedure: ICD single-chamber, new [place] aware;  Surgeon: Raoul Tirado MD;  Location:  [facility] CATH INVASIVE LOCATION;  Service: Cardiovascular;  Laterality: N/A;    HERNIA REPAIR        Allergies:  No Known Allergies  Home Meds:  Current Meds:     Current Outpatient Medications:     ACCU-CHEK SOFTCLIX LANCETS lancets, by Other route. Use as instructed, Disp: , Rfl:     acetaminophen (TYLENOL) 500 MG tablet, Take 2 tablets by mouth Every 8 (Eight) Hours As Needed for Moderate Pain  (arthritic pain)., Disp: 180 tablet, Rfl: 5    aspirin (aspirin) 81 MG EC tablet, Take 1 tablet by mouth Daily., Disp: 90 tablet, Rfl: 3    Blood Glucose Calibration liquid, by In Vitro route., Disp: , Rfl:     Blood Glucose Monitoring Suppl (ACCU-CHEK ALEKS PLUS) w/Device kit, Use to test blood sugar twice a day, Disp: , Rfl:     Blood Glucose Monitoring Suppl (Accu-Chek Guide) w/Device kit, 1 kit 3 (Three) Times a Day. Use glucometer to check blood sugars 3 times a day. Dx. E11.42, Disp: 1 kit, Rfl: 0    Budeson-Glycopyrrol-Formoterol (Breztri Aerosphere) 160-9-4.8 MCG/ACT aerosol inhaler, Inhale 2 puffs 2 (Two) Times a Day., Disp: 1 each, Rfl: 0    Continuous Blood Gluc  (FreeStyle Torie 14 Day [place]) device, 1 Units 3 (Three) Times a Day Before Meals. Use  continuous glucose monitor to monitor glucose regularly., Disp: 1 each, Rfl: 0    Continuous Glucose Sensor (FreeStyle Torie 2 Sensor) [facility], Apply 1 each topically Every 14 (Fourteen) Days. Dispense 6 per every 3 months. DX E11.42, Disp: 6 each, Rfl: 3    dapagliflozin Propanediol (Farxiga) 10 MG tablet, TAKE 10 MG BY MOUTH DAILY., Disp: 90 tablet, Rfl: 3    furosemide (LASIX) 80 MG tablet, TAKE 1 TABLET BY MOUTH FOUR TIMES A DAY, Disp: 360 tablet, Rfl: 1    gabapentin (NEURONTIN) 100 MG capsule, TAKE 1 CAPSULE BY MOUTH THREE TIMES A DAY (Patient taking differently: Take 1 capsule by mouth 3 (Three) Times a Day As Needed.), Disp: 90 capsule, Rfl: 2    glucose blood (Accu-Chek Sigrid Plus) test strip, USE TESTING STRIP TO CHECK BLOOD SUGARS 3 TIMES A DAY. DX. E11.42, Disp: 100 each, Rfl: 11    Insulin Glargine (Lantus SoloStar) 100 UNIT/ML injection pen, Inject 60 Units under the skin into the appropriate area as directed Daily., Disp: 15 mL, Rfl: 12    Insulin Pen Needle 32G X 4 MM misc, Use pen needle to deliver insulin subcutaneously daily.  Dx. E11.42, Disp: 90 each, Rfl: 3    metoprolol succinate XL (TOPROL-XL) 25 MG 24 hr tablet, TAKE 1 TABLET BY MOUTH EVERY DAY, Disp: 90 tablet, Rfl: 2    pantoprazole (PROTONIX) 40 MG EC tablet, Take 1 tablet by mouth Daily., Disp: , Rfl:     sacubitril-valsartan (Entresto) 49-51 MG tablet, TAKE 1 TABLET BY MOUTH TWICE A DAY, Disp: 180 tablet, Rfl: 1  Social History:   Social History     Tobacco Use    Smoking status: Former     Current packs/day: 0.00     Average packs/day: 2.0 packs/day for 20.0 years (40.0 ttl pk-yrs)     Types: Cigarettes     Start date: 3/6/1980     Quit date: 3/6/2000     Years since quittin.1     Passive exposure: Past    Smokeless tobacco: Never   Substance Use Topics    Alcohol use: Not Currently     Comment: rare      Family History:  Family History   Problem Relation Age of Onset    Leukemia Mother     No Known Problems Father     Cancer Sister   "       No    Cancer Maternal Grandmother               Review of Systems   Constitutional: Positive for malaise/fatigue.   Cardiovascular:  Positive for chest pain and leg swelling. Negative for palpitations.   Respiratory:  Positive for shortness of breath.    Skin:  Negative for rash.   Neurological:  Negative for dizziness, light-headedness and numbness.     All other systems are negative         Objective:     Physical Exam  /82   Pulse 81   Ht 185.4 cm (73\")   Wt (!) 142 kg (314 lb)   SpO2 93%   BMI 41.43 kg/m²   General:  Appears in no acute distress  Eyes: Sclera is anicteric,  conjunctiva is clear   HEENT:  No JVD.  No carotid bruits  Respiratory: Respirations regular and unlabored at rest.  Clear to auscultation  Cardiovascular: S1,S2 Regular rate and rhythm.  Skin: Color pink. Skin warm and dry to touch. No rashes  No xanthoma  Neuro: Alert and awake.  Walks with a cane    Lab Reviewed:         Raoul Tirado MD  4/29/2025 11:33 EDT      EMR Dragon/Transcription:   \"Dictated utilizing Dragon dictation\".        "

## 2025-04-30 ENCOUNTER — PATIENT ROUNDING (BHMG ONLY) (OUTPATIENT)
Age: 65
End: 2025-04-30
Payer: MEDICARE

## 2025-04-30 NOTE — PROGRESS NOTES
04/28/2025  Foot and Ankle Surgery - New Patient   Provider: Dr. Justin Gaitan DPM  Location: [facility]    Subjective:  Sorin Allen is a 64 y.o. male.     Chief Complaint   Patient presents with    Left Foot - Toe Injury, Fracture, Initial Evaluation     Nondisplaced fracture of the distal aspect of the great toe proximal phalanx involving the interphalangeal joint.   ALSO NOW HAVING PAIN IN THE LATERAL SIDE OF HIS FOOT UP INTO HIS THE BOTTOM OF THE ANKLE    Initial Evaluation     PCP: Stacy Perez PA  Last PCP Visit: 12/2/24         History of Present Illness  The patient presents for evaluation of left foot issues.    He sustained a fracture to his left toe following a fall approximately 10 days ago, necessitating a visit to the emergency room at [facility] where sutures were applied. He has not had the opportunity to review the x-ray results. He reports difficulty in bending down, which prevents him from wearing slip-on footwear. Despite the injury, he continues to drive, transporting his son to school and his wife to work. He also reports a lack of strength in his knees, which causes them to buckle when standing. He experiences pain and reflexes, and spends most of his day lying down, only getting up to go to the car. Upon returning home, he immediately lies back down. He expresses a lack of trust in his body and has ceased weightlifting activities.    He has neuropathy and is diabetic. He uses a cane for support due to occasional instability and fear of falling.       No Known Allergies    Past Medical History:   Diagnosis Date    Arthritis     Asthma     Cardiac disease     Carpal tunnel syndrome     CKD (chronic kidney disease) stage 3a, GFR 55 ml/min 02/05/2024    Congenital heart disease     CPAP (continuous positive airway pressure) dependence     Diabetes mellitus     Diverticulitis of colon     Diverticulosis     Erectile dysfunction     Heart disease     Heart valve disease      Hyperlipidemia     Hypertension     Neuromuscular disorder     ESAU (obstructive sleep apnea)     CPAP Therapy    Osteoarthritis        Past Surgical History:   Procedure Laterality Date    CARDIAC CATHETERIZATION N/A 2022    Procedure: Left Heart Cath;  Surgeon: Raoul Tirado MD;  Location: [facility] CATH INVASIVE LOCATION;  Service: Cardiovascular;  Laterality: N/A;    CARDIAC ELECTROPHYSIOLOGY PROCEDURE N/A 2022    Procedure: ICD single-chamber, new [place] aware;  Surgeon: Raoul Tirado MD;  Location: [facility] CATH INVASIVE LOCATION;  Service: Cardiovascular;  Laterality: N/A;    HERNIA REPAIR         Family History   Problem Relation Age of Onset    Leukemia Mother     No Known Problems Father     Cancer Sister         No    Cancer Maternal Grandmother        Social History     Socioeconomic History    Marital status:    Tobacco Use    Smoking status: Former     Current packs/day: 0.00     Average packs/day: 2.0 packs/day for 20.0 years (40.0 ttl pk-yrs)     Types: Cigarettes     Start date: 3/6/1980     Quit date: 3/6/2000     Years since quittin.1     Passive exposure: Past    Smokeless tobacco: Never   Vaping Use    Vaping status: Never Used   Substance and Sexual Activity    Alcohol use: Not Currently     Comment: rare    Drug use: Never    Sexual activity: Defer        Current Outpatient Medications on File Prior to Visit   Medication Sig Dispense Refill    ACCU-CHEK SOFTCLIX LANCETS lancets by Other route. Use as instructed      acetaminophen (TYLENOL) 500 MG tablet Take 2 tablets by mouth Every 8 (Eight) Hours As Needed for Moderate Pain  (arthritic pain). 180 tablet 5    aspirin (aspirin) 81 MG EC tablet Take 1 tablet by mouth Daily. 90 tablet 3    Blood Glucose Calibration liquid by In Vitro route.      Blood Glucose Monitoring Suppl (ACCU-CHEK ALEKS PLUS) w/Device kit Use to test blood sugar twice a day      Blood Glucose Monitoring Suppl (Accu-Chek Guide)  "w/Device kit 1 kit 3 (Three) Times a Day. Use glucometer to check blood sugars 3 times a day. Dx. E11.42 1 kit 0    Budeson-Glycopyrrol-Formoterol (Breztri Aerosphere) 160-9-4.8 MCG/ACT aerosol inhaler Inhale 2 puffs 2 (Two) Times a Day. 1 each 0    Continuous Blood Gluc  (FreeStyle Torie 14 Day [place]) device 1 Units 3 (Three) Times a Day Before Meals. Use continuous glucose monitor to monitor glucose regularly. 1 each 0    Continuous Glucose Sensor (FreeStyle Torie 2 Sensor) [facility] [place] Apply 1 each topically Every 14 (Fourteen) Days. Dispense 6 per every 3 months. DX E11.42 6 each 3    dapagliflozin Propanediol (Farxiga) 10 MG tablet TAKE 10 MG BY MOUTH DAILY. 90 tablet 3    furosemide (LASIX) 80 MG tablet TAKE 1 TABLET BY MOUTH FOUR TIMES A  tablet 1    gabapentin (NEURONTIN) 100 MG capsule TAKE 1 CAPSULE BY MOUTH THREE TIMES A DAY (Patient taking differently: Take 1 capsule by mouth 3 (Three) Times a Day As Needed.) 90 capsule 2    glucose blood (Accu-Chek Sigrid Plus) test strip USE TESTING STRIP TO CHECK BLOOD SUGARS 3 TIMES A DAY. DX. E11.42 100 each 11    Insulin Glargine (Lantus SoloStar) 100 UNIT/ML injection pen Inject 60 Units under the skin into the appropriate area as directed Daily. 15 mL 12    Insulin Pen Needle 32G X 4 MM misc Use pen needle to deliver insulin subcutaneously daily.  Dx. E11.42 90 each 3    metoprolol succinate XL (TOPROL-XL) 25 MG 24 hr tablet TAKE 1 TABLET BY MOUTH EVERY DAY 90 tablet 2    pantoprazole (PROTONIX) 40 MG EC tablet Take 1 tablet by mouth Daily.      sacubitril-valsartan (Entresto) 49-51 MG tablet TAKE 1 TABLET BY MOUTH TWICE A  tablet 1     No current facility-administered medications on file prior to visit.         Objective   Pulse 81   Ht 185.4 cm (73\")   Wt (!) 142 kg (312 lb 3.2 oz)   SpO2 100%   BMI 41.19 kg/m²     Foot/Ankle Exam    GENERAL  Appearance:  appears stated age and obese  Orientation:  AAOx3  Affect:  appropriate    VASCULAR     " Left Foot Vascularity   Dorsalis pedis:  2+  Posterior tibial:  2+  Skin temperature:  warm  Edema gradin+  CFT:  < 3 seconds  Pedal hair growth:  Present     NEUROLOGIC     Left Foot Neurologic   Light touch sensation: diminished  Hot/Cold sensation:  diminished  Achilles reflex:  1+    MUSCULOSKELETAL     Left Foot Musculoskeletal   Ecchymosis:  none  Tenderness:  none  Arch:  Pes planus     Left foot additional comments: Laceration noted to the plantar sulcus region of the left great toe with intact sutures.  No maceration, drainage or gross signs of infection.  No significant inflammation involving the toe.  Mild swelling involving the proximal phalanx region and IPJ.  No abnormal findings to the rest of the foot.    Physical Exam         Results  Laboratory Studies  Last A1c was 7.3.    Imaging  X-ray shows a displaced fracture in the left toe.       Assessment & Plan   Diagnoses and all orders for this visit:    1. Left foot pain (Primary)  -     XR Foot 3+ View Left    2. Laceration of left great toe without foreign body without damage to nail, initial encounter    3. Closed displaced fracture of proximal phalanx of left great toe, initial encounter    4. Type 2 diabetes mellitus with peripheral neuropathy    5. Type 2 diabetes mellitus with hyperglycemia, with long-term current use of insulin    6. Morbid obesity with BMI of 40.0-44.9, adult    7. Gait instability       Assessment & Plan    The patient sustained a displaced fracture in the left toe after tripping and falling more than 10 days ago. The fracture is part of the joint and slightly out of place but does not require surgical intervention. There is a risk of developing posttraumatic arthritis in the joint, which may necessitate surgery if it becomes problematic. Stiffness in the toe is expected for some time. The patient also has soft tissue damage, which will take about 8 weeks to improve. The sutures at the bottom of the foot will be removed  today. A rigid postoperative shoe will be provided to keep the toe from bending. He is advised to limit activity and avoid bending the toe. He can shower and bathe but should monitor the wound regularly using a mirror or have someone else check it. Betadine should be applied to the wound with a cotton ball to help dry the area and keep it clean. The fracture is expected to stabilize and heal in 8 to 12 weeks.    The patient has neuropathy, which may affect his ability to feel pain in the affected area. This condition could complicate the healing process if the wound becomes infected. He is advised to monitor the wound closely and ensure it does not become problematic.    The patient's diabetes appears to be well-controlled with a recent A1c of 7.3. No changes to his diabetes management are necessary at this time.Explained importance of diabetic foot care, daily foot checks, and glycemic control. Patient should check both feet on a daily basis, monitor and control blood sugars, make sure that both feet and in between toes are towel dried after baths or showers. Avoid barefoot walking at all times.  I discussed wearing white socks and checking shoes before wearing them. Patient was given information on proper foot care. Call the office at the first signs of a wound or with signs of infection.    Reviewed proper basic stretching and manual therapy exercises along with appropriate shoes and activity.  Discussed proper use and/or avoidance of OTC anti-inflammatories.  Patient is to call with any additional issues or concerns.  Greater than 45 minutes was spent before, during, and after evaluation for patient care excluding procedure.    The encounter note is created with the use of AI technology.  I do apologize if there are typos and/or confusion within the note.  Please feel free to contact me or my office with any questions or concerns.    Follow-up  The patient will follow up in 3 weeks and repeat imaging.    Suture  removal: Left great toe    Sutures were removed without complication.  No complicating features involving the laceration site after removal.         Patient or patient representative verbalized consent for the use of Ambient Listening during the visit with  JOHAN Gaitan DPM for chart documentation. 4/30/2025  07:32 EDT    JOHAN Gaitan DPM

## 2025-05-01 RX ORDER — METOPROLOL SUCCINATE 25 MG/1
25 TABLET, EXTENDED RELEASE ORAL DAILY
Qty: 90 TABLET | Refills: 3 | Status: SHIPPED | OUTPATIENT
Start: 2025-05-01

## 2025-05-01 NOTE — TELEPHONE ENCOUNTER
Rx Refill Note  Requested Prescriptions     Pending Prescriptions Disp Refills    metoprolol succinate XL (TOPROL-XL) 25 MG 24 hr tablet [Pharmacy Med Name: METOPROLOL SUCC ER 25 MG TAB] 90 tablet 2     Sig: TAKE 1 TABLET BY MOUTH EVERY DAY      Last office visit with prescribing clinician: 4/29/2025   Last telemedicine visit with prescribing clinician: Visit date not found   Next office visit with prescribing clinician: Visit date not found                         Would you like a call back once the refill request has been completed: [] Yes [] No    If the office needs to give you a call back, can they leave a voicemail: [] Yes [] No    Jessie Cunningham MA  05/01/25, 08:42 EDT

## 2025-05-20 ENCOUNTER — OFFICE VISIT (OUTPATIENT)
Age: 65
End: 2025-05-20
Payer: MEDICARE

## 2025-05-20 VITALS — BODY MASS INDEX: 41.62 KG/M2 | HEIGHT: 73 IN | HEART RATE: 71 BPM | WEIGHT: 314 LBS | OXYGEN SATURATION: 95 %

## 2025-05-20 DIAGNOSIS — E66.01 MORBID OBESITY WITH BMI OF 40.0-44.9, ADULT: ICD-10-CM

## 2025-05-20 DIAGNOSIS — Z79.4 TYPE 2 DIABETES MELLITUS WITH HYPERGLYCEMIA, WITH LONG-TERM CURRENT USE OF INSULIN: ICD-10-CM

## 2025-05-20 DIAGNOSIS — M79.672 LEFT FOOT PAIN: Primary | ICD-10-CM

## 2025-05-20 DIAGNOSIS — S91.112D: ICD-10-CM

## 2025-05-20 DIAGNOSIS — E11.65 TYPE 2 DIABETES MELLITUS WITH HYPERGLYCEMIA, WITH LONG-TERM CURRENT USE OF INSULIN: ICD-10-CM

## 2025-05-20 DIAGNOSIS — E11.42 TYPE 2 DIABETES MELLITUS WITH PERIPHERAL NEUROPATHY: ICD-10-CM

## 2025-05-20 DIAGNOSIS — S92.412D CLOSED DISPLACED FRACTURE OF PROXIMAL PHALANX OF LEFT GREAT TOE WITH ROUTINE HEALING, SUBSEQUENT ENCOUNTER: ICD-10-CM

## 2025-05-20 DIAGNOSIS — R26.81 GAIT INSTABILITY: ICD-10-CM

## 2025-05-21 NOTE — PROGRESS NOTES
05/20/2025  Foot and Ankle Surgery - Established Patient/Follow-up  Provider: Dr. Justin Gaitan DPM  Location: [facility]    Subjective:  Sorin Allen is a 64 y.o. male.     Chief Complaint   Patient presents with    Left Foot - Toe Injury, Fracture, Follow-up     Nondisplaced fracture of the distal aspect of the great toe proximal phalanx involving the interphalangeal joint.   ALSO NOW HAVING PAIN IN THE LATERAL SIDE OF HIS FOOT UP INTO HIS THE BOTTOM OF THE ANKLE    Follow-Up     Stacy Perez PA  12/2/24       History of Present Illness  The patient presents for evaluation of foot swelling.    He reports a sensation of increased swelling in his foot, which he manages by wearing sandals. He also experiences intermittent episodes of pain, a symptom that was not previously present. Additionally, he describes a needle-like sensation in his foot.      No Known Allergies    Current Outpatient Medications on File Prior to Visit   Medication Sig Dispense Refill    ACCU-CHEK SOFTCLIX LANCETS lancets by Other route. Use as instructed      acetaminophen (TYLENOL) 500 MG tablet Take 2 tablets by mouth Every 8 (Eight) Hours As Needed for Moderate Pain  (arthritic pain). 180 tablet 5    aspirin (aspirin) 81 MG EC tablet Take 1 tablet by mouth Daily. 90 tablet 3    Blood Glucose Calibration liquid by In Vitro route.      Blood Glucose Monitoring Suppl (ACCU-CHEK ALEKS PLUS) w/Device kit Use to test blood sugar twice a day      Blood Glucose Monitoring Suppl (Accu-Chek Guide) w/Device kit 1 kit 3 (Three) Times a Day. Use glucometer to check blood sugars 3 times a day. Dx. E11.42 1 kit 0    Continuous Blood Gluc  (FreeStyle Torie 14 Day [place]) device 1 Units 3 (Three) Times a Day Before Meals. Use continuous glucose monitor to monitor glucose regularly. 1 each 0    Continuous Glucose Sensor (FreeStyle Torie 2 Sensor) [facility] [place] Apply 1 each topically Every 14 (Fourteen) Days. Dispense 6 per every 3 months. DX  "E11.42 6 each 3    dapagliflozin Propanediol (Farxiga) 10 MG tablet TAKE 10 MG BY MOUTH DAILY. 90 tablet 3    furosemide (LASIX) 80 MG tablet TAKE 1 TABLET BY MOUTH FOUR TIMES A  tablet 1    gabapentin (NEURONTIN) 100 MG capsule TAKE 1 CAPSULE BY MOUTH THREE TIMES A DAY (Patient taking differently: Take 1 capsule by mouth 3 (Three) Times a Day As Needed.) 90 capsule 2    glucose blood (Accu-Chek Sigrid Plus) test strip USE TESTING STRIP TO CHECK BLOOD SUGARS 3 TIMES A DAY. DX. E11.42 100 each 11    Insulin Glargine (Lantus SoloStar) 100 UNIT/ML injection pen Inject 60 Units under the skin into the appropriate area as directed Daily. 15 mL 12    Insulin Pen Needle 32G X 4 MM misc Use pen needle to deliver insulin subcutaneously daily.  Dx. E11.42 90 each 3    metoprolol succinate XL (TOPROL-XL) 25 MG 24 hr tablet TAKE 1 TABLET BY MOUTH EVERY DAY 90 tablet 3    pantoprazole (PROTONIX) 40 MG EC tablet Take 1 tablet by mouth Daily.      sacubitril-valsartan (Entresto) 49-51 MG tablet TAKE 1 TABLET BY MOUTH TWICE A  tablet 1    Budeson-Glycopyrrol-Formoterol (Breztri Aerosphere) 160-9-4.8 MCG/ACT aerosol inhaler Inhale 2 puffs 2 (Two) Times a Day. 1 each 0     No current facility-administered medications on file prior to visit.       Objective   Pulse 71   Ht 185.4 cm (73\")   Wt (!) 142 kg (314 lb)   SpO2 95%   BMI 41.43 kg/m²     Foot/Ankle Exam  Physical Exam  GENERAL  Appearance:  appears stated age and obese  Orientation:  AAOx3  Affect:  appropriate     VASCULAR      Left Foot Vascularity   Dorsalis pedis:  2+  Posterior tibial:  2+  Skin temperature:  warm  Edema gradin+  CFT:  < 3 seconds  Pedal hair growth:  Present     NEUROLOGIC      Left Foot Neurologic   Light touch sensation: diminished  Hot/Cold sensation:  diminished  Achilles reflex:  1+     MUSCULOSKELETAL      Left Foot Musculoskeletal   Ecchymosis:  none  Tenderness:  none  Arch:  Pes planus     Left foot additional comments: " Laceration noted to the plantar sulcus region of the left great toe with intact sutures.  No maceration, drainage or gross signs of infection.  No significant inflammation involving the toe.  Mild swelling involving the proximal phalanx region and IPJ.  No abnormal findings to the rest of the foot.    5/20/25: Laceration to the inferior aspect of the great toe is now healed with overlying eschar.  No signs of infection.  No progressive deformity involving the great toe.  Stiffness noted to the interphalangeal joint.  No significant pain likely secondary to patient's peripheral neuropathy.      Results  Imaging  X-ray shows the joint is not in the best of positions.    Assessment & Plan   Diagnoses and all orders for this visit:    1. Left foot pain (Primary)  -     XR Foot 3+ View Left    2. Laceration of left great toe without foreign body without damage to nail, subsequent encounter    3. Closed displaced fracture of proximal phalanx of left great toe with routine healing, subsequent encounter    4. Type 2 diabetes mellitus with peripheral neuropathy    5. Type 2 diabetes mellitus with hyperglycemia, with long-term current use of insulin    6. Morbid obesity with BMI of 40.0-44.9, adult    7. Gait instability      Assessment & Plan    The patient's foot swelling is likely due to neuropathy, which complicates the condition by causing nerve dysfunction. The wound on the plantar aspect of the foot is healing well, with a satisfactory scab formation. The joint alignment is suboptimal, as evidenced by the x-ray. He was advised to wear a rigid-soled shoe to prevent excessive bending during ambulation. Epsom salt soaks were recommended to alleviate daily swelling. A Tubigrip compression garment will be provided to manage leg swelling. He was encouraged to engage in active toe movements. If he experiences pain, difficulty walking, or other issues, surgical intervention may be necessary, which could involve joint fusion or  screw placement. Reviewed proper basic stretching and manual therapy exercises along with appropriate shoes and activity.  Discussed proper use and/or avoidance of OTC anti-inflammatories.  Patient is to call with any additional issues or concerns.  Greater than 20 minutes was spent before, during, and after evaluation for patient care.    The encounter note is created with the use of AI technology.  I do apologize if there are typos and/or confusion within the note.  Please feel free to contact me or my office with any questions or concerns.    Follow-up  The patient will follow up in 4 weeks for an x-ray to monitor progress.             Patient or patient representative verbalized consent for the use of Ambient Listening during the visit with  JOHAN Gaitan DPM for chart documentation. 5/21/2025  06:34 EDT    JOHAN Gaitan DPM

## 2025-06-06 ENCOUNTER — OFFICE VISIT (OUTPATIENT)
Dept: FAMILY MEDICINE CLINIC | Facility: CLINIC | Age: 65
End: 2025-06-06
Payer: MEDICARE

## 2025-06-06 ENCOUNTER — LAB (OUTPATIENT)
Dept: FAMILY MEDICINE CLINIC | Facility: CLINIC | Age: 65
End: 2025-06-06
Payer: MEDICARE

## 2025-06-06 VITALS
DIASTOLIC BLOOD PRESSURE: 93 MMHG | BODY MASS INDEX: 41.5 KG/M2 | SYSTOLIC BLOOD PRESSURE: 135 MMHG | OXYGEN SATURATION: 96 % | WEIGHT: 313.1 LBS | HEIGHT: 73 IN | TEMPERATURE: 98.7 F | RESPIRATION RATE: 18 BRPM | HEART RATE: 75 BPM

## 2025-06-06 DIAGNOSIS — M25.512 LEFT ANTERIOR SHOULDER PAIN: ICD-10-CM

## 2025-06-06 DIAGNOSIS — M25.562 ACUTE PAIN OF BOTH KNEES: Primary | ICD-10-CM

## 2025-06-06 DIAGNOSIS — Z12.11 ENCOUNTER FOR SCREENING FOR MALIGNANT NEOPLASM OF COLON: ICD-10-CM

## 2025-06-06 DIAGNOSIS — N18.31 STAGE 3A CHRONIC KIDNEY DISEASE: ICD-10-CM

## 2025-06-06 DIAGNOSIS — Z53.20 STATIN DECLINED: ICD-10-CM

## 2025-06-06 DIAGNOSIS — M25.561 ACUTE PAIN OF BOTH KNEES: Primary | ICD-10-CM

## 2025-06-06 DIAGNOSIS — E78.2 MIXED DIABETIC HYPERLIPIDEMIA ASSOCIATED WITH TYPE 2 DIABETES MELLITUS: ICD-10-CM

## 2025-06-06 DIAGNOSIS — E11.69 MIXED DIABETIC HYPERLIPIDEMIA ASSOCIATED WITH TYPE 2 DIABETES MELLITUS: ICD-10-CM

## 2025-06-06 DIAGNOSIS — E11.42 TYPE 2 DIABETES MELLITUS WITH PERIPHERAL NEUROPATHY: ICD-10-CM

## 2025-06-06 LAB
ALBUMIN SERPL-MCNC: 4.2 G/DL (ref 3.5–5.2)
ALBUMIN UR-MCNC: 3 MG/DL
ALBUMIN/GLOB SERPL: 1 G/DL
ALP SERPL-CCNC: 74 U/L (ref 39–117)
ALT SERPL W P-5'-P-CCNC: 7 U/L (ref 1–41)
ANION GAP SERPL CALCULATED.3IONS-SCNC: 11.5 MMOL/L (ref 5–15)
AST SERPL-CCNC: 12 U/L (ref 1–40)
BILIRUB SERPL-MCNC: 0.4 MG/DL (ref 0–1.2)
BUN SERPL-MCNC: 15 MG/DL (ref 8–23)
BUN/CREAT SERPL: 15.3 (ref 7–25)
CALCIUM SPEC-SCNC: 9.7 MG/DL (ref 8.6–10.5)
CHLORIDE SERPL-SCNC: 100 MMOL/L (ref 98–107)
CHOLEST SERPL-MCNC: 189 MG/DL (ref 0–200)
CO2 SERPL-SCNC: 28.5 MMOL/L (ref 22–29)
CREAT SERPL-MCNC: 0.98 MG/DL (ref 0.76–1.27)
CREAT UR-MCNC: 98.7 MG/DL
EGFRCR SERPLBLD CKD-EPI 2021: 85.6 ML/MIN/1.73
GLOBULIN UR ELPH-MCNC: 4.4 GM/DL
GLUCOSE SERPL-MCNC: 104 MG/DL (ref 65–99)
HBA1C MFR BLD: 6.5 % (ref 4.8–5.6)
HDLC SERPL-MCNC: 32 MG/DL (ref 40–60)
LDLC SERPL CALC-MCNC: 129 MG/DL (ref 0–100)
LDLC/HDLC SERPL: 3.95 {RATIO}
MICROALBUMIN/CREAT UR: 30.4 MG/G (ref 0–29)
POTASSIUM SERPL-SCNC: 4 MMOL/L (ref 3.5–5.2)
PROT SERPL-MCNC: 8.6 G/DL (ref 6–8.5)
SODIUM SERPL-SCNC: 140 MMOL/L (ref 136–145)
TRIGL SERPL-MCNC: 153 MG/DL (ref 0–150)
VLDLC SERPL-MCNC: 28 MG/DL (ref 5–40)

## 2025-06-06 PROCEDURE — 83036 HEMOGLOBIN GLYCOSYLATED A1C: CPT | Performed by: PHYSICIAN ASSISTANT

## 2025-06-06 PROCEDURE — 82570 ASSAY OF URINE CREATININE: CPT | Performed by: PHYSICIAN ASSISTANT

## 2025-06-06 PROCEDURE — 82043 UR ALBUMIN QUANTITATIVE: CPT | Performed by: PHYSICIAN ASSISTANT

## 2025-06-06 PROCEDURE — 36415 COLL VENOUS BLD VENIPUNCTURE: CPT | Performed by: PHYSICIAN ASSISTANT

## 2025-06-06 PROCEDURE — 80061 LIPID PANEL: CPT | Performed by: PHYSICIAN ASSISTANT

## 2025-06-06 PROCEDURE — 80053 COMPREHEN METABOLIC PANEL: CPT | Performed by: PHYSICIAN ASSISTANT

## 2025-06-06 RX ORDER — POTASSIUM CHLORIDE 1500 MG/1
20 TABLET, EXTENDED RELEASE ORAL EVERY 12 HOURS SCHEDULED
COMMUNITY

## 2025-06-06 RX ORDER — MELOXICAM 7.5 MG/1
7.5 TABLET ORAL DAILY
COMMUNITY

## 2025-06-06 NOTE — PROGRESS NOTES
Subjective     History of Present Illness  The patient presents for evaluation of bilateral knee pain, left shoulder pain, diabetes mellitus, hypertension, and kidney function management.    He reports experiencing bilateral knee pain, with the left knee being more affected than the right. The pain is exacerbated during ambulation and necessitates keeping the knee flexed while in a supine position. He also experiences difficulty in straightening his knee due to muscle weakness. He has been managing the pain with anti-inflammatory medication, which provides relief for approximately 3 days. He expresses a desire to alleviate the stiffness in his knees without causing further damage to his kidneys. He recalls receiving a steroid injection for his knee pain about 5 years ago, which provided relief for a duration of 3 months.    He has a history of a fall resulting in a broken toe, which occurred 2 days prior to his emergency room visit in April 2025. Since then, he has not experienced any significant foot pain, but notes swelling in his knee. He was prescribed a boot for his foot but did not utilize it. He also reports a cut on the plantar aspect of his foot, which was cleaned and dressed. He has been under the care of a podiatrist for this issue.    He does not monitor his blood pressure at home and reports no history of hypertension. He was prescribed antihypertensive medication following the initiation of his cardiac treatment. His blood pressure was noted to be slightly elevated during this visit, which may be related to pain.    He maintains adequate hydration with water and cranberry juice. He reports normal urination and bowel movements, aided by nightly Metamucil intake. He has an upcoming appointment with his podiatrist later this month and a cardiology follow-up in 11/2025. He had a consultation with Dr. Tirado in 04/2025 and was referred to a nephrologist, but was unable to attend the appointment due to  scheduling conflicts. He had an ophthalmology check-up within the past year and reports no recent changes in vision. He has undergone Cologuard testing and had polyps removed, which were found to be benign.    He is currently on Lantus insulin, with the dosage varying based on his dietary intake.    He reports pain in his left shoulder, which he attributes to his recent fall. He has difficulty raising his arm since the fall, a problem he did not have previously. He also reports occasional swelling in his arm, which he believes may be related to his diet.    Past Medical History:   Diagnosis Date    Arthritis     Asthma     Cardiac disease     Carpal tunnel syndrome     CKD (chronic kidney disease) stage 3a, GFR 55 ml/min 02/05/2024    Congenital heart disease     CPAP (continuous positive airway pressure) dependence     Diabetes mellitus     Diverticulitis of colon     Diverticulosis     Erectile dysfunction     Heart disease     Heart valve disease     Hyperlipidemia     Hypertension     Neuromuscular disorder     ESAU (obstructive sleep apnea)     CPAP Therapy    Osteoarthritis      Past Surgical History:   Procedure Laterality Date    CARDIAC CATHETERIZATION N/A 2/16/2022    Procedure: Left Heart Cath;  Surgeon: Raoul Tirado MD;  Location: [facility] CATH INVASIVE LOCATION;  Service: Cardiovascular;  Laterality: N/A;    CARDIAC ELECTROPHYSIOLOGY PROCEDURE N/A 6/7/2022    Procedure: ICD single-chamber, new [place] aware;  Surgeon: Raoul Tirado MD;  Location:  [facility] CATH INVASIVE LOCATION;  Service: Cardiovascular;  Laterality: N/A;    HERNIA REPAIR       Family History   Problem Relation Age of Onset    Leukemia Mother     No Known Problems Father     Cancer Sister         No    Cancer Maternal Grandmother      Social History     Socioeconomic History    Marital status:    Tobacco Use    Smoking status: Former     Current packs/day: 0.00     Average packs/day: 2.0 packs/day for 20.0  years (40.0 ttl pk-yrs)     Types: Cigarettes     Start date: 3/6/1980     Quit date: 3/6/2000     Years since quittin.2     Passive exposure: Past    Smokeless tobacco: Never   Vaping Use    Vaping status: Never Used   Substance and Sexual Activity    Alcohol use: Not Currently     Comment: rare    Drug use: Never    Sexual activity: Defer         Current Outpatient Medications:     ACCU-CHEK SOFTCLIX LANCETS lancets, by Other route. Use as instructed, Disp: , Rfl:     acetaminophen (TYLENOL) 500 MG tablet, Take 2 tablets by mouth Every 8 (Eight) Hours As Needed for Moderate Pain  (arthritic pain)., Disp: 180 tablet, Rfl: 5    aspirin (aspirin) 81 MG EC tablet, Take 1 tablet by mouth Daily., Disp: 90 tablet, Rfl: 3    Blood Glucose Calibration liquid, by In Vitro route., Disp: , Rfl:     Blood Glucose Monitoring Suppl (ACCU-CHEK ALEKS PLUS) w/Device kit, Use to test blood sugar twice a day, Disp: , Rfl:     Blood Glucose Monitoring Suppl (Accu-Chek Guide) w/Device kit, 1 kit 3 (Three) Times a Day. Use glucometer to check blood sugars 3 times a day. Dx. E11.42, Disp: 1 kit, Rfl: 0    Continuous Blood Gluc  (FreeStyle Torie 14 Day [place]) device, 1 Units 3 (Three) Times a Day Before Meals. Use continuous glucose monitor to monitor glucose regularly., Disp: 1 each, Rfl: 0    Continuous Glucose Sensor (FreeStyle Torie 2 Sensor) [facility], Apply 1 each topically Every 14 (Fourteen) Days. Dispense 6 per every 3 months. DX E11.42, Disp: 6 each, Rfl: 3    dapagliflozin Propanediol (Farxiga) 10 MG tablet, TAKE 10 MG BY MOUTH DAILY., Disp: 90 tablet, Rfl: 3    furosemide (LASIX) 80 MG tablet, TAKE 1 TABLET BY MOUTH FOUR TIMES A DAY, Disp: 360 tablet, Rfl: 1    gabapentin (NEURONTIN) 100 MG capsule, TAKE 1 CAPSULE BY MOUTH THREE TIMES A DAY (Patient taking differently: Take 1 capsule by mouth 3 (Three) Times a Day As Needed.), Disp: 90 capsule, Rfl: 2    glucose blood (Accu-Chek Aleks Plus) test strip, USE TESTING  "STRIP TO CHECK BLOOD SUGARS 3 TIMES A DAY. DX. E11.42, Disp: 100 each, Rfl: 11    Insulin Glargine (Lantus SoloStar) 100 UNIT/ML injection pen, Inject 60 Units under the skin into the appropriate area as directed Daily., Disp: 15 mL, Rfl: 12    Insulin Pen Needle 32G X 4 MM misc, Use pen needle to deliver insulin subcutaneously daily.  Dx. E11.42, Disp: 90 each, Rfl: 3    meloxicam (MOBIC) 7.5 MG tablet, Take 1 tablet by mouth Daily., Disp: , Rfl:     metoprolol succinate XL (TOPROL-XL) 25 MG 24 hr tablet, TAKE 1 TABLET BY MOUTH EVERY DAY (Patient taking differently: Take 1 tablet by mouth Daily. PRN), Disp: 90 tablet, Rfl: 3    pantoprazole (PROTONIX) 40 MG EC tablet, Take 1 tablet by mouth Daily., Disp: , Rfl:     potassium chloride ER (K-TAB) 20 MEQ tablet controlled-release ER tablet, Take 1 tablet by mouth Every 12 (Twelve) Hours. PRN, Disp: , Rfl:     sacubitril-valsartan (Entresto) 49-51 MG tablet, TAKE 1 TABLET BY MOUTH TWICE A DAY, Disp: 180 tablet, Rfl: 1    Review of Systems   Constitutional:  Negative for activity change, appetite change, chills, diaphoresis, fatigue, fever, unexpected weight gain and unexpected weight loss.   Respiratory:  Negative for chest tightness, shortness of breath and wheezing.    Cardiovascular:  Positive for leg swelling. Negative for chest pain and palpitations.   Gastrointestinal:  Negative for abdominal pain, constipation, diarrhea, nausea and vomiting.   Genitourinary:  Negative for difficulty urinating and dysuria.   Musculoskeletal:  Positive for arthralgias, gait problem and joint swelling.   Neurological:  Positive for numbness. Negative for dizziness, tremors, syncope, weakness, headache and confusion.   Psychiatric/Behavioral:  Positive for sleep disturbance.      /93 (BP Location: Right arm, Patient Position: Sitting, Cuff Size: Large Adult)   Pulse 75   Temp 98.7 °F (37.1 °C) (Temporal)   Resp 18   Ht 185.4 cm (72.99\")   Wt (!) 142 kg (313 lb 1.6 oz)   " SpO2 96%   BMI 41.32 kg/m²       Objective   Physical Exam  Vitals and nursing note reviewed.   Constitutional:       Appearance: Normal appearance. He is obese.   HENT:      Head: Normocephalic and atraumatic.   Neck:      Thyroid: No thyroid mass, thyromegaly or thyroid tenderness.      Vascular: No carotid bruit.   Cardiovascular:      Rate and Rhythm: Normal rate and regular rhythm.      Heart sounds: Normal heart sounds.   Pulmonary:      Effort: Pulmonary effort is normal.      Breath sounds: Normal breath sounds.   Musculoskeletal:      Left shoulder: Decreased range of motion. Normal pulse.      Cervical back: Normal range of motion and neck supple.      Right knee: Decreased range of motion.      Left knee: Swelling present. Decreased range of motion.      Right lower leg: No edema.      Left lower leg: Edema present.   Skin:     General: Skin is warm and dry.   Neurological:      General: No focal deficit present.      Mental Status: He is alert and oriented to person, place, and time.   Psychiatric:         Mood and Affect: Mood normal.         Behavior: Behavior normal.         Thought Content: Thought content normal.         Physical Exam  Respiratory: Clear to auscultation, no wheezing, rales or rhonchi  Cardiovascular: Regular rate and rhythm, no murmurs, rubs, or gallops  Extremities: Left knee swollen, right knee mildly swollen  Musculoskeletal: Tenderness in bilateral knees, limited mobility in left knee, mild swelling in left shoulder    Procedures     Results  Labs   - Hemoglobin A1c: 12/2024, Slightly elevated   - Kidney function test: 12/2024, Slightly worse    Assessment    Diagnoses and all orders for this visit:    1. Acute pain of both knees (Primary)  -     XR Knee 3 View Bilateral; Future  -     XR Knee Bilateral AP Standing; Future  -     Ambulatory Referral to Orthopedic Surgery    2. Type 2 diabetes mellitus with peripheral neuropathy  -     Microalbumin / Creatinine Urine Ratio -  Urine, Clean Catch  -     Hemoglobin A1c    3. Encounter for screening for malignant neoplasm of colon  Comments:  declined today.    4. Statin declined    5. Mixed diabetic hyperlipidemia associated with type 2 diabetes mellitus  -     Lipid panel  -     Comprehensive metabolic panel    6. Left anterior shoulder pain  -     XR Shoulder 2+ View Left; Future  -     Ambulatory Referral to Orthopedic Surgery    7. Stage 3a chronic kidney disease  -     Ambulatory Referral to Nephrology         Assessment & Plan  1. Bilateral knee pain.  - The patient's blood pressure is slightly elevated today, potentially due to pain.  - Physical examination reveals swelling in the left knee.  - X-rays of the knees will be ordered to assess the extent of arthritis.  - A referral to orthopedics will be made for further evaluation and potential steroid injection if arthritis is severe.    2. Left shoulder pain.  - The patient reports difficulty lifting the arm since the fall.  - Physical examination reveals tenderness and limited range of motion in the left shoulder.  - X-rays of the shoulder will be ordered to assess the extent of the injury.  - Further treatment will be determined based on the imaging results.    3. Diabetes mellitus.  - The patient's hemoglobin A1c was slightly elevated in 12/2024.  - Blood work will be done to reassess glucose control.  - The patient is advised to continue monitoring his blood sugar levels and adjust his Lantus dosage accordingly.  - Counseling provided on the importance of dietary management to control blood sugar levels.    4. Hypertension.  - The patient's blood pressure is slightly elevated today, potentially due to pain.  - Advised to monitor blood pressure at home and report any significant changes.  - Blood pressure management will be reassessed based on home monitoring results.  - Discussed the potential impact of pain on blood pressure readings.    5. Kidney function management.  - The  patient's kidney function was slightly worse in 12/2024.  - Blood work and a urine test will be done to reassess kidney function.  - A referral to a nephrologist will be made, pt only able to be scheduled between 10 AM and 12 PM.  - Discussed the impact of medications like meloxicam on kidney function and advised on safer alternatives like Tylenol.    6. Medication management.  - The patient is currently taking Farxiga and Entresto, which can affect kidney function.  - Advised to continue taking these medications as prescribed but to monitor for any side effects such as coughing.  - The patient is also taking Metamucil for bowel regularity.  - Discussed the dual role of Farxiga in protecting and potentially affecting kidney function.              Patient or patient representative verbalized consent for the use of Ambient Listening during the visit with  Stacy Perez PA-C for chart documentation. 6/6/2025  10:16 EDT

## 2025-06-11 ENCOUNTER — HOSPITAL ENCOUNTER (OUTPATIENT)
Dept: GENERAL RADIOLOGY | Facility: HOSPITAL | Age: 65
Discharge: HOME | End: 2025-06-11
Payer: MEDICARE

## 2025-06-11 DIAGNOSIS — M25.562 ACUTE PAIN OF BOTH KNEES: ICD-10-CM

## 2025-06-11 DIAGNOSIS — M25.561 ACUTE PAIN OF BOTH KNEES: ICD-10-CM

## 2025-06-11 DIAGNOSIS — M25.512 LEFT ANTERIOR SHOULDER PAIN: ICD-10-CM

## 2025-06-11 PROCEDURE — 73030 X-RAY EXAM OF SHOULDER: CPT

## 2025-06-11 PROCEDURE — 73562 X-RAY EXAM OF KNEE 3: CPT

## 2025-06-20 ENCOUNTER — OFFICE VISIT (OUTPATIENT)
Dept: ORTHOPEDIC SURGERY | Facility: CLINIC | Age: 65
End: 2025-06-20
Payer: MEDICARE

## 2025-06-20 VITALS — OXYGEN SATURATION: 98 % | BODY MASS INDEX: 42.39 KG/M2 | HEIGHT: 72 IN | RESPIRATION RATE: 20 BRPM | WEIGHT: 313 LBS

## 2025-06-20 DIAGNOSIS — M17.0 PRIMARY OSTEOARTHRITIS OF BOTH KNEES: Primary | ICD-10-CM

## 2025-06-20 RX ORDER — TRIAMCINOLONE ACETONIDE 40 MG/ML
80 INJECTION, SUSPENSION INTRA-ARTICULAR; INTRAMUSCULAR
Status: COMPLETED | OUTPATIENT
Start: 2025-06-20 | End: 2025-06-20

## 2025-06-20 RX ADMIN — TRIAMCINOLONE ACETONIDE 80 MG: 40 INJECTION, SUSPENSION INTRA-ARTICULAR; INTRAMUSCULAR at 11:21

## 2025-06-20 NOTE — PROGRESS NOTES
Primary Care Sports Medicine Office Visit Note    Patient ID: Sorin Allen is a 64 y.o. male.    Chief Complaint:  Chief Complaint   Patient presents with    Left Knee - Pain, Initial Evaluation    Right Knee - Pain, Initial Evaluation     DOI- around April         History of Present Illness  The patient presents for evaluation of bilateral knee pain.    He reports experiencing discomfort in both knees, which he attributes to a fall that resulted in a fracture of his left great toe on 04/18/2025. The toe has since healed, but he began to notice significant pain in his knees post-recovery. He has been managing the pain with meloxicam, which provides occasional relief. He also mentions a previous instance of receiving steroid injections in both knees approximately 5 years ago, which were administered by Dr. Akers and provided effective relief.       Past Medical History:   Diagnosis Date    Arthritis     Asthma     Cardiac disease     Carpal tunnel syndrome     CKD (chronic kidney disease) stage 3a, GFR 55 ml/min 02/05/2024    Congenital heart disease     CPAP (continuous positive airway pressure) dependence     Diabetes mellitus     Dislocation, shoulder     Diverticulitis of colon     Diverticulosis     Erectile dysfunction     Fracture, foot     Heart disease     Heart valve disease     Hyperlipidemia     Hypertension     Knee sprain     Knee swelling     Neuromuscular disorder     ESAU (obstructive sleep apnea)     CPAP Therapy    Osteoarthritis     Tear of meniscus of knee     Tennis elbow        Past Surgical History:   Procedure Laterality Date    CARDIAC CATHETERIZATION N/A 2/16/2022    Procedure: Left Heart Cath;  Surgeon: Raoul Tirado MD;  Location: [facility] INVASIVE LOCATION;  Service: Cardiovascular;  Laterality: N/A;    CARDIAC ELECTROPHYSIOLOGY PROCEDURE N/A 6/7/2022    Procedure: ICD single-chamber, new [place] aware;  Surgeon: Raoul Tirado MD;  Location: [facility] CATH  "INVASIVE LOCATION;  Service: Cardiovascular;  Laterality: N/A;    HERNIA REPAIR         Family History   Problem Relation Age of Onset    Leukemia Mother     No Known Problems Father     Cancer Sister         No    Cancer Maternal Grandmother      Social History     Occupational History    Not on file   Tobacco Use    Smoking status: Former     Current packs/day: 0.00     Average packs/day: 2.0 packs/day for 20.0 years (40.0 ttl pk-yrs)     Types: Cigarettes     Start date: 3/6/1980     Quit date: 3/6/2000     Years since quittin.3     Passive exposure: Past    Smokeless tobacco: Never   Vaping Use    Vaping status: Never Used   Substance and Sexual Activity    Alcohol use: Yes     Alcohol/week: 1.0 standard drink of alcohol     Types: 1 Glasses of wine per week     Comment: rare    Drug use: Never    Sexual activity: Yes      Review of Systems  Objective:  Review of Systems:  Review of Systems   Constitutional:  Negative for activity change, fatigue and fever.   Musculoskeletal:  Positive for arthralgias.   Skin:  Negative for color change and rash.   Neurological:  Negative for numbness.     Physical Exam  Resp 20   Ht 182.9 cm (72\")   Wt (!) 142 kg (313 lb)   SpO2 98%   BMI 42.45 kg/m²   Vitals and nursing note reviewed.   Constitutional:       General: he is not in acute distress.     Appearance: he is well-developed. He is not diaphoretic.   HENT:      Head: Normocephalic and atraumatic.   Eyes:      Conjunctiva/sclera: Conjunctivae normal.   Pulmonary:      Effort: Pulmonary effort is normal. No respiratory distress.   Skin:     General: Skin is warm.      Capillary Refill: Capillary refill takes less than 2 seconds.   Neurological:      Mental Status: he  is alert.     Right Ankle Exam     Range of Motion   The patient has normal right ankle ROM.       Left Ankle Exam     Range of Motion   The patient has normal left ankle ROM.       Right Knee Exam     Muscle Strength   The patient has normal right " knee strength.    Tenderness   The patient is experiencing tenderness in the lateral joint line, medial joint line and patella.    Range of Motion   Extension:  normal   Flexion:  normal     Tests   Dean:  Medial - negative Lateral - negative  Varus: negative Valgus: negative  Right knee patellar apprehension test: +patellar grind, +irvin miller.    Other   Erythema: absent  Sensation: normal  Pulse: present (distal to the knee, DP and PT palpable)  Swelling: none  Effusion: no effusion present      Left Knee Exam     Muscle Strength   The patient has normal left knee strength.    Tenderness   The patient is experiencing tenderness in the lateral joint line, medial joint line and patella.    Range of Motion   Extension:  normal   Flexion:  normal     Tests   Dean:  Medial - negative Lateral - negative  Varus: negative Valgus: negative  Left knee patellar apprehension test: +patellar grind testing, +irvin miller testing.    Other   Erythema: absent  Sensation: normal  Pulse: present (distal to the knee, DP and PT palpable)  Swelling: none  Effusion: no effusion present            Results  3V XR bilateral knees reveals severe tricompartmental osteoarthritic change.     Assessment and Plan:    Diagnoses and all orders for this visit:    1. Primary osteoarthritis of both knees (Primary)  -     - Large Joint Arthrocentesis: bilateral knee    After discussion of risks and benefits, the patient elected to proceed with corticosteroid injection to the bilateral knees.  The patient tolerated this procedure well without any complaints or problems.  I recommended continuation of conservative management as previous, RTC in 3-6 months or sooner if symptoms recur.    - Large Joint Arthrocentesis: bilateral knee on 6/20/2025 11:21 AM  Indications: pain  Details: 25 G needle, anteromedial approach  Medications (Right): 80 mg triamcinolone acetonide 40 MG/ML  Medications (Left): 80 mg triamcinolone acetonide 40  "MG/ML  Outcome: tolerated well, no immediate complications  Procedure, treatment alternatives, risks and benefits explained, specific risks discussed. Consent was given by the patient. Immediately prior to procedure a time out was called to verify the correct patient, procedure, equipment, support staff and site/side marked as required. Patient was prepped and draped in the usual sterile fashion.         Shay MARTINEZ. \"Chance\" Kehinde GRIFFITHS DO, CAQSM  06/20/25  11:21 EDT    Disclaimer: Please note that areas of this note were completed with computer voice recognition software.  Quite often unanticipated grammatical, syntax, homophones, and other interpretive errors are inadvertently transcribed by the computer software. Please excuse any errors that have escaped final proofreading.    Patient or patient representative verbalized consent for the use of Ambient Listening during the visit with  Shay Busby II, DO for chart documentation. 06/20/25  11:21 EDT    "

## 2025-06-23 RX ORDER — SPIRONOLACTONE 25 MG/1
25 TABLET ORAL DAILY
Qty: 90 TABLET | Refills: 3 | OUTPATIENT
Start: 2025-06-23

## 2025-06-23 NOTE — TELEPHONE ENCOUNTER
Rx Refill Note  Requested Prescriptions     Pending Prescriptions Disp Refills    spironolactone (ALDACTONE) 25 MG tablet [Pharmacy Med Name: SPIRONOLACTONE 25 MG TABLET] 90 tablet 3     Sig: TAKE 1 TABLET BY MOUTH EVERY DAY      Last office visit with prescribing clinician: 4/29/2025   Last telemedicine visit with prescribing clinician: Visit date not found   Next office visit with prescribing clinician: Visit date not found                         Would you like a call back once the refill request has been completed: [] Yes [] No    If the office needs to give you a call back, can they leave a voicemail: [] Yes [] No    Malu Block MA  06/23/25, 08:28 EDT.

## 2025-07-01 ENCOUNTER — TELEPHONE (OUTPATIENT)
Dept: FAMILY MEDICINE CLINIC | Facility: CLINIC | Age: 65
End: 2025-07-01

## 2025-07-01 LAB
MC_CV_MDC_IDC_RATE_1: 182
MC_CV_MDC_IDC_RATE_1: 214
MC_CV_MDC_IDC_SHOCK_MEASURED_IMPEDANCE: 84
MC_CV_MDC_IDC_THERAPIES: NORMAL
MC_CV_MDC_IDC_THERAPIES: NORMAL
MC_CV_MDC_IDC_ZONE_ID: 1
MC_CV_MDC_IDC_ZONE_ID: 2
MC_CV_MDC_IDC_ZONE_ID: 3
MDC_IDC_MSMT_BATTERY_REMAINING_LONGEVITY: 84 MO
MDC_IDC_MSMT_BATTERY_REMAINING_PERCENTAGE: 68 %
MDC_IDC_MSMT_BATTERY_RRT_TRIGGER: 2.62
MDC_IDC_MSMT_BATTERY_STATUS: NORMAL
MDC_IDC_MSMT_BATTERY_VOLTAGE: 2.99
MDC_IDC_MSMT_CAP_CHARGE_TIME: 8.9
MDC_IDC_MSMT_LEADCHNL_RV_IMPEDANCE_VALUE: 450
MDC_IDC_MSMT_LEADCHNL_RV_PACING_THRESHOLD_POLARITY: NORMAL
MDC_IDC_MSMT_LEADCHNL_RV_SENSING_INTR_AMPL: 11.8
MDC_IDC_PG_IMPLANT_DTM: NORMAL
MDC_IDC_PG_MFG: NORMAL
MDC_IDC_PG_MODEL: NORMAL
MDC_IDC_PG_SERIAL: NORMAL
MDC_IDC_PG_TYPE: NORMAL
MDC_IDC_SESS_DTM: NORMAL
MDC_IDC_SESS_TYPE: NORMAL
MDC_IDC_SET_BRADY_LOWRATE: 50
MDC_IDC_SET_BRADY_MAX_SENSOR_RATE: 120
MDC_IDC_SET_BRADY_MODE: NORMAL
MDC_IDC_SET_LEADCHNL_RV_PACING_AMPLITUDE: 1
MDC_IDC_SET_LEADCHNL_RV_PACING_POLARITY: NORMAL
MDC_IDC_SET_LEADCHNL_RV_PACING_PULSEWIDTH: 0.5
MDC_IDC_SET_LEADCHNL_RV_SENSING_POLARITY: NORMAL
MDC_IDC_SET_LEADCHNL_RV_SENSING_SENSITIVITY: 0.5
MDC_IDC_SET_ZONE_STATUS: NORMAL
MDC_IDC_SET_ZONE_TYPE: NORMAL
MDC_IDC_STAT_BRADY_RV_PERCENT_PACED: 1
MDC_IDC_STAT_TACHYTHERAPY_ATP_DELIVERED_RECENT: 0
MDC_IDC_STAT_TACHYTHERAPY_SHOCKS_ABORTED_RECENT: 0
MDC_IDC_STAT_TACHYTHERAPY_SHOCKS_DELIVERED_RECENT: 0

## 2025-07-01 NOTE — TELEPHONE ENCOUNTER
Caller: Sorin Allen    Relationship: Self    Best call back number:     Sorin Allen (Self) [phone] (Mobile)       What was the call regarding:   PATIENT SCHEDULED A NEW PATIENT APPT WITH DR FUENTES, BUT WHEN THE INSURANCE SELECTS HIM AS THE PCP, IT'S ONLY SHOWING [place] ADDRESSES - WILL THIS CAUSE ANY ISSUES  AT HIS APPT/ BILLING      CAN YOU LOOK INTO THIS WITH CHRISTIANO AND ADVISE       Is it okay if the provider responds through MyChart: CALL PATIENT

## 2025-07-01 NOTE — TELEPHONE ENCOUNTER
I DON'T BELIEVE THIS SHOULD BE A PROBLEM. SINCE [facility] IS [facility]

## 2025-08-11 ENCOUNTER — OFFICE VISIT (OUTPATIENT)
Dept: FAMILY MEDICINE CLINIC | Facility: CLINIC | Age: 65
End: 2025-08-11
Payer: MEDICARE

## 2025-08-11 VITALS
TEMPERATURE: 98.6 F | WEIGHT: 298.6 LBS | DIASTOLIC BLOOD PRESSURE: 80 MMHG | OXYGEN SATURATION: 96 % | SYSTOLIC BLOOD PRESSURE: 115 MMHG | HEART RATE: 90 BPM | HEIGHT: 72 IN | BODY MASS INDEX: 40.44 KG/M2

## 2025-08-11 DIAGNOSIS — E11.42 TYPE 2 DIABETES MELLITUS WITH PERIPHERAL NEUROPATHY: ICD-10-CM

## 2025-08-11 DIAGNOSIS — Z79.4 TYPE 2 DIABETES MELLITUS WITH DIABETIC NEUROPATHY, WITH LONG-TERM CURRENT USE OF INSULIN: ICD-10-CM

## 2025-08-11 DIAGNOSIS — E11.40 TYPE 2 DIABETES MELLITUS WITH DIABETIC NEUROPATHY, WITH LONG-TERM CURRENT USE OF INSULIN: ICD-10-CM

## 2025-08-11 DIAGNOSIS — M25.562 ACUTE PAIN OF BOTH KNEES: ICD-10-CM

## 2025-08-11 DIAGNOSIS — M25.561 ACUTE PAIN OF BOTH KNEES: ICD-10-CM

## 2025-08-11 DIAGNOSIS — N18.31 STAGE 3A CHRONIC KIDNEY DISEASE: Primary | ICD-10-CM

## 2025-08-11 DIAGNOSIS — I50.32 DIASTOLIC HEART FAILURE, STAGE C, CHRONIC: ICD-10-CM

## 2025-08-11 RX ORDER — MINERAL OIL/HYDROPHIL PETROLAT
1 OINTMENT (GRAM) TOPICAL AS NEEDED
Qty: 99 G | Refills: 2 | Status: SHIPPED | OUTPATIENT
Start: 2025-08-11 | End: 2025-09-10

## 2025-08-11 RX ORDER — PREGABALIN 50 MG/1
50 CAPSULE ORAL 3 TIMES DAILY
Qty: 90 CAPSULE | Refills: 0 | Status: SHIPPED | OUTPATIENT
Start: 2025-08-11 | End: 2025-09-10

## 2025-08-11 RX ORDER — ACYCLOVIR 800 MG/1
1 TABLET ORAL
Qty: 2 EACH | Refills: 3 | Status: SHIPPED | OUTPATIENT
Start: 2025-08-11 | End: 2025-09-10

## 2025-08-15 ENCOUNTER — TELEPHONE (OUTPATIENT)
Dept: FAMILY MEDICINE CLINIC | Facility: CLINIC | Age: 65
End: 2025-08-15
Payer: MEDICARE

## 2025-08-18 DIAGNOSIS — E11.42 TYPE 2 DIABETES MELLITUS WITH PERIPHERAL NEUROPATHY: ICD-10-CM

## 2025-08-18 DIAGNOSIS — M25.512 LEFT ANTERIOR SHOULDER PAIN: Primary | ICD-10-CM

## (undated) DEVICE — MYNXGRIP 6F/7F: Brand: MYNXGRIP

## (undated) DEVICE — PACEMAKER CDS: Brand: MEDLINE INDUSTRIES, INC.

## (undated) DEVICE — RADIFOCUS OBTURATOR: Brand: RADIFOCUS

## (undated) DEVICE — PENCL HND ROCKRSWTCH HOLSTR EZ CLEAN TP CRD 10FT

## (undated) DEVICE — SUT SILK 2/0 SH CR8 18IN CR8 C012D

## (undated) DEVICE — GW PTFE EMERALD HEPCOAT FC J TIP STD .035 3MM 150CM

## (undated) DEVICE — ANTIBACTERIAL UNDYED BRAIDED (POLYGLACTIN 910), SYNTHETIC ABSORBABLE SUTURE: Brand: COATED VICRYL

## (undated) DEVICE — CATH DIAG IMPULSE FL4 6F 100CM

## (undated) DEVICE — ST ACC MICROPUNCTURE STFF/CANN PLAT/TP 4F 21G 40CM

## (undated) DEVICE — CABL BIPOL W/ALLGTR CLIP/SM 12FT

## (undated) DEVICE — VIOLET BRAIDED (POLYGLACTIN 910), SYNTHETIC ABSORBABLE SUTURE: Brand: COATED VICRYL

## (undated) DEVICE — ELECTRD DEFIB M/FUNC PROPADZ RADIOL 2PK

## (undated) DEVICE — CATH DIAG IMPULSE PIG .056 6F 110CM

## (undated) DEVICE — INTRO SHEATH PRELUDE SNAP .038 8.5F 13CM W/SDPRT LT/BLU

## (undated) DEVICE — PINNACLE INTRODUCER SHEATH: Brand: PINNACLE

## (undated) DEVICE — STAPLER, SKIN, 35W, A: Brand: MEDLINE INDUSTRIES, INC.

## (undated) DEVICE — COVER,LIGHT HANDLE,FLX,1/PK: Brand: MEDLINE INDUSTRIES, INC.

## (undated) DEVICE — CATH DIAG IMPULSE FR4 6F 100CM

## (undated) DEVICE — COVER,TABLE,44X90,STERILE: Brand: MEDLINE

## (undated) DEVICE — 3M™ IOBAN™ 2 ANTIMICROBIAL INCISE DRAPE 6650EZ: Brand: IOBAN™ 2

## (undated) DEVICE — PK TRY HEART CATH 50